# Patient Record
Sex: FEMALE | Race: WHITE | NOT HISPANIC OR LATINO | Employment: OTHER | URBAN - METROPOLITAN AREA
[De-identification: names, ages, dates, MRNs, and addresses within clinical notes are randomized per-mention and may not be internally consistent; named-entity substitution may affect disease eponyms.]

---

## 2017-03-03 ENCOUNTER — TELEPHONE (OUTPATIENT)
Dept: NEUROLOGY | Facility: CLINIC | Age: 42
End: 2017-03-03

## 2017-03-03 NOTE — TELEPHONE ENCOUNTER
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  Pharmacy #[phone]  Pharmacy [phone]

## 2017-03-03 NOTE — TELEPHONE ENCOUNTER
Prior Authorization Retail Medication Request  Medication/Dose: LAMICTAL 100 MG tablet  Diagnosis and ICD code: Poorly controlled generalized convulsive epilepsy with intractable epilepsy (H) [G40.311]   New/Renewal/Insurance Change PA: Renewal  Previously Tried and Failed Therapies: See Chart    Insurance ID (if provided):   Insurance Phone (if provided):     Any additional info from fax request:     If you received a fax notification from an outside Pharmacy:  Pharmacy Name:[facility].  Pharmacy #[phone]  Pharmacy [phone]

## 2017-03-03 NOTE — TELEPHONE ENCOUNTER
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  Pharmacy #[phone]  Pharmacy [phone]

## 2017-03-13 NOTE — TELEPHONE ENCOUNTER
[facility] Prior Authorization Team   Phone: [phone]  Fax: [phone]    PA Initiation    Medication: LAMICTAL 100 MG tablet  Insurance Company: [employer] Part D - Phone [phone] Fax [phone]  Pharmacy [facility]illing the Rx: [facility] [address]  Filling Pharmacy Phone: [phone]  Filling Pharmacy Fax:    Start Date: 3/13/2017

## 2017-03-13 NOTE — TELEPHONE ENCOUNTER
[facility] Prior Authorization Team   Phone: [phone]  Fax: [phone]    PA Initiation    Medication: FELBATOL 400 MG tablet  Insurance Company: [employer] Part D - Phone [phone] Fax [phone]  Pharmacy [facility]illing the Rx: [facility] [address]  Filling Pharmacy Phone: [phone]  Filling Pharmacy Fax:    Start Date: 3/13/2017

## 2017-03-13 NOTE — TELEPHONE ENCOUNTER
[facility] Prior Authorization Team   Phone: [phone]  Fax: [phone]    PA Initiation    Medication: DEPAKOTE SPRINKLES 125 MG capsule  Insurance Company: [employer] Part D - Phone [phone] Fax [phone]  Pharmacy [facility]illing the Rx: [facility] [place], [place] - [address] [place] [address]  Filling Pharmacy Phone: [phone]  Filling Pharmacy Fax:    Start Date: 3/13/2017

## 2017-03-14 NOTE — TELEPHONE ENCOUNTER
Prior Authorization Approval    Authorization Effective Date: 12/13/2016  Authorization Expiration Date: 3/13/2018  Medication: DEPAKOTE SPRINKLES 125 MG capsule - Approved  Approved Dose/Quantity:   Reference #:     Insurance Company: [employer] Part D - Phone [phone] Fax [phone]  Expected CoPay:  $0.00 (patient p/u meds on 2/24/17)       CoPay Card Available:      Foundation Assistance Needed:    Which Pharmacy is filling the prescription (Not needed for infusion/clinic administered): [facility] [place] [facility] [address] [place] [address]  Pharmacy Notified: Yes  Patient Notified: Yes

## 2017-03-14 NOTE — TELEPHONE ENCOUNTER
Prior Authorization Approval    Authorization Effective Date: 12/13/2016  Authorization Expiration Date: 3/13/2018  Medication: LAMICTAL 100 MG tablet- Approved  Approved Dose/Quantity:   Reference #:     Insurance Company: [employer] Part D - Phone [phone] Fax [phone]  Expected CoPay: $0.00 (patient p/u meds on 2/24/17)      CoPay Card Available:      Foundation Assistance Needed:    Which Pharmacy is filling the prescription (Not needed for infusion/clinic administered): [email or web address] [place] [facility] [address] [place] [address]  Pharmacy Notified: Yes  Patient Notified: Yes

## 2017-03-14 NOTE — TELEPHONE ENCOUNTER
Prior Authorization Approval    Authorization Effective Date: 12/13/2016  Authorization Expiration Date: 3/13/2018  Medication: FELBATOL 400 MG tablet- APPROVED  Approved Dose/Quantity:   Reference #:     Insurance Company: [employer] Part D - Phone [phone] Fax [phone]  Expected CoPay: $0.00 (patient p/u meds on 2/24/17)     CoPay Card Available:      Foundation Assistance Needed:    Which Pharmacy is filling the prescription (Not needed for infusion/clinic administered): [facility] [place] [place] - [address] [place] [address]  Pharmacy Notified: Yes  Patient Notified: Yes

## 2017-04-24 ENCOUNTER — TELEPHONE (OUTPATIENT)
Dept: NEUROLOGY | Facility: CLINIC | Age: 42
End: 2017-04-24

## 2017-04-24 DIAGNOSIS — G40.319 GENERALIZED CONVULSIVE EPILEPSY WITH INTRACTABLE EPILEPSY (H): Chronic | ICD-10-CM

## 2017-04-24 RX ORDER — DIVALPROEX SODIUM 125 MG
CAPSULE, DELAYED RELEASE SPRINKLE ORAL
Qty: 392 CAPSULE | Refills: 5 | Status: SHIPPED | OUTPATIENT
Start: 2017-04-24 | End: 2017-10-05

## 2017-05-30 ENCOUNTER — TELEPHONE (OUTPATIENT)
Dept: NEUROLOGY | Facility: CLINIC | Age: 42
End: 2017-05-30

## 2017-05-30 DIAGNOSIS — G40.319 GENERALIZED CONVULSIVE EPILEPSY WITH INTRACTABLE EPILEPSY (H): Chronic | ICD-10-CM

## 2017-05-30 RX ORDER — DIAZEPAM ORAL SOLUTION (CONCENTRATE) 5 MG/ML
SOLUTION ORAL
Qty: 120 ML | Refills: 0 | Status: SHIPPED | OUTPATIENT
Start: 2017-05-30 | End: 2017-10-13

## 2017-10-05 ENCOUNTER — TELEPHONE (OUTPATIENT)
Dept: NEUROLOGY | Facility: CLINIC | Age: 42
End: 2017-10-05

## 2017-10-05 DIAGNOSIS — G40.319: ICD-10-CM

## 2017-10-05 DIAGNOSIS — G40.319 GENERALIZED CONVULSIVE EPILEPSY WITH INTRACTABLE EPILEPSY (H): Chronic | ICD-10-CM

## 2017-10-05 RX ORDER — LAMOTRIGINE 100 MG/1
TABLET ORAL
Qty: 90 TABLET | Refills: 1 | Status: SHIPPED | OUTPATIENT
Start: 2017-10-05 | End: 2017-10-13

## 2017-10-05 RX ORDER — FELBAMATE 400 MG/1
TABLET ORAL
Qty: 224 TABLET | Refills: 1 | Status: SHIPPED | OUTPATIENT
Start: 2017-10-05 | End: 2017-10-13

## 2017-10-05 RX ORDER — DIVALPROEX SODIUM 125 MG
CAPSULE, DELAYED RELEASE SPRINKLE ORAL
Qty: 392 CAPSULE | Refills: 1 | Status: SHIPPED | OUTPATIENT
Start: 2017-10-05 | End: 2017-10-13

## 2017-10-05 NOTE — TELEPHONE ENCOUNTER
----- Message from Ge Interiano CMA sent at 10/5/2017  3:18 PM CDT -----  Regarding: refill  Patient needs a refill on   1. LAMICTAL 100 MG tablet     Taking 1 tab TID  2. FELBATOL 400 MG tablet     Take 3 tab po 8:00 AM and 4:00 PM and 2 tab po hs  3. DEPAKOTE SPRINKLES 125 MG CR capsule  Taking 5 caps AM, 4 caps at 4pm and 5 caps at HS.      Pharmacy: [facility], [address]      RTC date: 10/13

## 2017-10-13 ENCOUNTER — OFFICE VISIT (OUTPATIENT)
Dept: NEUROLOGY | Facility: CLINIC | Age: 42
End: 2017-10-13

## 2017-10-13 VITALS
SYSTOLIC BLOOD PRESSURE: 145 MMHG | DIASTOLIC BLOOD PRESSURE: 77 MMHG | BODY MASS INDEX: 29.79 KG/M2 | WEIGHT: 157.8 LBS | HEIGHT: 61 IN | HEART RATE: 83 BPM

## 2017-10-13 DIAGNOSIS — G40.319: ICD-10-CM

## 2017-10-13 DIAGNOSIS — G40.319 GENERALIZED CONVULSIVE EPILEPSY WITH INTRACTABLE EPILEPSY (H): Chronic | ICD-10-CM

## 2017-10-13 RX ORDER — DIAZEPAM ORAL SOLUTION (CONCENTRATE) 5 MG/ML
SOLUTION ORAL
Qty: 120 ML | Refills: 0 | Status: SHIPPED | OUTPATIENT
Start: 2017-10-13 | End: 2018-02-09

## 2017-10-13 RX ORDER — DIVALPROEX SODIUM 125 MG
CAPSULE, DELAYED RELEASE SPRINKLE ORAL
Qty: 500 CAPSULE | Refills: 3 | Status: SHIPPED | OUTPATIENT
Start: 2017-10-13 | End: 2018-02-09 | Stop reason: SINTOL

## 2017-10-13 RX ORDER — OXCARBAZEPINE 300 MG/1
300 TABLET, FILM COATED ORAL 2 TIMES DAILY
Status: ON HOLD | COMMUNITY
End: 2018-02-26

## 2017-10-13 RX ORDER — FELBAMATE 400 MG/1
TABLET ORAL
Qty: 224 TABLET | Refills: 3 | Status: ON HOLD | OUTPATIENT
Start: 2017-10-13 | End: 2018-02-26

## 2017-10-13 RX ORDER — LAMOTRIGINE 100 MG/1
TABLET ORAL
Qty: 180 TABLET | Refills: 3 | Status: SHIPPED | OUTPATIENT
Start: 2017-10-13 | End: 2018-04-17

## 2017-10-13 RX ORDER — OXCARBAZEPINE 150 MG/1
150 TABLET, FILM COATED ORAL 2 TIMES DAILY
Status: ON HOLD | COMMUNITY
End: 2018-02-26

## 2017-10-13 NOTE — LETTER
10/13/2017       RE: Nella Helms  : 1975   MRN: [id number]      Dear Colleague,    Thank you for referring your patient, Nella Helms, to the [facility] EPILEPSY CARE at [facility]. Please see a copy of my visit note below.    INTERVAL HX:  Stable sz frequency.  Has worsening of sz this summer associated with staph infection. Had drop sz but these resolved. Felbamate 1200 bid plus 800. Needs brand-failed generic FBM.  EPILEPSY HX REVIEWED 10/13/17:   I have been seeing her for at least more than 20 years, since age 19.  She was initially seen at [facility] when she was 15 years old.    First seizure occurred at 6 hours of age.  It was described as color turning dusky, turning head to the right, eyes fixed to the right and no response.  She continued to have intermittent seizure activity and subsequently was transferred to the [facility] where she was hospitalized for 2 weeks.  It is not clear what diagnostic evaluation was done but they decided to discharge her without seizure medications.  Seizures recurred at 9 months of age.  At that time they noticed twitching of her left arm and then twitching of the side of the face.  EEG initially showed hypsarrhythmic pattern.  She was treated with phenobarbital.  Then, she was treated with Tegretol and later Dilantin.  CT scan in  we do not have records.      Seizure types:   In  seizure types were described as sitting, both arms jerk up, her head turns to the right, eyes go to the right and roll up.  These events last 2 minutes and at that time she was having 6 a month.      Second seizure type is described as her arms go up, legs stiffen and she moans.  She can be lowered to the floor and then her arms and legs shake with some erratic breathing and sweating profusely.  These occurred at that time, 3-4 per month.        Seizure type 3 was described as being alert.  Her arms  fly out and her eyes blink.      Seizure type 4 described as stares.      She has had 1 episode of status epilepticus in .      EEG when she was 12 was read as abnormal record by virtue of diffuse theta and delta slowing greater on the right.      Parents report that Nella has always had a left hemiparesis, mostly affecting the left upper extremity with some involvement of her left lower extremity.  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  She has been diagnosed as legally blind as having cortical blindness.      BIRTH HISTORY:  Birth history was that she was the result of a full-term pregnancy that was complicated by maternal high blood pressure and use of Enduron.  Pregnancy ended in a 14 labored and vaginal delivery.  Birth weight 7 pounds 6 ounces.  Apgar scores of 4 at 1 minute and 8 at 5 minutes.      She has had global developmental delay.      Subsequent treatment at [facility] has been quite extensive and will not be reviewed completely here.  Last EEG was 2010 which showed mild to moderate nonspecific diffuse disturbance of cerebral activity and multifocal interictal activity with one complex partial seizure recorded.      In the last year they have described 204 seizures,all given diazepam.  OTHER ISSUES:        She has had behavioral issues throughout.      Bell's palsy in , resolved.      Most of her seizures that the staff is noticing now would be described as brief tonic seizures are brief tonic-clonic seizures.  They are more concerned with the length then the precise observation.  She might be having some minor seizures are not being reported.      CURRENT MEDICATIONS:  Felbatol and Lamictal have really been the best treatment for her.  She has been tried almost all the other seizure medications over time including Depakote, Tegretol, phenobarbital, Dilantin, etc.      SOCIAL HISTORY:  She is living in a group home.      REVIEW OF SYSTEMS:  Through the caregiver is essentially negative.  She has  "not had any upper respiratory infections lately.  Appetite is good.  She has no obvious headaches.   GI and  appear to be functioning normally.      PHYSICAL EXAMINATION:  Blood pressure 145/77, pulse 83, height 5' 1\" (154.9 cm), weight 157 lb 12.8 oz (71.6 kg), unknown if currently breastfeeding.  She is accompanied in clinic today by her mother and her caregiver.  Nella sits quietly throughout the examination, but every once in awhile verbalizes loudly and inappropriately with single words.  Her eyes are rolling.  She is microcephalic, has a small chin. However, when asked about airplane ride, she quickly shows rapifd flight with there hands     Gait is quite abnormal, apractic, but due to poor vision.      Attention span is very short and limited and she does not really follow her discussion.      Coordination is moderately impaired.  However, she is difficult to examine because she does not follow commands.  On observation, she does have difficulty more with the left than the right upper extremity but limb tone is increased bilaterally.        ASSESSMENT:  Her seizure is stable stable. Will not make any med changes at this time. Failed generic ASDs in past. Needs brand. Oxcarb given for behavior by other MD.  PLAN:   1.  RTC 12 months  2.  Levels of ASDs and labs not needed today as she is stable.    Again, thank you for allowing me to participate in the care of your patient.      Sincerely,    Elysia Gomez MD      "

## 2017-10-13 NOTE — PROGRESS NOTES
INTERVAL HX:  Stable sz frequency.  Has worsening of sz this summer associated with staph infection. Had drop sz but these resolved. Felbamate 1200 bid plus 800. Needs brand-failed generic FBM.  EPILEPSY HX REVIEWED 10/13/17:   I have been seeing her for at least more than 20 years, since age 19.  She was initially seen at [facility] when she was 15 years old.    First seizure occurred at 6 hours of age.  It was described as color turning dusky, turning head to the right, eyes fixed to the right and no response.  She continued to have intermittent seizure activity and subsequently was transferred to the [facility] where she was hospitalized for 2 weeks.  It is not clear what diagnostic evaluation was done but they decided to discharge her without seizure medications.  Seizures recurred at 9 months of age.  At that time they noticed twitching of her left arm and then twitching of the side of the face.  EEG initially showed hypsarrhythmic pattern.  She was treated with phenobarbital.  Then, she was treated with Tegretol and later Dilantin.  CT scan in 1983 we do not have records.      Seizure types:   In 1991 seizure types were described as sitting, both arms jerk up, her head turns to the right, eyes go to the right and roll up.  These events last 2 minutes and at that time she was having 6 a month.      Second seizure type is described as her arms go up, legs stiffen and she moans.  She can be lowered to the floor and then her arms and legs shake with some erratic breathing and sweating profusely.  These occurred at that time, 3-4 per month.        Seizure type 3 was described as being alert.  Her arms fly out and her eyes blink.      Seizure type 4 described as stares.      She has had 1 episode of status epilepticus in 1978.      EEG when she was 12 was read as abnormal record by virtue of diffuse theta and delta slowing greater on the right.      Parents report that  "Nella has always had a left hemiparesis, mostly affecting the left upper extremity with some involvement of her left lower extremity.  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  She has been diagnosed as legally blind as having cortical blindness.      BIRTH HISTORY:  Birth history was that she was the result of a full-term pregnancy that was complicated by maternal high blood pressure and use of Enduron.  Pregnancy ended in a 14 labored and vaginal delivery.  Birth weight 7 pounds 6 ounces.  Apgar scores of 4 at 1 minute and 8 at 5 minutes.      She has had global developmental delay.      Subsequent treatment at [facility] has been quite extensive and will not be reviewed completely here.  Last EEG was 2010 which showed mild to moderate nonspecific diffuse disturbance of cerebral activity and multifocal interictal activity with one complex partial seizure recorded.      In the last year they have described 204 seizures,all given diazepam.  OTHER ISSUES:        She has had behavioral issues throughout.      Bell's palsy in , resolved.      Most of her seizures that the staff is noticing now would be described as brief tonic seizures are brief tonic-clonic seizures.  They are more concerned with the length then the precise observation.  She might be having some minor seizures are not being reported.      CURRENT MEDICATIONS:  Felbatol and Lamictal have really been the best treatment for her.  She has been tried almost all the other seizure medications over time including Depakote, Tegretol, phenobarbital, Dilantin, etc.      SOCIAL HISTORY:  She is living in a group home.      REVIEW OF SYSTEMS:  Through the caregiver is essentially negative.  She has not had any upper respiratory infections lately.  Appetite is good.  She has no obvious headaches.   GI and  appear to be functioning normally.      PHYSICAL EXAMINATION:  Blood pressure 145/77, pulse 83, height 5' 1\" (154.9 cm), weight 157 lb 12.8 oz (71.6 kg), unknown " if currently breastfeeding.  She is accompanied in clinic today by her mother and her caregiver.  Nella sits quietly throughout the examination, but every once in awhile verbalizes loudly and inappropriately with single words.  Her eyes are rolling.  She is microcephalic, has a small chin. However, when asked about airplane ride, she quickly shows rapifd flight with there hands     Gait is quite abnormal, apractic, but due to poor vision.      Attention span is very short and limited and she does not really follow her discussion.      Coordination is moderately impaired.  However, she is difficult to examine because she does not follow commands.  On observation, she does have difficulty more with the left than the right upper extremity but limb tone is increased bilaterally.        ASSESSMENT:  Her seizure is stable stable. Will not make any med changes at this time. Failed generic ASDs in past. Needs brand. Oxcarb given for behavior by other MD.  PLAN:   1.  RTC 12 months  2.  Levels of ASDs and labs not needed today as she is stable.

## 2017-10-13 NOTE — MR AVS SNAPSHOT
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      Who to contact     Please call your clinic at [phone] to:    Ask questions about your health    Make or cancel appointments    Discuss your medicines    Learn about your test results    Speak to your doctor   If you have compliments or concerns about an experience at your clinic, or if you wish to file a complaint, please contact [facility] Patient Relations at [phone] or email us at [email or web address]         Additional Information About Your Visit        MyChart Information     Essential Viewing is an electronic gateway that provides easy, online access to your medical records. With Essential Viewing, you can request a clinic appointment, read your test results, renew a prescription or communicate with your care team.     To sign up for Essential Viewing visit the website at [email or web address]   You will be asked to enter the access code listed below, as well as some personal information. Please follow the directions to create your username and password.     Your access code is: [email or web address]  Expires: 2018  3:41 PM     Your access code will  in 90 days. If you need help or a new code, please contact your [facility] Clinic or call [phone] for assistance.        Care EveryWhere ID     This is your Care EveryWhere ID. This could be used by other organizations to access your [facility]  "medical records  [id number]        Your Vitals Were     Pulse Height Breastfeeding? BMI (Body Mass Index)          83 5' 1\" (154.9 cm) Unknown 29.82 kg/m2         Blood Pressure from Last 3 Encounters:   10/13/17 145/77   10/20/16 (!) 141/97   10/19/15 (!) 160/100    Weight from Last 3 Encounters:   10/13/17 157 lb 12.8 oz (71.6 kg)   10/20/16 149 lb (67.6 kg)   10/19/15 150 lb 6.4 oz (68.2 kg)              Today, you had the following     No orders found for display         Where to get your medicines      These medications were sent to [facility] [address] [address]     Phone:  [phone]     DEPAKOTE SPRINKLES 125 MG CR capsule    FELBATOL 400 MG tablet    LAMICTAL 100 MG tablet         Some of these will need a paper prescription and others can be bought over the counter.  Ask your nurse if you have questions.     Bring a paper prescription for each of these medications     diazepam 5 MG/ML (HIGH CONC) solution          Primary Care Provider Office Phone # Fax #    Monet Rodrigues [phone]       [facility] [address] [facility] [address]        Equal Access to Services     KATHRYN BROCK AH: Hadii arslan ku hadasho Soomaali, waaxda luqadaha, qaybta kaalmada adeegyada, sahara durbinin hayhalima sparsk . So [facility] [phone].    ATENCIÓN: Si habla español, tiene a singh disposición servicios gratuitos de asistencia lingüística. Llame al [phone].    We comply with applicable federal civil rights laws and [place] laws. We do not discriminate on the basis of race, color, national origin, age, disability, sex, sexual orientation, or gender identity.            Thank you!     Thank you for choosing [facility] EPILEPSY [facility]  for your care. Our goal is always to provide you with excellent care. Hearing back from our patients is one way we can continue to improve our services. Please take a few minutes " to complete the written survey that you may receive in the mail after your visit with us. Thank you!             Your Updated Medication List - Protect others around you: Learn how to safely use, store and throw away your medicines at www.disposemymeds.org.          This list is accurate as of: 10/13/17  3:41 PM.  Always use your most recent med list.                   Brand Name Dispense Instructions for use Diagnosis    COLACE 100 MG capsule   Generic drug:  docusate sodium      Take by mouth 2 times daily        DEPAKOTE SPRINKLES 125 MG CR capsule   Generic drug:  divalproex     500 capsule    Take 5 capsules po in am and at bedtime. Take 4 capsules po at 4 pm. 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 WHEN ADMINISTERED NOTIFY PC, RN & PD.    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    Poorly controlled generalized convulsive epilepsy with intractable epilepsy (H)       MULTIVITAMINS PO      Take by mouth daily        * OXcarbazepine 300 MG tablet    TRILEPTAL     Take 300 mg by mouth 2 times daily Take 1 tablet along with the 150 mg tablet 2 times daily (450 mg BID)        * OXcarbazepine 150 MG tablet    TRILEPTAL     Take 150 mg by mouth 2 times daily Take 1 tablet along with the 300 mg tablet 2 times daily (450 mg BID)        RISPERDAL 2 MG tablet   Generic drug:  risperiDONE      4 mg bid, 2 mg hs        traZODone 100 MG tablet    DESYREL     1 tablet 8am. 2 tablets noon, 2 tablets  4pm, 2 tablets 8pm        VITAMIN E      2 times daily        * Notice:  This list has 2 medication(s) that are the same as other medications prescribed for you. Read the directions carefully, and ask your doctor or other care provider to review them with you.

## 2017-10-18 ENCOUNTER — TELEPHONE (OUTPATIENT)
Dept: NEUROLOGY | Facility: CLINIC | Age: 42
End: 2017-10-18

## 2017-10-18 NOTE — TELEPHONE ENCOUNTER
Received a request to update patient's seizure protocol on 10/18/17. I put forms in nurse's folder to be completed.     Berta Rosado CMA

## 2017-11-26 ENCOUNTER — HEALTH MAINTENANCE LETTER (OUTPATIENT)
Age: 42
End: 2017-11-26

## 2018-01-08 ENCOUNTER — TELEPHONE (OUTPATIENT)
Dept: NEUROLOGY | Facility: CLINIC | Age: 43
End: 2018-01-08

## 2018-01-08 DIAGNOSIS — G40.319 GENERALIZED CONVULSIVE EPILEPSY WITH INTRACTABLE EPILEPSY (H): Primary | ICD-10-CM

## 2018-01-08 NOTE — TELEPHONE ENCOUNTER
"Patient has been seen in ED recently with falls and was told it was her Trileptal that was causing her difficulty.  It was reduced on 2 different occasions and then patient had 2 \"Grand - Mal\" seizures the last being yesterday.  Patients Trileptal was then increased back to original dose and caller is asking for a blood level to be done.    Nurse indicated he would send order for lab draw to patient's local clinic for a blood draw in one week and asked for a call back with any further seizure or other difficulties.      "

## 2018-01-16 ENCOUNTER — TRANSFERRED RECORDS (OUTPATIENT)
Dept: HEALTH INFORMATION MANAGEMENT | Facility: CLINIC | Age: 43
End: 2018-01-16

## 2018-01-18 LAB — OXCARBAZEPINE: 12.1 UG/ML (ref 10–35)

## 2018-01-19 DIAGNOSIS — G40.319 GENERALIZED CONVULSIVE EPILEPSY WITH INTRACTABLE EPILEPSY (H): ICD-10-CM

## 2018-01-24 ENCOUNTER — TELEPHONE (OUTPATIENT)
Dept: NEUROLOGY | Facility: CLINIC | Age: 43
End: 2018-01-24

## 2018-01-24 DIAGNOSIS — G40.319 GENERALIZED CONVULSIVE EPILEPSY WITH INTRACTABLE EPILEPSY (H): Primary | ICD-10-CM

## 2018-01-24 NOTE — TELEPHONE ENCOUNTER
Received call from  nurse who indicates thart patient has again having side effects of unsteadiness and knees buckling.   nurse also notes that she is on trazodone also and believes these two meds are interacting, and that both of these are prescribed by psychiatry.    When this nurse indicated that this issue needs to be addressed by psychiatry, she told me that the psychiatrist had a stroke and now they can not get her in until March.  This nurse than indicated that patient would have to be taken to urgent care or ED, and she is agreeable with this, but also ask for orders for blood draws again I I agreed to send orders to [facility].

## 2018-01-25 ENCOUNTER — TRANSFERRED RECORDS (OUTPATIENT)
Dept: HEALTH INFORMATION MANAGEMENT | Facility: CLINIC | Age: 43
End: 2018-01-25

## 2018-01-31 LAB
FELBAMATE LEVEL: 60 UG/ML (ref 40–100)
LAMOTRIGINE SERPL-MCNC: 6.3 UG/ML (ref 3–15)
OXCARBAZEPINE: 13 UG/ML (ref 10–35)
TRAZODONE LEVEL: 397 NG/ML (ref 800–1600)
VALPROIC ACID FREE: <6 UG/ML (ref 6–20)
VALPROIC ACID TOTAL: 76.1 UG/ML (ref 50–100)

## 2018-02-05 DIAGNOSIS — G40.319 GENERALIZED CONVULSIVE EPILEPSY WITH INTRACTABLE EPILEPSY (H): ICD-10-CM

## 2018-02-07 ENCOUNTER — TELEPHONE (OUTPATIENT)
Dept: NEUROLOGY | Facility: CLINIC | Age: 43
End: 2018-02-07

## 2018-02-07 NOTE — TELEPHONE ENCOUNTER
Nurse received In-Basket message as follows:    Caller: Alina Shoemaker     Relationship to Patient:      Call Back Number: [phone]     Reason for Call: Would like to discuss changing the patient's medications. Please give her a call back. Thanks.     Nurse returned call to Alina who indicates that patient has had a lot of back and forth with her OXC, there have been decreases with greatly improved physical function including better balance and fewer falls.  Patient has also had 2 Grand - Mal seizure on decreased dose, and OXC is now  back at previous dose once again.  Patient is also once again falling, getting hurt, drooling, and currently wheelchair bound unable to walk.  Patient does not currently have a follow up appointment, but Alina is going to get something set up.    Nurse called RUY  and ask for urgency on appointment, appointment was arranged for 3:30 pm Friday ( 2/9/18 ), returned call to Alina who indicates patient can and will be there.

## 2018-02-09 ENCOUNTER — OFFICE VISIT (OUTPATIENT)
Dept: NEUROLOGY | Facility: CLINIC | Age: 43
End: 2018-02-09
Payer: MEDICARE

## 2018-02-09 VITALS — HEIGHT: 61 IN | TEMPERATURE: 98.2 F

## 2018-02-09 DIAGNOSIS — G40.319 GENERALIZED CONVULSIVE EPILEPSY WITH INTRACTABLE EPILEPSY (H): Chronic | ICD-10-CM

## 2018-02-09 RX ORDER — DIAZEPAM ORAL SOLUTION (CONCENTRATE) 5 MG/ML
SOLUTION ORAL
Qty: 120 ML | Refills: 3 | Status: SHIPPED | OUTPATIENT
Start: 2018-02-09 | End: 2018-12-28

## 2018-02-09 NOTE — LETTER
2018       RE: Nella Helms  : 1975   MRN: [id number]      Dear Colleague,    Thank you for referring your patient, Nella Helms, to the [facility] at [facility]. Please see a copy of my visit note below.    INTERVAL HX:  Since last visit has had marked deterioration. Had a GTSz that did not respond to erecue meds. Taken to ER but sz over by then Has deteriorated in terms of neuro function. In wheelchair since Monday. Increasing tremors; decreased interactions and alertness. Less loud. Mom  a few weeks ago. Dad in hospital for cardiac surgery. Brother, who is guardian came today. MAJOR ISSUE Platelets dropped to 55 on 18 from 158 on 18 !! Other labs close to ok.   .EPILEPSY HX REVIEWED 18:   I have been seeing her f since she was  age 19.  She was initially seen at [facility] when she was 15 years old.    First seizure occurred at 6 hours of age.  It was described as color turning dusky, turning head to the right, eyes fixed to the right and no response.  She continued to have intermittent seizure activity and subsequently was transferred to the [facility] where she was hospitalized for 2 weeks.  It is not clear what diagnostic evaluation was done but they decided to discharge her without seizure medications.  Seizures recurred at 9 months of age.  At that time they noticed twitching of her left arm and then twitching of the side of the face.  EEG initially showed hypsarrhythmic pattern.  She was treated with phenobarbital.  Then, she was treated with Tegretol and later Dilantin.  CT scan in  we do not have records.      Seizure types:   In  seizure types were described as sitting, both arms jerk up, her head turns to the right, eyes go to the right and roll up.  These events last 2 minutes and at that time she was having 6 a month.      Second seizure type is described as her arms go  up, legs stiffen and she moans.  She can be lowered to the floor and then her arms and legs shake with some erratic breathing and sweating profusely.  These occurred at that time, 3-4 per month.        Seizure type 3 was described as being alert.  Her arms fly out and her eyes blink.      Seizure type 4 described as stares.      She has had 1 episode of status epilepticus in .      EEG when she was 12 was read as abnormal record by virtue of diffuse theta and delta slowing greater on the right.      Parents report that Nella has always had a left hemiparesis, mostly affecting the left upper extremity with some involvement of her left lower extremity.  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  She has been diagnosed as legally blind as having cortical blindness.      BIRTH HISTORY:  Birth history was that she was the result of a full-term pregnancy that was complicated by maternal high blood pressure and use of Enduron.  Pregnancy ended in a 14 labored and vaginal delivery.  Birth weight 7 pounds 6 ounces.  Apgar scores of 4 at 1 minute and 8 at 5 minutes.      She has had global developmental delay.      Subsequent treatment at [facility] has been quite extensive and will not be reviewed completely here.  Last EEG was 2010 which showed mild to moderate nonspecific diffuse disturbance of cerebral activity and multifocal interictal activity with one complex partial seizure recorded.      In the last year they have described 204 seizures,all given diazepam.  OTHER ISSUES:        She has had behavioral issues throughout.      Bell's palsy in , resolved.      Most of her seizures that the staff is noticing now would be described as brief tonic seizures are brief tonic-clonic seizures.  They are more concerned with the length then the precise observation.  She might be having some minor seizures are not being reported.      CURRENT MEDICATIONS:  Felbatol and Lamictal have really been the best treatment for her.  She has  "been tried almost all the other seizure medications over time including Depakote, Tegretol, phenobarbital, Dilantin, etc.      SOCIAL HISTORY:  She is living in a group home.      REVIEW OF SYSTEMS:  Through the caregiver is essentially negative.  She has not had any upper respiratory infections lately.  Appetite is good.  She has no obvious headaches.   GI and  appear to be functioning normally.      PHYSICAL EXAMINATION:  Temperature 98.2  F (36.8  C), temperature source Temporal, height 5' 1\" (154.9 cm), unknown if currently breastfeeding. Erythematous right face  She is accompanied in clinic today by her brother and her caregiver.  Nella sits quietly throughout the examination, but every once in awhile verbalizes loudly and inappropriately with single words.  Her eyes are rolling.  She is microcephalic, has a small chin. Less communicative than in past.   Gait : in wheelchair      Attention span is very short and limited and she does not really follow her discussion.      Coordination is moderately impaired.  However, she is difficult to examine because she does not follow commands.  On observation, she does have difficulty more with the left than the right upper extremity but limb tone is increased bilaterally.        ASSESSMENT:  Marked deterioration. Very low platelets. 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 I spoke with Dr Stella Rodrigues and warned her that we are D/Cing VPA and she may have more seizures requiring hospitalization. Also discussed SUDEP with brother. Had ASD levels drawn recently- all within usual range, so I believe neurological deteriorration is source of worsening functioning. More than 50% of 40 min visit in counseling re severity of condiotion and coordinating care with Dr. Rodrigues.  PLAN  1.  D/C valproate  2. Repeat CBC 5 days  3.RTC 2 weeks for re-evaluation of meds.      Sincerely,    Elysia Gomez MD      "

## 2018-02-09 NOTE — MR AVS SNAPSHOT
"              After Visit Summary   2018    Nella Helms    MRN: [id number]           Patient Information     Date Of Birth          1975        Visit Information        Provider Department      2018 3:30 PM Elysia Gomez MD [facility]         Follow-ups after your visit        Your next 10 appointments already scheduled     2018  3:00 PM CST   Return Visit with MD [facility]    [address], [address]  [facility] [address] [phone]              Who to contact     Please call your clinic at [phone] to:    Ask questions about your health    Make or cancel appointments    Discuss your medicines    Learn about your test results    Speak to your doctor            Additional Information About Your Visit        MyChart Information     PlayJam is an electronic gateway that provides easy, online access to your medical records. With PlayJam, you can request a clinic appointment, read your test results, renew a prescription or communicate with your care team.     To sign up for [employer] visit the website at [email or web address]   You will be asked to enter the access code listed below, as well as some personal information. Please follow the directions to create your username and password.     Your access code is: [email or web address]  Expires: 5/10/2018  4:21 PM     Your access code will  in 90 days. If you need help or a new code, please contact your [facility] or call [phone] for assistance.        Care EveryWhere ID     This is your Care EveryWhere ID. This could be used by other organizations to access your [facility] medical records  [id number]        Your Vitals Were     Temperature Height                98.2  F (36.8  C) (Temporal) 5' 1\" (154.9 cm)           Blood Pressure from Last 3 Encounters:   10/13/17 145/77   10/20/16 (!) 141/97   10/19/15 (!) 160/100    Weight from " Last 3 Encounters:   10/13/17 157 lb 12.8 oz (71.6 kg)   10/20/16 149 lb (67.6 kg)   10/19/15 150 lb 6.4 oz (68.2 kg)              Today, you had the following     No orders found for display       Primary Care Provider Office Phone # Fax #    Bryan Fritsch [phone]       [facility] [address]  [facility] [address]        Equal Access to Services     JE BROCK : Hadii aad ku hadasho Soomaali, waaxda luqadaha, qaybta kaalmada adeegyada, waxay idiin hayaan adeeg kharash la'halima . So [facility] [phone].    ATENCIÓN: Si habla español, tiene a singh disposición servicios gratuitos de asistencia lingüística. [facility] [phone].    We comply with applicable federal civil rights laws and [place] laws. We do not discriminate on the basis of race, color, national origin, age, disability, sex, sexual orientation, or gender identity.            Thank you!     Thank you for choosing [facility]  for your care. Our goal is always to provide you with excellent care. Hearing back from our patients is one way we can continue to improve our services. Please take a few minutes to complete the written survey that you may receive in the mail after your visit with us. Thank you!             Your Updated Medication List - Protect others around you: Learn how to safely use, store and throw away your medicines at www.disposemymeds.org.          This list is accurate as of 2/9/18  4:21 PM.  Always use your most recent med list.                   Brand Name Dispense Instructions for use Diagnosis    COLACE 100 MG capsule   Generic drug:  docusate sodium      Take by mouth 2 times daily        DEPAKOTE SPRINKLES 125 MG DR capsule   Generic drug:  divalproex sodium delayed-release     500 capsule    Take 5 capsules po in am and at bedtime. Take 4 capsules po at 4 pm. 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 WHEN ADMINISTERED NOTIFY PC, RN & PD.    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    Poorly controlled generalized convulsive epilepsy with intractable epilepsy (H)       MULTIVITAMINS PO      Take by mouth daily        * OXcarbazepine 300 MG tablet    TRILEPTAL     Take 300 mg by mouth 2 times daily Take 1 tablet along with the 150 mg tablet 2 times daily (450 mg BID)        * OXcarbazepine 150 MG tablet    TRILEPTAL     Take 150 mg by mouth 2 times daily Take 1 tablet along with the 300 mg tablet 2 times daily (450 mg BID)        RISPERDAL 2 MG tablet   Generic drug:  risperiDONE      4 mg bid, 2 mg hs        traZODone 100 MG tablet    DESYREL     1 tablet 8am. 2 tablets noon, 2 tablets 4pm, 2 tablets 8pm        VITAMIN E      2 times daily        * Notice:  This list has 2 medication(s) that are the same as other medications prescribed for you. Read the directions carefully, and ask your doctor or other care provider to review them with you.

## 2018-02-09 NOTE — PROGRESS NOTES
INTERVAL HX:  Since last visit has had marked deterioration. Had a GTSz that did not respond to erecue meds. Taken to ER but sz over by then Has deteriorated in terms of neuro function. In wheelchair since Monday. Increasing tremors; decreased interactions and alertness. Less loud. Mom  a few weeks ago. Dad in hospital for cardiac surgery. Brother, who is guardian came today. MAJOR ISSUE Platelets dropped to 55 on 18 from 158 on 18 !! Other labs close to ok.   .EPILEPSY HX REVIEWED 18:   I have been seeing her f since she was  age 19.  She was initially seen at [facility] when she was 15 years old.    First seizure occurred at 6 hours of age.  It was described as color turning dusky, turning head to the right, eyes fixed to the right and no response.  She continued to have intermittent seizure activity and subsequently was transferred to the [facility] where she was hospitalized for 2 weeks.  It is not clear what diagnostic evaluation was done but they decided to discharge her without seizure medications.  Seizures recurred at 9 months of age.  At that time they noticed twitching of her left arm and then twitching of the side of the face.  EEG initially showed hypsarrhythmic pattern.  She was treated with phenobarbital.  Then, she was treated with Tegretol and later Dilantin.  CT scan in  we do not have records.      Seizure types:   In  seizure types were described as sitting, both arms jerk up, her head turns to the right, eyes go to the right and roll up.  These events last 2 minutes and at that time she was having 6 a month.      Second seizure type is described as her arms go up, legs stiffen and she moans.  She can be lowered to the floor and then her arms and legs shake with some erratic breathing and sweating profusely.  These occurred at that time, 3-4 per month.        Seizure type 3 was described as being alert.  Her arms fly out and her  eyes blink.      Seizure type 4 described as stares.      She has had 1 episode of status epilepticus in .      EEG when she was 12 was read as abnormal record by virtue of diffuse theta and delta slowing greater on the right.      Parents report that Nella has always had a left hemiparesis, mostly affecting the left upper extremity with some involvement of her left lower extremity.  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  She has been diagnosed as legally blind as having cortical blindness.      BIRTH HISTORY:  Birth history was that she was the result of a full-term pregnancy that was complicated by maternal high blood pressure and use of Enduron.  Pregnancy ended in a 14 labored and vaginal delivery.  Birth weight 7 pounds 6 ounces.  Apgar scores of 4 at 1 minute and 8 at 5 minutes.      She has had global developmental delay.      Subsequent treatment at [facility] has been quite extensive and will not be reviewed completely here.  Last EEG was 2010 which showed mild to moderate nonspecific diffuse disturbance of cerebral activity and multifocal interictal activity with one complex partial seizure recorded.      In the last year they have described 204 seizures,all given diazepam.  OTHER ISSUES:        She has had behavioral issues throughout.      Bell's palsy in , resolved.      Most of her seizures that the staff is noticing now would be described as brief tonic seizures are brief tonic-clonic seizures.  They are more concerned with the length then the precise observation.  She might be having some minor seizures are not being reported.      CURRENT MEDICATIONS:  Felbatol and Lamictal have really been the best treatment for her.  She has been tried almost all the other seizure medications over time including Depakote, Tegretol, phenobarbital, Dilantin, etc.      SOCIAL HISTORY:  She is living in a group home.      REVIEW OF SYSTEMS:  Through the caregiver is essentially negative.  She has not had any  "upper respiratory infections lately.  Appetite is good.  She has no obvious headaches.   GI and  appear to be functioning normally.      PHYSICAL EXAMINATION:  Temperature 98.2  F (36.8  C), temperature source Temporal, height 5' 1\" (154.9 cm), unknown if currently breastfeeding. Erythematous right face  She is accompanied in clinic today by her brother and her caregiver.  Nella sits quietly throughout the examination, but every once in awhile verbalizes loudly and inappropriately with single words.  Her eyes are rolling.  She is microcephalic, has a small chin. Less communicative than in past.   Gait : in wheelchair      Attention span is very short and limited and she does not really follow her discussion.      Coordination is moderately impaired.  However, she is difficult to examine because she does not follow commands.  On observation, she does have difficulty more with the left than the right upper extremity but limb tone is increased bilaterally.        ASSESSMENT:  Marked deterioration. Very low platelets. 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 I spoke with Dr Stella Rodrigues and warned her that we are D/Cing VPA and she may have more seizures requiring hospitalization. Also discussed SUDEP with brother. Had ASD levels drawn recently- all within usual range, so I believe neurological deteriorration is source of worsening functioning. More than 50% of 40 min visit in counseling re severity of condiotion and coordinating care with Dr. Rodrigues.  PLAN  1.  D/C valproate  2. Repeat CBC 5 days  3.RTC 2 weeks for re-evaluation of meds.  "

## 2018-02-14 ENCOUNTER — TELEPHONE (OUTPATIENT)
Dept: NEUROLOGY | Facility: CLINIC | Age: 43
End: 2018-02-14

## 2018-02-14 ENCOUNTER — TRANSFERRED RECORDS (OUTPATIENT)
Dept: HEALTH INFORMATION MANAGEMENT | Facility: CLINIC | Age: 43
End: 2018-02-14

## 2018-02-14 DIAGNOSIS — G40.319 GENERALIZED CONVULSIVE EPILEPSY WITH INTRACTABLE EPILEPSY (H): Primary | ICD-10-CM

## 2018-02-14 DIAGNOSIS — G40.319: ICD-10-CM

## 2018-02-14 NOTE — TELEPHONE ENCOUNTER
Staff wondering if AED blood levels are to be checked today.  Patient is getting blood drawn locally at 4pm.  [facility].    Patient had VPA discontinued 5 days ago  Currently taking OXC LUX FBM  Had a cluster of seizures broken with 2 doses of DZP.    Discontinuation of VPA may have affected levels of other AEDs  Order for AED levels placed, to be sent to local clinic for blood draw today

## 2018-02-14 NOTE — TELEPHONE ENCOUNTER
Incoming call from caregiver Alina. Nella was taken off Depakote per direction of MD.Caregiver Alina was told that seizures may occur. Diazepam is on hand.    This morning Nella had multiple seizures that lasted a total of 10 minutes. During this time she had several cluster seizures. Caregiver reports up to 50 cluster seizures during the 10 minute time frame. No motor seizures have occurred.    Seizures described as : Eyes rolled back, she was aware of the seizure occurring but was unable to speak. Two doses of Diazepam were administered so she is currently very groggy. Caregiver would like to know what to do after third dose of Diazepam is administered.     Current/confirmed anticonvulsant  Medications:    OXC (300/150) 450-450  LTG (100) 100-100-100  FBM (400) [phone]

## 2018-02-14 NOTE — TELEPHONE ENCOUNTER
----- Message from Ge Interiano CMA sent at 2/14/2018  2:47 PM CST -----  Regarding: alma  Caller: jason    Relationship to Patient: caregiver    Call Back Number: [phone]    Reason for Call: pt is going in to get her levels check at 4pm today. Would like to know if he needs any AED levels check.

## 2018-02-14 NOTE — TELEPHONE ENCOUNTER
PLAN: Discussed with Dr. Gomez    1) Administer an additional one time dose of Lamotrigine 100 mg now.  2) Repeat platelet count today at 4:00 PM  3) May administer a total of two doses of Diazepam intensol 5 mg/ml in one day. Call [facility] first if a third dose of Diazepam is needed.  4) Call [facility] prior to taking Nella to the ER to determine if a trip to the ER is necessary.    Attention: Alina  FAX to: [phone]

## 2018-02-15 ENCOUNTER — HOSPITAL ENCOUNTER (INPATIENT)
Facility: CLINIC | Age: 43
LOS: 11 days | Discharge: HOME HEALTH | DRG: 813 | End: 2018-02-26
Attending: PSYCHIATRY & NEUROLOGY | Admitting: PSYCHIATRY & NEUROLOGY
Payer: MEDICARE

## 2018-02-15 ENCOUNTER — TELEPHONE (OUTPATIENT)
Dept: NEUROLOGY | Facility: CLINIC | Age: 43
End: 2018-02-15

## 2018-02-15 ENCOUNTER — ALLIED HEALTH/NURSE VISIT (OUTPATIENT)
Dept: NEUROLOGY | Facility: CLINIC | Age: 43
DRG: 813 | End: 2018-02-15
Attending: PSYCHIATRY & NEUROLOGY
Payer: MEDICARE

## 2018-02-15 DIAGNOSIS — R56.9 SEIZURES (H): Primary | ICD-10-CM

## 2018-02-15 DIAGNOSIS — D69.6 THROMBOCYTOPENIA (H): ICD-10-CM

## 2018-02-15 DIAGNOSIS — G40.319 GENERALIZED CONVULSIVE EPILEPSY WITH INTRACTABLE EPILEPSY (H): Primary | Chronic | ICD-10-CM

## 2018-02-15 LAB
ALBUMIN SERPL-MCNC: 3 G/DL (ref 3.4–5)
ALP SERPL-CCNC: 130 U/L (ref 40–150)
ALT SERPL W P-5'-P-CCNC: 65 U/L (ref 0–50)
ANION GAP SERPL CALCULATED.3IONS-SCNC: 4 MMOL/L (ref 3–14)
APTT PPP: 40 SEC (ref 22–37)
AST SERPL W P-5'-P-CCNC: 41 U/L (ref 0–45)
BASOPHILS # BLD AUTO: 0 10E9/L (ref 0–0.2)
BASOPHILS NFR BLD AUTO: 0.2 %
BILIRUB SERPL-MCNC: 0.1 MG/DL (ref 0.2–1.3)
BUN SERPL-MCNC: 7 MG/DL (ref 7–30)
CALCIUM SERPL-MCNC: 9.7 MG/DL (ref 8.5–10.1)
CHLORIDE SERPL-SCNC: 96 MMOL/L (ref 94–109)
CO2 SERPL-SCNC: 32 MMOL/L (ref 20–32)
CREAT SERPL-MCNC: 0.27 MG/DL (ref 0.52–1.04)
DIFFERENTIAL METHOD BLD: ABNORMAL
EOSINOPHIL # BLD AUTO: 0.1 10E9/L (ref 0–0.7)
EOSINOPHIL NFR BLD AUTO: 2.4 %
ERYTHROCYTE [DISTWIDTH] IN BLOOD BY AUTOMATED COUNT: 14.1 % (ref 10–15)
FERRITIN SERPL-MCNC: 280 NG/ML (ref 12–150)
FOLATE SERPL-MCNC: 20.5 NG/ML
GFR SERPL CREATININE-BSD FRML MDRD: >90 ML/MIN/1.7M2
GLUCOSE SERPL-MCNC: 88 MG/DL (ref 70–99)
HCT VFR BLD AUTO: 31.4 % (ref 35–47)
HGB BLD-MCNC: 10.6 G/DL (ref 11.7–15.7)
IMM GRANULOCYTES # BLD: 0 10E9/L (ref 0–0.4)
IMM GRANULOCYTES NFR BLD: 0 %
INR PPP: 0.89 (ref 0.86–1.14)
LDH SERPL L TO P-CCNC: 209 U/L (ref 81–234)
LYMPHOCYTES # BLD AUTO: 1.2 10E9/L (ref 0.8–5.3)
LYMPHOCYTES NFR BLD AUTO: 24.5 %
MCH RBC QN AUTO: 32.6 PG (ref 26.5–33)
MCHC RBC AUTO-ENTMCNC: 33.8 G/DL (ref 31.5–36.5)
MCV RBC AUTO: 97 FL (ref 78–100)
MONOCYTES # BLD AUTO: 0.5 10E9/L (ref 0–1.3)
MONOCYTES NFR BLD AUTO: 11 %
NEUTROPHILS # BLD AUTO: 3.1 10E9/L (ref 1.6–8.3)
NEUTROPHILS NFR BLD AUTO: 61.9 %
NRBC # BLD AUTO: 0 10*3/UL
NRBC BLD AUTO-RTO: 0 /100
PLATELET # BLD AUTO: 21 10E9/L (ref 150–450)
PLATELET # BLD EST: ABNORMAL 10*3/UL
POTASSIUM SERPL-SCNC: 4.4 MMOL/L (ref 3.4–5.3)
PROT SERPL-MCNC: 7.4 G/DL (ref 6.8–8.8)
RBC # BLD AUTO: 3.25 10E12/L (ref 3.8–5.2)
SODIUM SERPL-SCNC: 132 MMOL/L (ref 133–144)
VIT B12 SERPL-MCNC: 1094 PG/ML (ref 193–986)
WBC # BLD AUTO: 4.9 10E9/L (ref 4–11)

## 2018-02-15 PROCEDURE — 82525 ASSAY OF COPPER: CPT | Performed by: PSYCHIATRY & NEUROLOGY

## 2018-02-15 PROCEDURE — A9270 NON-COVERED ITEM OR SERVICE: HCPCS | Mod: GY | Performed by: STUDENT IN AN ORGANIZED HEALTH CARE EDUCATION/TRAINING PROGRAM

## 2018-02-15 PROCEDURE — 83615 LACTATE (LD) (LDH) ENZYME: CPT | Performed by: PSYCHIATRY & NEUROLOGY

## 2018-02-15 PROCEDURE — 95951 ZZHC EEG VIDEO < 12 HR: CPT | Mod: 52,ZF

## 2018-02-15 PROCEDURE — 82607 VITAMIN B-12: CPT | Performed by: PSYCHIATRY & NEUROLOGY

## 2018-02-15 PROCEDURE — 36415 COLL VENOUS BLD VENIPUNCTURE: CPT | Performed by: PSYCHIATRY & NEUROLOGY

## 2018-02-15 PROCEDURE — 12000008 ZZH R&B INTERMEDIATE UMMC

## 2018-02-15 PROCEDURE — 85025 COMPLETE CBC W/AUTO DIFF WBC: CPT | Performed by: PSYCHIATRY & NEUROLOGY

## 2018-02-15 PROCEDURE — 25000132 ZZH RX MED GY IP 250 OP 250 PS 637: Mod: GY | Performed by: STUDENT IN AN ORGANIZED HEALTH CARE EDUCATION/TRAINING PROGRAM

## 2018-02-15 PROCEDURE — 85730 THROMBOPLASTIN TIME PARTIAL: CPT | Performed by: PSYCHIATRY & NEUROLOGY

## 2018-02-15 PROCEDURE — 80053 COMPREHEN METABOLIC PANEL: CPT | Performed by: PSYCHIATRY & NEUROLOGY

## 2018-02-15 PROCEDURE — 83921 ORGANIC ACID SINGLE QUANT: CPT | Performed by: PSYCHIATRY & NEUROLOGY

## 2018-02-15 PROCEDURE — 85610 PROTHROMBIN TIME: CPT | Performed by: PSYCHIATRY & NEUROLOGY

## 2018-02-15 PROCEDURE — 82746 ASSAY OF FOLIC ACID SERUM: CPT | Performed by: PSYCHIATRY & NEUROLOGY

## 2018-02-15 PROCEDURE — 40000141 ZZH STATISTIC PERIPHERAL IV START W/O US GUIDANCE

## 2018-02-15 PROCEDURE — 82728 ASSAY OF FERRITIN: CPT | Performed by: PSYCHIATRY & NEUROLOGY

## 2018-02-15 RX ORDER — LAMOTRIGINE 100 MG/1
100 TABLET ORAL 3 TIMES DAILY
Status: DISCONTINUED | OUTPATIENT
Start: 2018-02-15 | End: 2018-02-26 | Stop reason: HOSPADM

## 2018-02-15 RX ORDER — METHYLPREDNISOLONE SODIUM SUCCINATE 125 MG/2ML
125 INJECTION, POWDER, LYOPHILIZED, FOR SOLUTION INTRAMUSCULAR; INTRAVENOUS ONCE
Status: COMPLETED | OUTPATIENT
Start: 2018-02-15 | End: 2018-02-16

## 2018-02-15 RX ORDER — BISACODYL 10 MG
10 SUPPOSITORY, RECTAL RECTAL DAILY PRN
Status: DISCONTINUED | OUTPATIENT
Start: 2018-02-15 | End: 2018-02-26 | Stop reason: HOSPADM

## 2018-02-15 RX ORDER — FELBAMATE 400 MG/1
800 TABLET ORAL EVERY EVENING
Status: DISCONTINUED | OUTPATIENT
Start: 2018-02-15 | End: 2018-02-16

## 2018-02-15 RX ORDER — MAGNESIUM SULFATE HEPTAHYDRATE 40 MG/ML
4 INJECTION, SOLUTION INTRAVENOUS EVERY 4 HOURS PRN
Status: DISCONTINUED | OUTPATIENT
Start: 2018-02-15 | End: 2018-02-26 | Stop reason: HOSPADM

## 2018-02-15 RX ORDER — NALOXONE HYDROCHLORIDE 0.4 MG/ML
.1-.4 INJECTION, SOLUTION INTRAMUSCULAR; INTRAVENOUS; SUBCUTANEOUS
Status: DISCONTINUED | OUTPATIENT
Start: 2018-02-15 | End: 2018-02-26 | Stop reason: HOSPADM

## 2018-02-15 RX ORDER — ONDANSETRON 4 MG/1
4 TABLET, ORALLY DISINTEGRATING ORAL EVERY 6 HOURS PRN
Status: DISCONTINUED | OUTPATIENT
Start: 2018-02-15 | End: 2018-02-26 | Stop reason: HOSPADM

## 2018-02-15 RX ORDER — ONDANSETRON 2 MG/ML
4 INJECTION INTRAMUSCULAR; INTRAVENOUS EVERY 6 HOURS PRN
Status: DISCONTINUED | OUTPATIENT
Start: 2018-02-15 | End: 2018-02-26 | Stop reason: HOSPADM

## 2018-02-15 RX ORDER — FELBAMATE 400 MG/1
1200 TABLET ORAL 2 TIMES DAILY
Status: DISCONTINUED | OUTPATIENT
Start: 2018-02-16 | End: 2018-02-16

## 2018-02-15 RX ORDER — DOCUSATE SODIUM 100 MG/1
100 CAPSULE, LIQUID FILLED ORAL 2 TIMES DAILY
Status: DISCONTINUED | OUTPATIENT
Start: 2018-02-15 | End: 2018-02-26 | Stop reason: HOSPADM

## 2018-02-15 RX ORDER — POLYETHYLENE GLYCOL 3350 17 G/17G
17 POWDER, FOR SOLUTION ORAL DAILY PRN
Status: DISCONTINUED | OUTPATIENT
Start: 2018-02-15 | End: 2018-02-26 | Stop reason: HOSPADM

## 2018-02-15 RX ORDER — ACETAMINOPHEN 325 MG/1
650 TABLET ORAL EVERY 4 HOURS PRN
Status: DISCONTINUED | OUTPATIENT
Start: 2018-02-15 | End: 2018-02-17

## 2018-02-15 RX ORDER — FELBAMATE 600 MG/1
1200 TABLET ORAL 2 TIMES DAILY
Status: DISCONTINUED | OUTPATIENT
Start: 2018-02-16 | End: 2018-02-15

## 2018-02-15 RX ADMIN — OXCARBAZEPINE 450 MG: 300 TABLET ORAL at 20:56

## 2018-02-15 RX ADMIN — LAMOTRIGINE 100 MG: 100 TABLET ORAL at 20:56

## 2018-02-15 RX ADMIN — FELBAMATE 800 MG: 400 TABLET ORAL at 22:15

## 2018-02-15 RX ADMIN — DOCUSATE SODIUM 100 MG: 100 CAPSULE, LIQUID FILLED ORAL at 20:56

## 2018-02-15 NOTE — IP AVS SNAPSHOT
[address] [address]    Phone:  [phone]                                       After Visit Summary   2/15/2018    Nella Helms    MRN: [id number]           After Visit Summary Signature Page     I have received my discharge instructions, and my questions have been answered. I have discussed any challenges I see with this plan with the nurse or doctor.    ..........................................................................................................................................  Patient/Patient Representative Signature      ..........................................................................................................................................  Patient Representative Print Name and Relationship to Patient    ..................................................               ................................................  Date                                            Time    ..........................................................................................................................................  Reviewed by Signature/Title    ...................................................              ..............................................  Date                                                            Time

## 2018-02-15 NOTE — TELEPHONE ENCOUNTER
Call returned to hospital admission for update regarding admission.  Currently no progress has been made regarding admission    Discussed with . He will contact admissions to try to move things along,  and patient's brother with update

## 2018-02-15 NOTE — TELEPHONE ENCOUNTER
Call placed to Ely to discuss the plan.  Ely will work on arranging transport and will call me back for confirmation

## 2018-02-15 NOTE — IP AVS SNAPSHOT
MRN:[id number]                      After Visit Summary   2/15/2018    Nella Helms    MRN: [id number]           Thank you!     Thank you for choosing [facility] for your care. Our goal is always to provide you with excellent care. Hearing back from our patients is one way we can continue to improve our services. Please take a few minutes to complete the written survey that you may receive in the mail after you visit with us. Thank you!        Patient Information     Date Of Birth          1975        Designated Caregiver       Most Recent Value    Caregiver    Will someone help with your care after discharge? no      About your hospital stay     You were admitted on:  February 15, 2018 You last received care in the:  [address] [facility] [place]    You were discharged on:  February 26, 2018        Reason for your hospital stay       You were hospitalized for low platelets and a need to change your seizure medications.                  Who to Call     For medical emergencies, please call 911.  For non-urgent questions about your medical care, please call your primary care provider or clinic, [phone]          Attending Provider     Provider Specialty    Courtney Matthews MD Neurology    [place], Marina Kowalski MD Neurology    [facility] - Neurology       Primary Care Provider Office Phone # Fax #    Monet Rodrigues [phone]      After Care Instructions     Activity       Your activity upon discharge: activity as tolerated            Diet       Follow this diet upon discharge: Orders Placed This Encounter      Combination Diet Regular Diet Adult                  Follow-up Appointments     Adult [facility] Follow-up and recommended labs and tests       Follow up with primary care provider, Monet Rodrigues MD, within 7 days for hospital follow- up.  The following labs/tests are recommended: weekly CBC.      Follow up with Dr. Gomez , at  (location with clinic name or city) the [facility], within 1-2 months to follow up medication changes.    Appointments on [place] and/or [facility] (with [facility] or [facility] provider or service). Call [phone] if you haven't heard regarding these appointments within 7 days of discharge.                  Additional Services     Home Care Referral       **Order classes of: [place] [facility], [facility] and [facility] will route to the Home Care and Hospice Referral Pool.  Home Care or Hospice will then contact the patient to schedule their appointment.**    If you do not hear from Home Care and Hospice, or you would like to call to schedule, please call the referring place of service that your provider has listed below.  ______________________________________________________________________    Your provider has referred you to: [facility]: [facility] [phone]   [email or web address]    Extended Emergency Contact Information  Primary Emergency Contact: Abdon Helms   [facility]  Home Phone: [phone]  Mobile Phone: [phone]  Relation: Brother  Secondary Emergency Contact: Lesly Garrett   [place]  Mobile Phone: [phone]  Relation: Relative  Father: Cuate Helms    [place]  Mobile Phone: [phone]    Patient Anticipated Discharge Date: 2/26/18   RN, PT, HHA to begin 24 - 48 hours after discharge.  PLEASE EVALUATE AND TREAT (Evaluation timeline is 24 - 48 hrs. Please call if there is need for a variance to this timeline).    REASON FOR REFERRAL: Weekly labs    ADDITIONAL SERVICES NEEDED: none    OTHER PERTINENT INFORMATION: Patient was last seen by provider on 2/26/18 for thrombocytopenia.    Current Outpatient Prescriptions:  lacosamide (VIMPAT) 200 MG TABS tablet, Take 1 tablet (200 mg) by mouth 2 times daily, Disp: 60 tablet, Rfl: 5  levETIRAcetam 1000 MG TABS, Take 1,000 mg by mouth 2 times daily, Disp: 60 tablet, Rfl:  11  OXcarbazepine (TRILEPTAL) 600 MG tablet, Take 1 tablet (600 mg) by mouth 2 times daily, Disp: 60 tablet, Rfl: 11  [START ON 2/27/2018] predniSONE (DELTASONE) 10 MG tablet, Take 1 tablet (10 mg) by mouth daily for 12 days, Disp: 12 tablet, Rfl: 0  [START ON 3/12/2018] predniSONE (DELTASONE) 5 MG tablet, Take 1 tablet (5 mg) by mouth daily for 14 days, Disp: 14 tablet, Rfl: 0      Patient Active Problem List:     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    This encounter with the patient was in whole, or in part, for the following medical condition, which is the primary reason for Home Health Care: Thrombocytopenia.    I certify that, based on my findings, the following services are medically necessary Home Health Services: Nursing    My clinical findings support the need for the above services because: Nurse is needed: Weekly labs.    Further, I certify that my clinical findings support that this patient is homebound (i.e. absences from home require considerable and taxing effort and are for medical reasons or [employer] services or infrequently or of short duration when for other reasons) because: Requires assistance of another person or specialized equipment to access medical services because patient: Is prone to wander/get lost without assistance..    Based on the above findings, I certify that this patient is confined to the home and needs intermittent skilled nursing care, physical therapy and/or speech therapy.  The patient is under my care, and I have initiated the establishment of the plan of care.  This patient will be followed  "by a physician who will periodically review the plan of care.    Physician/Provider to provide follow up care: Monet Rodrigues A    Responsible [place] certified Physician at time of discharge: Mark Washington    Please be aware that coverage of these services is subject to the terms and limitations of your health insurance plan.  Call member services at your health plan with any benefit or coverage questions.                  Future tests that were ordered for you     CBC with platelets  (weekly)      Last Lab Result: Hemoglobin (g/dL)       Date                     Value                 02/26/2018               11.4 (L)         ----------                  Pending Results     No orders found from 2/13/2018 to 2/16/2018.            Statement of Approval     Ordered          02/26/18 1124  I have reviewed and agree with all the recommendations and orders detailed in this document.  EFFECTIVE NOW     Approved and electronically signed by:  Megan Chin MD             Admission Information     Date & Time Provider Department Dept. Phone    2/15/2018 Mark Washington MD [address] [facility] [place] [phone]      Your Vitals Were     Blood Pressure Pulse Temperature Respirations Weight Pulse Oximetry    105/70 72 96.9  F (36.1  C) (Axillary) 18 71.3 kg (157 lb 3.2 oz) 98%    BMI (Body Mass Index)                   29.7 kg/m2           [employer] Information     [employer] lets you send messages to your doctor, view your test results, renew your prescriptions, schedule appointments and more. To sign up, go to [email or web address] . Click on \"Log in\" on the left side of the screen, which will take you to the Welcome page. Then click on \"Sign up Now\" on the right side of the page.     You will be asked to enter the access code listed below, as well as some personal information. Please follow the directions to create your username and password.     Your access code is: [email or web address]  Expires: 5/10/2018  4:21 PM   "   Your access code will  in 90 days. If you need help or a new code, please call your [facility] clinic or [phone].        Care EveryWhere ID     This is your Care EveryWhere ID. This could be used by other organizations to access your [facility] medical records  [id number]        Equal Access to Services     ANN-MARIEJE PERESBERNARD : Hadii arslan ku hadasho Soomaali, waaxda luqadaha, qaybta kaalmada adeegyada, sahara tobardiannastanley conley. So [facility] [phone].    ATENCIÓN: Si habla español, tiene a singh disposición servicios gratuitos de asistencia lingüística. Llame al [phone].    We comply with applicable federal civil rights laws and [place] laws. We do not discriminate on the basis of race, color, national origin, age, disability, sex, sexual orientation, or gender identity.               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)        Dose:  1000 mg   Take 1,000 mg by mouth 2 times daily   Quantity:  60 tablet   Refills:  11       * predniSONE 10 MG tablet   Commonly known as:  DELTASONE        Dose:  10 mg   Start taking on:  2018   Take 1 tablet (10 mg) by mouth daily for 12 days   Quantity:  12 tablet   Refills:  0       * predniSONE 5 MG tablet   Commonly known as:  DELTASONE        Dose:  5 mg   Start taking on:  3/12/2018   Take 1 tablet (5 mg) by mouth daily for 14 days   Quantity:  14 tablet   Refills:  0       * Notice:  This list has 2 medication(s) that are the same as other medications prescribed for you. Read the directions carefully, and ask your doctor or other care provider to review them with you.       CONTINUE these medicines which may have CHANGED, or have new prescriptions. If we are uncertain of the size of tablets/capsules you have at home, strength may be listed as something that might have changed.        Dose / Directions    OXcarbazepine 600 MG tablet   Commonly known as:  TRILEPTAL   This may have changed:    - medication strength  - how much to take  - additional instructions  - Another medication with the same name was removed. Continue taking this medication, and follow the directions you see here.   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 WHEN ADMINISTERED NOTIFY PC, RN & PD.   Quantity:  120 mL   Refills:  3       EYE DROPS AR OP        Dose:  2 drop   Apply 2 drops to eye daily as needed   Refills:  0       LAMICTAL 100 MG tablet   Used for:  Poorly controlled generalized convulsive epilepsy with intractable epilepsy (H)   Generic drug:  lamoTRIgine        TAKE (1) TABLET THREE TIMES DAILY.   Quantity:  180 tablet   Refills:  3       MULTIVITAMINS PO        Take by mouth daily   Refills:  0       RISPERDAL 2 MG tablet   Generic drug:  risperiDONE        4 mg bid, 2 mg hs   Refills:  0       traZODone 100 MG tablet   Commonly known as:  DESYREL        1 tablet 8am. 2 tablets noon, 2 tablets 4pm, 2 tablets 8pm   Refills:  0       VITAMIN E        2 times daily   Refills:  0         STOP taking     FELBATOL 400 MG tablet   Generic drug:  felbamate                Where to get  your medicines      These medications were sent to [facility] - [place], [place] - [address]  [address], [place] [address]     Phone:  [phone]     levETIRAcetam 1000 MG Tabs    OXcarbazepine 600 MG tablet    predniSONE 10 MG tablet    predniSONE 5 MG tablet         Some of these will need a paper prescription and others can be bought over the counter. Ask your nurse if you have questions.     Bring a paper prescription for each of these medications     lacosamide 200 MG Tabs tablet                Protect others around you: Learn how to safely use, store and throw away your medicines at www.disposemymeds.org.             Medication List: This is a list of all your medications and when to take them. Check marks below indicate your daily home schedule. Keep this list as a reference.      Medications           Morning Afternoon Evening Bedtime As Needed    COLACE 100 MG capsule   Take by mouth 2 times daily   Last time this was given:  100 mg on 2/26/2018  7:46 AM   Generic drug:  docusate sodium                                diazepam 5 MG/ML (HIGH CONC) solution   Commonly known as:  DIAZEPAM INTENSOL   (1)ML (5MG) AS NEEDED FOR ANY SEIZURE. WHEN ADMINISTERED NOTIFY PC, RN & PD.                                EYE DROPS AR OP   Apply 2 drops to eye daily as needed                                lacosamide 200 MG Tabs tablet   Commonly known as:  VIMPAT   Take 1 tablet (200 mg) by mouth 2 times daily   Last time this was given:  200 mg on 2/26/2018  7:46 AM                                LAMICTAL 100 MG tablet   TAKE (1) TABLET THREE TIMES DAILY.   Last time this was given:  100 mg on 2/26/2018 10:43 AM   Generic drug:  lamoTRIgine                                levETIRAcetam 1000 MG Tabs   Take 1,000 mg by mouth 2 times daily   Last time this was given:  1,000 mg on 2/26/2018  7:46 AM                                MULTIVITAMINS PO   Take by mouth daily                                 OXcarbazepine 600 MG tablet   Commonly known as:  TRILEPTAL   Take 1 tablet (600 mg) by mouth 2 times daily   Last time this was given:  600 mg on 2/26/2018  7:46 AM                                * predniSONE 10 MG tablet   Commonly known as:  DELTASONE   Take 1 tablet (10 mg) by mouth daily for 12 days   Start taking on:  2/27/2018   Last time this was given:  10 mg on 2/26/2018  7:46 AM                                * predniSONE 5 MG tablet   Commonly known as:  DELTASONE   Take 1 tablet (5 mg) by mouth daily for 14 days   Start taking on:  3/12/2018   Last time this was given:  10 mg on 2/26/2018  7:46 AM                                RISPERDAL 2 MG tablet   4 mg bid, 2 mg hs   Generic drug:  risperiDONE                                traZODone 100 MG tablet   Commonly known as:  DESYREL   1 tablet 8am. 2 tablets noon, 2 tablets 4pm, 2 tablets 8pm                                VITAMIN E   2 times daily                                * Notice:  This list has 2 medication(s) that are the same as other medications prescribed for you. Read the directions carefully, and ask your doctor or other care provider to review them with you.

## 2018-02-15 NOTE — MR AVS SNAPSHOT
After Visit Summary   2/15/2018    Nella Helms    MRN: [id number]           Patient Information     Date Of Birth          1975        Visit Information        Provider Department      2/15/2018 7:30 PM [facility] EEG TECH 4 [facility] EEG        Today's Diagnoses     Seizures (H)    -  1       Follow-ups after your visit        Your next 10 appointments already scheduled     Feb 16, 2018  7:00 AM CST   24 Hour Video Visit with [facility] EEG TECH 4   P EEG ([facility])    [facility]  [address] [address]   [phone]           [place]: Your appointment is scheduled at [facility]. [address] [place] [address]            Feb 20, 2018  3:00 PM CST   [facility]    [address], [address]  [facility] [id number] [phone]              Future tests that were ordered for you today     Open Standing Orders        Priority Remaining Interval Expires Ordered    Blood Morphology Pathologist Review Routine 1/1 AM DRAW  2/15/2018    Reticulocyte Count Routine 1/1 AM DRAW  2/15/2018    Rapid HIV 1 and 2 Antigen Antibody Routine 1/1 AM DRAW  2/15/2018    Hepatits C antibody Routine 1/1 AM DRAW  2/15/2018    Hematology IP Consult: Patient to be seen: Routine - within 24 hours; Call back #: [phone]; unexplained severe thrombocytopenia (but probably 2/2 felbamate); Consultant may enter orders: Yes Routine 1/1 ONE TIME  2/15/2018    Activity: Up with assist Routine 95421/66977 PRN  2/15/2018    Oxygen: Nasal cannula, Oxygen mask Routine 69767/96621 CONTINUOUS  2/15/2018    Basic metabolic panel Routine 1/1 AM DRAW  2/15/2018    CBC with platelets Routine 1/1 AM DRAW  2/15/2018    Magnesium Routine 100/100 CONDITIONAL (SPECIFY)  2/15/2018          Open Future Orders        Priority Expected Expires Ordered    Valproic Acid level Routine  2018    Lamotrigine Level Routine 2018    Felbamate Level Routine 2018    Oxcarbazepine level Routine 2018    Sodium Routine 2018            Who to contact     Please call your clinic at [phone] to:    Ask questions about your health    Make or cancel appointments    Discuss your medicines    Learn about your test results    Speak to your doctor            Additional Information About Your Visit        MyChart Information     [employer] is an electronic gateway that provides easy, online access to your medical records. With [employer], you can request a clinic appointment, read your test results, renew a prescription or communicate with your care team.     To sign up for [employer] visit the website at [email or web address]   You will be asked to enter the access code listed below, as well as some personal information. Please follow the directions to create your username and password.     Your access code is: [email or web address]  Expires: 5/10/2018  4:21 PM     Your access code will  in 90 days. If you need help or a new code, please contact your [facility] or call [phone] for assistance.        Care EveryWhere ID     This is your Care EveryWhere ID. This could be used by other organizations to access your [facility] medical records  [id number]         Blood Pressure from Last 3 Encounters:   02/15/18 145/72   10/13/17 145/77   10/20/16 (!) 141/97    Weight from Last 3 Encounters:   02/15/18 63 kg (138 lb 14.2 oz)   10/13/17 71.6 kg (157 lb 12.8 oz)   10/20/16 67.6 kg (149 lb)              Today, you had the following     No orders found for display         Today's Medication Changes      Notice     This visit is during an admission. Changes to the med list made in this visit will be reflected in the After Visit Summary of the admission.             Primary  Care Provider Office Phone # Fax #    Bryan Fritsch [phone]       [facility] [address]  [facility] [address]        Equal Access to Services     KATHRYN BROCK : Hadii aad ku hadasho Soomaali, waaxda luqadaha, qaybta kaalmada adeegyada, sahara jacksonn osvaldo santos laNathaliehalima conley. So [facility] [phone].    ATENCIÓN: Si habla español, tiene a singh disposición servicios gratuitos de asistencia lingüística. Llame al [phone].    We comply with applicable federal civil rights laws and [place] laws. We do not discriminate on the basis of race, color, national origin, age, disability, sex, sexual orientation, or gender identity.            Thank you!     Thank you for choosing [facility]  for your care. Our goal is always to provide you with excellent care. Hearing back from our patients is one way we can continue to improve our services. Please take a few minutes to complete the written survey that you may receive in the mail after your visit with us. Thank you!             Your Updated Medication List - Protect others around you: Learn how to safely use, store and throw away your medicines at www.disposemymeds.org.      Notice     This visit is during an admission. Changes to the med list made in this visit will be reflected in the After Visit Summary of the admission.

## 2018-02-15 NOTE — TELEPHONE ENCOUNTER
Care coordinator RN from [facility] called in with Platelets result of 35 (draw 2/14/18)  Per chart review, 1/7/18 = 158 ,  2/5/18 = 55,  Depakote was stopped on 2/9/18 because of falling platelets.  Seizure occurred 2/14/18, resolved with PRN DZP .    Unfortunately the call was cut off before I was able to get a call back number.  Caller ID noted [facility]    Call returned to [place].  No seizures have been noted since yesterday morning.    OXC (300/150) 450-450  LTG (100) 100-100-100  FBM (400) [phone]  VPA stopped 2/9/18

## 2018-02-15 NOTE — TELEPHONE ENCOUNTER
Call received from Abdon Kent expects arrival time to be 6.30.  Advised to go to the ER unless otherwise directed.

## 2018-02-15 NOTE — IP AVS SNAPSHOT
` `     [address] [facility] BANK: [phone]            Medication Administration Report for Nella Helms as of 02/22/18 1441   Legend:    Given Hold Not Given Due Canceled Entry Other Actions    Time Time (Time) Time  Time-Action       Inactive    Active    Linked        Medications 02/16/18 02/17/18 02/18/18 02/19/18 02/20/18 02/21/18 02/22/18    acetaminophen (TYLENOL) tablet 650 mg  Dose: 650 mg  Freq: EVERY 4 HOURS PRN Route: PO  PRN Reasons: mild pain,fever  Start: 02/17/18 1702   Admin Instructions: Alternate ibuprofen (if ordered) with acetaminophen.  Maximum acetaminophen dose from all sources = 75 mg/kg/day not to exceed 4 grams/day.      1706 (650 mg)-Given        0039 (650 mg)-Given       1356 (650 mg)-Given       1944 (650 mg)-Given         1342 (650 mg)-Given         0846 (650 mg)-Given           bisacodyl (DULCOLAX) Suppository 10 mg  Dose: 10 mg  Freq: DAILY PRN Route: RE  PRN Reason: constipation  Start: 02/15/18 1915   Admin Instructions: Hold for loose stools.  This is the third step of a three step constipation treatment protocol.               docusate sodium (COLACE) capsule 100 mg  Dose: 100 mg  Freq: 2 TIMES DAILY Route: PO  Start: 02/15/18 2000   Admin Instructions: To prevent constipation.  Hold for loose stools.     0836 (100 mg)-Given       2022 (100 mg)-Given        0910 (100 mg)-Given       (2052)-Not Given        0751 (100 mg)-Given       1945 (100 mg)-Given        0806 (100 mg)-Given       1916 (100 mg)-Given        0851 (100 mg)-Given       (1940)-Not Given        0810 (100 mg)-Given       2033 (100 mg)-Given        0846 (100 mg)-Given       [ ] 2000           enoxaparin (LOVENOX) injection 40 mg  Dose: 40 mg  Freq: EVERY 24 HOURS Route: SC  Start: 02/21/18 0800         0808 (40 mg)-Given        0846 (40 mg)-Given           lacosamide (VIMPAT) tablet 200 mg  Dose: 200 mg  Freq: 2 TIMES DAILY Route: PO  Start: 02/20/18 2000        1944 (200 mg)-Given        0810 (200 mg)-Given        2033 (200 mg)-Given        0846 (200 mg)-Given       [ ] 2000           lamoTRIgine (LaMICtal BRAND ONLY - not from Loladexxis) tablet 100 mg  Dose: 100 mg  Freq: 3 TIMES DAILY Route: PO  Start: 02/15/18 2000   Admin Instructions: BRAND ONLY     0836 (100 mg)-Given       1422 (100 mg)-Given       2022 (100 mg)-Given        1031 (100 mg)-Given       1450 (100 mg)-Given       2104 (100 mg)-Given        0752 (100 mg)-Given       1356 (100 mg)-Given       1945 (100 mg)-Given        0806 (100 mg)-Given       1422 (100 mg)-Given       1917 (100 mg)-Given        0855 (100 mg)-Given       1342 (100 mg)-Given       1944 (100 mg)-Given        0810 (100 mg)-Given       1430 (100 mg)-Given       2033 (100 mg)-Given        0847 (100 mg)-Given       1409 (100 mg)-Given       [ ] 2000           levETIRAcetam (KEPPRA) tablet 1,000 mg  Dose: 1,000 mg  Freq: 2 TIMES DAILY Route: PO  Start: 02/17/18 0915     0909 (1,000 mg)-Given       2052 (1,000 mg)-Given        0751 (1,000 mg)-Given       1945 (1,000 mg)-Given        0806 (1,000 mg)-Given       1915 (1,000 mg)-Given        0852 (1,000 mg)-Given       1944 (1,000 mg)-Given        0810 (1,000 mg)-Given       2033 (1,000 mg)-Given        0846 (1,000 mg)-Given       [ ] 2000           LORazepam (ATIVAN) injection 2 mg  Dose: 2 mg  Freq: EVERY 4 HOURS PRN Route: IV  PRN Comment: generalized tonic-clonic convulsions lasting greater than 2 minutes or with any hemodynamic compromise  Start: 02/17/18 0446   Admin Instructions: For IV PUSH: Dilute with equal volume of NS. For ordered doses up to 4 mg give IV Push. Administer each 2mg over 1-5 minutes.      0537 (2 mg)-Given           0755 (2 mg)-Given [C]            magnesium sulfate 4 g in 100 mL sterile water (premade)  Dose: 4 g  Freq: EVERY 4 HOURS PRN Route: IV  PRN Reason: magnesium supplementation  Start: 02/15/18 1915   Admin Instructions: For serum Mg++ less than 1.6 mg/dL  Give 4 g and recheck magnesium level 2 hours after dose, and  next AM.          1145 (4 g)-New Bag            naloxone (NARCAN) injection 0.1-0.4 mg  Dose: 0.1-0.4 mg  Freq: EVERY 2 MIN PRN Route: IV  PRN Reason: opioid reversal  Start: 02/15/18 1915   Admin Instructions: For respiratory rate LESS than or EQUAL to 8.  Partial reversal dose:  0.1 mg titrated q 2 minutes for Analgesia Side Effects Monitoring Sedation Level of 3 (frequently drowsy, arousable, drifts to sleep during conversation).Full reversal dose:  0.4 mg bolus for Analgesia Side Effects Monitoring Sedation Level of 4 (somnolent, minimal or no response to stimulation).  For ordered doses up to 2mg give IVP. Give each 0.4mg over 15 seconds in emergency situations. For non-emergent situations further dilute in 9mL of NS to facilitate titration of response.               ondansetron (ZOFRAN-ODT) ODT tab 4 mg  Dose: 4 mg  Freq: EVERY 6 HOURS PRN Route: PO  PRN Reasons: nausea,vomiting  Start: 02/15/18 1915   Admin Instructions: This is Step 1 of nausea and vomiting management.  If nausea not resolved in 15 minutes, go to Step 2 prochlorperazine (COMPAZINE). Do not push through foil backing. Peel back foil and gently remove. Place on tongue immediately. Administration with liquid unnecessary  With dry hands, peel back foil backing and gently remove tablet; do not push oral disintegrating tablet through foil backing; administer immediately on tongue and oral disintegrating tablet dissolves in seconds; then swallow with saliva; liquid not required.           1256 (4 mg)-Given          Or  ondansetron (ZOFRAN) injection 4 mg  Dose: 4 mg  Freq: EVERY 6 HOURS PRN Route: IV  PRN Reasons: nausea,vomiting  Start: 02/15/18 1915   Admin Instructions: This is Step 1 of nausea and vomiting management.  If nausea not resolved in 15 minutes, go to Step 2 prochlorperazine (COMPAZINE).  Irritant. For ordered doses up to 4 mg, give IV Push undiluted over 2-5 minutes.                      OXcarbazepine (TRILEPTAL) tablet 600 mg  Dose:  600 mg  Freq: 2 TIMES DAILY Route: PO  Start: 02/17/18 2000 2052 (600 mg)-Given        0751 (600 mg)-Given       1945 (600 mg)-Given        0806 (600 mg)-Given       2005 (600 mg)-Given        0852 (600 mg)-Given       1944 (600 mg)-Given        0810 (600 mg)-Given       2033 (600 mg)-Given        0847 (600 mg)-Given       [ ] 2000           pantoprazole (PROTONIX) EC tablet 40 mg  Dose: 40 mg  Freq: EVERY MORNING Route: PO  Start: 02/17/18 0800   Admin Instructions: DO NOT CRUSH.      0910 (40 mg)-Given        0751 (40 mg)-Given        0806 (40 mg)-Given        0851 (40 mg)-Given        0810 (40 mg)-Given        0847 (40 mg)-Given           polyethylene glycol (MIRALAX/GLYCOLAX) Packet 17 g  Dose: 17 g  Freq: DAILY PRN Route: PO  PRN Reason: constipation  Start: 02/15/18 1915   Admin Instructions: Give in 8oz of  water, juice, or soda. Hold for loose stools.  This is the second step of a three step constipation treatment protocol.  1 Packet = 17 grams. Mixed prescribed dose in 8 ounces of water. Follow with 8 oz. of water.              Future Medications  Medications 02/16/18 02/17/18 02/18/18 02/19/18 02/20/18 02/21/18 02/22/18       predniSONE (DELTASONE) tablet 20 mg  Dose: 20 mg  Freq: DAILY Route: PO  Start: 02/23/18 0800   End: 02/26/18 0759             Followed by  predniSONE (DELTASONE) tablet 10 mg  Dose: 10 mg  Freq: DAILY Route: PO  Start: 02/26/18 0800   End: 03/12/18 0759             Followed by  predniSONE (DELTASONE) tablet 5 mg  Dose: 5 mg  Freq: DAILY Route: PO  Start: 03/12/18 0800   End: 03/26/18 0759             Completed Medications  Medications 02/16/18 02/17/18 02/18/18 02/19/18 02/20/18 02/21/18 02/22/18         Dose: 2 mg  Freq: ONCE Route: IV  Start: 02/19/18 1800   End: 02/19/18 1800   Admin Instructions: For IV PUSH: Dilute with equal volume of NS. For ordered doses up to 4 mg give IV Push. Administer each 2mg over 1-5 minutes.        1800 (2 mg)-Given                Dose: 40  mg  Freq: DAILY Route: PO  Start: 02/21/18 0800   End: 02/22/18 0847         0810 (40 mg)-Given        0847 (40 mg)-Given          Discontinued Medications  Medications 02/16/18 02/17/18 02/18/18 02/19/18 02/20/18 02/21/18 02/22/18         Dose: 150 mg  Freq: 2 TIMES DAILY Route: PO  Start: 02/19/18 1045   End: 02/20/18 1554       1047 (150 mg)-Given       1915 (150 mg)-Given        0851 (150 mg)-Given       1554-Med Discontinued           Dose: 40 mg  Freq: DAILY Route: PO  Start: 02/20/18 0800   End: 02/20/18 1553        0852 (40 mg)-Given       1553-Med Discontinued

## 2018-02-15 NOTE — TELEPHONE ENCOUNTER
Call placed to .  He would like patient admitting to the [facility] for evaluation.    Call placed to Referral / transfer line  The coordinator (Juan) will have the hematologist contact Dr.Leppik Martinez will call me back with additional instructions

## 2018-02-16 ENCOUNTER — APPOINTMENT (OUTPATIENT)
Dept: CT IMAGING | Facility: CLINIC | Age: 43
DRG: 813 | End: 2018-02-16
Attending: PSYCHIATRY & NEUROLOGY
Payer: MEDICARE

## 2018-02-16 ENCOUNTER — ALLIED HEALTH/NURSE VISIT (OUTPATIENT)
Dept: NEUROLOGY | Facility: CLINIC | Age: 43
DRG: 813 | End: 2018-02-16
Attending: PSYCHIATRY & NEUROLOGY
Payer: MEDICARE

## 2018-02-16 DIAGNOSIS — G40.319 GENERALIZED CONVULSIVE EPILEPSY WITH INTRACTABLE EPILEPSY (H): Primary | Chronic | ICD-10-CM

## 2018-02-16 LAB
ANION GAP SERPL CALCULATED.3IONS-SCNC: 6 MMOL/L (ref 3–14)
BASOPHILS # BLD AUTO: 0 10E9/L (ref 0–0.2)
BASOPHILS # BLD AUTO: 0 10E9/L (ref 0–0.2)
BASOPHILS NFR BLD AUTO: 0 %
BASOPHILS NFR BLD AUTO: 0 %
BUN SERPL-MCNC: 8 MG/DL (ref 7–30)
CALCIUM SERPL-MCNC: 10.2 MG/DL (ref 8.5–10.1)
CHLORIDE SERPL-SCNC: 100 MMOL/L (ref 94–109)
CO2 SERPL-SCNC: 29 MMOL/L (ref 20–32)
COPATH REPORT: NORMAL
CREAT SERPL-MCNC: 0.35 MG/DL (ref 0.52–1.04)
DAT POLY-SP REAG RBC QL: NORMAL
DIFFERENTIAL METHOD BLD: ABNORMAL
DIFFERENTIAL METHOD BLD: ABNORMAL
EOSINOPHIL # BLD AUTO: 0 10E9/L (ref 0–0.7)
EOSINOPHIL # BLD AUTO: 0 10E9/L (ref 0–0.7)
EOSINOPHIL NFR BLD AUTO: 0.4 %
EOSINOPHIL NFR BLD AUTO: 0.6 %
ERYTHROCYTE [DISTWIDTH] IN BLOOD BY AUTOMATED COUNT: 14.5 % (ref 10–15)
ERYTHROCYTE [DISTWIDTH] IN BLOOD BY AUTOMATED COUNT: 14.6 % (ref 10–15)
FELBAMATE LEVEL: 59 UG/ML (ref 40–100)
GFR SERPL CREATININE-BSD FRML MDRD: >90 ML/MIN/1.7M2
GLUCOSE SERPL-MCNC: 93 MG/DL (ref 70–99)
HAPTOGLOB SERPL-MCNC: 184 MG/DL (ref 15–200)
HCT VFR BLD AUTO: 30.7 % (ref 35–47)
HCT VFR BLD AUTO: 33.7 % (ref 35–47)
HCV AB SERPL QL IA: NONREACTIVE
HGB BLD-MCNC: 10.3 G/DL (ref 11.7–15.7)
HGB BLD-MCNC: 11.4 G/DL (ref 11.7–15.7)
HIV 1+2 AB+HIV1 P24 AG SERPL QL IA: NONREACTIVE
HIV 1+2 AB+HIV1P24 AG SERPLBLD IA.RAPID: ABNORMAL
IMM GRANULOCYTES # BLD: 0 10E9/L (ref 0–0.4)
IMM GRANULOCYTES # BLD: 0 10E9/L (ref 0–0.4)
IMM GRANULOCYTES NFR BLD: 0.2 %
IMM GRANULOCYTES NFR BLD: 0.4 %
LAMOTRIGINE SERPL-MCNC: 7.1 UG/ML (ref 3–15)
LYMPHOCYTES # BLD AUTO: 0.6 10E9/L (ref 0.8–5.3)
LYMPHOCYTES # BLD AUTO: 0.9 10E9/L (ref 0.8–5.3)
LYMPHOCYTES NFR BLD AUTO: 12.4 %
LYMPHOCYTES NFR BLD AUTO: 17.9 %
MCH RBC QN AUTO: 32.3 PG (ref 26.5–33)
MCH RBC QN AUTO: 32.5 PG (ref 26.5–33)
MCHC RBC AUTO-ENTMCNC: 33.6 G/DL (ref 31.5–36.5)
MCHC RBC AUTO-ENTMCNC: 33.8 G/DL (ref 31.5–36.5)
MCV RBC AUTO: 96 FL (ref 78–100)
MCV RBC AUTO: 97 FL (ref 78–100)
MONOCYTES # BLD AUTO: 0.1 10E9/L (ref 0–1.3)
MONOCYTES # BLD AUTO: 0.2 10E9/L (ref 0–1.3)
MONOCYTES NFR BLD AUTO: 3 %
MONOCYTES NFR BLD AUTO: 4.6 %
NEUTROPHILS # BLD AUTO: 3.9 10E9/L (ref 1.6–8.3)
NEUTROPHILS # BLD AUTO: 3.9 10E9/L (ref 1.6–8.3)
NEUTROPHILS NFR BLD AUTO: 76.9 %
NEUTROPHILS NFR BLD AUTO: 83.6 %
NRBC # BLD AUTO: 0 10*3/UL
NRBC # BLD AUTO: 0 10*3/UL
NRBC BLD AUTO-RTO: 0 /100
NRBC BLD AUTO-RTO: 1 /100
OXCARBAZEPINE: 15.4 UG/ML (ref 10–35)
PLATELET # BLD AUTO: 30 10E9/L (ref 150–450)
PLATELET # BLD AUTO: 43 10E9/L (ref 150–450)
POTASSIUM SERPL-SCNC: 4.5 MMOL/L (ref 3.4–5.3)
RBC # BLD AUTO: 3.17 10E12/L (ref 3.8–5.2)
RBC # BLD AUTO: 3.53 10E12/L (ref 3.8–5.2)
RETICS # AUTO: 19.8 10E9/L (ref 25–95)
RETICS/RBC NFR AUTO: 0.6 % (ref 0.5–2)
SODIUM SERPL-SCNC: 130 MMOL/L (ref 135–145)
SODIUM SERPL-SCNC: 135 MMOL/L (ref 133–144)
VALPROIC ACID TOTAL: <10 UG/ML (ref 50–100)
WBC # BLD AUTO: 4.7 10E9/L (ref 4–11)
WBC # BLD AUTO: 5 10E9/L (ref 4–11)

## 2018-02-16 PROCEDURE — 36415 COLL VENOUS BLD VENIPUNCTURE: CPT | Performed by: STUDENT IN AN ORGANIZED HEALTH CARE EDUCATION/TRAINING PROGRAM

## 2018-02-16 PROCEDURE — A9270 NON-COVERED ITEM OR SERVICE: HCPCS | Mod: GY | Performed by: STUDENT IN AN ORGANIZED HEALTH CARE EDUCATION/TRAINING PROGRAM

## 2018-02-16 PROCEDURE — 25000128 H RX IP 250 OP 636: Performed by: STUDENT IN AN ORGANIZED HEALTH CARE EDUCATION/TRAINING PROGRAM

## 2018-02-16 PROCEDURE — 95951 ZZHC EEG VIDEO EACH 24 HR: CPT | Mod: ZF

## 2018-02-16 PROCEDURE — 86880 COOMBS TEST DIRECT: CPT | Performed by: STUDENT IN AN ORGANIZED HEALTH CARE EDUCATION/TRAINING PROGRAM

## 2018-02-16 PROCEDURE — 83010 ASSAY OF HAPTOGLOBIN QUANT: CPT | Performed by: PSYCHIATRY & NEUROLOGY

## 2018-02-16 PROCEDURE — 12000008 ZZH R&B INTERMEDIATE UMMC

## 2018-02-16 PROCEDURE — 36415 COLL VENOUS BLD VENIPUNCTURE: CPT | Performed by: PSYCHIATRY & NEUROLOGY

## 2018-02-16 PROCEDURE — 25000132 ZZH RX MED GY IP 250 OP 250 PS 637: Mod: GY | Performed by: STUDENT IN AN ORGANIZED HEALTH CARE EDUCATION/TRAINING PROGRAM

## 2018-02-16 PROCEDURE — 86803 HEPATITIS C AB TEST: CPT | Performed by: PSYCHIATRY & NEUROLOGY

## 2018-02-16 PROCEDURE — 87806 HIV AG W/HIV1&2 ANTB W/OPTIC: CPT | Performed by: PSYCHIATRY & NEUROLOGY

## 2018-02-16 PROCEDURE — 99223 1ST HOSP IP/OBS HIGH 75: CPT | Mod: GC | Performed by: INTERNAL MEDICINE

## 2018-02-16 PROCEDURE — 80048 BASIC METABOLIC PNL TOTAL CA: CPT | Performed by: PSYCHIATRY & NEUROLOGY

## 2018-02-16 PROCEDURE — 40000611 ZZHCL STATISTIC MORPHOLOGY W/INTERP HEMEPATH TC 85060: Performed by: PSYCHIATRY & NEUROLOGY

## 2018-02-16 PROCEDURE — 70450 CT HEAD/BRAIN W/O DYE: CPT

## 2018-02-16 PROCEDURE — 85025 COMPLETE CBC W/AUTO DIFF WBC: CPT | Performed by: PSYCHIATRY & NEUROLOGY

## 2018-02-16 PROCEDURE — 85045 AUTOMATED RETICULOCYTE COUNT: CPT | Performed by: PSYCHIATRY & NEUROLOGY

## 2018-02-16 PROCEDURE — 85025 COMPLETE CBC W/AUTO DIFF WBC: CPT | Performed by: STUDENT IN AN ORGANIZED HEALTH CARE EDUCATION/TRAINING PROGRAM

## 2018-02-16 PROCEDURE — 87389 HIV-1 AG W/HIV-1&-2 AB AG IA: CPT | Performed by: PSYCHIATRY & NEUROLOGY

## 2018-02-16 RX ORDER — LEVETIRACETAM 750 MG/1
750 TABLET ORAL 2 TIMES DAILY
Status: DISCONTINUED | OUTPATIENT
Start: 2018-02-17 | End: 2018-02-17

## 2018-02-16 RX ORDER — FELBAMATE 400 MG/1
1200 TABLET ORAL DAILY
Status: DISCONTINUED | OUTPATIENT
Start: 2018-02-17 | End: 2018-02-16

## 2018-02-16 RX ORDER — LAMOTRIGINE 100 MG/1
TABLET ORAL
Qty: 84 TABLET | Refills: 0 | OUTPATIENT
Start: 2018-02-16

## 2018-02-16 RX ADMIN — DOCUSATE SODIUM 100 MG: 100 CAPSULE, LIQUID FILLED ORAL at 08:36

## 2018-02-16 RX ADMIN — LAMOTRIGINE 100 MG: 100 TABLET ORAL at 14:22

## 2018-02-16 RX ADMIN — LAMOTRIGINE 100 MG: 100 TABLET ORAL at 08:36

## 2018-02-16 RX ADMIN — FELBAMATE 1200 MG: 600 TABLET ORAL at 08:36

## 2018-02-16 RX ADMIN — LAMOTRIGINE 100 MG: 100 TABLET ORAL at 20:22

## 2018-02-16 RX ADMIN — LEVETIRACETAM 2000 MG: 100 INJECTION, SOLUTION INTRAVENOUS at 18:04

## 2018-02-16 RX ADMIN — METHYLPREDNISOLONE 125 MG: 125 INJECTION, POWDER, LYOPHILIZED, FOR SOLUTION INTRAMUSCULAR; INTRAVENOUS at 00:46

## 2018-02-16 RX ADMIN — DOCUSATE SODIUM 100 MG: 100 CAPSULE, LIQUID FILLED ORAL at 20:22

## 2018-02-16 RX ADMIN — OXCARBAZEPINE 450 MG: 300 TABLET ORAL at 08:36

## 2018-02-16 RX ADMIN — OXCARBAZEPINE 450 MG: 300 TABLET ORAL at 20:22

## 2018-02-16 NOTE — CONSULTS
[facility] Hematology-Oncology New Consult          Nella Helms MRN# [id number]   Age: 42 year old YOB: 1975   Date of Admission: 2/15/2018     Reason for consult:Thromnbocytopenia  Requested by: Sunil Richards MD       Assessment and Recommendations:     Assessment:    41 yo F  h/o severe epilepsy (on several AED's) and developmental delay who presents for severe unexplained thrombocytopenia. The thrombocytopenia was initally mild in 11/2017 (105K), the count retured to 158K in 1/7/18 but has been declining since then. Plt claudio at 21K, no obvious bleeding except petechiae. Valproate   She has no evidence of TTP thus far, peripheral smear reviewed by Dr. Santos and me does not show any schistocytes, and the platelets are truly decreased in number with no clumping, so no pseudo- thrombocytopenia. Interestingly, the smear has occasional bite cells, target cells and spherocytes, although the normal haptoglobin and LD goes against significant hemolysis, and not sure how that would tie in with the thrmboctyopenia. Nothing to suggest DIC or HIT as she has not had Heparin exposure recently. She does have a family hx of thrombocytoepnai of unknown etiology, not sure of its significance. Since the thrombocytopenia is new, not congenital.   DD include: ITP, drug induced immune thrombocytopenia (valproate,felbamate-more associate with aplastic anemia), infection, lupus anticoagulant (mild increase in PTT)  It is possible that because she was on valproate for decades, she has a chronic BM suppression and is having delayed recovery.Improvement with steroid would suggest either ITP or drug induced immune TCP.        Recommendations:  -Prednisone 60 mg daily PO  -WILFREDO, TSH   -Lupus anticoagulant screen (ordered for you)  -Immature platelet fraction (ordered for you)  -HIV, HCV, Hep B screening  -US of abdomen for spleen size  -Avoid pharmacologic DVT pro;hylaxis until Plt >50K  -Plt Trx threshold: Plt  <10K in absenceof bleeding, if fever >20K, bleeding 30K      Pt seen and discussed with Dr. Santos   who agrees with the plan.    Please don't hesitate to call us with any questions    Shakeel aMtias MD  Hematology Oncology fellow  [phone]    Attending Note:  I have reviewed the patient chart, and interviewed and examined the patient.  I agree with the assessment and plan. I saw the patient on 2/16/18.   Stephanie Santos MD  Hematology        HPI:     42 year old female h/o severe epilepsy (on several AED) and developmental delay who presents for severe unexplained thrombocytopenia. Hx was taken from brother and chart review. Pt lives in group home and has been noted to be declining by her brother in terms of gait, coordination, and cognitive function over the past few years but much more noticeably in the last couple months. She has otherwise been in her usual state of health.   Per chart review plt count trend:   10/2016- 206K  11/2017-105K  1/7/18-158K  2/5/18-55K  2/9/18- Vaproic acid held  2/14/18-34K  2/15/18-21K    The patient had recurrent falls and balance problems for the last 2-3 weeks and was incidentally noted to have dropping platelets at a routine visit 2/5/18 to her epileptologist (Dr. Gomez) when platelets dropped from 158->55. Her primary epileptologist then stopped her Vakproic acid 2/9. On 2/14, the patient had a seizure that resolved with prn diazepam. Plts then noted to be only 35. The patient has been admitted for further work-up and video-EEG monitoring while AEDs are adjusted.      Per the brother, she had some bruises after the fall 2 weeks ago and has been on wheel chair since then. She is legally blind but is usually ambulatory but has progressive weakness on the lt side and consequent gait and balnce issues. At baseline she is usually independent of some ADLs. He denied any recent illness like fever, chills, night swests, wt loss, sick contacts, rashes, chest pain, SOB, cough wheezing,  abdominal pain, nausea, vomiting, diarrhea, nose bleeds, gum bleed, GI or  bleed. No recent change in meds except stopping valproate, which she has been taking for decades with no problem. She has not been taking any NSAIDs.    Hospital course: Ct of the head was neg for bleeding. Hemolysis work up was negative with normal LDH and haptoglobin. LIANE neg. She has mild normocytic anemia, and normal white count. Coagulation parameters are normal. Normal kidney function and ALT is only mildly elevated, AST normal.She was given a dose of 125 mg solumedrol with improvement of plt count from 21 to 30 K this AM.      Current AED's and other meds PTA include:  Oxcarbazepine 450 BID  Felbatol 1200 mg AM and 800 mg PM  Lamotrigine 100 TID  Trazodone 700 mg in 4 divided doses  Risperidone 4 mg BID  Valproate 625 BID and 600 mg at 4 pm-DC on     Review of system: Complete ROS otherwise negative         Past Medical History:     Past Medical History:   Diagnosis Date     Cortical blindness      Mental retardation      Strabismus    Developmental delay-[facility] group home  Epilepsy-[facility]          Past Surgical History:   No past surgical history on file.          Social History:     Social History     Social History     Marital status: Single     Spouse name: N/A     Number of children: N/A     Years of education: N/A     Occupational History     Not on file.     Social History Main Topics     Smoking status: Never Smoker     Smokeless tobacco: Never Used     Alcohol use No     Drug use: No     Sexual activity: Not on file     Other Topics Concern     Not on file     Social History Narrative   [facility] group home, brother is POA          Family History:   No family history on file.   Mom- 1 month ago had aortic valve replaced; had chronic thrombocytopenia of unknown etiology (in 3-5 th decade)  Sister- of PDA at age 11- had thrombocytoenia of unknown etiology  No cancer Hx in family          Allergies:   .   Allergies  "  Allergen Reactions     Cefzil [Cefprozil]      Codeine Phosphate      Dilantin [Phenytoin]      Penicillins              Medications:     Current Facility-Administered Medications   Medication     [START ON 2/17/2018] felbamate (FELBATOL **BRAND ONLY**) tablet 1,200 mg DO NOT PULL FROM PYXIS     lamoTRIgine (BRAND NAME ONLY LaMICtal) tablet 100 mg DO NOT PULL FROM PYXIS     OXcarbazepine (TRILEPTAL) tablet 450 mg     acetaminophen (TYLENOL) tablet 650 mg     naloxone (NARCAN) injection 0.1-0.4 mg     polyethylene glycol (MIRALAX/GLYCOLAX) Packet 17 g     bisacodyl (DULCOLAX) Suppository 10 mg     ondansetron (ZOFRAN-ODT) ODT tab 4 mg    Or     ondansetron (ZOFRAN) injection 4 mg     magnesium sulfate 4 g in 100 mL sterile water (premade)     docusate sodium (COLACE) capsule 100 mg     felbamate (FELBATOL **BRAND ONLY** ) tablet 800 mg  DO NOT PULL FROM PYXIS           Vital signs:  Temp: 96.8  F (36  C) Temp src: Axillary BP: 134/72 Pulse: 79   Resp: 16 SpO2: 92 % O2 Device: None (Room air)     Weight: 63 kg (138 lb 14.2 oz)  Estimated body mass index is 26.24 kg/(m^2) as calculated from the following:    Height as of 2/9/18: 1.549 m (5' 1\").    Weight as of this encounter: 63 kg (138 lb 14.2 oz).    Physical exam:  Exam limited  Gen: Limited interaction. not in any acute distress  HEENT: Mucous Membrane Moist, no oral lesions, no pallor, icterus, no gum bleeding, or wet purpura  Neck: supple,   Lymph Nodes: no cervical, sc, axillary LAD   CVS: Regular Rate Rhythm, no murmurs  Resp: CTA, no added sounds  GI: Soft, non-tender, no Hepatospleenomegaly  Extremities: no edema , she had petechiae in the b/l LE  Neuro: AAOx4. Cranial nerves grossly intact. Strength 5/5 throughout.  Normal muscle tone. Sensations grossly intact. 3+ symmetric reflexes in Knee jt          Data:   The labs and imaging were reviewed.      .  Recent Results (from the past 24 hour(s))   CBC with platelets differential    Collection Time: " 02/15/18  8:26 PM   Result Value Ref Range    WBC 4.9 4.0 - 11.0 10e9/L    RBC Count 3.25 (L) 3.8 - 5.2 10e12/L    Hemoglobin 10.6 (L) 11.7 - 15.7 g/dL    Hematocrit 31.4 (L) 35.0 - 47.0 %    MCV 97 78 - 100 fl    MCH 32.6 26.5 - 33.0 pg    MCHC 33.8 31.5 - 36.5 g/dL    RDW 14.1 10.0 - 15.0 %    Platelet Count 21 (LL) 150 - 450 10e9/L    Diff Method Automated Method     % Neutrophils 61.9 %    % Lymphocytes 24.5 %    % Monocytes 11.0 %    % Eosinophils 2.4 %    % Basophils 0.2 %    % Immature Granulocytes 0.0 %    Nucleated RBCs 0 0 /100    Absolute Neutrophil 3.1 1.6 - 8.3 10e9/L    Absolute Lymphocytes 1.2 0.8 - 5.3 10e9/L    Absolute Monocytes 0.5 0.0 - 1.3 10e9/L    Absolute Eosinophils 0.1 0.0 - 0.7 10e9/L    Absolute Basophils 0.0 0.0 - 0.2 10e9/L    Abs Immature Granulocytes 0.0 0 - 0.4 10e9/L    Absolute Nucleated RBC 0.0     Platelet Estimate Confirming automated cell count    Comprehensive metabolic panel    Collection Time: 02/15/18  8:26 PM   Result Value Ref Range    Sodium 132 (L) 133 - 144 mmol/L    Potassium 4.4 3.4 - 5.3 mmol/L    Chloride 96 94 - 109 mmol/L    Carbon Dioxide 32 20 - 32 mmol/L    Anion Gap 4 3 - 14 mmol/L    Glucose 88 70 - 99 mg/dL    Urea Nitrogen 7 7 - 30 mg/dL    Creatinine 0.27 (L) 0.52 - 1.04 mg/dL    GFR Estimate >90 >60 mL/min/1.7m2    GFR Estimate If Black >90 >60 mL/min/1.7m2    Calcium 9.7 8.5 - 10.1 mg/dL    Bilirubin Total 0.1 (L) 0.2 - 1.3 mg/dL    Albumin 3.0 (L) 3.4 - 5.0 g/dL    Protein Total 7.4 6.8 - 8.8 g/dL    Alkaline Phosphatase 130 40 - 150 U/L    ALT 65 (H) 0 - 50 U/L    AST 41 0 - 45 U/L   INR    Collection Time: 02/15/18  8:26 PM   Result Value Ref Range    INR 0.89 0.86 - 1.14   Partial thromboplastin time    Collection Time: 02/15/18  8:26 PM   Result Value Ref Range    PTT 40 (H) 22 - 37 sec   Vitamin B12    Collection Time: 02/15/18  8:26 PM   Result Value Ref Range    Vitamin B12 1094 (H) 193 - 986 pg/mL   Folate    Collection Time: 02/15/18  8:26 PM    Result Value Ref Range    Folate 20.5 >5.4 ng/mL   Ferritin    Collection Time: 02/15/18  8:26 PM   Result Value Ref Range    Ferritin 280 (H) 12 - 150 ng/mL   Lactate Dehydrogenase    Collection Time: 02/15/18  8:26 PM   Result Value Ref Range    Lactate Dehydrogenase 209 81 - 234 U/L   Basic metabolic panel    Collection Time: 02/16/18  7:22 AM   Result Value Ref Range    Sodium 135 133 - 144 mmol/L    Potassium 4.5 3.4 - 5.3 mmol/L    Chloride 100 94 - 109 mmol/L    Carbon Dioxide 29 20 - 32 mmol/L    Anion Gap 6 3 - 14 mmol/L    Glucose 93 70 - 99 mg/dL    Urea Nitrogen 8 7 - 30 mg/dL    Creatinine 0.35 (L) 0.52 - 1.04 mg/dL    GFR Estimate >90 >60 mL/min/1.7m2    GFR Estimate If Black >90 >60 mL/min/1.7m2    Calcium 10.2 (H) 8.5 - 10.1 mg/dL   CBC with platelets differential    Collection Time: 02/16/18  7:22 AM   Result Value Ref Range    WBC 4.7 4.0 - 11.0 10e9/L    RBC Count 3.53 (L) 3.8 - 5.2 10e12/L    Hemoglobin 11.4 (L) 11.7 - 15.7 g/dL    Hematocrit 33.7 (L) 35.0 - 47.0 %    MCV 96 78 - 100 fl    MCH 32.3 26.5 - 33.0 pg    MCHC 33.8 31.5 - 36.5 g/dL    RDW 14.5 10.0 - 15.0 %    Platelet Count 30 (LL) 150 - 450 10e9/L    Diff Method Automated Method     % Neutrophils 83.6 %    % Lymphocytes 12.4 %    % Monocytes 3.0 %    % Eosinophils 0.6 %    % Basophils 0.0 %    % Immature Granulocytes 0.4 %    Nucleated RBCs 0 0 /100    Absolute Neutrophil 3.9 1.6 - 8.3 10e9/L    Absolute Lymphocytes 0.6 (L) 0.8 - 5.3 10e9/L    Absolute Monocytes 0.1 0.0 - 1.3 10e9/L    Absolute Eosinophils 0.0 0.0 - 0.7 10e9/L    Absolute Basophils 0.0 0.0 - 0.2 10e9/L    Abs Immature Granulocytes 0.0 0 - 0.4 10e9/L    Absolute Nucleated RBC 0.0    Rapid HIV 1 and 2 Antigen Antibody    Collection Time: 02/16/18  7:22 AM   Result Value Ref Range    Rapid HIV 1/2 Antibody Incorrectly ordered by PCU/Clinic (A) NR^Nonreactive    Rapid HIV 1 p24 Antigen Incorrectly ordered by PCU/Clinic (A) NR^Nonreactive    Rapid HIV Interpretation  Incorrectly ordered by PCU/Clinic     Rapid HIV Internal Control Incorrectly ordered by PCU/Clinic    Reticulocyte Count    Collection Time: 02/16/18  7:22 AM   Result Value Ref Range    % Retic 0.6 0.5 - 2.0 %    Absolute Retic 19.8 (L) 25 - 95 10e9/L   Direct antiglobulin test    Collection Time: 02/16/18  8:45 AM   Result Value Ref Range    LIANE  Broad Spectrum Neg        Lab Results   Component Value Date    WBC 4.7 02/16/2018     Lab Results   Component Value Date    RBC 3.53 02/16/2018     Lab Results   Component Value Date    HGB 11.4 02/16/2018     Lab Results   Component Value Date    HCT 33.7 02/16/2018     No components found for: MCT  Lab Results   Component Value Date    MCV 96 02/16/2018     Lab Results   Component Value Date    MCH 32.3 02/16/2018     Lab Results   Component Value Date    MCHC 33.8 02/16/2018     Lab Results   Component Value Date    RDW 14.5 02/16/2018     Lab Results   Component Value Date    PLT 30 02/16/2018         Last Basic Metabolic Panel:  Lab Results   Component Value Date     02/16/2018      Lab Results   Component Value Date    POTASSIUM 4.5 02/16/2018     Lab Results   Component Value Date    CHLORIDE 100 02/16/2018     Lab Results   Component Value Date    SHARMILA 10.2 02/16/2018     Lab Results   Component Value Date    CO2 29 02/16/2018     Lab Results   Component Value Date    BUN 8 02/16/2018     Lab Results   Component Value Date    CR 0.35 02/16/2018     Lab Results   Component Value Date    GLC 93 02/16/2018         Results for orders placed or performed during the hospital encounter of 02/15/18   CT Head w/o Contrast    Narrative    CT HEAD W/O CONTRAST 2/16/2018 1:47 AM    Provided History: psychomotor slowing in the setting of severe  thrombocytopenia;     Comparison: None.    Technique: Using multidetector thin collimation helical acquisition  technique, axial, coronal and sagittal CT images from the skull base  to the vertex were obtained without intravenous  contrast.     Findings:    Motion artifact limiting exam. Enlargement of the posterior horns of  the lateral ventricles with associated severe thinning of the  bilateral parietal cortex, changes appear chronic. No intracranial  hemorrhage, mass effect, or midline shift. The ventricles are  proportionate to the cerebral sulci. The gray to white matter  differentiation of the cerebral hemispheres is preserved. The basal  cisterns are patent.    The visualized paranasal sinuses are clear. The mastoid air cells are  clear.       Impression    Impression:   1. No acute intracranial pathology.  2. 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    I have personally reviewed the examination and initial interpretation  and I agree with the findings.    MD Shakeel MEIER MD

## 2018-02-16 NOTE — PLAN OF CARE
Problem: Patient Care Overview  Goal: Plan of Care/Patient Progress Review  2100: Lab updated writer of critical platelet count of 21; MD and charge RN notified.

## 2018-02-16 NOTE — PROCEDURES
Procedure Date: 02/15/2018      EEG #:  -1.  This is day 1 of 24-hour video EEG.      DATE OF RECORDIN/15/2018.      SOURCE FILE DURATION:  3 hours, 35 minutes and 1 second.      CLINICAL SUMMARY:  The patient is a 42-year-old, right-handed female with history of intractable epilepsy.  The patient presented after being noted to be declining in terms of gait, coordination, and cognitive function.  EEG was performed to evaluate for seizures.     TECHNICAL SUMMARY: This continuous video- EEG monitoring procedure was performed with 23 scalp electrodes in 10-20 electrode system placement, and additional scalp, precordial and other surface electrodes used for electrical referencing and artifact detection.  Video monitoring was utilized and periodically reviewed by EEG technologists and the physician for electroclinical correlations.     INTERICTAL ACTIVITY:  There was occasional nonsustained 8 to 9 Hz alpha activity over the posterior head regions which was symmetric.  Diffuse theta and less frequently delta slowing was present during waking.  Stage II sleep was recorded and manifested by vertex waves and symmetric sleep spindles.  During sleep, there was also activation of rare spike and sharp waves over the right frontotemporal head region at F8/T4 as well as rare frontally predominant generalized slow spike and wave and slow sharp-wave discharges at 1.5 to 2.5 Hz, seen as isolated discharges.      CLINICAL/ICTAL EVENTS:  No electrographic or clinical seizures were recorded.      IMPRESSION:  This is an abnormal video EEG due to the presence of rare epileptiform discharges over the right frontotemporal head region as well as frontally predominant generalized epileptiform discharges during sleep.  There was also diffuse theta delta slowing during waking consistent with a mild to moderate diffuse nonspecific encephalopathy.  No electrographic or clinical seizures were recorded during this video EEG monitoring.          KENNY SARABIA MD             D: 2018   T: 2018   MT: SHARRON      Name:     JOHANNA SIMONS   MRN:      [id number]        Account:        [id number]   :      1975           Procedure Date: 02/15/2018      Document: [id number]

## 2018-02-16 NOTE — PROGRESS NOTES
"SPIRITUAL HEALTH SERVICES  SPIRITUAL ASSESSMENT Progress Note  [facility] ([place]) 6A   ON-CALL VISIT    REFERRAL SOURCE: [facility] request for hospital .     Attempted x2 to visit with pt's brother Abdon, who seems to have requested hospital .  Walked with Abdon to [facility] and he began talking about his sister's situation.  His phone rang and he needed to take the call.  I waited for awhile, went and did another visit and came back and he was still on the phone.  Checked back again later and his phone was just ringing and he was taking another call again.  He smiled and said, \"keep trying.\"      PLAN: Will attempt visit again today if schedule allows, otherwise refer to Saturday on call .    Mana Putnam  Chaplain Resident  Pager [phone]  "

## 2018-02-16 NOTE — PLAN OF CARE
Problem: Patient Care Overview  Goal: Plan of Care/Patient Progress Review  Outcome: Improving  VEEG monitoring, no events witnessed or reported. VSS. pt is confused, difficult to assess neuros. No sign of pain or distress. Voided about 560mL overnight. PIV SL'd. Up w/ heavy 2, GB, & pivot to commode. Reg diet. Meds with apple sauce. CT scan was completed last night. Hematology consult today due to unexplained thrombocytopenia, last platelet count was 21 @2030 02/15/18. Cont to monitor and with POC.

## 2018-02-16 NOTE — PLAN OF CARE
Problem: Seizure Disorder/Epilepsy (Adult)  Goal: Signs and Symptoms of Listed Potential Problems Will be Absent, Minimized or Managed (Seizure Disorder/Epilepsy)  Signs and symptoms of listed potential problems will be absent, minimized or managed by discharge/transition of care (reference Seizure Disorder/Epilepsy (Adult) CPG).   Outcome: No Change  VSS. No s/s of pain. Pt is developmentally delayed, orientated to self. Hard to assess neuros, doesn't follow all commmands. Pt was pleaseant during day, would call out but was easily redirected. EEG leads in place, no known events today. Good po intake, feeder. Voiding good amounts, had large BM. Up with assist of 2 to Pivot. Brother at bedside during day. Bed alarm on at all times.

## 2018-02-16 NOTE — MR AVS SNAPSHOT
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 [address] [place] [id number]            Feb 20, 2018  3:00 PM CST   Return Visit with MD [facility] ([facility])    [address], [address]  [facility] [id number] [phone]              Future tests that were ordered for you today     Open Standing Orders        Priority Remaining Interval Expires Ordered    CBC with platelets differential Routine 15/16 AM DRAW  2/16/2018    Magnesium Routine 100/100 CONDITIONAL (SPECIFY)  2/16/2018    Activity: Up with assist Routine 24461/85969 PRN  2/15/2018    Oxygen: Nasal cannula, Oxygen mask Routine 66295/19029 CONTINUOUS  2/15/2018            Who to contact     Please call your clinic at [phone] to:    Ask questions about your health    Make or cancel appointments    Discuss your medicines    Learn about your test results    Speak to your doctor            Additional Information About Your Visit        BioProte[employer] Information     PurePlay is an electronic gateway that provides easy, online access to your medical records. With PurePlay, you can request a clinic appointment, read your test results, renew a prescription or communicate with your care team.     To sign up for MyChart visit  the website at [email or web address]   You will be asked to enter the access code listed below, as well as some personal information. Please follow the directions to create your username and password.     Your access code is: [email or web address]  Expires: 5/10/2018  4:21 PM     Your access code will  in 90 days. If you need help or a new code, please contact your [facility] or call [phone] for assistance.        Care EveryWhere ID     This is your Care EveryWhere ID. This could be used by other organizations to access your [facility] medical records  [id number]         Blood Pressure from Last 3 Encounters:   18 134/72   10/13/17 145/77   10/20/16 (!) 141/97    Weight from Last 3 Encounters:   02/15/18 63 kg (138 lb 14.2 oz)   10/13/17 71.6 kg (157 lb 12.8 oz)   10/20/16 67.6 kg (149 lb)              Today, you had the following     No orders found for display         Today's Medication Changes      Notice     This visit is during an admission. Changes to the med list made in this visit will be reflected in the After Visit Summary of the admission.             Primary Care Provider Office Phone # Fax #    Bryan Fritsch [phone]       [address] [address]        Equal Access to Services     [facility]: Hadii aad ku hadasho Soomaali, waaxda luqadaha, qaybta kaalmada adeegyada, sahara sparks . So [facility] [phone].    ATENCIÓN: Si habla español, tiene a singh disposición servicios gratuitos de asistencia lingüística. Llame al [phone].    We comply with applicable federal civil rights laws and [place] laws. We do not discriminate on the basis of race, color, national origin, age, disability, sex, sexual orientation, or gender identity.            Thank you!     Thank you for choosing [facility]  for your care. Our goal is always to provide you with excellent care. Hearing back from our  patients is one way we can continue to improve our services. Please take a few minutes to complete the written survey that you may receive in the mail after your visit with us. Thank you!             Your Updated Medication List - Protect others around you: Learn how to safely use, store and throw away your medicines at www.disposemymeds.org.      Notice     This visit is during an admission. Changes to the med list made in this visit will be reflected in the After Visit Summary of the admission.

## 2018-02-16 NOTE — PROGRESS NOTES
"CLINICAL NUTRITION SERVICES - ASSESSMENT NOTE     Nutrition Prescription    RECOMMENDATIONS FOR MDs/PROVIDERS TO ORDER:  - none currently     Malnutrition Status:    - unable to assess     Recommendations already ordered by Registered Dietitian (RD):  - ordered oral nutrition supplement BID with meals     Future/Additional Recommendations:  - oral PO/supp tolerance/adequacy      REASON FOR ASSESSMENT  Nella Helms is a/an 42 year old female assessed by the dietitian for Admission Nutrition Risk Screen for unintentional loss of 10# or more in the past two months    Medical History: Pt with h/o severe epilepsy and developmental delay, cortical blindness, and wheelchair bound who presents for severe unexplained thrombocytopenia.    NUTRITION HISTORY  Assess as able; pt busy with MD/other provider upon visit    CURRENT NUTRITION ORDERS  Diet: Regular  Intake/Tolerance: 100%     LABS  Labs reviewed  ALT: 65 (H)    MEDICATIONS  Medications reviewed  Colace  Prednisone     ANTHROPOMETRICS  Height: 0 cm (Data Unavailable)   Ht Readings from Last 2 Encounters:   02/09/18 1.549 m (5' 1\")   10/13/17 1.549 m (5' 1\")   61\"   Most Recent Weight: 63 kg (138 lb 14.2 oz)    IBW: 48 kg  BMI: Overweight BMI 25-29.9  Weight History:   Wt Readings from Last 9 Encounters:   02/15/18 63 kg (138 lb 14.2 oz)   10/13/17 71.6 kg (157 lb 12.8 oz)   10/20/16 67.6 kg (149 lb)   10/19/15 68.2 kg (150 lb 6.4 oz)   12/04/14 69.8 kg (153 lb 12.8 oz)   11/06/14 70.4 kg (155 lb 3.2 oz)   10/23/13 66.7 kg (147 lb)   12% weight loss over ~ 4 months     Dosing Weight: 52 kg (adjusted weight using current weight of 63 kg and IBW of 48 kg)    ASSESSED NUTRITION NEEDS  Estimated Energy Needs: 6975-5647 kcals/day (25 - 30 kcals/kg)  Justification: Maintenance  Estimated Protein Needs: 62-78 grams protein/day (1.2 - 1.5 grams of pro/kg)  Justification: Increased needs  Estimated Fluid Needs: 6168-0143 mL/day (1 mL/kcal)   Justification: Maintenance and Per " provider pending fluid status    PHYSICAL FINDINGS  See malnutrition section below.    MALNUTRITION  % Intake: Unable to assess  % Weight Loss: > 10% in 6 months (severe)  Subcutaneous Fat Loss: Unable to assess  Muscle Loss: Unable to assess  Fluid Accumulation/Edema: Unable to assess  Malnutrition Diagnosis: Unable to determine due to pt with provider     NUTRITION DIAGNOSIS  Inadequate protein-energy intake related to imbalance of intake verses output as evidenced by pt with 12% weight loss over 4 months.       INTERVENTIONS  Implementation  Nutrition Education: Not appropriate at this time due to patient condition   Medical food supplement therapy     Goals  Patient to consume % of nutritionally adequate meal trays TID, or the equivalent with supplements/snacks.     Monitoring/Evaluation  Progress toward goals will be monitored and evaluated per protocol.    José Antonio Adler RD, [place], [facility]  Neuro ICU  Pager: [phone]

## 2018-02-16 NOTE — PROGRESS NOTES
Care Coordinator Progress Note     Admission Date/Time:  2/15/2018  Attending MD:  Dr Courtney Matthews     Data  Chart reviewed, discussed with interdisciplinary team. Pt was admitted from group home last evening. The group home had been in contact with the Epilepsy [facility]; pt found to have low platelets, hospital admission recommended.   Per 6A Discharge Rounds report pt was admitted last evening; had a sitter on nights, but sitter was discontinued. Plan for continuous video EEG monitoring & Heme consult for low platelets. Oriented x1, developmental delay; up with assist of 2 & a walker. Required straight cath last evening.     Patient was admitted for:  Thrombocytopenia  Past history includes:  Epilepsy; cortical blindness; mental retardation.     Concerns with insurance coverage for discharge needs: None. Pt's insurance is [place] & [place].     Current Living Situation: Patient lives in a group home.  [facility] in [place]. Main phone: [phone]. Ely Renteria, Nurse or Rosalina Jaime, Director.      Support System: Supportive  Services Involved:  TBD  Transportation: TBD  Barriers to Discharge: TBD    Coordination of Care and Referrals  Reviewed chart; could not find any documentation in paper chart from [facility].   Introduced myself to pt. She was resting in bed; awake; had crayons & pictures in front of her. She said her name.   Met with pt's brother, Abdon and his aunt & uncle in the 6th Floor Lounge. Abdon said he is pt's legal guardian. He had paperwork; I made copies and placed in chart. Abdon said his parents were guardians. His mother passed away 1 month ago and his father just had open heart surgery last Friday, 2-09-18.   Abdon said Nella has been at [facility] in [place] for about 20 years. It is a split level home. Pt's mobility has declined in the last few weeks. Abdon asked to speak with a SW. He doesn't think she can go back there if she can't do stairs. She has been  using a w/c the last 2 weeks. PT consulted here. Informed him I will contact the group home and discuss it with them. The group home may have to look for a handicapped accessible or 1 level home. Until a new home is found pt may need TCU placement. Abdon said about 10 years ago she broke her ankle and went to a TCU.   Abdon gave me the contact for [facility] . Phone: [phone]. I will call her.   Abdon said he spoke with doctors here and is aware of the plan. Gave him the phone number for 6A. Encouraged him to call if any questions.       Abdon also had info from the group home on pt's behaviors. I made a copy & put in the chart.     Called Roldan at [facility]; she runs the house Nella lives in. Informed Roldan I spoke with brother, Abdon and he mentioned pt's declining mobility. Roldan said since before November, 2-17 pt has been losing her balance more. Prior to that she was able to walker and do stairs with minimal assist. She was able to feed herself; toilet herself and make her needs known. Staff assisted with washing hair and supervised bathing.   [facility]old[facility] said to call the main office and discuss disposition. [facility] in [place]. Main phone: [phone]. Ely Renteria, Nurse or Rosalina Jaime, Director.       Assessment  Diagnostic work-up in progress. Due to declining mobility pt may need temporary TCU placement until a new group home is found.     Plan  Anticipated Discharge Date:  TBD    Anticipated Discharge Plan:   Return to group home if mobilty improves or TCU placement until new group home is found.    --PT consult.       Joanne Contreras, RN Care Coordinator  Unit [address], [facility] in [place]. Main phone: [phone]. Ely Renteria Nurse or Rosalina Jaiem, Director.       [facility]   Phone: [phone].

## 2018-02-16 NOTE — PLAN OF CARE
Problem: Seizure Disorder/Epilepsy (Adult)  Goal: Signs and Symptoms of Listed Potential Problems Will be Absent, Minimized or Managed (Seizure Disorder/Epilepsy)  Signs and symptoms of listed potential problems will be absent, minimized or managed by discharge/transition of care (reference Seizure Disorder/Epilepsy (Adult) CPG).   Pt admitted with severe epilepsy and unexplained thrombocytopenia. Platelets 21 this shift; MD and charge notified. Hematology  consulted per MD.  Developmentally delayed. VSS except slightly HTN. Oriented to self. Unable to assess neuros as pt doesn t follow most commands. Dysconjugate gaze. R upper > L upper per family; unable to assess. Seizure precautions maintained. VEEG leads in place; 1 event per family at 2000; staring and eyes back and forth; button pushed per family. Bladder scan of 600 cc; float nurse to straight cath now. Pt takes meds whole in apple sauce. Reg diet; good intake. Wheelchair bound at baseline; up with heavy assist of 2 and GB; pivot to commode. Continue to monitor and follow current POC.

## 2018-02-16 NOTE — H&P
[facility] [place]  Neurology History and Physical    Patient Name:  Nella Helms  MRN:  [id number]    :  1975  Date of Admission:  2/15/2018  Date of Service:  February 15, 2018  Primary care provider:  Fritsch, Bryan      Chief Complaint:  thrombocytopenia    History of Present Illness:   42 year old female h/o severe epilepsy and developmental delay who presents for severe unexplained thrombocytopenia. The patient has been noted to be declining by her brother in terms of gait, coordination, and cognitive function over the past few years but much more noticeably in the last couple months. She has otherwise been in her usual state of health. The patient was incidentally noted to have dropping platelets at a routine visit 18 to her epileptologist (Dr. Gomez) when platelets dropped from 158->55. Her primary epileptologist then stopped her VPA . On , the patient had a sz that resolved with prn diazepam. Plts then noted to be only 35. The patient has been admitted for further work-up and video-EEG monitoring while AEDs are adjusted.     Epilepsy History (Per Dr. Gomez):     Semiology:  -1st sz 6h after birth, EEG with hypsarrythmia    Type 1: sitting, both arms jerk up, her head turns to the right, eyes go to the right and roll up.  These events last 2 minutes and at that time she was having 6 a month.       Type 2:  is described as her arms go up, legs stiffen and she moans.  She can be lowered to the floor and then her arms and legs shake with some erratic breathing and sweating profusely.  These occurred at that time, 3-4 per month.         Seizure type 3 was described as being alert.  Her arms fly out and her eyes blink.       Seizure type 4 described as stares.       She has had 1 episode of status epilepticus in .       EEG when she was 12 was read as abnormal record by virtue of diffuse theta and delta slowing greater on the right.     Previous  AEDs:  -CBZ, PHE, PHB, VPA    ROS: A 10-point ROS was performed as per HPI.     PMH:  Past Medical History:   Diagnosis Date     Cortical blindness      Mental retardation      Strabismus      No past surgical history on file.    Allergies:  Allergies   Allergen Reactions     Cefzil [Cefprozil]      Codeine Phosphate      Dilantin [Phenytoin]      Penicillins        Medications:      Current Facility-Administered Medications:      lamoTRIgine (BRAND NAME ONLY LaMICtal) tablet 100 mg, 100 mg, Oral, TID, Abraham Cummings MD     OXcarbazepine (TRILEPTAL) tablet 450 mg, 450 mg, Oral, BID, Abraham Cummings MD     acetaminophen (TYLENOL) tablet 650 mg, 650 mg, Oral, Q4H PRN, Abraham Cummings MD     naloxone (NARCAN) injection 0.1-0.4 mg, 0.1-0.4 mg, Intravenous, Q2 Min PRN, Abraham Cummings MD     polyethylene glycol (MIRALAX/GLYCOLAX) Packet 17 g, 17 g, Oral, Daily PRN, Abraham Cummings MD     bisacodyl (DULCOLAX) Suppository 10 mg, 10 mg, Rectal, Daily PRN, Abraham Cummings MD     ondansetron (ZOFRAN-ODT) ODT tab 4 mg, 4 mg, Oral, Q6H PRN **OR** ondansetron (ZOFRAN) injection 4 mg, 4 mg, Intravenous, Q6H PRN, Abraham Cummings MD     magnesium sulfate 4 g in 100 mL sterile water (premade), 4 g, Intravenous, Q4H PRN, Abraham Cummings MD     docusate sodium (COLACE) capsule 100 mg, 100 mg, Oral, BID, Abraham Cummings MD     felbamate (FELBATOL) tablet 800 mg, 800 mg, Oral, QPM, Abraham Cummings MD     [START ON 2/16/2018] felbamate (FELBATOL) tablet 1,200 mg, 1,200 mg, Oral, BID, Abraham Cummings MD    Social History:  Social History   Substance Use Topics     Smoking status: Never Smoker     Smokeless tobacco: Never Used     Alcohol use No       Family History:    Reviewed    Physical Examination:   Vitals:  B/P: 145/72, T: Data Unavailable, P: 68, R: 16  General: pt laying comfortably in bed, breathing easily on ra, in NAD   HEENT: petechiae noted in mouth  Chest: cta   Heart: rrr  Abdomen: soft, nt, nd, +BS  Ext: no edema   Skin:  multiple bruises and petechiae throughout  Neuro:   -MS: alert, rarely able to follow 1 step commands  -CN: blinks to threat b/l, b/l exotropia, R upper and lower face weak, tongue midline, dysarthric  -Motor: moves R>L ext but all antigravity  -Reflexes: normoactive and symmetric at achilles, patella, brachioradialis, and biceps. Toes mute b/l  -Sensory: responds to noxious stimuli equally in all ext  -Gait: deferred    Investigations:    Plt: 158-> 55->35->21    Assessment and Plan:  43 yo f with developmental delay and epilepsy presents for severe unexplained thrombocytopenia.    #Severe Thrombocytopenia  Platelets dropped from 158 (1/7/18) to 55 (2/5/18). VPA then stopped but platelets continued to drop to 35 (2/14/18) and 21 (2/15/18). Most likely felbamate is the offending agent but will investigate other etiologies as below. Will discuss down-titration of felbamate in AM and continue vEEG.  -Head CT   -125mg IV solumedrol for possibility of ITP  -HIV, HCV  -peripheral smear, ferritin, haptoglobin, LDH, reticulocytes  -B12/MMA, folate, copper  -LFTs  -Hematology consult in AM    #Epilepsy: last sz 2/14/18, resolved with prn diazepam  -down titration of felbamate to be discussed in AM  -continue -450  -continue -100-100  -continue FBM [id number]  -VPA stopped (2/9/18)    #Developmental Delay: ongoing decline likely 2/2 cumulative effect of AEDs  -PT/OT for safety eval  -SW consult    FEN: lyte replacement protocol, senna  PPx: chemical ppx contraindicated given thrombocytopenia  Full Code    Patient discussed with attending neurologist, Dr. Cassie Richards MD  Neurology G3  [phone]

## 2018-02-16 NOTE — PROGRESS NOTES
SPIRITUAL HEALTH SERVICES  SPIRITUAL ASSESSMENT Progress Note  [facility] ([place]) 6A   ON-CALL VISIT    REFERRAL SOURCE: [facility]      Visited with pt Nella and brother Abdon.  Abdon shared about stresses in their family in the last month:  Their mom  a month ago, yesterday Abdon transported his father to a hospital after a procedure, and Nella is now hospitalized.  Provided compassionate conversation and listening for Abdon, who is serving as a caregiver for both his sister (pt) and his father.  Abdon requested a visit with a .  I affirmed  would visit Monday though was not available today.  Abdon said that was fine.  Provided prayer upon request.    PLAN: Refer to Maricruz garcia for follow-up on Monday.    Mana Putnam  Chaplain Resident  Pager [phone]

## 2018-02-17 ENCOUNTER — APPOINTMENT (OUTPATIENT)
Dept: PHYSICAL THERAPY | Facility: CLINIC | Age: 43
DRG: 813 | End: 2018-02-17
Attending: PSYCHIATRY & NEUROLOGY
Payer: MEDICARE

## 2018-02-17 ENCOUNTER — APPOINTMENT (OUTPATIENT)
Dept: ULTRASOUND IMAGING | Facility: CLINIC | Age: 43
DRG: 813 | End: 2018-02-17
Attending: STUDENT IN AN ORGANIZED HEALTH CARE EDUCATION/TRAINING PROGRAM
Payer: MEDICARE

## 2018-02-17 ENCOUNTER — APPOINTMENT (OUTPATIENT)
Dept: GENERAL RADIOLOGY | Facility: CLINIC | Age: 43
DRG: 813 | End: 2018-02-17
Attending: PSYCHIATRY & NEUROLOGY
Payer: MEDICARE

## 2018-02-17 ENCOUNTER — ALLIED HEALTH/NURSE VISIT (OUTPATIENT)
Dept: NEUROLOGY | Facility: CLINIC | Age: 43
DRG: 813 | End: 2018-02-17
Attending: PSYCHIATRY & NEUROLOGY
Payer: MEDICARE

## 2018-02-17 DIAGNOSIS — G40.319 GENERALIZED CONVULSIVE EPILEPSY WITH INTRACTABLE EPILEPSY (H): Primary | Chronic | ICD-10-CM

## 2018-02-17 LAB
BASOPHILS # BLD AUTO: 0 10E9/L (ref 0–0.2)
BASOPHILS NFR BLD AUTO: 0 %
COPPER SERPL-MCNC: 119 UG/DL (ref 80–155)
DIFFERENTIAL METHOD BLD: ABNORMAL
EOSINOPHIL # BLD AUTO: 0.1 10E9/L (ref 0–0.7)
EOSINOPHIL NFR BLD AUTO: 1.1 %
ERYTHROCYTE [DISTWIDTH] IN BLOOD BY AUTOMATED COUNT: 15 % (ref 10–15)
ERYTHROCYTE [DISTWIDTH] IN BLOOD BY AUTOMATED COUNT: 15 % (ref 10–15)
HCT VFR BLD AUTO: 29.8 % (ref 35–47)
HCT VFR BLD AUTO: 32.1 % (ref 35–47)
HGB BLD-MCNC: 10.5 G/DL (ref 11.7–15.7)
HGB BLD-MCNC: 9.8 G/DL (ref 11.7–15.7)
IMM GRANULOCYTES # BLD: 0 10E9/L (ref 0–0.4)
IMM GRANULOCYTES NFR BLD: 0.3 %
LYMPHOCYTES # BLD AUTO: 1.5 10E9/L (ref 0.8–5.3)
LYMPHOCYTES NFR BLD AUTO: 23.4 %
MCH RBC QN AUTO: 32.2 PG (ref 26.5–33)
MCH RBC QN AUTO: 32.5 PG (ref 26.5–33)
MCHC RBC AUTO-ENTMCNC: 32.7 G/DL (ref 31.5–36.5)
MCHC RBC AUTO-ENTMCNC: 32.9 G/DL (ref 31.5–36.5)
MCV RBC AUTO: 98 FL (ref 78–100)
MCV RBC AUTO: 99 FL (ref 78–100)
MONOCYTES # BLD AUTO: 0.7 10E9/L (ref 0–1.3)
MONOCYTES NFR BLD AUTO: 11.5 %
NEUTROPHILS # BLD AUTO: 4.1 10E9/L (ref 1.6–8.3)
NEUTROPHILS NFR BLD AUTO: 63.7 %
NRBC # BLD AUTO: 0 10*3/UL
NRBC BLD AUTO-RTO: 0 /100
PLATELET # BLD AUTO: 60 10E9/L (ref 150–450)
PLATELET # BLD AUTO: 75 10E9/L (ref 150–450)
PLATELETS.RETICULATED NFR BLD AUTO: 9.1 % (ref 1–7)
RBC # BLD AUTO: 3.04 10E12/L (ref 3.8–5.2)
RBC # BLD AUTO: 3.23 10E12/L (ref 3.8–5.2)
T4 FREE SERPL-MCNC: 0.75 NG/DL (ref 0.76–1.46)
TSH SERPL DL<=0.005 MIU/L-ACNC: 6.13 MU/L (ref 0.4–4)
WBC # BLD AUTO: 6.4 10E9/L (ref 4–11)
WBC # BLD AUTO: 9.2 10E9/L (ref 4–11)

## 2018-02-17 PROCEDURE — 85597 PHOSPHOLIPID PLTLT NEUTRALIZ: CPT | Performed by: STUDENT IN AN ORGANIZED HEALTH CARE EDUCATION/TRAINING PROGRAM

## 2018-02-17 PROCEDURE — 71045 X-RAY EXAM CHEST 1 VIEW: CPT

## 2018-02-17 PROCEDURE — 95951 ZZHC EEG VIDEO EACH 24 HR: CPT | Mod: ZF

## 2018-02-17 PROCEDURE — 76705 ECHO EXAM OF ABDOMEN: CPT

## 2018-02-17 PROCEDURE — 87040 BLOOD CULTURE FOR BACTERIA: CPT | Performed by: STUDENT IN AN ORGANIZED HEALTH CARE EDUCATION/TRAINING PROGRAM

## 2018-02-17 PROCEDURE — C9254 INJECTION, LACOSAMIDE: HCPCS | Performed by: STUDENT IN AN ORGANIZED HEALTH CARE EDUCATION/TRAINING PROGRAM

## 2018-02-17 PROCEDURE — A9270 NON-COVERED ITEM OR SERVICE: HCPCS | Mod: GY | Performed by: STUDENT IN AN ORGANIZED HEALTH CARE EDUCATION/TRAINING PROGRAM

## 2018-02-17 PROCEDURE — 25000132 ZZH RX MED GY IP 250 OP 250 PS 637: Mod: GY | Performed by: STUDENT IN AN ORGANIZED HEALTH CARE EDUCATION/TRAINING PROGRAM

## 2018-02-17 PROCEDURE — 97530 THERAPEUTIC ACTIVITIES: CPT | Mod: GP

## 2018-02-17 PROCEDURE — 97161 PT EVAL LOW COMPLEX 20 MIN: CPT | Mod: GP

## 2018-02-17 PROCEDURE — 25000128 H RX IP 250 OP 636: Performed by: STUDENT IN AN ORGANIZED HEALTH CARE EDUCATION/TRAINING PROGRAM

## 2018-02-17 PROCEDURE — G0499 HEPB SCREEN HIGH RISK INDIV: HCPCS | Performed by: STUDENT IN AN ORGANIZED HEALTH CARE EDUCATION/TRAINING PROGRAM

## 2018-02-17 PROCEDURE — 36415 COLL VENOUS BLD VENIPUNCTURE: CPT | Performed by: STUDENT IN AN ORGANIZED HEALTH CARE EDUCATION/TRAINING PROGRAM

## 2018-02-17 PROCEDURE — 86038 ANTINUCLEAR ANTIBODIES: CPT | Performed by: STUDENT IN AN ORGANIZED HEALTH CARE EDUCATION/TRAINING PROGRAM

## 2018-02-17 PROCEDURE — 12000003 ZZH R&B CRITICAL UMMC

## 2018-02-17 PROCEDURE — 85730 THROMBOPLASTIN TIME PARTIAL: CPT | Performed by: STUDENT IN AN ORGANIZED HEALTH CARE EDUCATION/TRAINING PROGRAM

## 2018-02-17 PROCEDURE — 85025 COMPLETE CBC W/AUTO DIFF WBC: CPT | Performed by: STUDENT IN AN ORGANIZED HEALTH CARE EDUCATION/TRAINING PROGRAM

## 2018-02-17 PROCEDURE — 85055 RETICULATED PLATELET ASSAY: CPT | Performed by: PSYCHIATRY & NEUROLOGY

## 2018-02-17 PROCEDURE — 40000193 ZZH STATISTIC PT WARD VISIT

## 2018-02-17 PROCEDURE — 25000125 ZZHC RX 250: Performed by: STUDENT IN AN ORGANIZED HEALTH CARE EDUCATION/TRAINING PROGRAM

## 2018-02-17 PROCEDURE — 85027 COMPLETE CBC AUTOMATED: CPT | Performed by: STUDENT IN AN ORGANIZED HEALTH CARE EDUCATION/TRAINING PROGRAM

## 2018-02-17 PROCEDURE — 84439 ASSAY OF FREE THYROXINE: CPT | Performed by: STUDENT IN AN ORGANIZED HEALTH CARE EDUCATION/TRAINING PROGRAM

## 2018-02-17 PROCEDURE — 00000167 ZZHCL STATISTIC INR NC: Performed by: STUDENT IN AN ORGANIZED HEALTH CARE EDUCATION/TRAINING PROGRAM

## 2018-02-17 PROCEDURE — 84443 ASSAY THYROID STIM HORMONE: CPT | Performed by: STUDENT IN AN ORGANIZED HEALTH CARE EDUCATION/TRAINING PROGRAM

## 2018-02-17 PROCEDURE — 85613 RUSSELL VIPER VENOM DILUTED: CPT | Performed by: STUDENT IN AN ORGANIZED HEALTH CARE EDUCATION/TRAINING PROGRAM

## 2018-02-17 PROCEDURE — 00000401 ZZHCL STATISTIC THROMBIN TIME NC: Performed by: STUDENT IN AN ORGANIZED HEALTH CARE EDUCATION/TRAINING PROGRAM

## 2018-02-17 PROCEDURE — 86706 HEP B SURFACE ANTIBODY: CPT | Performed by: STUDENT IN AN ORGANIZED HEALTH CARE EDUCATION/TRAINING PROGRAM

## 2018-02-17 RX ORDER — PANTOPRAZOLE SODIUM 40 MG/1
40 TABLET, DELAYED RELEASE ORAL EVERY MORNING
Status: DISCONTINUED | OUTPATIENT
Start: 2018-02-17 | End: 2018-02-26 | Stop reason: HOSPADM

## 2018-02-17 RX ORDER — LORAZEPAM 2 MG/ML
2 INJECTION INTRAMUSCULAR EVERY 4 HOURS PRN
Status: DISCONTINUED | OUTPATIENT
Start: 2018-02-17 | End: 2018-02-26 | Stop reason: HOSPADM

## 2018-02-17 RX ORDER — OXCARBAZEPINE 300 MG/1
600 TABLET, FILM COATED ORAL 2 TIMES DAILY
Status: DISCONTINUED | OUTPATIENT
Start: 2018-02-17 | End: 2018-02-26 | Stop reason: HOSPADM

## 2018-02-17 RX ORDER — LACOSAMIDE 100 MG/1
100 TABLET ORAL 2 TIMES DAILY
Status: DISCONTINUED | OUTPATIENT
Start: 2018-02-17 | End: 2018-02-17

## 2018-02-17 RX ORDER — SODIUM CHLORIDE 9 MG/ML
INJECTION, SOLUTION INTRAVENOUS
Status: DISCONTINUED
Start: 2018-02-17 | End: 2018-02-17 | Stop reason: HOSPADM

## 2018-02-17 RX ORDER — ACETAMINOPHEN 325 MG/1
650 TABLET ORAL EVERY 4 HOURS PRN
Status: DISCONTINUED | OUTPATIENT
Start: 2018-02-17 | End: 2018-02-26 | Stop reason: HOSPADM

## 2018-02-17 RX ORDER — PREDNISONE 20 MG/1
60 TABLET ORAL DAILY
Status: DISCONTINUED | OUTPATIENT
Start: 2018-02-17 | End: 2018-02-19

## 2018-02-17 RX ORDER — LEVETIRACETAM 500 MG/1
1000 TABLET ORAL 2 TIMES DAILY
Status: DISCONTINUED | OUTPATIENT
Start: 2018-02-17 | End: 2018-02-26 | Stop reason: HOSPADM

## 2018-02-17 RX ADMIN — ACETAMINOPHEN 650 MG: 325 TABLET, FILM COATED ORAL at 17:06

## 2018-02-17 RX ADMIN — LAMOTRIGINE 100 MG: 100 TABLET ORAL at 21:04

## 2018-02-17 RX ADMIN — LAMOTRIGINE 100 MG: 100 TABLET ORAL at 14:50

## 2018-02-17 RX ADMIN — LEVETIRACETAM 1000 MG: 500 TABLET, FILM COATED ORAL at 20:52

## 2018-02-17 RX ADMIN — PREDNISONE 60 MG: 20 TABLET ORAL at 09:09

## 2018-02-17 RX ADMIN — LACOSAMIDE 150 MG: 100 TABLET, FILM COATED ORAL at 20:52

## 2018-02-17 RX ADMIN — OXCARBAZEPINE 600 MG: 300 TABLET, FILM COATED ORAL at 20:52

## 2018-02-17 RX ADMIN — DOCUSATE SODIUM 100 MG: 100 CAPSULE, LIQUID FILLED ORAL at 09:10

## 2018-02-17 RX ADMIN — PANTOPRAZOLE SODIUM 40 MG: 40 TABLET, DELAYED RELEASE ORAL at 09:10

## 2018-02-17 RX ADMIN — SODIUM CHLORIDE 200 MG: 9 INJECTION, SOLUTION INTRAVENOUS at 07:59

## 2018-02-17 RX ADMIN — LORAZEPAM 2 MG: 2 INJECTION INTRAMUSCULAR; INTRAVENOUS at 05:37

## 2018-02-17 RX ADMIN — OXCARBAZEPINE 450 MG: 300 TABLET ORAL at 09:09

## 2018-02-17 RX ADMIN — LEVETIRACETAM 1000 MG: 500 TABLET, FILM COATED ORAL at 09:09

## 2018-02-17 RX ADMIN — LAMOTRIGINE 100 MG: 100 TABLET ORAL at 10:31

## 2018-02-17 ASSESSMENT — ACTIVITIES OF DAILY LIVING (ADL)
WHICH_OF_THE_ABOVE_FUNCTIONAL_RISKS_HAD_A_RECENT_ONSET_OR_CHANGE?: AMBULATION;FALL HISTORY
TRANSFERRING: 0-->INDEPENDENT
AMBULATION: 0-->INDEPENDENT
FALL_HISTORY_WITHIN_LAST_SIX_MONTHS: YES
DRESS: 0-->INDEPENDENT
TOILETING: 2-->ASSISTIVE PERSON
RETIRED_COMMUNICATION: 0-->UNDERSTANDS/COMMUNICATES WITHOUT DIFFICULTY
BATHING: 2-->ASSISTIVE PERSON
NUMBER_OF_TIMES_PATIENT_HAS_FALLEN_WITHIN_LAST_SIX_MONTHS: 4
RETIRED_EATING: 0-->INDEPENDENT
SWALLOWING: 0-->SWALLOWS FOODS/LIQUIDS WITHOUT DIFFICULTY
COGNITION: 0 - NO COGNITION ISSUES REPORTED

## 2018-02-17 ASSESSMENT — VISUAL ACUITY
OU: BASELINE
OU: BASELINE

## 2018-02-17 NOTE — PLAN OF CARE
Problem: Seizure Disorder/Epilepsy (Adult)  Goal: Signs and Symptoms of Listed Potential Problems Will be Absent, Minimized or Managed (Seizure Disorder/Epilepsy)  Signs and symptoms of listed potential problems will be absent, minimized or managed by discharge/transition of care (reference Seizure Disorder/Epilepsy (Adult) CPG).   Outcome: No Change  Pt. Had a few witnessed seizures this am shortly before shift change in which she was given 2mg Ativan IV.  As a result, pt. Was lethargic at the start of the shift but would arouse to voice. 200mg Vimpat IV was given x 1 this am.  Pt. Became more alert mid morning and was interacting with her brother, Abdon, at the bedside.  Pt. Is oriented x 1 and doesn't always follow commands.  BALDERRAMA with 4/5 strength.  L. Eye is dysconjugate and pt. Is unable to track on command.  Pt. Is legally blind at baseline and brother states that she can only see about 6 feet in front of her.  Speech is garbled.  Pt. Is a max assist of 2-3 when pivoting to the commode.  Pt. Has been continent thus far this shift and had a large, formed BM this am.  Chest Xray and Abdominal US performed today.  Pt. Had one witnessed episode this afternoon where she tensed up her BUE and looked to the left for a few seconds and would not respond to voice; event button pushed.  Within a few seconds pt. Returned to baseline. The plan is for pt. To remain on VEEG while AEDs are being adjusted.  Continue to monitor and notify MD with any further seizure activity.

## 2018-02-17 NOTE — PROGRESS NOTES
[facility], [place]   Neurology Daily Note  2/17/2018    Subjective:  Two seizures noted around 9pm.  Given ativan 2mg for each event. Lacosamide loaded after second event.     Objective:    Vitals: /52 (BP Location: Right arm)  Pulse 100  Temp 98.2  F (36.8  C) (Axillary)  Resp 16  Wt 63 kg (138 lb 14.2 oz)  SpO2 93%  BMI 26.24 kg/m2  General: Sitting upright in bed in no acute distress  HEENT: NC/AT  Chest: No respiratory distress  Heart: RRR  Abdomen: Nondistended  Ext: no edema   Skin: Minimal petechiae noted in left upper extremity  Neuro:   -MS: alert, awake, eating breakfast.  Able to follow some commands (closing eyes, sticking out tongue).  -CN: blinks to threat bilaterally.  Bilateral exotropia noted, tongue midline, mild dysarthria noted  -Motor: Moving all extremities spontaneously and antigravity  -Sensory: Intact to light touch throughout  -Gait: deferred    Pertinent Investigations:    Plt: 158-> 55->35->21-> 30-> 43 -> 60     Assessment/Plan  Ms. Helms is 42 year old woman with a history of developmental delay and epilepsy who was admitted on 2/15 for thrombocytopenia.     #Thrombocytopenia: improving.  Presenting with acute drop in platelets.  Valproate discontinued as an outpatient however pain was continued to drop.  Felbamate also discontinued as it has been associated with aplastic anemia.  All other cell lines are stable.  ITP also in the differential.  -Hematology onboard, appreciate recs  - Prednisone 60mg daily  - US of abdomen     #Epilepsy: Follows up with Dr. Gomez as an outpatient  -Discontinued felbamate 2/16  -VPA stopped 2/9/18  -continue -450  -continue -100-100  -Started Keppra.  Increased to 1000 twice daily today.  -Loaded with Vimpat 200 mg this morning.  Continue 100 mg twice daily maintenance dose.  -Continue video EEG monitoring while adjusting antiepileptics    #Developmental Delay  -PT/OT for safety eval  -SW  consult     FEN: lyte replacement protocol, senna  PPx: chemical ppx contraindicated given thrombocytopenia  Full Code    Patient seen and discussed with Dr. Matthews, attending.    Cuate Dubon MD  Neurology PGY2  [phone]

## 2018-02-17 NOTE — PROGRESS NOTES
Pt had a witnessed event at 0644 lasting approximately 45 seconds; no interventions provided.  Unable to follow commands nor recite sentences.  MD at bedside following event; waiting for vimpat from pharmacy.

## 2018-02-17 NOTE — INTERIM SUMMARY
Patient had a shaking event earlier for which cross-cover was paged. Shaking had stopped after 2-3 minutes but there has been a consistent desaturation in her O2 and she's riding in mid-80's without signs of distress, has been doing this for ~5 minutes. No use of accessory muscles. She's refusing nasal cannula or mask. Will administer Ativan 2mg to prevent another seizure and calm her down enough to hopefully allow us to put the mask on.    Mansoor Thomason MD  Neurology PGY3

## 2018-02-17 NOTE — PLAN OF CARE
Problem: Patient Care Overview  Goal: Plan of Care/Patient Progress Review  VEEG monitoring, pt had multiple events. Post seizure event around 0530, pt O2 sats was in 80s, pt would not allow placement of NC or Oxy plus mask. MD was notified, Ativan was administered. Pt continued to have brief events. Waiting for Vimpat order to be verified. Difficult to assess neuros, follows only some commands. PIV SL'd. Up w/ heavy 2, GB, & pivot to commode. Has been appropriately using call light to void. No BM. Reg diet. Meds with apple sauce. Cont to monitor & with POC.

## 2018-02-17 NOTE — PLAN OF CARE
Problem: Patient Care Overview  Goal: Plan of Care/Patient Progress Review  Discharge Planner PT   Patient plan for discharge: unknown   Current status: STS x2 with FWW with max Ax2- mod Ax2 with knees blocked.  Behavioral and cognitive changes causes difficulty in command following.  Supine to sit with max Ax2.  Rolls R and L with mod Ax1.  Sitting balance with frequent max Ax2 due to avoiding command following.  Occasionally will correct balance IND.    Barriers to return to prior living situation: cognitive impairment, medical status, level of A   Recommendations for discharge: TCU  Rationale for recommendations: Is below functional mobility baseline.  Family reports up until 3 wks ago pt was amb with HHAx1.  Most recently in w/c performing transfers with Ax1.  Unable to return back to group home due to stairs and Ax 2-3 needed for all mobility.  Would benefit most for skilled PT at TCU but would be able to d/c to a different group home without stairs and would require ability for staff to provide Ax2 for mobility.        Entered by: Jarad Deleon 02/17/2018 2:53 PM

## 2018-02-17 NOTE — PLAN OF CARE
Problem: Patient Care Overview  Goal: Plan of Care/Patient Progress Review  OT 6A Cx Pt with medical needs this am, PT to see in pm. Will reschedule eval for Sunday.

## 2018-02-17 NOTE — MR AVS SNAPSHOT
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)    -  1       Follow-ups after your visit        Your next 10 appointments already scheduled     2018  3:00 PM CST   Return Visit with Elysia Gomez MD   [facility]    [address] [place], [address]  [facility] [address] [phone]              Who to contact     Please call your clinic at [phone] to:    Ask questions about your health    Make or cancel appointments    Discuss your medicines    Learn about your test results    Speak to your doctor            Additional Information About Your Visit        MyChart Information     [employer] is an electronic gateway that provides easy, online access to your medical records. With [employer], you can request a clinic appointment, read your test results, renew a prescription or communicate with your care team.     To sign up for [employer] visit the website at [email or web address]   You will be asked to enter the access code listed below, as well as some personal information. Please follow the directions to create your username and password.     Your access code is: [email or web address]  Expires: 5/10/2018  4:21 PM     Your access code will  in 90 days. If you need help or a new code, please contact your [facility] or call [phone] for assistance.        Care EveryWhere ID     This is your Care EveryWhere ID. This could be used by other organizations to access your [place] medical records  [id number]         Blood Pressure from Last 3 Encounters:   18 138/63   10/13/17 145/77   10/20/16 (!) 141/97    Weight from Last 3 Encounters:   02/15/18 63  kg (138 lb 14.2 oz)   10/13/17 71.6 kg (157 lb 12.8 oz)   10/20/16 67.6 kg (149 lb)              Today, you had the following     No orders found for display         Today's Medication Changes      Notice     This visit is during an admission. Changes to the med list made in this visit will be reflected in the After Visit Summary of the admission.             Primary Care Provider Office Phone # Fax #    Bryan Fritsch [phone]       [facility] [address]  [facility] [address]        Equal Access to Services     [facility]: Hadii aad ku hadasho Soomaali, waaxda luqadaha, qaybta kaalmada adeegyada, sahara sparks . So [facility] [phone].    ATENCIÓN: Si habla español, tiene a singh disposición servicios gratuitos de asistencia lingüística. Llame al [phone].    We comply with applicable federal civil rights laws and [place] laws. We do not discriminate on the basis of race, color, national origin, age, disability, sex, sexual orientation, or gender identity.            Thank you!     Thank you for choosing [facility]  for your care. Our goal is always to provide you with excellent care. Hearing back from our patients is one way we can continue to improve our services. Please take a few minutes to complete the written survey that you may receive in the mail after your visit with us. Thank you!             Your Updated Medication List - Protect others around you: Learn how to safely use, store and throw away your medicines at www.disposemymeds.org.      Notice     This visit is during an admission. Changes to the med list made in this visit will be reflected in the After Visit Summary of the admission.

## 2018-02-17 NOTE — PROCEDURES
Procedure Date: 2018      EEG #:  -2      DAY 2 of 24-HOUR VIDEO EEG      DATE OF RECORDIN2018.     CLINICAL SUMMARY:  The patient is a 42-year-old, right-handed female with developmental delay and intractable epilepsy.  The patient presented for increased seizures after discontinuation of Depakote due to thrombocytopenia.  EEG was performed to evaluate for seizures.      MEDICATIONS:  Lacosamide, lamotrigine, Trileptal, levetiracetam and p.r.n. Ativan.       TECHNICAL SUMMARY: This continuous video- EEG monitoring procedure was performed with 23 scalp electrodes in 10-20 electrode system placement, and additional scalp, precordial and other surface electrodes used for electrical referencing and artifact detection.  Video monitoring was utilized and periodically reviewed by EEG technologists and the physician for electroclinical correlations.     INTERICTAL ACTIVITY:  There was 8-9 Hz alpha activity over the posterior head regions during waking, which was symmetric and attenuated by eye opening.  Diffuse theta greater and delta slowing was seen during waking.  During drowsiness, there was attenuation of PDR and roving eye movements.  Stage II sleep was manifested as vertex waves and symmetric sleep spindles.  There was sleep activation of occasional right frontotemporal spike and sharp waves at F8, T4.  Generalized frontally predominant slow spike wave and sharp and slow wave were present during waking, which significantly increased during sleep.  These were seen at rate of 1.5-2.5 Hz.  During waking, there were seen rarely and only as isolated discharge, however, during sleep, they were frequent and were seen or in runs of generalized discharges lasting up to 5 seconds.      CLINICAL/ICTAL EVENTS:  The patient had 2 seizures on this date of monitoring.  First seizure was out of sleep.  First clinical onset was at 21:11:53.  The patient was sleeping on her back, she woke up and had elevation  and posturing of her left arm.  She leaned forward and upper body got stiff.  Then, she put her hands together and rocked back and forth and then clapped a few times.  Her face was looking down and was not clearly visible.  Then, the patient sat still for a few seconds towards the end of the seizure.  After the seizure, she laid back again.  The seizure ended at approximately 21:13:05.  Electrographically, initially, the patient was in stage II sleep.  At 21:11:49, a right frontotemporal sharp wave was seen which repeated in 2 seconds.  Then there was generalized approximately 10-11 Hz of activity at 21:11:51 which lasted 1 second.  Upon awakening of the patient, there was generalized attenuation of background lasting approximately 5 seconds, then there was generalized 3 Hz delta activity which evolved to rhythmic 5-6 Hz theta activity which was seen diffusely.  Then it changed to 2-4 Hz delta activity, and it ended at approximately 21:13:05.      The patient had another seizure out of sleep.  First clinical manifestation was at 21:50:53.  The patient woke up with grunting and upper body tensing up with posturing and elevation of the left arm and then holding hands together.  Seizure ended at approximately 21:51:13.  Electrographically, initially, the patient was in stage II sleep; then there was an arousal with generalized 9-10 Hz alpha activity followed by generalized attenuation of the background lasting approximately 4 seconds followed by rhythmic generalized 3 Hz delta activity seen maximally over anterior electrodes.  Seizure ended at approximately 21:51:13.      IMPRESSION:  This is an abnormal video EEG due to the presence of mild to moderate diffuse nonspecific encephalopathy.  There were also occasional epileptiform discharges over the right frontotemporal head region during sleep.  Generalized epileptiform discharges were seen during waking and they significantly increased during sleep.  Two nonlocalizing  tonic seizures were captured out of sleep with tonic upper body and left arm posturing and elevation.  EEG was not clearly localizing or lateralizing.  Clinical correlation advised.         KENNY SARABIA MD             D: 2018   T: 2018   MT: LAWSON      Name:     JOHANNA SIMONS   MRN:      [id number]        Account:        [id number]   :      1975           Procedure Date: 2018      Document: [id number]

## 2018-02-17 NOTE — PROGRESS NOTES
02/17/18 1100   Quick Adds   Type of Visit Initial PT Evaluation   Living Environment   Lives With other (see comments)  (group home)   Living Arrangements group home   Home Accessibility stairs (1 railing present);stairs within home   Number of Stairs to Enter Home 14  (7 up and 7 down.  )   Number of Stairs Within Home 0   Stair Railings at Home inside, present on left side   Transportation Available family or friend will provide   Living Environment Comment Pt lives in a group home (split level).  Brother states pt was amb with HHA x1 up until 3 wks ago.  Since then, mobility has declined to w/c bound with Ax1 for transfers from w/c to bed.      Self-Care   Usual Activity Tolerance moderate   Current Activity Tolerance poor   Regular Exercise no   Equipment Currently Used at Home none   Functional Level Prior   Ambulation 2-->assistive person   Transferring 2-->assistive person   Toileting 2-->assistive person   Bathing 2-->assistive person   Dressing 2-->assistive person   Eating 0-->independent   Communication 2-->difficulty speaking (not related to language barrier)   Swallowing 0-->swallows foods/liquids without difficulty   Cognition 2 - difficulty with organizing thoughts   Fall history within last six months yes   Number of times patient has fallen within last six months 4   Which of the above functional risks had a recent onset or change? ambulation;fall history   Prior Functional Level Comment Lives at group home.  Due to developmental delay requires HHAx1 for mobility and most recently w/c bound.    General Information   Onset of Illness/Injury or Date of Surgery - Date 02/15/18   Referring Physician Sunil Richards MD   Patient/Family Goals Statement rehab to increase IND functional mobility   Pertinent History of Current Problem (include personal factors and/or comorbidities that impact the POC) 42 year old female h/o severe epilepsy and developmental delay who presents for severe unexplained  thrombocytopenia. The patient has been noted to be declining by her brother in terms of gait, coordination, and cognitive function over the past few years but much more noticeably in the last couple months. She has otherwise been in her usual state of health. The patient was incidentally noted to have dropping platelets at a routine visit 2/5/18 to her epileptologist (Dr. Gomez) when platelets dropped from 158->55. Her primary epileptologist then stopped her VPA 2/9. On 2/14, the patient had a sz that resolved with prn diazepam. Plts then noted to be only 35. The patient has been admitted for further work-up and video-EEG monitoring while AEDs are adjusted.    Precautions/Limitations fall precautions  (seizure precautions )   Heart Disease Risk Factors Lack of physical activity;Overweight;Medical history   General Observations Pt demonstrates behovioral issues limiting participation and command following.    Cognitive Status Examination   Orientation person   Level of Consciousness alert   Follows Commands and Answers Questions 50% of the time   Personal Safety and Judgment impaired   Memory impaired   Posture    Posture Forward head position;Protracted shoulders   Range of Motion (ROM)   ROM Comment WFL   Strength   Strength Comments WFL   Bed Mobility   Bed Mobility Comments max Ax2 from sit to supine   Transfer Skills   Transfer Comments STS with FWW and maxAx2-mod Ax2   Gait   Gait Comments unable at this time   General Therapy Interventions   Planned Therapy Interventions bed mobility training;balance training;gait training;neuromuscular re-education;strengthening;stretching;transfer training;risk factor education;home program guidelines;wheelchair management/propulsion training;progressive activity/exercise   Clinical Impression   Criteria for Skilled Therapeutic Intervention yes, treatment indicated   PT Diagnosis impaired functional mobility   Influenced by the following impairments impaired balance,  "weakness, cognitive impairment, poor command following   Functional limitations due to impairments IND with mobility   Clinical Presentation Evolving/Changing   Clinical Presentation Rationale seizure activity this am   Clinical Decision Making (Complexity) Moderate complexity   Therapy Frequency` 5 times/week   Predicted Duration of Therapy Intervention (days/wks) 2/24/18   Anticipated Discharge Disposition Transitional Care Facility   Risk & Benefits of therapy have been explained Yes   Patient, Family & other staff in agreement with plan of care Yes   [facility] TM \"6 Clicks\"   2016, Trustees of [facility], under license to [employer].  All rights reserved.   6 Clicks Short Forms Basic Mobility Inpatient Short Form   [facility] AM-PAC  \"6 Clicks\" V.2 Basic Mobility Inpatient Short Form   1. Turning from your back to your side while in a flat bed without using bedrails? 2 - A Lot   2. Moving from lying on your back to sitting on the side of a flat bed without using bedrails? 2 - A Lot   3. Moving to and from a bed to a chair (including a wheelchair)? 2 - A Lot   4. Standing up from a chair using your arms (e.g., wheelchair, or bedside chair)? 2 - A Lot   5. To walk in hospital room? 1 - Total   6. Climbing 3-5 steps with a railing? 1 - Total   Basic Mobility Raw Score (Score out of 24.Lower scores equate to lower levels of function) 10   Total Evaluation Time   Total Evaluation Time (Minutes) 15     "

## 2018-02-17 NOTE — PLAN OF CARE
Problem: Seizure Disorder/Epilepsy (Adult)  Goal: Signs and Symptoms of Listed Potential Problems Will be Absent, Minimized or Managed (Seizure Disorder/Epilepsy)  Signs and symptoms of listed potential problems will be absent, minimized or managed by discharge/transition of care (reference Seizure Disorder/Epilepsy (Adult) CPG).   Pt admitted with hx of epilepsy and severe thrombocytopenia. Last platelet count of 43 tonight. Developmentally delayed. VSS. Oriented to self. Unable to assess neuros as pt doesn t follow most commands. Strabismus both eyes. Seizure precautions maintained. VEEG leads in place; no events witnessed. Pt takes meds whole in apple sauce. Reg diet; good intake. Up with 2 and GB; pivot to commode. Voiding spontaneously. 2 BM this shift. Continue to monitor and follow current POC.  Update 2300: EEG techs updated writer that pt had two seizures last at around 2130; pt sitting up and MD malvin notified by technician. Will continue to monitor.

## 2018-02-17 NOTE — INTERIM SUMMARY
Patient had another event ~615am after Ativan 2mg received at 530. Loading with Lacosamide 200mg for improved coverage. O2 sats improved to 90% with 4L mask.    Mansoor Thomason MD  Neurology PGY3

## 2018-02-17 NOTE — PROGRESS NOTES
Hem onc brief progress note      Chart reviewed. Plt count improved to 60K. Received PO prednsione today at 8 AM. Improvement could be from the iv steroids on Day 1. TSH slightly high and low T4 suggesting subclinical hypothyroidism. No obvious symptoms, but hard to assess due to neurological status. Would send Anti-TPO Abs and repeat TSH in 6 weeks. Spleen size normal.Plan to continue Prednisone 60 mg daily.    Shakeel Matias MD  Hematology Oncology fellow  [phone]    Attending Note:  I have reviewed the patient chart.  I agree with the assessment and plan.  Stephanie Santos MD  Hematology

## 2018-02-18 ENCOUNTER — ALLIED HEALTH/NURSE VISIT (OUTPATIENT)
Dept: NEUROLOGY | Facility: CLINIC | Age: 43
DRG: 813 | End: 2018-02-18
Attending: PSYCHIATRY & NEUROLOGY
Payer: MEDICARE

## 2018-02-18 DIAGNOSIS — G40.319 GENERALIZED CONVULSIVE EPILEPSY WITH INTRACTABLE EPILEPSY (H): Primary | Chronic | ICD-10-CM

## 2018-02-18 LAB
ALBUMIN UR-MCNC: 10 MG/DL
ANION GAP SERPL CALCULATED.3IONS-SCNC: 7 MMOL/L (ref 3–14)
APPEARANCE UR: CLEAR
BASOPHILS # BLD AUTO: 0 10E9/L (ref 0–0.2)
BASOPHILS NFR BLD AUTO: 0.1 %
BILIRUB UR QL STRIP: NEGATIVE
BUN SERPL-MCNC: 12 MG/DL (ref 7–30)
CALCIUM SERPL-MCNC: 9 MG/DL (ref 8.5–10.1)
CHLORIDE SERPL-SCNC: 108 MMOL/L (ref 94–109)
CO2 SERPL-SCNC: 28 MMOL/L (ref 20–32)
COLOR UR AUTO: YELLOW
CREAT SERPL-MCNC: 0.47 MG/DL (ref 0.52–1.04)
DIFFERENTIAL METHOD BLD: ABNORMAL
EOSINOPHIL # BLD AUTO: 0.1 10E9/L (ref 0–0.7)
EOSINOPHIL NFR BLD AUTO: 0.9 %
ERYTHROCYTE [DISTWIDTH] IN BLOOD BY AUTOMATED COUNT: 14.9 % (ref 10–15)
GFR SERPL CREATININE-BSD FRML MDRD: >90 ML/MIN/1.7M2
GLUCOSE SERPL-MCNC: 84 MG/DL (ref 70–99)
GLUCOSE UR STRIP-MCNC: NEGATIVE MG/DL
HCT VFR BLD AUTO: 28.1 % (ref 35–47)
HGB BLD-MCNC: 9.3 G/DL (ref 11.7–15.7)
HGB UR QL STRIP: NEGATIVE
IMM GRANULOCYTES # BLD: 0 10E9/L (ref 0–0.4)
IMM GRANULOCYTES NFR BLD: 0.4 %
KETONES UR STRIP-MCNC: NEGATIVE MG/DL
LEUKOCYTE ESTERASE UR QL STRIP: NEGATIVE
LYMPHOCYTES # BLD AUTO: 2.4 10E9/L (ref 0.8–5.3)
LYMPHOCYTES NFR BLD AUTO: 29.6 %
MCH RBC QN AUTO: 32.7 PG (ref 26.5–33)
MCHC RBC AUTO-ENTMCNC: 33.1 G/DL (ref 31.5–36.5)
MCV RBC AUTO: 99 FL (ref 78–100)
METHYLMALONATE SERPL-SCNC: <0.1 UMOL/L (ref 0–0.4)
MONOCYTES # BLD AUTO: 0.9 10E9/L (ref 0–1.3)
MONOCYTES NFR BLD AUTO: 11.4 %
MUCOUS THREADS #/AREA URNS LPF: PRESENT /LPF
NEUTROPHILS # BLD AUTO: 4.6 10E9/L (ref 1.6–8.3)
NEUTROPHILS NFR BLD AUTO: 57.6 %
NITRATE UR QL: NEGATIVE
NRBC # BLD AUTO: 0 10*3/UL
NRBC BLD AUTO-RTO: 0 /100
PH UR STRIP: 7.5 PH (ref 5–7)
PLATELET # BLD AUTO: 82 10E9/L (ref 150–450)
POTASSIUM SERPL-SCNC: 3.6 MMOL/L (ref 3.4–5.3)
RBC # BLD AUTO: 2.84 10E12/L (ref 3.8–5.2)
RBC #/AREA URNS AUTO: 0 /HPF (ref 0–2)
SODIUM SERPL-SCNC: 143 MMOL/L (ref 133–144)
SOURCE: ABNORMAL
SP GR UR STRIP: 1.02 (ref 1–1.03)
SQUAMOUS #/AREA URNS AUTO: 2 /HPF (ref 0–1)
UROBILINOGEN UR STRIP-MCNC: NORMAL MG/DL (ref 0–2)
WBC # BLD AUTO: 8 10E9/L (ref 4–11)
WBC #/AREA URNS AUTO: 1 /HPF (ref 0–2)

## 2018-02-18 PROCEDURE — 36415 COLL VENOUS BLD VENIPUNCTURE: CPT | Performed by: STUDENT IN AN ORGANIZED HEALTH CARE EDUCATION/TRAINING PROGRAM

## 2018-02-18 PROCEDURE — 81001 URINALYSIS AUTO W/SCOPE: CPT | Performed by: STUDENT IN AN ORGANIZED HEALTH CARE EDUCATION/TRAINING PROGRAM

## 2018-02-18 PROCEDURE — 80183 DRUG SCRN QUANT OXCARBAZEPIN: CPT | Performed by: STUDENT IN AN ORGANIZED HEALTH CARE EDUCATION/TRAINING PROGRAM

## 2018-02-18 PROCEDURE — 25000125 ZZHC RX 250: Performed by: STUDENT IN AN ORGANIZED HEALTH CARE EDUCATION/TRAINING PROGRAM

## 2018-02-18 PROCEDURE — 80177 DRUG SCRN QUAN LEVETIRACETAM: CPT | Performed by: STUDENT IN AN ORGANIZED HEALTH CARE EDUCATION/TRAINING PROGRAM

## 2018-02-18 PROCEDURE — A9270 NON-COVERED ITEM OR SERVICE: HCPCS | Mod: GY | Performed by: STUDENT IN AN ORGANIZED HEALTH CARE EDUCATION/TRAINING PROGRAM

## 2018-02-18 PROCEDURE — 80175 DRUG SCREEN QUAN LAMOTRIGINE: CPT | Performed by: STUDENT IN AN ORGANIZED HEALTH CARE EDUCATION/TRAINING PROGRAM

## 2018-02-18 PROCEDURE — 95951 ZZHC EEG VIDEO EACH 24 HR: CPT | Mod: ZF

## 2018-02-18 PROCEDURE — 80299 QUANTITATIVE ASSAY DRUG: CPT | Performed by: STUDENT IN AN ORGANIZED HEALTH CARE EDUCATION/TRAINING PROGRAM

## 2018-02-18 PROCEDURE — 25000132 ZZH RX MED GY IP 250 OP 250 PS 637: Mod: GY | Performed by: STUDENT IN AN ORGANIZED HEALTH CARE EDUCATION/TRAINING PROGRAM

## 2018-02-18 PROCEDURE — 25000128 H RX IP 250 OP 636: Performed by: STUDENT IN AN ORGANIZED HEALTH CARE EDUCATION/TRAINING PROGRAM

## 2018-02-18 PROCEDURE — 12000003 ZZH R&B CRITICAL UMMC

## 2018-02-18 PROCEDURE — 40000141 ZZH STATISTIC PERIPHERAL IV START W/O US GUIDANCE

## 2018-02-18 PROCEDURE — 80048 BASIC METABOLIC PNL TOTAL CA: CPT | Performed by: STUDENT IN AN ORGANIZED HEALTH CARE EDUCATION/TRAINING PROGRAM

## 2018-02-18 PROCEDURE — 85025 COMPLETE CBC W/AUTO DIFF WBC: CPT | Performed by: STUDENT IN AN ORGANIZED HEALTH CARE EDUCATION/TRAINING PROGRAM

## 2018-02-18 RX ORDER — OLANZAPINE 5 MG/1
5 TABLET, ORALLY DISINTEGRATING ORAL ONCE
Status: COMPLETED | OUTPATIENT
Start: 2018-02-18 | End: 2018-02-18

## 2018-02-18 RX ORDER — LORAZEPAM 2 MG/ML
1 INJECTION INTRAMUSCULAR ONCE
Status: COMPLETED | OUTPATIENT
Start: 2018-02-18 | End: 2018-02-18

## 2018-02-18 RX ADMIN — ACETAMINOPHEN 650 MG: 325 TABLET, FILM COATED ORAL at 19:44

## 2018-02-18 RX ADMIN — LORAZEPAM 1 MG: 2 INJECTION INTRAMUSCULAR; INTRAVENOUS at 05:14

## 2018-02-18 RX ADMIN — OXCARBAZEPINE 600 MG: 300 TABLET, FILM COATED ORAL at 19:45

## 2018-02-18 RX ADMIN — DOCUSATE SODIUM 100 MG: 100 CAPSULE, LIQUID FILLED ORAL at 19:45

## 2018-02-18 RX ADMIN — LAMOTRIGINE 100 MG: 100 TABLET ORAL at 07:52

## 2018-02-18 RX ADMIN — DOCUSATE SODIUM 100 MG: 100 CAPSULE, LIQUID FILLED ORAL at 07:51

## 2018-02-18 RX ADMIN — OLANZAPINE 5 MG: 5 TABLET, ORALLY DISINTEGRATING ORAL at 03:46

## 2018-02-18 RX ADMIN — LAMOTRIGINE 100 MG: 100 TABLET ORAL at 19:45

## 2018-02-18 RX ADMIN — LEVETIRACETAM 1000 MG: 500 TABLET, FILM COATED ORAL at 19:45

## 2018-02-18 RX ADMIN — LACOSAMIDE 150 MG: 100 TABLET, FILM COATED ORAL at 19:45

## 2018-02-18 RX ADMIN — PANTOPRAZOLE SODIUM 40 MG: 40 TABLET, DELAYED RELEASE ORAL at 07:51

## 2018-02-18 RX ADMIN — LEVETIRACETAM 1000 MG: 500 TABLET, FILM COATED ORAL at 07:51

## 2018-02-18 RX ADMIN — OXCARBAZEPINE 600 MG: 300 TABLET, FILM COATED ORAL at 07:51

## 2018-02-18 RX ADMIN — PREDNISONE 60 MG: 20 TABLET ORAL at 07:51

## 2018-02-18 RX ADMIN — ACETAMINOPHEN 650 MG: 325 TABLET, FILM COATED ORAL at 13:56

## 2018-02-18 RX ADMIN — ACETAMINOPHEN 650 MG: 325 TABLET, FILM COATED ORAL at 00:39

## 2018-02-18 RX ADMIN — LAMOTRIGINE 100 MG: 100 TABLET ORAL at 13:56

## 2018-02-18 RX ADMIN — LACOSAMIDE 150 MG: 100 TABLET, FILM COATED ORAL at 07:51

## 2018-02-18 ASSESSMENT — VISUAL ACUITY
OU: NORMAL ACUITY
OU: BASELINE
OU: BASELINE

## 2018-02-18 NOTE — PLAN OF CARE
"Problem: Patient Care Overview  Goal: Plan of Care/Patient Progress Review  PT 6A: Cancel.  Cousin in room at time of attempt.  Cousin states pt had a \"rough night\" with little sleep. Was agitated and confused last night, should rest now.  PT unable to try back.  Will reschedule tomorrow.       "

## 2018-02-18 NOTE — PROCEDURES
Procedure Date: 2018      24-HOUR VIDEO EEG      EEG #-3 (This is day 3 of a 24-hour video EEG.)      DATE OF RECORDIN2018.      SOURCE FILE DURATION:  23 hours 31 minutes 55 seconds.      CLINICAL SUMMARY:  The patient is a 42-year-old, right-handed female with history of intractable epilepsy.  The patient presented after being noted to be declining in terms of gait, coordination, and cognitive function.  EEG was performed to evaluate for seizures.     MEDICATIONS:  Lacosamide, lamotrigine, Trileptal, levetiracetam and p.r.n. Ativan.      TECHNICAL SUMMARY: This continuous video- EEG monitoring procedure was performed with 23 scalp electrodes in 10-20 electrode system placement, and additional scalp, precordial and other surface electrodes used for electrical referencing and artifact detection.  Video monitoring was utilized and periodically reviewed by EEG technologists and the physician for electroclinical correlations.      INTERICTAL ACTIVITY:  There was occasional nonsustained 8 to 9 Hz alpha activity over the posterior head regions which was symmetric.  Diffuse theta and less frequently delta slowing was present during waking.  Stage II sleep was recorded and manifested by vertex waves and symmetric sleep spindles.  During sleep, there was also activation of rare spike and sharp waves over the right frontotemporal head region at F8/T4.  There were also frequent frontally predominant generalized slow spike and wave and sharp and slow wave discharges at 1.5 to 2.5 Hz, which increased significantly during sleep.      CLINICAL/ICTAL AND ICTAL EVENTS:  The patient had 12 complex partial seizures which one got secondary generalized.  These occurred at 2:37:37, 4:32:15, 6:13:42,  6:43:40, 12:05:28, 12:46:16, 13:59:41, 14:18:39, 14:35, 14:54:05, 15:03:58, and 15:17:34.  The seizures typically lasted 15-30 seconds and rarely up to 1 minute.  During these, the patient would typically have upper  "body stiffening and leaning forward with elevation and posturing of the left arm and then irregular bilateral arm movements with some grabbing with the right hand and staring.  The patient was tested by the nurse during the seizure  at 12:46 and she was not able to follow commands and not naming objects.   Electrographically, the started with a generalized attenuation of the background followed by diffuse fast alpha activity which evolved into diffuse rhythmic delta activity.  This ictal activity was diffuse but more pronounced in the parasagittal regions.  The seizure at 4:32:15 got secondary generalized electrographically.  It occurred out of sleep.  There was generalized attenuation of the background for 1 second followed by diffuse rhythmic alpha activity followed by rhythmic delta activity in diffuse distribution which evolved into diffuse rhythmic theta activity which evolved to generalized spike and wave discharges.  The seizure lasted for 2 minutes.  Clinically, the seizures started similar to complex partial seizures with upper body stiffening and leaning forward and posturing and elevation of the left arm followed by rightward cephalic version, whole body stiffening and clonic movements. The last 3 seizures were slightly different.  The patient had right arm elevation and irregular wringing hand movements and then slight bilateral leg twitches.  They started with generalized 1 second attenuation of the background followed by diffuse rhythmic theta activity.   There was a spike component over the right frontotemporal during 2 of these seizures.  They were only 10-15 seconds.     The patient also had 2 events.  At 18:47 the patient was off camera, she cries out, said \"I need help\".  Per staff, the patient was getting out of bed.  There were no changes in the EEG background and no ictal activity during this event.  The next event was at 19:20.  The patient tried to get out of the bed. The patient fell into " bed and yelled.   There was no ictal activity during this event.      IMPRESSION:  This is an abnormal video EEG due to the presence of mild to moderate diffuse nonspecific encephalopathy.  There were epileptiform discharges over the right frontotemporal head region during sleep as well as frequent generalized epileptogenic discharges.  Twelve complex partial seizures of nonlocalizing, nonlateralizing EEG onset were captured, though clinically there was some suggestion of right hemispheric onset.  One seizure got  secondary generalized.  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         KENNY SARABIA MD             D: 2018   T: 2018   MT: MD      Name:     JOHANNA SIMONS   MRN:      [id number]        Account:        [id number]   :      1975           Procedure Date: 2018      Document: [id number]

## 2018-02-18 NOTE — PROGRESS NOTES
[facility], [place]   Neurology Daily Note    Nella Helms  [id number]  02/18/2018    Subjective Data:  Ms Helms is somewhat restless in bed this morning.  She indicates that she is uncomfortable, but does not further localize this complaint.  Preliminary EEG read with 13 seizures yesterday.  Formal read is pending to determine efficacy of increased antiepileptic drugs.    Objective Data:   /71 (BP Location: Right arm)  Pulse 73  Temp 98.9  F (37.2  C) (Axillary)  Resp 20  Wt 63 kg (138 lb 14.2 oz)  SpO2 95%  BMI 26.24 kg/m2  SpO2 data collected on O2 mask    Neurologic: Limited by baseline mental status.  Ms Helms she is lying in bed, she is awake and attends to the examiner.  She occasionally follow simple commands.  Her speech is abrupt.  Mild dysarthria.  No apparent facial asymmetry.  The eyes appeared to move in all directions without nystagmus.  I was unable to examine her pupils due to active eye closure.  All 4 extremities move spontaneously and apparently purposefully.  She tends to hold the distal portions of her limbs and flexion.  She has her shoes on in the bed.  Reflexes are symmetric throughout toes are down.  Coordination is not able to be assessed due to cooperation    Constitutional: appears anxious  Cardiovascular: regular rhythm  Respiratory: clear to auscultation without wheezes or rales  Chest: symmetric chest rise, no accessory muscle use  Abdominal: BS normal active x 4  Extremities: distal limbs held in flexion  Skin: No lesions or rashes    Labs:  Recent Labs   Lab Test  02/17/18   1414  02/17/18   0833  02/16/18   1744  02/16/18   0722  02/15/18   2026   10/19/15   1430   HGB  10.5*  9.8*  10.3*  11.4*  10.6*   < >  12.9   WBC  9.2  6.4  5.0  4.7  4.9   < >  6.4   PLT  75*  60*  43*  30*  21*   < >  207   NA   --    --    --   135  132*   --   135   POTASSIUM   --    --    --   4.5  4.4   --    --    CR   --    --    --   0.35*  0.27*   --    --   "  BUN   --    --    --   8  7   --    --    GLC   --    --    --   93  88   --    --     < > = values in this interval not displayed.     Abdominal US: impression copied directly to ensure accuracy  \"Impression:   1. Normal-appearing spleen and measures 10 cm.  2. Trace left-sided pleural effusion\"    EEG:  \"CLINICAL/ICTAL AND ICTAL EVENTS:  The patient had 12 complex partial seizures which one got secondary generalized.  These occurred at 2:30, 7:37, 4:30, 2:15, 6:13:42,  6:43:40, 12:05:28, 12:46:16, 13:59:41, 14:18:39, 14:35, 14:54:05, 15:03:58, and 15:17:34.  The number of seizures is 12 seizures.  The seizures typically lasted 15-30 seconds and rarely up to 1 minute.  During these, the patient would typically have upper body stiffening and leaning forward with elevation and posturing of the left arm and then irregular bilateral arm movements with some grabbing with the right hand and staring.  The patient was tested by the nurse during the seizure  at 12:46 and she was not able to follow commands and not naming objects.  The last 3 seizures were slightly different.  The patient had right arm elevation and irregular movements and then slight bilateral leg twitches.  Electrographically, the seizures started with a generalized attenuation of the background followed by diffuse fast alpha activity which evolved into diffuse rhythmic delta activity.  This ictal activity was diffuse but more pronounced in the parasagittal regions.  The seizure at 4:32:15 got secondary generalized electrographically.  It occurred out of sleep.  There was generalized attenuation of the background for 1 second followed by diffuse rhythmic alpha activity followed by rhythmic delta activity in diffuse distribution which evolved into diffuse rhythmic theta activity which evolved to generalized spike and wave discharges.  Clinically, the seizure lasted for 2 minutes.  Clinically, the seizures started similar to complex partial seizures with " "elevation of upper body and posturing and elevation of the left arm followed by rightward cephalic version, whole body stiffening and clonic movements.  The last two seizures were electrographically different.  They started with generalized 1 second attenuation of the background followed by diffuse rhythmic theta activity.  This had a spike component over the right frontotemporal during 12 of the seizures and they were only 10-15 seconds.  The patient also had 2 events.  At 18:47 the patient was off camera, she cries out, said \"I need help\".  Per staff, the patient was getting out of bed.  There were no changes in the EEG background and no ictal activity during this event.  The next event was at 19:20.  The patient tried to get out of the bed. The patient fell into bed and yelled.   There was no ictal activity during this event.       IMPRESSION:  This is an abnormal video EEG due to the presence of mild to moderate diffuse nonspecific encephalopathy.  There were epileptiform discharges over the right frontotemporal head region during sleep as well as frequent generalized epileptogenic discharges.  Twelve complex partial seizures of nonlocalizing, nonlateralizing EEG onset were captured, though clinically there was some suggestion of right hemispheric onset with 8 seizures.  One seizure got  secondary generalized.  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 \"    Assessment and Plan:  Ms Helms is a 42-year-old woman with history of developmental delay and intractable epilepsy admitted for workup of thrombocytopenia which is improving.    #Thrombocytopenia: The cause of her thrombocytopenia is not yet determined.  ITP versus adverse effects of valproic acid and/or felbamate are considered.  Valproic acid was discontinued approximately 1 week prior to her admission and her platelets were continuing to trend downward.  Shortly after stopping felbamate " the platelets began to rebound, however this is confounded by her simultaneously receiving steroids as treatment for ITP.  We are not certain that her platelets would rebound so quickly after stopping felbamate.  Hematology is consulted and following.  Abdominal ultrasound did not reveal evidence of splenomegaly  - prednisone 60mg daily for empiric treatment of ITP  - hematology following  - transfuse if platelets <10,000 or <25,000 with evidence of hemorrhage    #epilepsy: Felbamate discontinued shortly after admission. Levetiracetam and lacosamide since started and titrating up. Oxcarbazepine titrated up. Seizures continue to be frequent.  - lacosamide 150mg BID  - levetiracetam 1000mg BID  - oxcarbazepine 600mg BID  - vEEG  - seizure precautions    FEN: Electrolytes replaced PRN, regular diet  Prophylaxis: SCD, no chemoprophylaxis due to thrombocytopenia  Code Status: FULL CODE    Dispo: Anticipate discharge back home after seizures are under control and thrombocytopenia is improved    This patient was seen and discussed with attending neurologist, Dr. Cassie Cummings, DO  PGY3 Neurology   Neurology Service  02/18/2018

## 2018-02-18 NOTE — PROGRESS NOTES
PRELIMINARY EEG REPORT:     There was mild to moderate diffuse encephalopathy, frequent generalized slow spike-wave and sharp and slow wave complexes.  At least 13 brief seizures were captured in the past 24 hours with diffuse nonlocalizing onset.  The full report is to follow.    Sandra Sparks MD

## 2018-02-18 NOTE — PLAN OF CARE
Problem: Seizure Disorder/Epilepsy (Adult)  Goal: Signs and Symptoms of Listed Potential Problems Will be Absent, Minimized or Managed (Seizure Disorder/Epilepsy)  Signs and symptoms of listed potential problems will be absent, minimized or managed by discharge/transition of care (reference Seizure Disorder/Epilepsy (Adult) CPG).   Pt admitted with epilepsy and severe thrombocytopenia. Developmentally delayed. VSS except O2 sats 88-95% this shift on 3L O2; MD aware. Sats 92-95 when oxyPlus mask on; pt takes it off frequently; redirected as needed. Oriented to self. Unable to assess neuros as pt doesn t follow most commands. Garbled speech. Legally blind. Strabismus to R eye. Impulsive and restless most of shift. Pt had multiple seizures per MD during the day; AED's increased this evening. Seizure precautions maintained. VEEG leads in place. Pt takes meds whole in apple sauce. Reg diet; good intake. Up with 2-3 and GB; pivot to commode. Voiding spontaneously. 2 BM this shift. Pt's brother updated this evening; he went home this evening and will be back on Monday. Please call him with any updates/changes. Continue to monitor and follow current POC.

## 2018-02-18 NOTE — PLAN OF CARE
Problem: Patient Care Overview  Goal: Plan of Care/Patient Progress Review  OT 6A Pt not medically appropriate for OT eval on this date.

## 2018-02-18 NOTE — PLAN OF CARE
Problem: Seizure Disorder/Epilepsy (Adult)  Goal: Signs and Symptoms of Listed Potential Problems Will be Absent, Minimized or Managed (Seizure Disorder/Epilepsy)  Signs and symptoms of listed potential problems will be absent, minimized or managed by discharge/transition of care (reference Seizure Disorder/Epilepsy (Adult) CPG).   Outcome: No Change  Pt. Was agitated on the previous shift and was given 1mg Ativan IV x 1 @ 0514.  Pt. Slept the entire morning, aroused to verbal intermittently, but would quickly drift back to sleep.  O2 sats were in the low to mid 90s on 2L via Oxy Plus mask.  Pt. Was awoken around 1300 to eat lunch.  Pt. Was agitated when she woke up and was crying for Abdon and stating that she fell.  Pt. Was reassured that she hadn't fallen, but she continued to be upset and holler out for Abdon.  Abdon was called and pt. Quickly calmed down after she talked to him.  Pt. Ate 75% of her lunch with minimal assistance, but needed to be reminded not to eat too fast.  Neuros remain unchanged: oriented x self and doesn't follow most commands.  BALDERRAMA with 4/5 strength.  L. Eye is dysconjugate and pt. Is unable to track on command.  Pt. Is legally blind at baseline and brother states that she can only see about 6 feet in front of her.  Speech is garbled.  Pt. Got up to the commode with minimal assist of two and a GB this afternoon. No obvious seizures witnessed thus far this shift.  Multiple EEG leads were replaced this morning and are currently intact.  1:1 present at the bedside to ensure pt. Safety; no mitts in place.  Pt. Has bruising and petechia present throughout BLE.  Continue to monitor and notify MD with any further seizure activity.

## 2018-02-18 NOTE — PLAN OF CARE
Problem: Patient Care Overview  Goal: Plan of Care/Patient Progress Review  Outcome: No Change  VEEG monitoring, no events witnessed or reported. VSS with 3L O2 via NC, O2 sats in mid 90s.Oriented to self only. Difficult to assess neuros as pt doesn't follow most commands; garbled speech; R eye strabismus; legally blind. Pt was restless and impulsive throughout the night. c/o of pain in her R feet where there was a small reddened sore next to her toe. PRN Tylenol does not seem to help. pt wanted to have her shoes on in bed. Pulling on her VEEG leads, mitts did not help. At times pt was loud & aggressive. MD notified. Zyprexa administered at 0345. New PIV had to be placed to administer Ativan since the L PIV was leaking out. Became somewhat calm during AM shift change. Urine sample sent to lab for Urinalysis. Up w/ heavy 2, GB, & pivot to commode. Formed BM x 1. Reg diet. Meds with apple sauce. Bedside attendant overnight. Cont to monitor & with POC.

## 2018-02-18 NOTE — MR AVS SNAPSHOT
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)    -  1       Follow-ups after your visit        Your next 10 appointments already scheduled     2018  3:00 PM CST   Return Visit with Elysia Gomez MD   [facility] ([facility])    [address], [address]  [facility] [address] [phone]              Who to contact     Please call your clinic at [phone] to:    Ask questions about your health    Make or cancel appointments    Discuss your medicines    Learn about your test results    Speak to your doctor            Additional Information About Your Visit        MyChart Information     [employer] is an electronic gateway that provides easy, online access to your medical records. With [email or web address], you can request a clinic appointment, read your test results, renew a prescription or communicate with your care team.     To sign up for [employer] visit the website at [email or web address]   You will be asked to enter the access code listed below, as well as some personal information. Please follow the directions to create your username and password.     Your access code is: [email or web address]  Expires: 5/10/2018  4:21 PM     Your access code will  in 90 days. If you need help or a new code, please contact your [facility] or call [phone] for assistance.        Care EveryWhere ID     This is your Care EveryWhere ID. This could be used by other organizations to access your [facility] medical records  [id number]         Blood Pressure from Last 3 Encounters:   18 127/55   10/13/17 145/77   10/20/16 (!) 141/97    Weight from Last 3 Encounters:   02/15/18 63  kg (138 lb 14.2 oz)   10/13/17 71.6 kg (157 lb 12.8 oz)   10/20/16 67.6 kg (149 lb)              Today, you had the following     No orders found for display         Today's Medication Changes      Notice     This visit is during an admission. Changes to the med list made in this visit will be reflected in the After Visit Summary of the admission.             Primary Care Provider Office Phone # Fax #    Bryan Fritsch [phone]       [facility] [address]  [facility] [address]        Equal Access to Services     [facility]: Hadii aad ku hadasho Soomaali, waaxda luqadaha, qaybta kaalmada adeegyada, sahara sparks . So [facility] [phone].    ATENCIÓN: Si habla español, tiene a singh disposición servicios gratuitos de asistencia lingüística. Llame al [phone].    We comply with applicable federal civil rights laws and [place] laws. We do not discriminate on the basis of race, color, national origin, age, disability, sex, sexual orientation, or gender identity.            Thank you!     Thank you for choosing [facility]  for your care. Our goal is always to provide you with excellent care. Hearing back from our patients is one way we can continue to improve our services. Please take a few minutes to complete the written survey that you may receive in the mail after your visit with us. Thank you!             Your Updated Medication List - Protect others around you: Learn how to safely use, store and throw away your medicines at www.disposemymeds.org.      Notice     This visit is during an admission. Changes to the med list made in this visit will be reflected in the After Visit Summary of the admission.

## 2018-02-19 ENCOUNTER — APPOINTMENT (OUTPATIENT)
Dept: PHYSICAL THERAPY | Facility: CLINIC | Age: 43
DRG: 813 | End: 2018-02-19
Attending: PSYCHIATRY & NEUROLOGY
Payer: MEDICARE

## 2018-02-19 ENCOUNTER — ALLIED HEALTH/NURSE VISIT (OUTPATIENT)
Dept: NEUROLOGY | Facility: CLINIC | Age: 43
DRG: 813 | End: 2018-02-19
Attending: PSYCHIATRY & NEUROLOGY
Payer: MEDICARE

## 2018-02-19 DIAGNOSIS — G40.319 GENERALIZED CONVULSIVE EPILEPSY WITH INTRACTABLE EPILEPSY (H): Primary | Chronic | ICD-10-CM

## 2018-02-19 LAB
ANA SER QL IF: NEGATIVE
BASOPHILS # BLD AUTO: 0 10E9/L (ref 0–0.2)
BASOPHILS NFR BLD AUTO: 0.3 %
DIFFERENTIAL METHOD BLD: ABNORMAL
EOSINOPHIL # BLD AUTO: 0.2 10E9/L (ref 0–0.7)
EOSINOPHIL NFR BLD AUTO: 2.4 %
ERYTHROCYTE [DISTWIDTH] IN BLOOD BY AUTOMATED COUNT: 14.9 % (ref 10–15)
HBV SURFACE AB SERPL IA-ACNC: 10.76 M[IU]/ML
HBV SURFACE AG SERPL QL IA: NONREACTIVE
HCT VFR BLD AUTO: 28.7 % (ref 35–47)
HGB BLD-MCNC: 9.5 G/DL (ref 11.7–15.7)
IMM GRANULOCYTES # BLD: 0 10E9/L (ref 0–0.4)
IMM GRANULOCYTES NFR BLD: 0.6 %
LACOSAMIDE SERPL-MCNC: 2.8 UG/ML (ref 5–10)
LAMOTRIGINE SERPL-MCNC: 2.9 UG/ML (ref 2.5–15)
LEVETIRACETAM SERPL-MCNC: 19 UG/ML (ref 12–46)
LYMPHOCYTES # BLD AUTO: 2.5 10E9/L (ref 0.8–5.3)
LYMPHOCYTES NFR BLD AUTO: 38.6 %
MCH RBC QN AUTO: 32.9 PG (ref 26.5–33)
MCHC RBC AUTO-ENTMCNC: 33.1 G/DL (ref 31.5–36.5)
MCV RBC AUTO: 99 FL (ref 78–100)
MONOCYTES # BLD AUTO: 0.7 10E9/L (ref 0–1.3)
MONOCYTES NFR BLD AUTO: 10.7 %
NEUTROPHILS # BLD AUTO: 3.1 10E9/L (ref 1.6–8.3)
NEUTROPHILS NFR BLD AUTO: 47.4 %
NRBC # BLD AUTO: 0 10*3/UL
NRBC BLD AUTO-RTO: 1 /100
PLATELET # BLD AUTO: 121 10E9/L (ref 150–450)
PLATELET # BLD EST: ABNORMAL 10*3/UL
RBC # BLD AUTO: 2.89 10E12/L (ref 3.8–5.2)
WBC # BLD AUTO: 6.6 10E9/L (ref 4–11)

## 2018-02-19 PROCEDURE — 97116 GAIT TRAINING THERAPY: CPT | Mod: GP

## 2018-02-19 PROCEDURE — 25000132 ZZH RX MED GY IP 250 OP 250 PS 637: Mod: GY | Performed by: STUDENT IN AN ORGANIZED HEALTH CARE EDUCATION/TRAINING PROGRAM

## 2018-02-19 PROCEDURE — A9270 NON-COVERED ITEM OR SERVICE: HCPCS | Mod: GY | Performed by: STUDENT IN AN ORGANIZED HEALTH CARE EDUCATION/TRAINING PROGRAM

## 2018-02-19 PROCEDURE — 85025 COMPLETE CBC W/AUTO DIFF WBC: CPT | Performed by: STUDENT IN AN ORGANIZED HEALTH CARE EDUCATION/TRAINING PROGRAM

## 2018-02-19 PROCEDURE — 97530 THERAPEUTIC ACTIVITIES: CPT | Mod: GP

## 2018-02-19 PROCEDURE — 25000128 H RX IP 250 OP 636: Performed by: STUDENT IN AN ORGANIZED HEALTH CARE EDUCATION/TRAINING PROGRAM

## 2018-02-19 PROCEDURE — 12000003 ZZH R&B CRITICAL UMMC

## 2018-02-19 PROCEDURE — 36415 COLL VENOUS BLD VENIPUNCTURE: CPT | Performed by: STUDENT IN AN ORGANIZED HEALTH CARE EDUCATION/TRAINING PROGRAM

## 2018-02-19 PROCEDURE — 40000193 ZZH STATISTIC PT WARD VISIT

## 2018-02-19 PROCEDURE — 25000125 ZZHC RX 250: Performed by: STUDENT IN AN ORGANIZED HEALTH CARE EDUCATION/TRAINING PROGRAM

## 2018-02-19 PROCEDURE — 95951 ZZHC EEG VIDEO EACH 24 HR: CPT | Mod: ZF

## 2018-02-19 RX ORDER — PREDNISONE 20 MG/1
40 TABLET ORAL DAILY
Status: DISCONTINUED | OUTPATIENT
Start: 2018-02-20 | End: 2018-02-20

## 2018-02-19 RX ORDER — LORAZEPAM 2 MG/ML
2 INJECTION INTRAMUSCULAR ONCE
Status: COMPLETED | OUTPATIENT
Start: 2018-02-19 | End: 2018-02-19

## 2018-02-19 RX ADMIN — LAMOTRIGINE 100 MG: 100 TABLET ORAL at 08:06

## 2018-02-19 RX ADMIN — PREDNISONE 60 MG: 20 TABLET ORAL at 08:06

## 2018-02-19 RX ADMIN — LACOSAMIDE 150 MG: 100 TABLET, FILM COATED ORAL at 10:47

## 2018-02-19 RX ADMIN — LEVETIRACETAM 1000 MG: 500 TABLET, FILM COATED ORAL at 19:15

## 2018-02-19 RX ADMIN — LORAZEPAM 2 MG: 2 INJECTION INTRAMUSCULAR; INTRAVENOUS at 18:00

## 2018-02-19 RX ADMIN — OXCARBAZEPINE 600 MG: 300 TABLET, FILM COATED ORAL at 08:06

## 2018-02-19 RX ADMIN — PANTOPRAZOLE SODIUM 40 MG: 40 TABLET, DELAYED RELEASE ORAL at 08:06

## 2018-02-19 RX ADMIN — LACOSAMIDE 150 MG: 100 TABLET, FILM COATED ORAL at 19:15

## 2018-02-19 RX ADMIN — LAMOTRIGINE 100 MG: 100 TABLET ORAL at 14:22

## 2018-02-19 RX ADMIN — LEVETIRACETAM 1000 MG: 500 TABLET, FILM COATED ORAL at 08:06

## 2018-02-19 RX ADMIN — DOCUSATE SODIUM 100 MG: 100 CAPSULE, LIQUID FILLED ORAL at 08:06

## 2018-02-19 RX ADMIN — OXCARBAZEPINE 600 MG: 300 TABLET, FILM COATED ORAL at 20:05

## 2018-02-19 RX ADMIN — LAMOTRIGINE 100 MG: 100 TABLET ORAL at 19:17

## 2018-02-19 RX ADMIN — DOCUSATE SODIUM 100 MG: 100 CAPSULE, LIQUID FILLED ORAL at 19:16

## 2018-02-19 NOTE — PROGRESS NOTES
EEG CLINICAL NEUROPHYSIOLOGY PRELIMINARY REPORT    Video-EEG through 0600 today reviewed. Mild to moderate diffuse encephalopathy. Occasional generalized slow spike waves. Occasional right temporal sharp waves. Three brief (less than 4 sec) runs of diffuse bifrontal alpha without clinical correlate. Given her previous EEG findings these are probably brief subclinical seizures. Significant improvement compared to Saturday's recording. Full report to follow.    Ramses Paul MD  Pager [phone]

## 2018-02-19 NOTE — PLAN OF CARE
Problem: Patient Care Overview  Goal: Plan of Care/Patient Progress Review  Patient has been awake the whole shift, screams and hits people sometimes, able to swallow her meds with applesauce/pudding, assist of two to commode, had BM and voided, good appetite, has right eye strabismus, legally blind, brother monserrat and sitter has been helping to calm her down, likes to be out of the room, continue to monitor.

## 2018-02-19 NOTE — PLAN OF CARE
Problem: Patient Care Overview  Goal: Plan of Care/Patient Progress Review  Outcome: Improving  VEEG monitoring, no events witnessed or reported. VSS. Oriented to self only. Difficult to assess neuros as pt doesn't follow most commands; garbled speech; R eye strabismus; legally blind. Pt rested well for most part of the night. If pt is restless or agitated, try calling her brother to have him speak to her to calm her down. Up w/ heavy 2, GB, & pivot to commode. Reg diet. Meds with apple sauce & have her sit upright. Bedside attendant overnight. Cont to monitor & with POC.

## 2018-02-19 NOTE — PLAN OF CARE
Problem: Patient Care Overview  Goal: Plan of Care/Patient Progress Review  Discharge Planner PT   Patient plan for discharge: rehab   Current status: Ambulates [id number] with HHA and CGA-min Ax1 to correct balance.  W/c following.  STS and supine to sit with CGAx1.  Occasional outbursts present with pt is unhappy but agreeable to getting OOR with PT.  Educated brother of d/c recs and benefits of PT.    Barriers to return to prior living situation: A level, medical status, weakness, balance impairment.   Recommendations for discharge: TCU  Rationale for recommendations: Pt would benefit from skilled PT at TCU in order ot progress functional balance, strength, command following and stairs.  Unable to attempt stairs at this time secondary to poor command following and weakness.        Entered by: Jarad Deleon 02/19/2018 3:53 PM

## 2018-02-19 NOTE — PROGRESS NOTES
Care Coordinator Progress Note     Admission Date/Time:  2/15/2018  Attending MD:  Dr Gray Felt     Coordination of Care & Referrals  Chart reviewed, discussed with interdisciplinary team. On bedside rounds Dr Cortes reported pt will remain on continuous video EEG monitoring for 1 more day. Plan for PT to evaluate if pt able to ambulate & do stairs. Pt's group home is a split level. Pt very loud today, screaming at times. Sitter at bedside. Occasionally tried to strike out at sitter.   Brother at bedside, he tried to calm pt down but she isn't always receptive to it. Brother thinks her behavior is related to change in medication.   Dr Chin updated pt's brother. May be ready for discharge tomorrow. Informed brother, Abdon, I will contact [facility] about need to change pt's living situation. Current group home is a split level; pt not able to do stairs.     Noted in front of chart the info from group [place] about pt's behaviors & interventions they suggest. Informed pt's nurseNuha of this.     This afternoon called [facility] main office at [phone] and spoke with Maksim. She said both the nurse & director are out today. The nurse is back tomorrow. Maksim said pt needs to be able to do stairs and only need assist of 1 to return to group home. Informed her pt needs assist of 2 and is not able to do stairs. She will leave a message for group home nurse to call me tomorrow morning.     Spoke with brother Abdon, this afternoon. Informed him the group home nurse & director were not in the office today & I will follow-up tomorrow. Abdon said Nella saw Dr Gomez in clinic about a week ago; she is more interested in her environment than she was then.     Patient was admitted for:  Thrombocytopenia  Past history includes:  Epilepsy; cortical blindness; mental retardation.    Barriers to Discharge: cognitively impaired, dependent with mobility/activities of daily living, physical impairment and Unable to do  stairs; needs assist of 2 for mobility.     Assessment  Pt with behaviors due to development delay; requiring a sitter for safety. Pt was having impaired mobility prior to admission and was in a group home that had stairs. Will need to speak with group home staff about what their plan was. Do not anticipate pt's mobility will improve sufficiently for her to do stairs & only need 1 assist.    Due to aggressive behaviors and need for 1:1, TCU placement would be difficult.   Change in environment and familiar staff may cause exacerbation of behaviors.      Plan  Anticipated Discharge Date:  When safe disposition determined.   Anticipated Discharge Plan:  RNCC will speak with [facility] nurse or director tomorrow.   --Nursing to review behavior interventions from [facility] (info in front of chart) and implement as appropriate.          Joanne Contreras RN Care Coordinator  [address], [facility] in [place]  Main phone: [phone]  Ely Renteria Nurse   Rosalina Jaime, Director.      [place]   Phone: [phone]

## 2018-02-19 NOTE — PLAN OF CARE
Problem: Seizure Disorder/Epilepsy (Adult)  Goal: Signs and Symptoms of Listed Potential Problems Will be Absent, Minimized or Managed (Seizure Disorder/Epilepsy)  Signs and symptoms of listed potential problems will be absent, minimized or managed by discharge/transition of care (reference Seizure Disorder/Epilepsy (Adult) CPG).   Pt admitted with epilepsy and severe thrombocytopenia. Developmentally delayed. VSS on RA. Oriented to self. Unable to assess neuros as pt doesn t follow most commands. Garbled speech. Legally blind. Strabismus to R eye. Impulsive and restless part of shift. Called pt s brother Abdon who was able to calm her down and help her fall asleep. Please call Abdon overnight if pt is restless per family request. VEEG leads in place; no events witnessed this shift. Seizure precautions maintained. Voiding spontaneously. No BM. Up with assist of 2; GB and pivot to commode. Pt s brother (Abdon) will be back tomorrow. Continue to monitor and follow current POC.

## 2018-02-19 NOTE — PROCEDURES
DATE OF STUDY:  2018      TYPE OF STUDY:  Inpatient video-EEG monitoring.      EEG #-4      DURATION OF RECORDIN hours 19 minutes 56 seconds.      HISTORY:  Day #4 of video-EEG monitoring, Nella Helms, a 42-year-old who is undergoing monitoring for the treatment of her ongoing seizure disorder.  She has intractable epilepsy and was treated with a variety of medications.  She developed significant thrombocytopenia and medications are being adjusted.  Frequent seizures were recorded earlier in this series of recording during adjustment and the seizures were sometimes subtle.  Video monitoring is being performed to quantify seizure frequency during this period of transition.  She is being treated with lacosamide 300 mg per day and lamotrigine 300 mg per day, oxcarbazepine 1800 mg per day and levetiracetam 2000 mg per day.     TECHNICAL SUMMARY: This continuous video- EEG monitoring procedure was performed with 23 scalp electrodes in 10-20 electrode system placements, and additional scalp, precordial and other surface electrodes used for electrical referencing and artifact detection.  Video monitoring was utilized and periodically reviewed by EEG technologists and the physician for electroclinical correlations.     FINDINGS:  Desynchronized background while awake.  No well-defined posterior dominant rhythm.  Scattered irregular theta.  Rare sleep spindles are seen during sleep.  There are periods of diffuse alpha lasting up to 2 seconds, sometimes with a spiky configuration (e.g. 17, 31, 22).  Two or three of these are seen during the study.  Video during these is reviewed and there is no clear clinical changes. EEG during patient's previous seizures recorded earlier was reviewed and there were some similarities in the form and distribution of the discharge suggesting that these were fragments of seizures or subclinical seizures.     INTERICTAL ABNORMALITIES:  Occasional diffuse spikes.  Rarely these  recur at the rate of 1.6-2.0 Hz.  They do not occur in the long runs typically seen in Lennox-Gastaut syndrome.  Occasional right temporal sharp waves.      ICTAL:  No electrographic seizures.      IMPRESSION:  Abnormal.  There is evidence of mild diffuse encephalopathy.  Occasional generalized spikes are seen which rarely recur at a slow rate.  These findings suggest a symptomatic generalized epilepsy.  Right temporal sharp waves are also noted suggesting focal epilepsy predisposition.  Thus, overall, epileptiform abnormalities are consistent with symptomatic generalized/symptomatic focal epilepsy.  Overt seizures were not seen in this study, which is much improved compared to the first 2 days of recording. However, there were runs of diffuse paroxysmal appearing alpha that may have been a variation of generalized paroxysmal fast activity often seen in patients with symptomatic generalized epilepsy.     KERON LOPEZ MD             D: 2018   T: 2018   MT: KESHAV      Name:     JOHANNA SIMONS   MRN:      [id number]        Account:        [id number]   :      1975           Procedure Date: 2018      Document: [id number]

## 2018-02-19 NOTE — MR AVS SNAPSHOT
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)    -  1       Follow-ups after your visit        Your next 10 appointments already scheduled     2018  3:00 PM CST   Return Visit with Elysia Gomez MD   [facility] ([facility])    [address], [address]  [facility] [address] [phone]              Who to contact     Please call your clinic at [phone] to:    Ask questions about your health    Make or cancel appointments    Discuss your medicines    Learn about your test results    Speak to your doctor            Additional Information About Your Visit        MyChart Information     [employer] is an electronic gateway that provides easy, online access to your medical records. With [facility], you can request a clinic appointment, read your test results, renew a prescription or communicate with your care team.     To sign up for [employer] visit the website at [email or web address]   You will be asked to enter the access code listed below, as well as some personal information. Please follow the directions to create your username and password.     Your access code is: [email or web address]  Expires: 5/10/2018  4:21 PM     Your access code will  in 90 days. If you need help or a new code, please contact your [facility] or call [phone] for assistance.        Care EveryWhere ID     This is your Care EveryWhere ID. This could be used by other organizations to access your [place] medical records  [id number]         Blood Pressure from Last 3 Encounters:   18 100/42   10/13/17 145/77   10/20/16 (!) 141/97    Weight from Last 3 Encounters:   02/15/18 63  kg (138 lb 14.2 oz)   10/13/17 71.6 kg (157 lb 12.8 oz)   10/20/16 67.6 kg (149 lb)              Today, you had the following     No orders found for display         Today's Medication Changes      Notice     This visit is during an admission. Changes to the med list made in this visit will be reflected in the After Visit Summary of the admission.             Primary Care Provider Office Phone # Fax #    Bryan Fritsch [phone]       [facility] [address]  [facility] [address]        Equal Access to Services     [facility]: Hadii aad ku hadasho Soomaali, waaxda luqadaha, qaybta kaalmada adeegyada, sahara sparks . So [facility] [phone].    ATENCIÓN: Si habla español, tiene a singh disposición servicios gratuitos de asistencia lingüística. Llame al [phone].    We comply with applicable federal civil rights laws and [place] laws. We do not discriminate on the basis of race, color, national origin, age, disability, sex, sexual orientation, or gender identity.            Thank you!     Thank you for choosing [facility]  for your care. Our goal is always to provide you with excellent care. Hearing back from our patients is one way we can continue to improve our services. Please take a few minutes to complete the written survey that you may receive in the mail after your visit with us. Thank you!             Your Updated Medication List - Protect others around you: Learn how to safely use, store and throw away your medicines at www.disposemymeds.org.      Notice     This visit is during an admission. Changes to the med list made in this visit will be reflected in the After Visit Summary of the admission.

## 2018-02-19 NOTE — PROGRESS NOTES
Care Coordinator Progress Note     Admission Date/Time:  2/15/2018  Attending MD:  Dr Gray Felt      Coordination of Care and Referrals  Pt's brother, Abdon, said the group home staff has information on TCU facilities in the [facility]. Abdon said he lives in [place]; his father is in [place]. He would prefer placement in the [facility] area. Informed him [facility] [place], will follow-up with him for TCU placement. Informed him it probably won't be an easy placement and he expressed understanding.    Abdon said he won't be here tomorrow.     Assessment  Brother (guardian) involved and supportive.       Plan  RNCC will follow-up with group home nurse or director tomorrow regarding plans for new handicapped accessible group home.  [facility] for TCU placement.         Joanne Contreras RN Care Coordinator  Unit [address], [facility]

## 2018-02-19 NOTE — PROGRESS NOTES
"SPIRITUAL HEALTH SERVICES  SPIRITUAL ASSESSMENT Progress Note  [facility]     REFERRAL SOURCE: Follow-up visit     Follow-up visit with pt and her brother Abdon.  Provided emotional support to both and Abdon spoke about having a good weekend connecting with family.  Affirmed that we are still aware of their desire to speak with a  and that due to 's illness last week we are a little backed up.  Pt was understanding and stated \"That's ok, we're doing good.\"      PLAN: Unit  continue to follow,  please check in when able.    Mana Putnam  Chaplain Resident  Pager [phone]  "

## 2018-02-20 ENCOUNTER — APPOINTMENT (OUTPATIENT)
Dept: PHYSICAL THERAPY | Facility: CLINIC | Age: 43
DRG: 813 | End: 2018-02-20
Attending: PSYCHIATRY & NEUROLOGY
Payer: MEDICARE

## 2018-02-20 ENCOUNTER — ALLIED HEALTH/NURSE VISIT (OUTPATIENT)
Dept: NEUROLOGY | Facility: CLINIC | Age: 43
DRG: 813 | End: 2018-02-20
Attending: PSYCHIATRY & NEUROLOGY
Payer: MEDICARE

## 2018-02-20 DIAGNOSIS — G40.319 GENERALIZED CONVULSIVE EPILEPSY WITH INTRACTABLE EPILEPSY (H): Primary | Chronic | ICD-10-CM

## 2018-02-20 LAB
10OH-CARBAZEPINE SERPL-MCNC: 12.5 UG/ML
BASOPHILS # BLD AUTO: 0 10E9/L (ref 0–0.2)
BASOPHILS NFR BLD AUTO: 0.3 %
DIFFERENTIAL METHOD BLD: ABNORMAL
EOSINOPHIL # BLD AUTO: 0.3 10E9/L (ref 0–0.7)
EOSINOPHIL NFR BLD AUTO: 4 %
ERYTHROCYTE [DISTWIDTH] IN BLOOD BY AUTOMATED COUNT: 14.7 % (ref 10–15)
HCT VFR BLD AUTO: 30.8 % (ref 35–47)
HGB BLD-MCNC: 10 G/DL (ref 11.7–15.7)
IMM GRANULOCYTES # BLD: 0 10E9/L (ref 0–0.4)
IMM GRANULOCYTES NFR BLD: 0.6 %
LA PPP-IMP: POSITIVE
LYMPHOCYTES # BLD AUTO: 3.1 10E9/L (ref 0.8–5.3)
LYMPHOCYTES NFR BLD AUTO: 44.7 %
MCH RBC QN AUTO: 32.5 PG (ref 26.5–33)
MCHC RBC AUTO-ENTMCNC: 32.5 G/DL (ref 31.5–36.5)
MCV RBC AUTO: 100 FL (ref 78–100)
MONOCYTES # BLD AUTO: 0.9 10E9/L (ref 0–1.3)
MONOCYTES NFR BLD AUTO: 13.1 %
NEUTROPHILS # BLD AUTO: 2.6 10E9/L (ref 1.6–8.3)
NEUTROPHILS NFR BLD AUTO: 37.3 %
NRBC # BLD AUTO: 0 10*3/UL
NRBC BLD AUTO-RTO: 1 /100
PLATELET # BLD AUTO: 144 10E9/L (ref 150–450)
RBC # BLD AUTO: 3.08 10E12/L (ref 3.8–5.2)
WBC # BLD AUTO: 6.9 10E9/L (ref 4–11)

## 2018-02-20 PROCEDURE — 25000132 ZZH RX MED GY IP 250 OP 250 PS 637: Mod: GY | Performed by: STUDENT IN AN ORGANIZED HEALTH CARE EDUCATION/TRAINING PROGRAM

## 2018-02-20 PROCEDURE — 36415 COLL VENOUS BLD VENIPUNCTURE: CPT | Performed by: STUDENT IN AN ORGANIZED HEALTH CARE EDUCATION/TRAINING PROGRAM

## 2018-02-20 PROCEDURE — 40000193 ZZH STATISTIC PT WARD VISIT

## 2018-02-20 PROCEDURE — 95951 ZZHC EEG VIDEO < 12 HR: CPT | Mod: 52,ZF

## 2018-02-20 PROCEDURE — A9270 NON-COVERED ITEM OR SERVICE: HCPCS | Mod: GY | Performed by: STUDENT IN AN ORGANIZED HEALTH CARE EDUCATION/TRAINING PROGRAM

## 2018-02-20 PROCEDURE — 25000125 ZZHC RX 250: Performed by: STUDENT IN AN ORGANIZED HEALTH CARE EDUCATION/TRAINING PROGRAM

## 2018-02-20 PROCEDURE — 97116 GAIT TRAINING THERAPY: CPT | Mod: GP

## 2018-02-20 PROCEDURE — 12000003 ZZH R&B CRITICAL UMMC

## 2018-02-20 PROCEDURE — 85025 COMPLETE CBC W/AUTO DIFF WBC: CPT | Performed by: STUDENT IN AN ORGANIZED HEALTH CARE EDUCATION/TRAINING PROGRAM

## 2018-02-20 RX ORDER — PREDNISONE 2.5 MG/1
5 TABLET ORAL DAILY
Status: DISCONTINUED | OUTPATIENT
Start: 2018-03-12 | End: 2018-02-26 | Stop reason: HOSPADM

## 2018-02-20 RX ORDER — PREDNISONE 10 MG/1
10 TABLET ORAL DAILY
Status: DISCONTINUED | OUTPATIENT
Start: 2018-02-26 | End: 2018-02-26 | Stop reason: HOSPADM

## 2018-02-20 RX ORDER — LACOSAMIDE 200 MG/1
200 TABLET ORAL 2 TIMES DAILY
Status: DISCONTINUED | OUTPATIENT
Start: 2018-02-20 | End: 2018-02-26 | Stop reason: HOSPADM

## 2018-02-20 RX ORDER — PREDNISONE 20 MG/1
40 TABLET ORAL DAILY
Status: COMPLETED | OUTPATIENT
Start: 2018-02-21 | End: 2018-02-22

## 2018-02-20 RX ORDER — PREDNISONE 20 MG/1
20 TABLET ORAL DAILY
Status: COMPLETED | OUTPATIENT
Start: 2018-02-23 | End: 2018-02-25

## 2018-02-20 RX ADMIN — LACOSAMIDE 200 MG: 200 TABLET, FILM COATED ORAL at 19:44

## 2018-02-20 RX ADMIN — PANTOPRAZOLE SODIUM 40 MG: 40 TABLET, DELAYED RELEASE ORAL at 08:51

## 2018-02-20 RX ADMIN — ACETAMINOPHEN 650 MG: 325 TABLET, FILM COATED ORAL at 13:42

## 2018-02-20 RX ADMIN — DOCUSATE SODIUM 100 MG: 100 CAPSULE, LIQUID FILLED ORAL at 08:51

## 2018-02-20 RX ADMIN — LEVETIRACETAM 1000 MG: 500 TABLET, FILM COATED ORAL at 08:52

## 2018-02-20 RX ADMIN — OXCARBAZEPINE 600 MG: 300 TABLET, FILM COATED ORAL at 19:44

## 2018-02-20 RX ADMIN — LEVETIRACETAM 1000 MG: 500 TABLET, FILM COATED ORAL at 19:44

## 2018-02-20 RX ADMIN — LACOSAMIDE 150 MG: 100 TABLET, FILM COATED ORAL at 08:51

## 2018-02-20 RX ADMIN — OXCARBAZEPINE 600 MG: 300 TABLET, FILM COATED ORAL at 08:52

## 2018-02-20 RX ADMIN — LAMOTRIGINE 100 MG: 100 TABLET ORAL at 19:44

## 2018-02-20 RX ADMIN — LAMOTRIGINE 100 MG: 100 TABLET ORAL at 13:42

## 2018-02-20 RX ADMIN — LAMOTRIGINE 100 MG: 100 TABLET ORAL at 08:55

## 2018-02-20 RX ADMIN — PREDNISONE 40 MG: 20 TABLET ORAL at 08:52

## 2018-02-20 ASSESSMENT — VISUAL ACUITY
OU: BASELINE
OU: BASELINE

## 2018-02-20 NOTE — PLAN OF CARE
Problem: Patient Care Overview  Goal: Plan of Care/Patient Progress Review  Discharge Planner PT   Patient plan for discharge: unknown  Current status: Ambulates 160 and 260 ft HHA and modAx2 w/ wc follow. STS CGA-minAx2. 2x1 aerobic step up w/ HHA and modAx2 w/ tactile cues for appropriate foot placement. Fewer behavioral outbursts today. Highly impulsive, requiring verbal and manual cues for safety.   Barriers to return to prior living situation: Decreased safety awareness, level of assist, balance deficits, impaired cognition   Recommendations for discharge: TCU  Rationale for recommendations: Pt would benefit from continued therapy to address deficits w/ transfers, ambulation, and stairs and to increase command following. Pt currently unsafe to return to previous group home.        Entered by: Alisha Schwartz 02/20/2018 1:44 PM

## 2018-02-20 NOTE — PLAN OF CARE
Problem: Patient Care Overview  Goal: Plan of Care/Patient Progress Review  Outcome: No Change  Pt here for video EEG monitoring. Pt pulled all electrodes off at once this am. No sz activity noted. Pt pleasant and sleeping off and on. Has now woke, is hitting and yelling. Unable to calm and brother called. He talked with pt and now is calmer. Has a bed side attendant overnight.

## 2018-02-20 NOTE — PROGRESS NOTES
Chart reviewed. Plts improved to 140.    Prednisone dose taper: 40 mg daily for 3 days, 20 mg daily for 3 days and 10 mg daily for 2 weeks, 5 mg daily for 2 weeks and then stop. She needs weekly CBC, only if plts drop <50 K, contact Dr. Santos for appt at [phone])    Attending Note:  I have reviewed the patient chart..  I agree with the assessment and plan. It is not completely clear if the low platelet was due to ITP or some other factor, such as suppression from a self-limited viral infection. I would like to wean her to a lower dose of prednisone fairly quickly, since high doses can have toxicity such as insomnia, and perhaps in her might contribute to some dificult behaviors, along with possible hyperglycemia, osteoporosis and gastritis. As long as platelet count remains >50k, no additional interventions are needed Would like to try to minimize the need for her to come back and forth to clinic. Prednisone taper as above, and after 2 weeks at 10 mg, stop. This is assuming platelets are staying >50k. Needs weekly CBC- can be done by home care. If platelets drop to <50k while tapering or when off prednisone, should come back to see me in hematology clinic in [facility]. If platelets holding >50k, no need for routine hematology f/u.  Stephanie Santos MD  Hematology

## 2018-02-20 NOTE — PROGRESS NOTES
[facility] [place]   Neurology Daily Note    Nella Helms  [id number]  02/20/2018    Subjective Data:  Per PT near functional baseline, stairs still not attempted. EEG discontinued, await final read. Feeling well today, denies complaints.    Objective Data:   /46 (BP Location: Left arm)  Pulse 70  Temp 99.4  F (37.4  C) (Axillary)  Resp 18  Wt 63 kg (138 lb 14.2 oz)  SpO2 96%  BMI 26.24 kg/m2  SpO2 data collected on O2 mask    Mental status: awake, alert, attentive, oriented to self and hospital. Follows 1 step commands, spontaneous speech nonfluent.  CN: Visual fields impaired >5 feet, right eye appears to track in all directions, left eye fixed in midline, PERRL, face symmetric, facial sensation intact and symmetric, tongue and uvula midline, shoulder shrug intact.  Motor: moves 4/4 extremities purposefully antigravity, follows some commands for strength testing, diminished bulk LUE, no increased tone, no abnormal movements  Sensory: intact to light touch in 4/4 extremities  Reflexes: 2+ and symmetric in bilateral biceps, brachioradialis, patellae and achilles. No clonus, toes downgoing.  Coordination and gait: Not tested     Constitutional: appears anxious  Cardiovascular: regular rhythm  Respiratory: clear to auscultation without wheezes or rales  Chest: symmetric chest rise, no accessory muscle use  Abdominal: BS normal active x 4  Extremities: warm, dry, no edema  Skin: No lesions or rashes    Labs:  Recent Labs   Lab Test  02/18/18   0737  02/16/18   0722   NA  143  135   POTASSIUM  3.6  4.5   CHLORIDE  108  100   CO2  28  29   ANIONGAP  7  6   GLC  84  93   BUN  12  8   CR  0.47*  0.35*   SHARMILA  9.0  10.2*     Recent Labs   Lab Test  02/20/18   0702   WBC  6.9   RBC  3.08*   HGB  10.0*   HCT  30.8*   MCV  100   MCH  32.5   MCHC  32.5   RDW  14.7   PLT  144*     EEG:  HISTORY:  Day #5 of video-EEG monitoring of Nella Helms, a 42-year-old with medically refractory,  generalized/symptomatic focal epilepsy.  She presented with thrombocytopenia on Depakote and Felbatol.  These medications were discontinued.  Vimpat and Keppra were substituted.  She has had some worsening seizures.  Video monitoring is being performed to quantify seizures during medication adjustment.       TECHNICAL SUMMARY:  EEG was recorded from scalp electrodes applied according to the 10-20 international system.  Additional electrodes were utilized for referencing, artifact detection, and recording from other cerebral regions.  Video was continuously recorded.  Video was reviewed for clinical correlation and ptosis with EEG interpretation.       FINDINGS:  During sleep, irregular theta and delta.  No sleep spindles.  During wakefulness, attenuated background.  Occasional runs of 10 Hz posterior dominant rhythm.  Occasional irregular slowing.       INTERICTAL ABNORMALITIES:   Occasional generalized spikes.  Occasional sharp slow wave and spike-wave complexes at the rate of 2 Hz or less.  Occasional right temporal sharp waves.       ICTAL:  The patient experienced a cluster of tonic seizures starting around 17:44. Clinically, seizures were reasonably stereotyped.  Head leaned to the left, there was left arm extension, and then some leg movement.  Seizures were typically relatively brief.  Following this brief period of dystonic posturing, she would rock back and forth.  Seizures were recorded at 17:44:44, 17:45:13, 17:45:36, 17:46:18, and 17:46:53.  Further brief seizures were recorded at 17:50:30.  Further brief seizure was recorded at 17:15:30.  Ictal EEG during all seizures was similar.  Generalized polyspike was noted for about half a second.  This was followed by arrhythmic 6 Hz delta which gradually slowed.  Sometimes the delta would persist for another 20 seconds.  This was associated with some of the rocking and other automatic movements.       IMPRESSION:  Abnormal.  There is evidence of a mild diffuse  "encephalopathy.  Slow spike waves seen consistent with symptomatic generalized epilepsy.  Right temporal sharp waves were also seen indicating a focal seizure tendency.       A cluster of brief tonic seizures was recorded starting around 17:40. EEG indicated a generalized onset.  During one of the seizures, prolonged rocking following the seizure lasted for about 30 seconds, suggesting that that particular tonic seizure had evolved into an atypical absence.  Convulsive seizures were not seen.       Overall, study continues consistent with the syndromic diagnosis of symptomatic generalized/symptomatic focal epilepsy.  The recorded seizures all appear to be generalized.    Assessment and Plan:  Ms Helms is a 42-year-old woman with history of developmental delay and intractable epilepsy admitted for workup of thrombocytopenia which is improving.    #Thrombocytopenia: Heme favors ITP as etiology due to evidence of peripheral destruction of platelets. Also considering effects of valproic acid and/or felbamate.  Valproic acid was discontinued 1 week prior to admission and platelets were continuing to trend downward.  Shortly after stopping felbamate the platelets began to rebound, however this is confounded by her simultaneously receiving steroids as empiric treatment for ITP. Abdominal ultrasound did not reveal evidence of splenomegaly.  - prednisone- 40 mg daily and then a prolonged taper to be prescribed by Heme/Onc:  \"Prednisone dose taper: 40 mg daily for 3 days, 20 mg daily for 3 days and 10 mg daily for 2 weeks, 5 mg daily for 2 weeks and then stop. She needs weekly CBC, only if plts drop <50 K, contact Dr. Santos for appt at [phone])\"  - Hematology following  - transfuse if platelets <10,000 or <25,000 with evidence of hemorrhage    #Intractible epilepsy: Felbamate discontinued shortly after admission. Levetiracetam and lacosamide since started and titrated up. Oxcarbazepine titrated up. Run of several " tonic seizures in the evening. Based on her Epilepsy clinic notes from the last several years, it appears that she does not have seizure control at her baseline, but currently is in a state that she could return to her home and have consideration of change from lacosamide/levetiracetam to rufinamide or clobazam per her primary Epileptologist, on a gradual, outpatient basis.  - lacosamide increase to 200mg BID  - levetiracetam 1000mg BID  - oxcarbazepine 600mg BID  - discontinue vEEG  - Epilepsy clinic follow up  - seizure precautions    FEN: no mIVF, Lytes replaced PRN, regular diet  Prophylaxis: SCD, subQ lovenox  Code Status: FULL CODE    Dispo: Anticipate discharge back to group home, may require TCU stay if not at her ambulatory baseline. Medically ready for discharge.    This patient was seen and discussed with attending Neurologist, Dr. Cortes.    Megan Chin MD  Neurology PGY-2  [phone]

## 2018-02-20 NOTE — PLAN OF CARE
Problem: Patient Care Overview  Goal: Plan of Care/Patient Progress Review  Patient admitted for seizure monitoring. Hx developmental delay & epilepsy. VSS. Oriented to self only. Difficult to complete neuro exam. Often yelling out, can be combative at times. Cooperative most of shift. 1:1 sitter at bedside. VEEG, ripped leads off early this morning. Regular diet, requires assistance. PIV SL. Up with assist of 2 and GB. Walked around unit several times with PT. Voiding spontaneously, can be incontinent at times. Large BM this morning. Denies pain. Will continue to monitor.

## 2018-02-20 NOTE — MR AVS SNAPSHOT
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 With Gigalocal, you can request a clinic appointment, read your test results, renew a prescription or communicate with your care team.     To sign up for [employer] visit the website at [email or web address]   You will be asked to enter the access code listed below, as well as some personal information. Please follow the directions to create your username and password.     Your access code is: [email or web address]  Expires: 5/10/2018  4:21 PM     Your access code will  in 90 days. If you need help or a new code, please contact your [facility] or call [phone] for assistance.        Care EveryWhere ID     This is your Care EveryWhere ID. This could be used by other organizations to access your [facility] medical records  [id number]         Blood Pressure from Last 3 Encounters:   18 113/46   10/13/17 145/77   10/20/16 (!) 141/97    Weight from Last 3 Encounters:   02/15/18 63 kg (138 lb 14.2 oz)   10/13/17 71.6 kg (157 lb 12.8 oz)   10/20/16 67.6 kg (149 lb)              Today, you had the following     No orders found for display         Today's Medication Changes      Notice     This visit is during an admission.  Changes to the med list made in this visit will be reflected in the After Visit Summary of the admission.             Primary Care Provider Office Phone # Fax #    Bryan Fritsch [phone]       [place] [address]  [facility] [address]        Equal Access to Services     KATHRYN BROCK : Hadii aad ku hadasho Soomaali, waaxda luqadaha, qaybta kaalmada adeegyada, waxay giftyin hayaan adeeg khdiannastanley lalian . So [facility] [phone].    ATENCIÓN: Si habla español, tiene a singh disposición servicios gratuitos de asistencia lingüística. [facility] [phone].    We comply with applicable federal civil rights laws and [place] laws. We do not discriminate on the basis of race, color, national origin, age, disability, sex, sexual orientation, or gender identity.            Thank you!     Thank you for choosing [facility]  for your care. Our goal is always to provide you with excellent care. Hearing back from our patients is one way we can continue to improve our services. Please take a few minutes to complete the written survey that you may receive in the mail after your visit with us. Thank you!             Your Updated Medication List - Protect others around you: Learn how to safely use, store and throw away your medicines at www.disposemymeds.org.      Notice     This visit is during an admission. Changes to the med list made in this visit will be reflected in the After Visit Summary of the admission.

## 2018-02-20 NOTE — PLAN OF CARE
Problem: Patient Care Overview  Goal: Plan of Care/Patient Progress Review  Pt VSS. Neuros unchanged. Pt agreeable and cooperative much of time, however will have episodes of yelling, hitting. Pt redirectable during this time. Plan was for pt to go in halls in wheelchair, however, pt had multiple seizure episodes approx 1800, ativan given. Pt still awake shortly after and was able to eat dinner but has then been very sleepy since. Bedside attendant with pt and helpful. Takes meds in applesauce/pudding. Up with 2, pivot. Reg diet, tolerate well. Brother at bedside for part of shift and very helpful. Brother made decision to go home but wants staff to know that they can call anytime of night for patient to talk to him, sometimes he can help her calm down. VEEG intact, pt ripped off a couple times. PIV SL. Cont with POC.

## 2018-02-20 NOTE — PROCEDURES
Procedure Date: 2018      TYPE OF STUDY:  Inpatient video EEG monitoring.      EEG #-5.      DURATION OF RECORDIN hours 26 minutes 7 seconds.      HISTORY:  Day #5 of video-EEG monitoring of Nella Helms, a 42-year-old with medically refractory, generalized/symptomatic focal epilepsy.  She presented with thrombocytopenia on Depakote and Felbatol.  These medications were discontinued.  Vimpat and Keppra were substituted.  She has had some worsening seizures.  Video monitoring is being performed to quantify seizures during medication adjustment.      TECHNICAL SUMMARY:  EEG was recorded from scalp electrodes applied according to the 10-20 international system.  Additional electrodes were utilized for referencing, artifact detection, and recording from other cerebral regions.  Video was continuously recorded.  Video was reviewed for clinical correlation and ptosis with EEG interpretation.      FINDINGS:  During sleep, irregular theta and delta.  No sleep spindles.  During wakefulness, attenuated background.  Occasional runs of 10 Hz posterior dominant rhythm.  Occasional irregular slowing.      INTERICTAL ABNORMALITIES:   Occasional generalized spikes.  Occasional sharp slow wave and spike-wave complexes at the rate of 2 Hz or less.  Occasional right temporal sharp waves.      ICTAL:  The patient experienced a cluster of tonic seizures starting around 17:44. Clinically, seizures were reasonably stereotyped.  Head leaned to the left, there was left arm extension, and then some leg movement.  Seizures were typically relatively brief.  Following this brief period of dystonic posturing, she would rock back and forth.  Seizures were recorded at 17:44:44, 17:45:13, 17:45:36, 17:46:18, and 17:46:53.  Further brief seizures were recorded at 17:50:30.  Further brief seizure was recorded at 17:15:30.  Ictal EEG during all seizures was similar.  Generalized polyspike was noted for about half a second.  This was  followed by arrhythmic 6 Hz delta which gradually slowed.  Sometimes the delta would persist for another 20 seconds.  This was associated with some of the rocking and other automatic movements.      IMPRESSION:  Abnormal.  There is evidence of a mild diffuse encephalopathy.  Slow spike waves seen consistent with symptomatic generalized epilepsy.  Right temporal sharp waves were also seen indicating a focal seizure tendency.      A cluster of brief tonic seizures was recorded starting around 17:40. EEG indicated a generalized onset.  During one of the seizures, prolonged rocking following the seizure lasted for about 30 seconds, suggesting that that particular tonic seizure had evolved into an atypical absence.  Convulsive seizures were not seen.      Overall, study continues consistent with the syndromic diagnosis of symptomatic generalized/symptomatic focal epilepsy.  The recorded seizures all appear to be generalized.         KERON LOPEZ MD             D: 2018   T: 2018   MT: NADIA      Name:     JOHANNA SIMONS   MRN:      [id number]        Account:        [id number]   :      1975           Procedure Date: 2018      Document: [id number]

## 2018-02-20 NOTE — PLAN OF CARE
Problem: Patient Care Overview  Goal: Plan of Care/Patient Progress Review  OT:  Pt with another care provider at time of attempt.

## 2018-02-20 NOTE — PLAN OF CARE
Problem: Patient Care Overview  Goal: Plan of Care/Patient Progress Review  OT 6A: Cancel and hold. Per PT, patient near or at functional baseline for ADLs as she lives in group home and has assist. PT to follow for mobility, OT to initiate evaluation as appropriate.

## 2018-02-20 NOTE — PROGRESS NOTES
Care Coordinator Progress Note     Admission Date/Time:  2/15/2018  Attending MD:  Dr Gray Felt      Coordination of Care and Referrals     Received a voice message this AM from SANDY Graves [facility] Care Coordinator at [facility]. She requested a call back about Nella. Phone: [phone].   Observed pt this AM; she was sitting up in bed; calmer than yesterday. Sitter at bedside. Pt pulled off EEG leads last night; off EEG now.   Spoke with Dr Cummings and Dr Chin about pt's status. Plan is to get steroid plan from Heme/Onc. Pt was able to walk with 1 assist yesterday; will see if she can do stairs. Pt will be on the current AEDs. May be ready for discharge as soon as tomorrow.      Called SANDY Graves at [facility]. Informed her pt is doing better today; requiring a sitter for safety; yesterday she was loud and trying to hit people. Updated her on medications and platelets, 144 today.  Reviewed PT notes with Ely: pt walked 160 feet with assist yesterday. Ely said pt cannot return to [facility] if she needs assist of 2. They only have 1 staff member for 4 residents. Informed Ely I will update her later today on pt's mobility status.   Ely said they have had residents from the [facility] to go local [facility] for rehab. Local facilities they have used in the past are: [facility] in [place]; [facility] in [place]; [facility]. These facilities have taken their pts with developmental disabilities.   Today noted pt was ambulating in shepherd with PT; required assist of 2; was leaning on staff; following direction with encouragement. Staff also wheeled pt around the unit in w/c.          Assessment  Pt's mood improved from yesterday. Mobility improving but still requiring assist of 2. Has not done stairs yet. Group home involved with discharge planning.      Plan  Anticipated Discharge Date:  When medical evaluation/treatment completed.    Anticipated Discharge Plan:  If pt only needing  assist of 1 & can do stairs she can return to current group home; otherwise will need TCU placement.   --RNCC will update group home nurse later this afternoon on pt's mobility status.   --Will need to be off sitter before a TCU would accept her.         Joanne Contreras RN Care Coordinator  [address], [facility]

## 2018-02-21 LAB
BASOPHILS # BLD AUTO: 0 10E9/L (ref 0–0.2)
BASOPHILS NFR BLD AUTO: 0.3 %
DIFFERENTIAL METHOD BLD: ABNORMAL
EOSINOPHIL # BLD AUTO: 0.3 10E9/L (ref 0–0.7)
EOSINOPHIL NFR BLD AUTO: 3.6 %
ERYTHROCYTE [DISTWIDTH] IN BLOOD BY AUTOMATED COUNT: 14.5 % (ref 10–15)
HCT VFR BLD AUTO: 32.4 % (ref 35–47)
HGB BLD-MCNC: 10.6 G/DL (ref 11.7–15.7)
IMM GRANULOCYTES # BLD: 0.1 10E9/L (ref 0–0.4)
IMM GRANULOCYTES NFR BLD: 0.7 %
LYMPHOCYTES # BLD AUTO: 3.2 10E9/L (ref 0.8–5.3)
LYMPHOCYTES NFR BLD AUTO: 43.4 %
MAGNESIUM SERPL-MCNC: 1.5 MG/DL (ref 1.6–2.3)
MAGNESIUM SERPL-MCNC: 2.3 MG/DL (ref 1.6–2.3)
MCH RBC QN AUTO: 33 PG (ref 26.5–33)
MCHC RBC AUTO-ENTMCNC: 32.7 G/DL (ref 31.5–36.5)
MCV RBC AUTO: 101 FL (ref 78–100)
MONOCYTES # BLD AUTO: 0.9 10E9/L (ref 0–1.3)
MONOCYTES NFR BLD AUTO: 12.6 %
NEUTROPHILS # BLD AUTO: 2.9 10E9/L (ref 1.6–8.3)
NEUTROPHILS NFR BLD AUTO: 39.4 %
NRBC # BLD AUTO: 0 10*3/UL
NRBC BLD AUTO-RTO: 0 /100
PLATELET # BLD AUTO: 198 10E9/L (ref 150–450)
RBC # BLD AUTO: 3.21 10E12/L (ref 3.8–5.2)
WBC # BLD AUTO: 7.3 10E9/L (ref 4–11)

## 2018-02-21 PROCEDURE — 25000128 H RX IP 250 OP 636: Performed by: STUDENT IN AN ORGANIZED HEALTH CARE EDUCATION/TRAINING PROGRAM

## 2018-02-21 PROCEDURE — 25000132 ZZH RX MED GY IP 250 OP 250 PS 637: Mod: GY | Performed by: STUDENT IN AN ORGANIZED HEALTH CARE EDUCATION/TRAINING PROGRAM

## 2018-02-21 PROCEDURE — 83735 ASSAY OF MAGNESIUM: CPT | Performed by: STUDENT IN AN ORGANIZED HEALTH CARE EDUCATION/TRAINING PROGRAM

## 2018-02-21 PROCEDURE — 25000125 ZZHC RX 250: Performed by: STUDENT IN AN ORGANIZED HEALTH CARE EDUCATION/TRAINING PROGRAM

## 2018-02-21 PROCEDURE — 36415 COLL VENOUS BLD VENIPUNCTURE: CPT | Performed by: PSYCHIATRY & NEUROLOGY

## 2018-02-21 PROCEDURE — A9270 NON-COVERED ITEM OR SERVICE: HCPCS | Mod: GY | Performed by: STUDENT IN AN ORGANIZED HEALTH CARE EDUCATION/TRAINING PROGRAM

## 2018-02-21 PROCEDURE — 83735 ASSAY OF MAGNESIUM: CPT | Performed by: PSYCHIATRY & NEUROLOGY

## 2018-02-21 PROCEDURE — 40000141 ZZH STATISTIC PERIPHERAL IV START W/O US GUIDANCE

## 2018-02-21 PROCEDURE — 36415 COLL VENOUS BLD VENIPUNCTURE: CPT | Performed by: STUDENT IN AN ORGANIZED HEALTH CARE EDUCATION/TRAINING PROGRAM

## 2018-02-21 PROCEDURE — 12000003 ZZH R&B CRITICAL UMMC

## 2018-02-21 PROCEDURE — 40000802 ZZH SITE CHECK

## 2018-02-21 PROCEDURE — 85025 COMPLETE CBC W/AUTO DIFF WBC: CPT | Performed by: STUDENT IN AN ORGANIZED HEALTH CARE EDUCATION/TRAINING PROGRAM

## 2018-02-21 RX ADMIN — LEVETIRACETAM 1000 MG: 500 TABLET, FILM COATED ORAL at 08:10

## 2018-02-21 RX ADMIN — LEVETIRACETAM 1000 MG: 500 TABLET, FILM COATED ORAL at 20:33

## 2018-02-21 RX ADMIN — DOCUSATE SODIUM 100 MG: 100 CAPSULE, LIQUID FILLED ORAL at 20:33

## 2018-02-21 RX ADMIN — OXCARBAZEPINE 600 MG: 300 TABLET, FILM COATED ORAL at 20:33

## 2018-02-21 RX ADMIN — MAGNESIUM SULFATE IN WATER 4 G: 40 INJECTION, SOLUTION INTRAVENOUS at 11:45

## 2018-02-21 RX ADMIN — LACOSAMIDE 200 MG: 200 TABLET, FILM COATED ORAL at 20:33

## 2018-02-21 RX ADMIN — PANTOPRAZOLE SODIUM 40 MG: 40 TABLET, DELAYED RELEASE ORAL at 08:10

## 2018-02-21 RX ADMIN — LAMOTRIGINE 100 MG: 100 TABLET ORAL at 20:33

## 2018-02-21 RX ADMIN — LORAZEPAM 2 MG: 2 INJECTION INTRAMUSCULAR; INTRAVENOUS at 07:55

## 2018-02-21 RX ADMIN — OXCARBAZEPINE 600 MG: 300 TABLET, FILM COATED ORAL at 08:10

## 2018-02-21 RX ADMIN — LACOSAMIDE 200 MG: 200 TABLET, FILM COATED ORAL at 08:10

## 2018-02-21 RX ADMIN — ENOXAPARIN SODIUM 40 MG: 40 INJECTION SUBCUTANEOUS at 08:08

## 2018-02-21 RX ADMIN — LAMOTRIGINE 100 MG: 100 TABLET ORAL at 14:30

## 2018-02-21 RX ADMIN — DOCUSATE SODIUM 100 MG: 100 CAPSULE, LIQUID FILLED ORAL at 08:10

## 2018-02-21 RX ADMIN — LAMOTRIGINE 100 MG: 100 TABLET ORAL at 08:10

## 2018-02-21 RX ADMIN — PREDNISONE 40 MG: 20 TABLET ORAL at 08:10

## 2018-02-21 ASSESSMENT — VISUAL ACUITY
OU: BASELINE

## 2018-02-21 NOTE — PLAN OF CARE
Problem: Patient Care Overview  Goal: Plan of Care/Patient Progress Review  Patient admitted for seizure monitoring. Hx developmental delay & epilepsy. VSS. Oriented to self only. Difficult to complete neuro exam. Often yelling out, can be combative at times. Cooperative most of shift. Sitter discontinued today at 0730. Had several events this morning around 0800, jerking of arms and head turning to the right. 2mg Ativan given per . Regular diet, requires assistance. PIV SL. Up with assist of 2 and GB. Voiding spontaneously, can be incontinent at times. No BM this shift. Denies pain.Magnesium replaced, recheck at 1545. D/c to TCU vs back to group home depending on mobility needs. Will continue to monitor.

## 2018-02-21 NOTE — PROGRESS NOTES
[facility], [place]   Neurology Daily Note    Nella Helms  [id number]  02/21/2018    Subjective Data:  Has been calm and pleasant overnight, sitting in bed without sitter. During interview in am, had repetitive arm stiffening and head turning that interrupted her midsentence, consistent with her semiology of electrographic tonic seizures from earlier in her hospital stay. Ativan 2 mg IV given x1 with full resolution of seizure activity.    Objective Data:   /57 (BP Location: Right arm)  Pulse 71  Temp 98.9  F (37.2  C) (Oral)  Resp 16  Wt 63 kg (138 lb 14.2 oz)  SpO2 95%  BMI 26.24 kg/m2  SpO2 data collected on RA    Neurologic:  - Mental Status: alert and oriented to person, place; verbal, language appropriate, follows commands some commands  - Cranial Nerves: pupils symmetric, R eye movement intact; no facial asymmetry; tongue protrusion midline  - Sensation intact to light touch in upper and lower extremities bilaterally  - Motor Exam: tone and bulk symmetric; symmetric movement anti-gravity, strong  strength  - Reflexes: DTR 2+ and symmetric throughout  - Coordination: not assessed  - Gait: not assessed  Constitutional: NAD, cooperative with examination and interview  Cardiovascular: Regular rate and rhythm without murmurs  Respiratory: clear to auscultation without wheezes or rales; no accessory muscle use  Abdominal: soft and nontender without organomegaly; BS normal  Extremities: no clubbing, no edema  Skin: No lesions or rashes    Labs:  CBC RESULTS:   Recent Labs   Lab Test  02/21/18   0806   WBC  7.3   RBC  3.21*   HGB  10.6*   HCT  32.4*   MCV  101*   MCH  33.0   MCHC  32.7   RDW  14.5   PLT  198     Imaging: Impression copied directly for accuracy:  EEG 2/20/18:  HISTORY:  Day 6 of video-EEG monitoring on patient Nella Helms, a 42-year-old with a history of medically intractable seizures.  She had been treated with a complicated medical regimen that  included Felbatol and valproic acid.  She developed symptoms severe thrombocytopenia and both of these medications required discontinuation.  Study is being performed to quantify seizures during medication transition process given the severe and unstable nature of her epilepsy.  She is currently being treated with lacosamide 300 mg per day, lamotrigine 300 mg per day, oxcarbazepine 1200 mg per day and levetiracetam 2000 mg per day.     IMPRESSION:  Abnormal, consistent with mild diffuse encephalopathy.  Occasional generalized epileptiform discharges are seen consistent with generalized epilepsy and consistent with the previous diagnosis of symptomatic generalized epilepsy.  No seizures recorded in this session.     SUMMARY OF 6 DAYS OF VIDEO ELECTROENCEPHALOGRAM MONITORING:  Mild diffuse encephalopathy noted throughout, at times mild to moderate.  Generalized spikes and spike wave discharges were noted.  Infrequently, these would recur at a rate of 2 Hz for less.  Occasional right temporal sharp waves.  Occasional runs of bifrontal alpha.  These were thought to possibly represent a variant of generalized paroxysmal fast activity.  However, video during these did not show clear seizure discharge.     The patient experienced 12 seizures on the second day of evaluation, 2 seizures on third day of evaluation and a cluster of 5 seizures occurring over about 5 minutes on day 5 of the evaluation.  Seizures were clinically and electrographically stereotyped.  Seizures consisted of elevation of the right arm with head turning to the left and bilateral stiffening.  After this, some rocking behavior was noted.  The seizures on day 2 were longer, lasting for up to 60 seconds.  This occurred in the context of significant medication and changes.  The seizures on day 5 were briefer with the clinical behavior typically lasting less than 5 seconds and 1 case lasting 15 seconds.  Ictal EEG showed diffuse polyspike at onset followed  "by rhythmic delta.  Sometimes an alpha discharge was also seen.  Overall features suggested that these were tonic seizures of generalized onset or perhaps frontal lobe seizures of indeterminate site of onset.     Multiple seizures were recorded during the acute medication changes necessitated by the development of significant thrombocytopenia.  These included discontinuation of Felbatol and the substitution of levetiracetam and lacosamide.  Seizures were longer and more frequent during the period of acute medication change and were briefer later on in the session when medications were more stable.  Both of the interictal and the ictal features were consistent with a symptomatic generalized/symptomatic multifocal epilepsy.     Assessment and Plan:  Ms Helms is a 42-year-old woman with history of developmental delay and intractable epilepsy admitted for workup of thrombocytopenia which is improving.     #Thrombocytopenia: resolved  Heme is considering ITP as etiology due to evidence of peripheral destruction of platelets. Also considering effects of valproic acid and/or felbamate, or suppression from a self-limited viral infection. Valproic acid was discontinued 1 week prior to admission and platelets were continuing to trend downward. Shortly after stopping felbamate the platelets began to rebound, however this is confounded by her simultaneously receiving steroids as empiric treatment for ITP. Abdominal ultrasound did not reveal evidence of splenomegaly. Heme suggests weaning to a lower dose quickly in order to minimize insomnia and effects of prednisone on behavior.  - prednisone- 40 mg daily and then a prolonged taper to be prescribed by Heme/Onc:  \"Prednisone dose taper: 40 mg daily for 3 days, 20 mg daily for 3 days and 10 mg daily for 2 weeks, then stop. She needs weekly CBC, only if plts drop <50 K, contact Dr. Santos for appt at [phone])\"  - Will need weekly CBC outpatient, can be done by home health " RN  - Hematology following  - transfuse if platelets <10,000 or <25,000 with evidence of hemorrhage     #Intractible epilepsy:   Felbamate discontinued shortly after admission. Levetiracetam and lacosamide since started and titrated up. Oxcarbazepine titrated up. Run of several tonic seizures in the evening. Based on her Epilepsy clinic notes from the last several years, it appears that she does not have seizure control at her baseline, but currently is in a state that she could return to her home and have consideration of change from lacosamide/levetiracetam to rufinamide or clobazam per her primary Epileptologist, on a gradual, outpatient basis. Since admission and medication changes, seizures have become shorter and less frequent, and there were no seizures recorded on the last day of vEEG.  - lacosamide 200mg BID  - levetiracetam 1000mg BID  - oxcarbazepine 600mg BID  - Epilepsy clinic follow up  - seizure precautions    FEN: regular diet  Prophylaxis: SCD, subQ lovenox  Code Status: FULL    Dispo: Ready for discharge, likely to TCU vs group home depending on need for assist with walking/stairs.    Patient was seen and discussed with attending Neurologist, Dr. Cortes    This note was transcribed with the assistance of Mansoor Sigala, MS3    Megan Chin MD  Neurology PGY-2  [phone]

## 2018-02-21 NOTE — PLAN OF CARE
Problem: Patient Care Overview  Goal: Plan of Care/Patient Progress Review  Outcome: No Change  Pt on 6A for thrombocytopenia and seizure monitoring. VSS. Pt oriented to self. Intermittently follows commands. Has known to be combative at times, but cooperative this shift. 1:1 sitter at bedside. VEEG discontinued today. No events witnessed or reported this shift. Up w/ A1-2 and GB. Voiding spont via commode, incontinent at times. Pt had a couple BMs this shift. PIV SL--difficult to flush but still works. Discharge to TCU vs back to group home. Continue with POC.

## 2018-02-21 NOTE — PROCEDURES
Procedure Date: 2018      TYPE OF STUDY:  Inpatient video EEG monitoring.      ELECTROENCEPHALOGRAM NUMBER:  -6.      DURATION OF RECORDIN hours 18 minutes 39 seconds.      HISTORY:  Day 6 of video-EEG monitoring on patient Nella Helms, a 42-year-old with a history of medically intractable seizures.  She had been treated with a complicated medical regimen that included Felbatol and valproic acid.  She developed symptoms severe thrombocytopenia and both of these medications required discontinuation.  Study is being performed to quantify seizures during medication transition process given the severe and unstable nature of her epilepsy.  She is currently being treated with lacosamide 300 mg per day, lamotrigine 300 mg per day, oxcarbazepine 1200 mg per day and levetiracetam 2000 mg per day.      TECHNICAL SUMMARY:  EEG was recorded from scalp electrodes placed according to the 10-20 international system.  Additional electrodes were utilized for referencing, artifact detection and recording from other cerebral regions.  Video was continuously recorded.  Video was reviewed for clinical correlation and to assist with EEG interpretation.      FINDINGS:  Desynchronized background while awake without a well-organized posterior dominant rhythm.  Some sleep spindles and irregular slowing during sleep.  T5 and T6 electrodes came off early in the study and so reliable recording from these regions was not possible.  The patient had removed most of her electrodes after approximately 0600, so further analysis was not possible after that moment.      INTERICTAL ABNORMALITIES:  Occasional diffuse generalized spikes.      ICTAL ABNORMALITIES:  No electrographic seizures.  No events marked by staff.      IMPRESSION:  Abnormal, consistent with mild diffuse encephalopathy.  Occasional generalized epileptiform discharges are seen consistent with generalized epilepsy and consistent with the previous diagnosis of  symptomatic generalized epilepsy.  No seizures recorded in this session.      SUMMARY OF 6 DAYS OF VIDEO ELECTROENCEPHALOGRAM MONITORING:  Mild diffuse encephalopathy noted throughout, at times mild to moderate.  Generalized spikes and spike wave discharges were noted.  Infrequently, these would recur at a rate of 2 Hz for less.  Occasional right temporal sharp waves.  Occasional runs of bifrontal alpha.  These were thought to possibly represent a variant of generalized paroxysmal fast activity.  However, video during these did not show clear seizure discharge.      The patient experienced 12 seizures on the second day of evaluation, 2 seizures on third day of evaluation and a cluster of 5 seizures occurring over about 5 minutes on day 5 of the evaluation.  Seizures were clinically and electrographically stereotyped.  Seizures consisted of elevation of the right arm with head turning to the left and bilateral stiffening.  After this, some rocking behavior was noted.  The seizures on day 2 were longer, lasting for up to 60 seconds.  This occurred in the context of significant antiseizure medication changes.  The seizures on day 5 were briefer with the clinical behavior typically lasting less than 5 seconds and  In one case lasting 15 seconds.  Ictal EEG showed diffuse polyspike at onset followed by rhythmic delta.  Sometimes an alpha discharge was also seen.  Overall features suggested that these were tonic seizures of generalized onset or perhaps frontal lobe seizures of indeterminate site of onset.      Multiple seizures were recorded during the acute medication changes necessitated by the development of significant thrombocytopenia.  These included discontinuation of Felbatol and the substitution of levetiracetam and lacosamide.  Seizures were longer and more frequent during the period of acute medication change and were briefer later on in the session when medications were more stable.  Both of the interictal and  the ictal features were consistent with a symptomatic generalized/symptomatic multifocal epilepsy.         KERON LOPEZ MD             D: 2018   T: 2018   MT: JARVIS      Name:     JOHANNA SIMONS   MRN:      [id number]        Account:        [id number]   :      1975           Procedure Date: 2018      Document: [id number]

## 2018-02-21 NOTE — PLAN OF CARE
Problem: Patient Care Overview  Goal: Plan of Care/Patient Progress Review  Oriented x1 to self. Pt would not follow commands for writer. No evidence seizures overnight. VSS on RA. Denies pain/ nausea. Up ax1-2 to BSC. Voiding. Large BM x1 overnight. RPIV SL. Pt appeared in pleasant mood overnight & was not impulsive. Sitter currently at bedside for safety, to be d/c'ed today. Plan to d/c back to group home, but possibly TCU if not at ambulatory baseline. Will cont to monitor.

## 2018-02-21 NOTE — PROGRESS NOTES
Care Coordinator Progress Note     Admission Date/Time:  2/15/2018  Attending MD:  Dr Marina Cortes     Data  Chart reviewed, discussed with interdisciplinary team. In 6A Discharge Rounds it was reported the Sitter was discontinued. Pt continues to require assist of 1-2; it not able to get out of bed or chair without assist. I observed yesterday she required assist of 2 to walk in shepherd.   Pt had seizure activity this AM and required IV Ativan.   Started on Lovenox subq today, every 24 hours. Received Magnesium IV x1 this AM.      Barriers to Discharge: cognitively impaired and physical impairment.  Requires assist of 2 for mobility.     Assessment  Behavior improving, sitter discontinued. Still requiring assist of 2 for mobility; group home does not have that much assistance available. If pt can remain off sitter for 24 hour then can consider TCU placement for short-term rehab.      Plan  Anticipated Discharge Date:  When no longer requiring IV meds.   Anticipated Discharge Plan:  TCU placement for short-term rehab.         Joanne Contreras RN Care Coordinator  Unit 6A, [facility]

## 2018-02-22 ENCOUNTER — APPOINTMENT (OUTPATIENT)
Dept: PHYSICAL THERAPY | Facility: CLINIC | Age: 43
DRG: 813 | End: 2018-02-22
Attending: PSYCHIATRY & NEUROLOGY
Payer: MEDICARE

## 2018-02-22 LAB
BASOPHILS # BLD AUTO: 0 10E9/L (ref 0–0.2)
BASOPHILS NFR BLD AUTO: 0.5 %
DIFFERENTIAL METHOD BLD: ABNORMAL
EOSINOPHIL # BLD AUTO: 0.3 10E9/L (ref 0–0.7)
EOSINOPHIL NFR BLD AUTO: 4.1 %
ERYTHROCYTE [DISTWIDTH] IN BLOOD BY AUTOMATED COUNT: 15 % (ref 10–15)
HCT VFR BLD AUTO: 31.6 % (ref 35–47)
HGB BLD-MCNC: 10.2 G/DL (ref 11.7–15.7)
IMM GRANULOCYTES # BLD: 0 10E9/L (ref 0–0.4)
IMM GRANULOCYTES NFR BLD: 0.5 %
LYMPHOCYTES # BLD AUTO: 3 10E9/L (ref 0.8–5.3)
LYMPHOCYTES NFR BLD AUTO: 44.9 %
MCH RBC QN AUTO: 32.9 PG (ref 26.5–33)
MCHC RBC AUTO-ENTMCNC: 32.3 G/DL (ref 31.5–36.5)
MCV RBC AUTO: 102 FL (ref 78–100)
MONOCYTES # BLD AUTO: 0.6 10E9/L (ref 0–1.3)
MONOCYTES NFR BLD AUTO: 9.7 %
NEUTROPHILS # BLD AUTO: 2.7 10E9/L (ref 1.6–8.3)
NEUTROPHILS NFR BLD AUTO: 40.3 %
NRBC # BLD AUTO: 0 10*3/UL
NRBC BLD AUTO-RTO: 0 /100
PLATELET # BLD AUTO: 225 10E9/L (ref 150–450)
RBC # BLD AUTO: 3.1 10E12/L (ref 3.8–5.2)
WBC # BLD AUTO: 6.6 10E9/L (ref 4–11)

## 2018-02-22 PROCEDURE — A9270 NON-COVERED ITEM OR SERVICE: HCPCS | Mod: GY | Performed by: STUDENT IN AN ORGANIZED HEALTH CARE EDUCATION/TRAINING PROGRAM

## 2018-02-22 PROCEDURE — 40000193 ZZH STATISTIC PT WARD VISIT

## 2018-02-22 PROCEDURE — 36415 COLL VENOUS BLD VENIPUNCTURE: CPT | Performed by: STUDENT IN AN ORGANIZED HEALTH CARE EDUCATION/TRAINING PROGRAM

## 2018-02-22 PROCEDURE — 12000003 ZZH R&B CRITICAL UMMC

## 2018-02-22 PROCEDURE — 40000141 ZZH STATISTIC PERIPHERAL IV START W/O US GUIDANCE

## 2018-02-22 PROCEDURE — 97530 THERAPEUTIC ACTIVITIES: CPT | Mod: GP

## 2018-02-22 PROCEDURE — 85025 COMPLETE CBC W/AUTO DIFF WBC: CPT | Performed by: STUDENT IN AN ORGANIZED HEALTH CARE EDUCATION/TRAINING PROGRAM

## 2018-02-22 PROCEDURE — 25000132 ZZH RX MED GY IP 250 OP 250 PS 637: Mod: GY | Performed by: STUDENT IN AN ORGANIZED HEALTH CARE EDUCATION/TRAINING PROGRAM

## 2018-02-22 PROCEDURE — 97112 NEUROMUSCULAR REEDUCATION: CPT | Mod: GP

## 2018-02-22 PROCEDURE — 25000128 H RX IP 250 OP 636: Performed by: STUDENT IN AN ORGANIZED HEALTH CARE EDUCATION/TRAINING PROGRAM

## 2018-02-22 PROCEDURE — 25000125 ZZHC RX 250: Performed by: STUDENT IN AN ORGANIZED HEALTH CARE EDUCATION/TRAINING PROGRAM

## 2018-02-22 PROCEDURE — 97116 GAIT TRAINING THERAPY: CPT | Mod: GP

## 2018-02-22 RX ADMIN — PANTOPRAZOLE SODIUM 40 MG: 40 TABLET, DELAYED RELEASE ORAL at 08:47

## 2018-02-22 RX ADMIN — LEVETIRACETAM 1000 MG: 500 TABLET, FILM COATED ORAL at 08:46

## 2018-02-22 RX ADMIN — PREDNISONE 40 MG: 20 TABLET ORAL at 08:47

## 2018-02-22 RX ADMIN — ACETAMINOPHEN 650 MG: 325 TABLET, FILM COATED ORAL at 22:07

## 2018-02-22 RX ADMIN — OXCARBAZEPINE 600 MG: 300 TABLET, FILM COATED ORAL at 08:47

## 2018-02-22 RX ADMIN — OXCARBAZEPINE 600 MG: 300 TABLET, FILM COATED ORAL at 20:23

## 2018-02-22 RX ADMIN — ACETAMINOPHEN 650 MG: 325 TABLET, FILM COATED ORAL at 08:46

## 2018-02-22 RX ADMIN — ACETAMINOPHEN 650 MG: 325 TABLET, FILM COATED ORAL at 15:34

## 2018-02-22 RX ADMIN — ENOXAPARIN SODIUM 40 MG: 40 INJECTION SUBCUTANEOUS at 08:46

## 2018-02-22 RX ADMIN — DOCUSATE SODIUM 100 MG: 100 CAPSULE, LIQUID FILLED ORAL at 20:23

## 2018-02-22 RX ADMIN — LEVETIRACETAM 1000 MG: 500 TABLET, FILM COATED ORAL at 20:23

## 2018-02-22 RX ADMIN — LAMOTRIGINE 100 MG: 100 TABLET ORAL at 08:47

## 2018-02-22 RX ADMIN — LAMOTRIGINE 100 MG: 100 TABLET ORAL at 14:09

## 2018-02-22 RX ADMIN — DOCUSATE SODIUM 100 MG: 100 CAPSULE, LIQUID FILLED ORAL at 08:46

## 2018-02-22 RX ADMIN — ONDANSETRON 4 MG: 4 TABLET, ORALLY DISINTEGRATING ORAL at 12:56

## 2018-02-22 RX ADMIN — LACOSAMIDE 200 MG: 200 TABLET, FILM COATED ORAL at 20:23

## 2018-02-22 RX ADMIN — LAMOTRIGINE 100 MG: 100 TABLET ORAL at 20:24

## 2018-02-22 RX ADMIN — LACOSAMIDE 200 MG: 200 TABLET, FILM COATED ORAL at 08:46

## 2018-02-22 ASSESSMENT — VISUAL ACUITY
OU: BASELINE

## 2018-02-22 NOTE — PLAN OF CARE
Problem: Patient Care Overview  Goal: Plan of Care/Patient Progress Review  Discharge Planner PT   Patient plan for discharge: not stated   Current status: Pt performed STS tx with CGA and use of UE for support/control. She ambulated 2 x 250' with HHA and CGA-min A at . Pt completed 3 steps with L ascending handrail and CGA-min A. She performed seated ball tosses for improved coordination and sitting balance.   Barriers to return to prior living situation: medical needs   Recommendations for discharge: return to group home   Rationale for recommendations: Pt appears to be approaching baseline functional mobility.        Entered by: Marla Paulino 02/22/2018 3:24 PM

## 2018-02-22 NOTE — PLAN OF CARE
Problem: Patient Care Overview  Goal: Plan of Care/Patient Progress Review  Outcome: No Change  Pt on 6A for thrombocytopenia and seizure monitoring. VSS. Neuros at baseline; hx of developmental delay. Pt is oriented to self. Intermittently follows commands. Pt pleasant overnight, but per report can be combative at times. No seizures witnessed this shift. Up with assist of 1-2 and GB. Voiding spont via commode, incontinent at times. PIV SL. Discharge to TCU vs back to group home depending on ambulatory needs. Continue to monitor and follow POC.

## 2018-02-22 NOTE — PROGRESS NOTES
[facility], [place]   Neurology Daily Note    Nella Helms  [id number]  02/22/2018    Subjective Data:  Per nurses, Nella has been pleasant overnight with some combative outbursts. Has been sitting in her bed or chair without sitter.    Objective Data:   /51 (BP Location: Left arm)  Pulse 66  Temp 97.8  F (36.6  C) (Axillary)  Resp 16  Wt 63 kg (138 lb 14.2 oz)  SpO2 94%  BMI 26.24 kg/m2  SpO2 data collected on RA    Neurologic:  - Mental Status: alert and oriented to person, place; language fluent but simple, follows some commands  - Cranial Nerves: pupils symmetric, R eye movement intact; no facial asymmetry; tongue protrusion midline  - Sensation intact to light touch in upper and lower extremities bilaterally  - Motor Exam: tone and bulk symmetric; symmetric movement anti-gravity, strong  strength  - Reflexes: DTR 2+ and symmetric throughout  - Coordination: not assessed  - Gait: not assessed  Constitutional: NAD, cooperative with examination and interview  Cardiovascular: Regular rate and rhythm without murmurs  Respiratory: clear to auscultation without wheezes or rales; no accessory muscle use  Abdominal: soft and nontender without organomegaly; BS normal  Extremities: no clubbing, no edema  Skin: No lesions or rashes    Labs:  Recent Labs   Lab Test  02/22/18   0658   WBC  6.6   RBC  3.10*   HGB  10.2*   HCT  31.6*   MCV  102*   MCH  32.9   MCHC  32.3   RDW  15.0   PLT  225     Assessment and Plan:  Ms Helms is a 42-year-old woman with history of developmental delay and intractable epilepsy admitted for transition off of valproate/felbamate due to thrombocytopenia which has since resolved and she has been seizure free for greater than 24 hours.      #Thrombocytopenia: resolved  Heme is considering ITP as etiology due to evidence of peripheral destruction of platelets. Also considering effects of valproic acid and/or felbamate, or suppression from a self-limited  "viral infection. Valproic acid was discontinued 1 week prior to admission and platelets were continuing to trend downward. Shortly after stopping felbamate the platelets began to rebound, however this is confounded by her simultaneously receiving steroids as empiric treatment for ITP. Abdominal ultrasound did not reveal evidence of splenomegaly. Heme suggests weaning to a lower dose quickly in order to minimize insomnia and effects of prednisone on behavior.  - prednisone- 40 mg daily and then a prolonged taper to be prescribed by Heme/Onc:  \"Prednisone dose taper: 40 mg daily for 3 days, 20 mg daily for 3 days and 10 mg daily for 2 weeks, then stop. She needs weekly CBC, only if plts drop <50 K, contact Dr. Santos for appt at [phone])\"  - Will need weekly CBC outpatient, can be done by home health RN  - Hematology signed off  - transfuse if platelets <10,000 or <25,000 with evidence of hemorrhage      #Intractible epilepsy:   Felbamate discontinued shortly after admission. Levetiracetam and lacosamide since started and titrated up. Oxcarbazepine titrated up. Run of several tonic seizures in the evening. Based on her Epilepsy clinic notes from the last several years, it appears that she does not have seizure control at her baseline, but currently is in a state that she could return to her home and have consideration of change from lacosamide/levetiracetam to rufinamide or clobazam per her primary Epileptologist, on a gradual, outpatient basis. Since admission and medication changes, seizures have become shorter and less frequent, and there were no seizures recorded on the last day of vEEG.  - lacosamide 200mg BID  - levetiracetam 1000mg BID  - oxcarbazepine 600mg BID  - Epilepsy clinic follow up  - seizure precautions     FEN: regular diet  Prophylaxis: SCD, subQ lovenox  Code Status: FULL     Dispo: Ready for discharge, will send TCU referrals today due to still walking with assist of 1 and only walking 3 " stairs, per discussion with group home.    Patient was seen and discussed with attending neurologist, Dr. Cortes    This note was transcribed with the assistance of Mansoor Sigala, MS3    Megan Chin MD  Neurology PGY-2  [phone]

## 2018-02-22 NOTE — PLAN OF CARE
Problem: Patient Care Overview  Goal: Plan of Care/Patient Progress Review  Pt VSS. Neuros unchanged, baseline for pt. Pt restless at times but was in w/c x2 in hallway at nurses station. No events noted. PIV SL. Pt slept from approx 0331-7783, then up in hallway again with staff. Reg diet, tolerated well, ate with assistance. Pt went back to bed approx 2100. Pt generally happy and cooperative this shift. Up with 1, GB, was walking quite well for short distances this shift. Voiding spontaneously, good UOP. Had BM this shift. BA on. Cont with POC.

## 2018-02-22 NOTE — PLAN OF CARE
Problem: Patient Care Overview  Goal: Plan of Care/Patient Progress Review  Patient admitted for seizure monitoring. Hx developmental delay & epilepsy. VSS. Oriented to self only. Difficult to complete neuro exam. Often yelling out, cooperative most of shift. No events noted this shift. Regular diet, requires assistance to not eat too fast. Had one large emesis after breakfast this morning. Zofran given before lunch. PIV SL. Up with assist of 2 and GB. Voiding spontaneously, can be incontinent at times. No BM this shift. Denies pain. D/c to TCU vs back to group home depending on mobility needs. Will continue to monitor.

## 2018-02-22 NOTE — PROGRESS NOTES
Care Coordinator Progress Note     Admission Date/Time:  2/15/2018  Attending MD:  Dr Gray Felt     Coordination of Care and Referrals   Pt has been without a sitter for over 24 hours. Spoke with PT this AM and they will see pt this afternoon.  This AM spoke with pt's nurse, Halle about status of mobility. Halle reported pt had an emesis after breakfast, was lethargic; required assist of 2 for mobility.     Left a message for SANDY Graves Coordinator with [facility]; informed her pt still requiring assist of 2 for mobility.      Plan  TCU placement until mobility improved and then return to group home.     Joanne Contreras RN Care Coordinator  Unit 6A, [facility]  ______________________________________________    Addendum:  This afternoon informed by ADALID Moreira, that pt did well. She was able to do 3 stairs; up with assist of 1. I called SANDY Graves at [facility] and had her speak with Marla. Ely still expressed concern about pt returning to group home. She would like to see if pt can do 12 steps (has this number of stairs at group home) & if pt is able to ambulate independently at all or if she always needs 1 assist for mobility. I will keep her updated.   Updated ADALID Moreira, she will see pt tomorrow morning.   Pt is improving with mobility but difficult to tell how soon she can do 12 stairs and be ambulating with minimal assist. Therefore, will make TCU referrals.     Based on my previous discussions with pt's brother/guardian, Abdon and suggestions for TCUs from SANDY Graves [facility], I made the following calls.    1)  [facility] in [place]. Phone: [phone]. Left a message for Admissions to call me back about a referral.    At 4:05pm spoke with Bailey in Admissions, phone: [phone]. Faxed referral, fax# [phone]. Bailey said their TCU was full; they do have room on their LTC unit. I said the LTC unit was OK.        2)  [facility]. Phone: [phone]. Spoke with  Admissions and faxed referral. They have 1 female bed. Informed them pt is sometimes loud and calls out. Fax: [phone].   At 3:47pm received call back from Admissions; they declined pt for admission, pt is not appropriate for their home.     3)  [facility] ([facility] in [place].  Phone: [phone]. Spoke with Admissions and faxed referral. Fax: [phone].       Plan:  Wait to hear back from facilities. Will update SW.

## 2018-02-23 ENCOUNTER — APPOINTMENT (OUTPATIENT)
Dept: PHYSICAL THERAPY | Facility: CLINIC | Age: 43
DRG: 813 | End: 2018-02-23
Attending: PSYCHIATRY & NEUROLOGY
Payer: MEDICARE

## 2018-02-23 LAB
BACTERIA SPEC CULT: NO GROWTH
BACTERIA SPEC CULT: NO GROWTH
SPECIMEN SOURCE: NORMAL
SPECIMEN SOURCE: NORMAL

## 2018-02-23 PROCEDURE — A9270 NON-COVERED ITEM OR SERVICE: HCPCS | Mod: GY | Performed by: STUDENT IN AN ORGANIZED HEALTH CARE EDUCATION/TRAINING PROGRAM

## 2018-02-23 PROCEDURE — 25000132 ZZH RX MED GY IP 250 OP 250 PS 637: Mod: GY | Performed by: STUDENT IN AN ORGANIZED HEALTH CARE EDUCATION/TRAINING PROGRAM

## 2018-02-23 PROCEDURE — 97112 NEUROMUSCULAR REEDUCATION: CPT | Mod: GP

## 2018-02-23 PROCEDURE — 12000003 ZZH R&B CRITICAL UMMC

## 2018-02-23 PROCEDURE — 25000128 H RX IP 250 OP 636: Performed by: STUDENT IN AN ORGANIZED HEALTH CARE EDUCATION/TRAINING PROGRAM

## 2018-02-23 PROCEDURE — 97530 THERAPEUTIC ACTIVITIES: CPT | Mod: GP

## 2018-02-23 PROCEDURE — 97116 GAIT TRAINING THERAPY: CPT | Mod: GP

## 2018-02-23 PROCEDURE — 40000193 ZZH STATISTIC PT WARD VISIT

## 2018-02-23 PROCEDURE — 25000125 ZZHC RX 250: Performed by: STUDENT IN AN ORGANIZED HEALTH CARE EDUCATION/TRAINING PROGRAM

## 2018-02-23 RX ADMIN — ENOXAPARIN SODIUM 40 MG: 40 INJECTION SUBCUTANEOUS at 09:14

## 2018-02-23 RX ADMIN — LACOSAMIDE 200 MG: 200 TABLET, FILM COATED ORAL at 20:28

## 2018-02-23 RX ADMIN — ACETAMINOPHEN 650 MG: 325 TABLET, FILM COATED ORAL at 09:13

## 2018-02-23 RX ADMIN — PREDNISONE 20 MG: 20 TABLET ORAL at 09:13

## 2018-02-23 RX ADMIN — DOCUSATE SODIUM 100 MG: 100 CAPSULE, LIQUID FILLED ORAL at 20:24

## 2018-02-23 RX ADMIN — LACOSAMIDE 200 MG: 200 TABLET, FILM COATED ORAL at 09:13

## 2018-02-23 RX ADMIN — LEVETIRACETAM 1000 MG: 500 TABLET, FILM COATED ORAL at 20:28

## 2018-02-23 RX ADMIN — LAMOTRIGINE 100 MG: 100 TABLET ORAL at 09:13

## 2018-02-23 RX ADMIN — PANTOPRAZOLE SODIUM 40 MG: 40 TABLET, DELAYED RELEASE ORAL at 09:13

## 2018-02-23 RX ADMIN — OXCARBAZEPINE 600 MG: 300 TABLET, FILM COATED ORAL at 09:13

## 2018-02-23 RX ADMIN — OXCARBAZEPINE 600 MG: 300 TABLET, FILM COATED ORAL at 20:28

## 2018-02-23 RX ADMIN — LAMOTRIGINE 100 MG: 100 TABLET ORAL at 13:17

## 2018-02-23 RX ADMIN — LEVETIRACETAM 1000 MG: 500 TABLET, FILM COATED ORAL at 09:13

## 2018-02-23 RX ADMIN — ACETAMINOPHEN 650 MG: 325 TABLET, FILM COATED ORAL at 22:07

## 2018-02-23 RX ADMIN — LAMOTRIGINE 100 MG: 100 TABLET ORAL at 20:31

## 2018-02-23 ASSESSMENT — VISUAL ACUITY
OU: BASELINE

## 2018-02-23 NOTE — PROGRESS NOTES
Social Work Services Progress Note    Hospital Day: 9    Collaborated with:  Sally DELGADO (Ely), 6A RN Care Coordinator and Charge Nurse, Dr. Chin    Data:  Pt is awaiting discharge.    The 6A RN Care Coordinator initiated SNF referrals on 2/22/18 (on this SW's behalf).  Pt was assessed and declined for admit to [facility] in [place] and [facility] has indicated that they have no openings.  However, per the LAKESHIA RN Care Coordinator, a TCU stay is no longer indicated.    Intervention:  LIDIA spoke with Ely Zavala RN.  Per Ely, Sally has submitted a  new rate request to [facility] requesting that they increase pt's rate so that [facility] could increase pt's services.  Per Eyl, Sally staff will complete an in-person assessment on Monday.  Per Ely, they will not consider pt for return to the group home today or through the weekend due to: 1.  Needing to complete an in-person assessment (their  is off today  2.  Anticipate approval for rate increase from [facility] by Monday 2/26/18, and 3.  Inadequate staffing today and through the weekend.  Ely indicates that she anticipates that pt will be able to return to the group home on 2/26/18. They do not have adequate staffing today or through the weekend.  LIDIA updated the LAKESHIA RN Care Coordinator, 6A RN Care Coordinator, 6A Charge Nurse and Dr. Chin.     Assessment:  A rehab stay is no longer indicated.    Plan:    Anticipated Disposition:  Return to group home on 2/26/18, pending [facility] staff in-person assessment    Barriers to d/c plan:  Per SANDY Graves at [facility] they will not consider pt for return to the group home today or through the weekend due to: 1.  Needing to complete an in-person assessment (their  is off today  2.  Anticipate approval for rate increase from [facility] by Monday 2/26/18, and 3.  Inadequate staffing today and through the weekend.    Follow Up:  The LAKESHIA RN Care  Coordinator will coordinate  return to the group home.    MICHAEL Smith  Social Work, 6A  Phone:  [phone]  Pager:  [phone]  2/23/2018

## 2018-02-23 NOTE — PROGRESS NOTES
Care Coordinator Progress Note     Admission Date/Time:  2/15/2018  Attending MD:  Dr Gray Felt     Coordination of Care & Referrals  This AM Marla, PT, reported pt did 12 steps; she was up with supervision only. Did well with therapy. I called SANDY Graves with [facility] and informed her of pt's progress. I had Marla speak directly with Ely about pt's PT session. Ely said she would like to speak with the doctor about pt's medical issues. I contacted Dr Chin and she spoke with Ely.   Later informed by LIDIA Smith that she took a call from SANDY Graves [facility]. Ely said they are not able to accept pt back yet; they have applied to the [facility] for more funding. Group home staff plan to visit pt on Monday, 2-26. Dr Chin aware of this; she also communicated to Ely that pt is stable and ready for discharge.    Coordination of Care and Referrals   Discussed case with group home RN; LIDIA and Dr Chin.     Assessment  Pt medically ready for discharge. Group home will not accept pt back at this time (see LIDIA note from 2-23).     Plan  Anticipated Discharge Date:  TBD    Anticipated Discharge Plan:  Return to group home. Group home staff will visit pt on Monday, 2-26.         Joanne Contreras, RN Care Coordinator  Unit 6A, [facility]

## 2018-02-23 NOTE — PLAN OF CARE
Problem: Patient Care Overview  Goal: Plan of Care/Patient Progress Review  Discharge Planner PT   Patient plan for discharge: not stated   Current status: Pt performed 5 x sit<>stand with SBA-CGA; poor eccentric control. She ambulated 2 x 250' with CGA. Pt performed 4 x 3 steps with unilateral UE support and CGA. To promote coordination and balance, pt completed seated and standing ball tosses - no overt LOB.   Barriers to return to prior living situation: medical needs   Recommendations for discharge: return to group home. Pt appears close to baseline of functional mobility.   Rationale for recommendations: see above. Will continue to work with pt during hospitalization to promote maintenance of activity tolerance and strength.        Entered by: Marla Paulino 02/23/2018 8:53 AM

## 2018-02-23 NOTE — PROGRESS NOTES
CLINICAL NUTRITION SERVICES - REASSESSMENT NOTE     Nutrition Prescription    RECOMMENDATIONS FOR MDs/PROVIDERS TO ORDER:  - none additional     Malnutrition Status:    - Unable to determine due to pt with other cares during visits.    Recommendations already ordered by Registered Dietitian (RD):  - ordered weight to assess for weight changes.     Future/Additional Recommendations:  - ongoing PO adequacy      EVALUATION OF THE PROGRESS TOWARD GOALS   Diet: Regular diet   Intake: 100% PO        NEW FINDINGS   Nutrition: pt tolerating regular diet with 100% PO intake. Pt requires assistance with meals and requires supervision due to habit of eating too quickly.    Meds/labs: bowel meds  Skin: Farhad score 19 and nutrition sub-score of 3 (adequate)  Weights:  No new weight since 2/15    MALNUTRITION  % Intake: No decreased intake noted  % Weight Loss: > 10% in 6 months (severe)  Subcutaneous Fat Loss: Unable to assess  Muscle Loss: Unable to assess  Fluid Accumulation/Edema: None noted in flowsheets   Malnutrition Diagnosis: Unable to determine due to pt with other cares during visits.  Pt may not be able to consent for NFPA due to mental status/orientated to self only.     Previous Goals   Patient to consume % of nutritionally adequate meal trays TID, or the equivalent with supplements/snacks.  Evaluation: Met    Previous Nutrition Diagnosis  Inadequate protein-energy intake related to imbalance of intake verses output as evidenced by pt with 12% weight loss over 4 months.     Evaluation: Improving    CURRENT NUTRITION DIAGNOSIS  No nutrition diagnosis at this time    INTERVENTIONS  Implementation  Ordered weight     Goals  Patient to consume % of nutritionally adequate meal trays TID, or the equivalent with supplements/snacks.    Monitoring/Evaluation  Progress toward goals will be monitored and evaluated per protocol.     José Antonio Adler RD, LD, [facility]  Neuro ICU  Pager: [phone]

## 2018-02-23 NOTE — PLAN OF CARE
Problem: Patient Care Overview  Goal: Plan of Care/Patient Progress Review  OT 5C: cancel and defer, per chart review and discussion with physical therapist, pt is at baseline for ADLs, not appropriate for IP therapy. Will complete orders.

## 2018-02-23 NOTE — PROGRESS NOTES
[facility], [place]   Neurology Daily Note    Nella Helms  [id number]  02/23/2018    Subjective Data:  No overnight issues. No seizures in >48 hours. Walked with PT.    Objective Data:   /54 (BP Location: Right arm)  Pulse 75  Temp 95.9  F (35.5  C) (Axillary)  Resp 16  Wt 63 kg (138 lb 14.2 oz)  SpO2 94%  BMI 26.24 kg/m2  SpO2 data collected on RA    Neurologic:  - Mental Status: alert and oriented to person, place; language fluent but simple, follows some commands  - Cranial Nerves: pupils symmetric, R eye movement intact; no facial asymmetry; tongue protrusion midline  - Sensation intact to light touch in upper and lower extremities bilaterally  - Motor Exam: tone and bulk symmetric; symmetric movement anti-gravity, strong  strength  - Reflexes: DTR 2+ and symmetric throughout  - Coordination: not assessed  - Gait: not assessed  Constitutional: NAD, cooperative with examination and interview  Cardiovascular: Regular rate and rhythm without murmurs  Respiratory: clear to auscultation without wheezes or rales; no accessory muscle use  Abdominal: soft and nontender without organomegaly; BS normal  Extremities: no clubbing, no edema  Skin: No lesions or rashes    Labs:  No recent lab testing. Platelets wnl for >2 days. Will move to weekly cbcs only.    Assessment and Plan:  Ms Helms is a 42-year-old woman with history of developmental delay and intractable epilepsy admitted for transition off of valproate/felbamate due to thrombocytopenia which has since resolved and she has been seizure free for greater than 48 hours.      #Thrombocytopenia: resolved  Heme is considering ITP as etiology due to evidence of peripheral destruction of platelets. Also considering effects of valproic acid and/or felbamate, or suppression from a self-limited viral infection. Valproic acid was discontinued 1 week prior to admission and platelets were continuing to trend downward. Shortly after  "stopping felbamate the platelets began to rebound, however this is confounded by her simultaneously receiving steroids as empiric treatment for ITP. Abdominal ultrasound did not reveal evidence of splenomegaly. Heme suggests weaning to a lower dose quickly in order to minimize insomnia and effects of prednisone on behavior.  - prednisone- 40 mg daily and then a prolonged taper to be prescribed by Heme/Onc:  \"Prednisone dose taper: 40 mg daily for 3 days, 20 mg daily for 3 days and 10 mg daily for 2 weeks, then stop. She needs weekly CBC, only if plts drop <50 K, contact Dr. Santos for appt at [phone])\"  - Will need weekly CBC outpatient, can be done by home health RN  - Hematology signed off  - transfuse if platelets <10,000 or <25,000 with evidence of hemorrhage      #Intractible epilepsy:   Felbamate discontinued shortly after admission. Levetiracetam and lacosamide since started and titrated up. Oxcarbazepine titrated up. Run of several tonic seizures in the evening. Based on her Epilepsy clinic notes from the last several years, it appears that she does not have seizure control at her baseline, but currently is in a state that she could return to her home and have consideration of change from lacosamide/levetiracetam to rufinamide or clobazam per her primary Epileptologist, on a gradual, outpatient basis. Since admission and medication changes, seizures have become shorter and less frequent, and there were no seizures recorded on the last day of vEEG.  - lacosamide 200mg BID  - levetiracetam 1000mg BID  - oxcarbazepine 600mg BID  - Epilepsy clinic follow up  - seizure precautions     FEN: regular diet, no mIVF, lytes wnl  Prophylaxis: SCD, subQ lovenox  Code Status: FULL     Dispo: Medically ready for discharge, at ambulatory baseline, Group Home [facility] DELGADO states that face to face evaluation of patient must take place to confirm patient is appropriate for return to home, and the person who would do " that is out on Friday and does not work on the weekend. They anticipate d/c home on Monday. She also mentions that an application for more funding from the [facility] has been made by the facility and after direct questioning if this application will be a further barrier to discharge come Monday, she says no.    Patient was seen and discussed with attending neurologist, Dr. Sebastian Chin MD  Neurology PGY-2  [phone]

## 2018-02-23 NOTE — PLAN OF CARE
Problem: Patient Care Overview  Goal: Plan of Care/Patient Progress Review  Outcome: Improving  Alert to self only, pt seemed slightly agitated today, c/o pain on legs, given tylenol x 1. Up to bathroom/commode with assist x 1 and GB. Generalized weakness 4/5. Follows some commands. Tolerating regular diet but need supervision due to habit of eating to fast. Possible return to group home on Monday. Bed alarm on for safety. Continue to monitor and treat per plan of care.
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
## 2018-02-24 PROCEDURE — 25000132 ZZH RX MED GY IP 250 OP 250 PS 637: Mod: GY | Performed by: STUDENT IN AN ORGANIZED HEALTH CARE EDUCATION/TRAINING PROGRAM

## 2018-02-24 PROCEDURE — A9270 NON-COVERED ITEM OR SERVICE: HCPCS | Mod: GY | Performed by: STUDENT IN AN ORGANIZED HEALTH CARE EDUCATION/TRAINING PROGRAM

## 2018-02-24 PROCEDURE — 25000125 ZZHC RX 250: Performed by: STUDENT IN AN ORGANIZED HEALTH CARE EDUCATION/TRAINING PROGRAM

## 2018-02-24 PROCEDURE — 25000128 H RX IP 250 OP 636: Performed by: STUDENT IN AN ORGANIZED HEALTH CARE EDUCATION/TRAINING PROGRAM

## 2018-02-24 PROCEDURE — 12000003 ZZH R&B CRITICAL UMMC

## 2018-02-24 RX ADMIN — ACETAMINOPHEN 650 MG: 325 TABLET, FILM COATED ORAL at 10:45

## 2018-02-24 RX ADMIN — OXCARBAZEPINE 600 MG: 300 TABLET, FILM COATED ORAL at 20:17

## 2018-02-24 RX ADMIN — OXCARBAZEPINE 600 MG: 300 TABLET, FILM COATED ORAL at 08:21

## 2018-02-24 RX ADMIN — DOCUSATE SODIUM 100 MG: 100 CAPSULE, LIQUID FILLED ORAL at 08:21

## 2018-02-24 RX ADMIN — LAMOTRIGINE 100 MG: 100 TABLET ORAL at 20:18

## 2018-02-24 RX ADMIN — PANTOPRAZOLE SODIUM 40 MG: 40 TABLET, DELAYED RELEASE ORAL at 08:21

## 2018-02-24 RX ADMIN — LAMOTRIGINE 100 MG: 100 TABLET ORAL at 08:21

## 2018-02-24 RX ADMIN — LACOSAMIDE 200 MG: 200 TABLET, FILM COATED ORAL at 08:21

## 2018-02-24 RX ADMIN — LACOSAMIDE 200 MG: 200 TABLET, FILM COATED ORAL at 20:17

## 2018-02-24 RX ADMIN — LAMOTRIGINE 100 MG: 100 TABLET ORAL at 14:15

## 2018-02-24 RX ADMIN — LEVETIRACETAM 1000 MG: 500 TABLET, FILM COATED ORAL at 20:18

## 2018-02-24 RX ADMIN — DOCUSATE SODIUM 100 MG: 100 CAPSULE, LIQUID FILLED ORAL at 20:17

## 2018-02-24 RX ADMIN — LEVETIRACETAM 1000 MG: 500 TABLET, FILM COATED ORAL at 08:21

## 2018-02-24 RX ADMIN — ENOXAPARIN SODIUM 40 MG: 40 INJECTION SUBCUTANEOUS at 08:21

## 2018-02-24 RX ADMIN — PREDNISONE 20 MG: 20 TABLET ORAL at 08:21

## 2018-02-24 ASSESSMENT — VISUAL ACUITY
OU: BASELINE

## 2018-02-24 NOTE — PROGRESS NOTES
[facility], [place]   Neurology Daily Note    Nella Helms  [id number]  02/24/2018    Subjective Data:  Stood up quickly from commode and fell onto knees. No seizures in >72 hours.    Objective Data:   /61 (BP Location: Right arm)  Pulse 84  Temp 97.9  F (36.6  C) (Oral)  Resp 16  Wt 71.3 kg (157 lb 3.2 oz)  SpO2 92%  BMI 29.7 kg/m2  SpO2 data collected on RA    Neurologic:  - Mental Status: alert and oriented to person, place; language fluent but simple, follows some commands  - Cranial Nerves: pupils symmetric, R eye movement intact; no facial asymmetry; tongue protrusion midline  - Sensation intact to light touch in upper and lower extremities bilaterally  - Motor Exam: tone and bulk symmetric; symmetric movement anti-gravity, strong  strength  - Reflexes: DTR 2+ and symmetric throughout  - Coordination: not assessed  - Gait: not assessed  Constitutional: NAD, cooperative with examination and interview  Cardiovascular: Regular rate and rhythm without murmurs  Respiratory: clear to auscultation without wheezes or rales; no accessory muscle use  Abdominal: soft and nontender without organomegaly; BS normal  Extremities: no clubbing, no edema  Skin: No lesions or rashes    Labs:  No recent lab testing. CBC recheck Monday 2/26.    Assessment and Plan:  Ms Helms is a 42-year-old woman with history of developmental delay and intractable epilepsy admitted for transition off of valproate/felbamate due to thrombocytopenia which has since resolved and she has been seizure free for greater than 72 hours.      #Thrombocytopenia: resolved  Heme is considering ITP as etiology due to evidence of peripheral destruction of platelets. Also considering effects of valproic acid and/or felbamate, or suppression from a self-limited viral infection. Valproic acid was discontinued 1 week prior to admission and platelets were continuing to trend downward. Shortly after stopping felbamate the  "platelets began to rebound, however this is confounded by her simultaneously receiving steroids as empiric treatment for ITP. Abdominal ultrasound did not reveal evidence of splenomegaly. Heme suggests weaning to a lower dose quickly in order to minimize insomnia and effects of prednisone on behavior.  - prednisone- 40 mg daily and then a prolonged taper to be prescribed by Heme/Onc:  \"Prednisone dose taper: 40 mg daily for 3 days (currently day 2), 20 mg daily for 3 days (start Monday) and 10 mg daily for 2 weeks, then stop. She needs weekly CBC, only if plts drop <50 K, contact Dr. Santos for appt at [phone])\"  - Will need weekly CBC outpatient, can be done by home health RN  - Hematology signed off  - transfuse if platelets <10,000 or <25,000 with evidence of hemorrhage      #Intractible epilepsy:   Felbamate discontinued shortly after admission. Levetiracetam and lacosamide since started and titrated up. Oxcarbazepine titrated up. Run of several tonic seizures in the evening. Based on her Epilepsy clinic notes from the last several years, it appears that she does not have seizure control at her baseline, but currently is in a state that she could return to her home and have consideration of change from lacosamide/levetiracetam to rufinamide or clobazam per her primary Epileptologist, on a gradual, outpatient basis. Since admission and medication changes, seizures have become shorter and less frequent, and there were no seizures recorded on the last day of vEEG.  - lacosamide 200mg BID  - levetiracetam 1000mg BID  - oxcarbazepine 600mg BID  - Epilepsy clinic follow up  - seizure precautions     FEN: regular diet, no mIVF, lytes wnl  Prophylaxis: SCD, subQ lovenox  Code Status: FULL     Dispo: Medically ready for discharge, anticipate Monday after reevaluated by [facility] employee.    Patient was seen and discussed with attending neurologist, Dr. Pedersen.    Megan Chin MD  Neurology " PGY-2  [phone]      Patient seen and examined by me today February 24, 2018. I agree with DR. Chin's note and details above from today February 24, 2018.   Juanito Vazquez MD

## 2018-02-24 NOTE — PLAN OF CARE
Problem: Patient Care Overview  Goal: Plan of Care/Patient Progress Review  VS stabled. A&Ox alert and able to follow some commands. Neuros unable to assess fully. Patient complained of Pain all night. Had at least 5 hours of sleep.pt tends to jump out of bed often as well as throws self on a commode when using the commode. Patient has no Incision. Voids using commode. Assist of one, careful attention needed so that she does not hurt self. Patient has no blood sugar checks. Has no IVs intact. Regular diet. Takes pills with putting.Cont to monitor and with POC. Pt had a fall to her knees from commode.

## 2018-02-24 NOTE — PLAN OF CARE
Problem: Patient Care Overview  Goal: Plan of Care/Patient Progress Review  Alert to self only,delayed, repetitive statements, uses a loud voice.  c/o pain on legs, given tylenol x 1. Up to bathroom/commode with assist x 1 and GB.Ambulated in halls with staff and GB Generalized weakness 4/5. Follows some commands. Tolerating regular diet but need supervision due to habit of eating to fast. Bother visited today. Possible return to group home on Monday. Bed alarm on for safety. Continue to monitor and treat per plan of care.

## 2018-02-25 PROCEDURE — 25000132 ZZH RX MED GY IP 250 OP 250 PS 637: Mod: GY | Performed by: STUDENT IN AN ORGANIZED HEALTH CARE EDUCATION/TRAINING PROGRAM

## 2018-02-25 PROCEDURE — 12000008 ZZH R&B INTERMEDIATE UMMC

## 2018-02-25 PROCEDURE — A9270 NON-COVERED ITEM OR SERVICE: HCPCS | Mod: GY | Performed by: STUDENT IN AN ORGANIZED HEALTH CARE EDUCATION/TRAINING PROGRAM

## 2018-02-25 PROCEDURE — 25000125 ZZHC RX 250: Performed by: STUDENT IN AN ORGANIZED HEALTH CARE EDUCATION/TRAINING PROGRAM

## 2018-02-25 PROCEDURE — 25000128 H RX IP 250 OP 636: Performed by: STUDENT IN AN ORGANIZED HEALTH CARE EDUCATION/TRAINING PROGRAM

## 2018-02-25 RX ADMIN — LAMOTRIGINE 100 MG: 100 TABLET ORAL at 20:25

## 2018-02-25 RX ADMIN — OXCARBAZEPINE 600 MG: 300 TABLET, FILM COATED ORAL at 08:03

## 2018-02-25 RX ADMIN — PANTOPRAZOLE SODIUM 40 MG: 40 TABLET, DELAYED RELEASE ORAL at 08:04

## 2018-02-25 RX ADMIN — ENOXAPARIN SODIUM 40 MG: 40 INJECTION SUBCUTANEOUS at 08:04

## 2018-02-25 RX ADMIN — ACETAMINOPHEN 650 MG: 325 TABLET, FILM COATED ORAL at 23:23

## 2018-02-25 RX ADMIN — LEVETIRACETAM 1000 MG: 500 TABLET, FILM COATED ORAL at 08:04

## 2018-02-25 RX ADMIN — ACETAMINOPHEN 650 MG: 325 TABLET, FILM COATED ORAL at 16:14

## 2018-02-25 RX ADMIN — LEVETIRACETAM 1000 MG: 500 TABLET, FILM COATED ORAL at 20:25

## 2018-02-25 RX ADMIN — DOCUSATE SODIUM 100 MG: 100 CAPSULE, LIQUID FILLED ORAL at 20:24

## 2018-02-25 RX ADMIN — LAMOTRIGINE 100 MG: 100 TABLET ORAL at 08:04

## 2018-02-25 RX ADMIN — LAMOTRIGINE 100 MG: 100 TABLET ORAL at 14:33

## 2018-02-25 RX ADMIN — LACOSAMIDE 200 MG: 200 TABLET, FILM COATED ORAL at 08:04

## 2018-02-25 RX ADMIN — DOCUSATE SODIUM 100 MG: 100 CAPSULE, LIQUID FILLED ORAL at 08:03

## 2018-02-25 RX ADMIN — PREDNISONE 20 MG: 20 TABLET ORAL at 08:04

## 2018-02-25 RX ADMIN — OXCARBAZEPINE 600 MG: 300 TABLET, FILM COATED ORAL at 20:25

## 2018-02-25 RX ADMIN — LACOSAMIDE 200 MG: 200 TABLET, FILM COATED ORAL at 20:27

## 2018-02-25 ASSESSMENT — VISUAL ACUITY
OU: BASELINE

## 2018-02-25 NOTE — PLAN OF CARE
Problem: Patient Care Overview  Goal: Plan of Care/Patient Progress Review  Outcome: No Change  Alert to self only, delayed, repetitive statements, uses a loud voice. Does not follow most commands. Slept well overnight. Denies pain. Up to bathroom/commode with assist of 1 and GB. Generalized weakness 4/5. Tolerating regular diet but need supervision due to habit of eating too fast. Bed alarm at all times.   Plan: Possible return to group home on Monday.

## 2018-02-25 NOTE — PROGRESS NOTES
[facility], [place]   Neurology Daily Note  2/25/2018    Nella Helms  [id number]  1975    Subjective:  No acute events overnight. No seizures noted.  Pain, which is unchanged from the past several days.  She states that she fell, however this fall occurred several days ago and did not result in any significant injuries.    Objective:    Vitals: /55  Pulse 76  Temp 98.3  F (36.8  C) (Oral)  Resp 16  Wt 71.3 kg (157 lb 3.2 oz)  SpO2 98%  BMI 29.7 kg/m2    Exam: Ms Helms is lying comfortably in bed.  She is awake and alert.  She cooperates with examination for the most part, however is chronically able to follow simple commands only.  Her pupils are symmetric and reactive to light, eye movements are full.  There is no facial asymmetry.  Her voice is mildly dysarthric at baseline.  All 4 limbs move spontaneously, purposefully, and antigravity.  Sensation, coordination, and gait not assessed today.  Her heart rhythm is regular, pulses are full, no murmurs are heard.  Breath sounds are clear.  Bowel sounds are present in all quadrants.  There are multiple bandages on her limbs from minor injuries.  No focal swelling, no rashes, no bruising.    Assessment and Plan:   Nella Helms is a 42 year old female with a history of developmental delay and intractable epilepsy admitted 2/15/2018 for discontinuation of valproate and felbamate due to thrombocytopenia with suspected ITP. Thrombocytopenia now resolved; currently on steroid taper. Now on levetiracetam, lacosamide, and oxcarbazepine; seizure free for >96 hours.    #Intractable epilepsy:  Valproic acid stopped prior to admission, felbamate stopped shortly after admission and replaced with levetiracetam and lacosamide.  Seizure-free for the past several days.  - lacosamide 200mg BID  - levetiracetam 1000mg BID  - oxcarbazepine 600mg BID  - lamotrigine 100mg TID  - Follow up in epilepsy clinic  - Seizure  precautions    #Thrombocytopenia: resolved.  Hematology consulted with working diagnosis of ITP.  - Prednisone 40 mg daily then prolonged taper as prescribed by heme/onc:   40 mg daily for 3 days, 20 mg daily for 3 days (starting 2/26), and 10 mg daily for 2 weeks, then stop.  - Needs weekly CBC outpatient, can be done by home health RN  - Next CBC Monday 2/26  - Hematology signed off  - Transfuse if platelets <10,000 or <25,000 with evidence of hemorrhage    FEN: regular diet, no mIVF, lytes WNL  Prophylaxis: SCD, enoxaparin  Code Status: FULL CODE    Dispo: Medically ready for discharge. PT has evaluated her and she is at ambulatory baseline. Anticipate d/c Monday after reevaluation by group home employees.    This patient was seen and discussed with Dr. Pedersen, attending.    Note written with the help of Nuha Rocha, MS3.    Abraham Cummings,   PGY3 Neurology    Patient seen and examined by me today February 25, 2018  I agree with Dr. Cummings's note from today February 25, 2018  Juanito willingham md  February 25, 2018

## 2018-02-25 NOTE — PLAN OF CARE
Pt with a  history of developmental delay and intractable epilepsy admitted 2/15/2018 for discontinuation of valproate and felbamate due to thrombocytopenia. VSS. Neuros at baseline. Dysconjugate gaze, follows simple commands, repetitive statements, and uses a loud tone. Pt c/o of pain in feet and neck. Tylenol given x2. Pt needs assistance with feeding. Up to bathroom with assistance of one and a GB.BM x1 in AM. Voiding spont. Pt is able to make most of her needs known .  Anticipate d/c Monday after reevaluation by group home employees.Cont POC.

## 2018-02-25 NOTE — PLAN OF CARE
Problem: Patient Care Overview  Goal: Plan of Care/Patient Progress Review  PT 6A: Cx, per conversation with RN pt not appropriate for PT, just recently returned to bed and asleep.

## 2018-02-26 VITALS
HEART RATE: 72 BPM | SYSTOLIC BLOOD PRESSURE: 105 MMHG | OXYGEN SATURATION: 98 % | DIASTOLIC BLOOD PRESSURE: 70 MMHG | TEMPERATURE: 96.9 F | WEIGHT: 157.2 LBS | RESPIRATION RATE: 18 BRPM | BODY MASS INDEX: 29.7 KG/M2

## 2018-02-26 LAB
ERYTHROCYTE [DISTWIDTH] IN BLOOD BY AUTOMATED COUNT: 16 % (ref 10–15)
HCT VFR BLD AUTO: 35.7 % (ref 35–47)
HGB BLD-MCNC: 11.4 G/DL (ref 11.7–15.7)
MCH RBC QN AUTO: 33.6 PG (ref 26.5–33)
MCHC RBC AUTO-ENTMCNC: 31.9 G/DL (ref 31.5–36.5)
MCV RBC AUTO: 105 FL (ref 78–100)
PLATELET # BLD AUTO: 394 10E9/L (ref 150–450)
RBC # BLD AUTO: 3.39 10E12/L (ref 3.8–5.2)
WBC # BLD AUTO: 4.9 10E9/L (ref 4–11)

## 2018-02-26 PROCEDURE — 85027 COMPLETE CBC AUTOMATED: CPT | Performed by: STUDENT IN AN ORGANIZED HEALTH CARE EDUCATION/TRAINING PROGRAM

## 2018-02-26 PROCEDURE — 25000132 ZZH RX MED GY IP 250 OP 250 PS 637: Mod: GY | Performed by: STUDENT IN AN ORGANIZED HEALTH CARE EDUCATION/TRAINING PROGRAM

## 2018-02-26 PROCEDURE — 25000128 H RX IP 250 OP 636: Performed by: STUDENT IN AN ORGANIZED HEALTH CARE EDUCATION/TRAINING PROGRAM

## 2018-02-26 PROCEDURE — 36415 COLL VENOUS BLD VENIPUNCTURE: CPT | Performed by: STUDENT IN AN ORGANIZED HEALTH CARE EDUCATION/TRAINING PROGRAM

## 2018-02-26 PROCEDURE — A9270 NON-COVERED ITEM OR SERVICE: HCPCS | Mod: GY | Performed by: STUDENT IN AN ORGANIZED HEALTH CARE EDUCATION/TRAINING PROGRAM

## 2018-02-26 PROCEDURE — 25000125 ZZHC RX 250: Performed by: STUDENT IN AN ORGANIZED HEALTH CARE EDUCATION/TRAINING PROGRAM

## 2018-02-26 RX ORDER — LACOSAMIDE 200 MG/1
200 TABLET ORAL 2 TIMES DAILY
Qty: 60 TABLET | Refills: 5 | Status: SHIPPED | OUTPATIENT
Start: 2018-02-26 | End: 2018-04-17

## 2018-02-26 RX ORDER — PREDNISONE 10 MG/1
10 TABLET ORAL DAILY
Qty: 12 TABLET | Refills: 0 | Status: SHIPPED | OUTPATIENT
Start: 2018-02-27 | End: 2018-03-11

## 2018-02-26 RX ORDER — OXCARBAZEPINE 600 MG/1
600 TABLET, FILM COATED ORAL 2 TIMES DAILY
Qty: 60 TABLET | Refills: 11 | Status: SHIPPED | OUTPATIENT
Start: 2018-02-26 | End: 2019-03-29

## 2018-02-26 RX ORDER — PREDNISONE 5 MG/1
5 TABLET ORAL DAILY
Qty: 14 TABLET | Refills: 0 | Status: SHIPPED | OUTPATIENT
Start: 2018-03-12 | End: 2018-03-26

## 2018-02-26 RX ORDER — LEVETIRACETAM 1000 MG/1
1000 TABLET ORAL 2 TIMES DAILY
Qty: 60 TABLET | Refills: 11 | Status: ON HOLD | OUTPATIENT
Start: 2018-02-26 | End: 2018-09-26

## 2018-02-26 RX ADMIN — LAMOTRIGINE 100 MG: 100 TABLET ORAL at 10:43

## 2018-02-26 RX ADMIN — DOCUSATE SODIUM 100 MG: 100 CAPSULE, LIQUID FILLED ORAL at 07:46

## 2018-02-26 RX ADMIN — LACOSAMIDE 200 MG: 200 TABLET, FILM COATED ORAL at 07:46

## 2018-02-26 RX ADMIN — ACETAMINOPHEN 650 MG: 325 TABLET, FILM COATED ORAL at 07:46

## 2018-02-26 RX ADMIN — LEVETIRACETAM 1000 MG: 500 TABLET, FILM COATED ORAL at 07:46

## 2018-02-26 RX ADMIN — OXCARBAZEPINE 600 MG: 300 TABLET, FILM COATED ORAL at 07:46

## 2018-02-26 RX ADMIN — PANTOPRAZOLE SODIUM 40 MG: 40 TABLET, DELAYED RELEASE ORAL at 07:46

## 2018-02-26 RX ADMIN — ENOXAPARIN SODIUM 40 MG: 40 INJECTION SUBCUTANEOUS at 07:46

## 2018-02-26 RX ADMIN — PREDNISONE 10 MG: 10 TABLET ORAL at 07:46

## 2018-02-26 NOTE — PLAN OF CARE
Problem: Seizure Disorder/Epilepsy (Adult)  Intervention: Support/Optimize Psychosocial Response to Condition  Post Fall Assessment Note    S: Patient had a(n) :unwitnessed fall     B: Patient's orientation/mental status at time of fall: oriented to self only. Developmental delay at baseline   Medications administered within 8 hours of fall:    PCA/Opiates:  No    Hypnotics:  No    Psychotropics: No    Antihypertensives:  No    Sedatives:  No   Location of fall: In room    A: Type of injury from fall:  None   Patient status:    Patient was lifted from fall event: Yes   Interventions: Chair alarm on   MD notified: Yes   Family member/next of kin notified: Yes, brother Abdon contacted    R: Falls assessment completed in Epic:  Yes Care Plan updated:  Yes

## 2018-02-26 NOTE — PLAN OF CARE
"Problem: Seizure Disorder/Epilepsy (Adult)  Goal: Signs and Symptoms of Listed Potential Problems Will be Absent, Minimized or Managed (Seizure Disorder/Epilepsy)  Signs and symptoms of listed potential problems will be absent, minimized or managed by discharge/transition of care (reference Seizure Disorder/Epilepsy (Adult) CPG).   Outcome: Adequate for Discharge Date Met: 02/26/18  Pt admitted for seizures. No events witnessed or reported. VSS. Pt c/o neck pain, relieved w/ Tylenol. Neuros include: severe developmental delay at baseline, follows some commands, repetitive statements. Up w/ A1 and gait belt. Multiple skin abrasions present. Voiding spontaneously, Sm BM. Patient to discharge to group home today.   Pt fell at 1245, pt fell from wheelchair to knees, chair alarm was on and going off, pt stated  \"I'm trying to go home.\" see fall note. (MD Chin notified), Brother Abdon notified of fall. No injuries sustained from fall.  Discharge instructions given to group home staff at 1315. Patient transported to front door via wheel chair.      "

## 2018-02-26 NOTE — PLAN OF CARE
Problem: Patient Care Overview  Goal: Plan of Care/Patient Progress Review  Physical Therapy Discharge Summary    Reason for therapy discharge:    Discharged to group home    Progress towards therapy goal(s). See goals on Care Plan in [facility] electronic health record for goal details.  Goals met    Therapy recommendation(s):    No further therapy is recommended.

## 2018-02-26 NOTE — PLAN OF CARE
Problem: Patient Care Overview  Goal: Plan of Care/Patient Progress Review  Outcome: No Change  VS stable. A&Ox; unable to assess due to not following commands.. Pt was able to follow some commands such as squeeze my fingers however was unable to participate in neuro assessments. Complained of pain in the head, back, and legs, administered tylenol and feel sleep after. No  Incision present. Has a few bandages on her legs and face. Patient has bedside side commode to Void. No BS checks. No PIV present. Up with assist of 2. Patient jerks forward when placed on a commode. Need someone to be present when urinating. Regular diet. Takes pills with pudding. If behavioral is an issue, calling her brother and talking to her calms her down.Cont to monitor and with POC.

## 2018-02-27 ENCOUNTER — CARE COORDINATION (OUTPATIENT)
Dept: CARE COORDINATION | Facility: CLINIC | Age: 43
End: 2018-02-27

## 2018-02-27 NOTE — DISCHARGE SUMMARY
[facility]  NEUROLOGY DISCHARGE SUMMARY    Patient Name:  Nella Helms  MRN:  [id number]      :  1975      Date of Admission:  2/15/2018  Date of Discharge:  2018  Admitting Physician:  Courtney Matthews MD  Discharge Physician:  Mark Washington MD  Primary Care Provider:   Monet Rodrigues  Discharge Disposition:   Discharged to group home    Admission Diagnoses:  low platelets  Thrombocytopenia (H)    Discharge Diagnoses:    Thrombocytopenia  Seizure disorder    Brief History of Illness:   42 year old female h/o severe epilepsy and developmental delay who presents for severe unexplained thrombocytopenia. The patient has been noted to be declining by her brother in terms of gait, coordination, and cognitive function over the past few years but much more noticeably in the last couple months. She has otherwise been in her usual state of health. The patient was incidentally noted to have dropping platelets at a routine visit 18 to her epileptologist (Dr. Gomez) when platelets dropped from 158->55. Her primary epileptologist then stopped her VPA . On , the patient had a sz that resolved with prn diazepam. Plts then noted to be only 35. The patient has been admitted for further work-up and video-EEG monitoring while AEDs are adjusted.    For further information about admission please see H&P dated 2018    Hospital Course:  The patient was admitted for EEG monitoring well antiepileptic drugs were changed due to thrombocytopenia.  Valproic acid had been discontinued prior to admission.  Felbamate was discontinued at admission.  Keppra and lacosamide were initiated in their place.  Lamotrigine was continued at prior to admission dose and oxcarbazepine was increased to 600 mg BID from 450 BID.  Hematology/Oncology was consulted for evaluation of acute thrombocytopenia.  At their recommendation prednisone was started at 60 mg daily and  platelet counts were seen to immediately increase.  For this reason it was thought that neither valproic acid nor felbamate were the offenders causing the thrombocytopenia, rather that ITP was the etiology.  After platelets normalized prednisone was tapered to 40 mg daily for 3 days followed by 20 mg daily for 3 days followed by 10 mg daily for 2 weeks followed by 5 mg daily for 2 weeks then discontinuation.  The patient is instructed to have weekly CBCs drawn and she only needs to be seen in hematology clinic as an outpatient if her platelets drop below 50,000.    With regard to seizures, patient has consistent documentation in past years of around 200 seizures yearly.  While recording with EEG monitoring, several subclinical seizures were observed early in the admission followed by a significant decrease, followed by 0 for greater than 24 hours.  The patient did have one clinical event while EEG leads were not connected for which she received as needed Ativan on 2/21.  After this event no clinical seizures were observed.  The patient had multiple falls prior to admission and she was evaluated by physical therapy and progressed to her ambulatory baseline prior to discharge back to her group home.    Pertinent Investigations:  EEG  Day 1  IMPRESSION:  This is an abnormal video EEG due to the presence of rare epileptiform discharges over the right frontotemporal head region as well as frontally predominant generalized epileptiform discharges during sleep.  There was also diffuse theta delta slowing during waking consistent with a mild to moderate diffuse nonspecific encephalopathy.  No electrographic or clinical seizures were recorded during this video EEG monitoring.   Day 2  IMPRESSION:  This is an abnormal video EEG due to the presence of mild to moderate diffuse nonspecific encephalopathy.  There were also occasional epileptiform discharges over the right frontotemporal head region during sleep.  Generalized  epileptiform discharges were seen during waking and they significantly increased during sleep.  Two nonlocalizing tonic seizures were captured out of sleep with tonic upper body and left arm posturing and elevation.  EEG was not clearly localizing or lateralizing.  Clinical correlation advised.   Day 3  IMPRESSION:  This is an abnormal video EEG due to the presence of mild to moderate diffuse nonspecific encephalopathy.  There were epileptiform discharges over the right frontotemporal head region during sleep as well as frequent generalized epileptogenic discharges.  Twelve complex partial seizures of nonlocalizing, nonlateralizing EEG onset were captured, though clinically there was some suggestion of right hemispheric onset.  One seizure got  secondary generalized.  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   Day 4  IMPRESSION:  Abnormal.  There is evidence of mild diffuse encephalopathy.  Occasional generalized spikes are seen which rarely recur at a slow rate.  These findings suggest a symptomatic generalized epilepsy.  Right temporal sharp waves are also noted suggesting focal epilepsy predisposition.  Thus, overall, epileptiform abnormalities are consistent with symptomatic generalized/symptomatic focal epilepsy.  Overt seizures were not seen in this study, which is much improved compared to the first 2 days of recording. However, there were runs of diffuse paroxysmal appearing alpha that may have been a variation of generalized paroxysmal fast activity often seen in patients with symptomatic generalized epilepsy.  Day 5  IMPRESSION:  Abnormal.  There is evidence of a mild diffuse encephalopathy.  Slow spike waves seen consistent with symptomatic generalized epilepsy.  Right temporal sharp waves were also seen indicating a focal seizure tendency.       A cluster of brief tonic seizures was recorded starting around 17:40. EEG indicated a  generalized onset.  During one of the seizures, prolonged rocking following the seizure lasted for about 30 seconds, suggesting that that particular tonic seizure had evolved into an atypical absence.  Convulsive seizures were not seen.       Overall, study continues consistent with the syndromic diagnosis of symptomatic generalized/symptomatic focal epilepsy.  The recorded seizures all appear to be generalized.   Day 6  IMPRESSION:  Abnormal, consistent with mild diffuse encephalopathy.  Occasional generalized epileptiform discharges are seen consistent with generalized epilepsy and consistent with the previous diagnosis of symptomatic generalized epilepsy.  No seizures recorded in this session.       SUMMARY OF 6 DAYS OF VIDEO ELECTROENCEPHALOGRAM MONITORING:  Mild diffuse encephalopathy noted throughout, at times mild to moderate.  Generalized spikes and spike wave discharges were noted.  Infrequently, these would recur at a rate of 2 Hz for less.  Occasional right temporal sharp waves.  Occasional runs of bifrontal alpha.  These were thought to possibly represent a variant of generalized paroxysmal fast activity.  However, video during these did not show clear seizure discharge.       The patient experienced 12 seizures on the second day of evaluation, 2 seizures on third day of evaluation and a cluster of 5 seizures occurring over about 5 minutes on day 5 of the evaluation.  Seizures were clinically and electrographically stereotyped.  Seizures consisted of elevation of the right arm with head turning to the left and bilateral stiffening.  After this, some rocking behavior was noted.  The seizures on day 2 were longer, lasting for up to 60 seconds.  This occurred in the context of significant antiseizure medication changes.  The seizures on day 5 were briefer with the clinical behavior typically lasting less than 5 seconds and  In one case lasting 15 seconds.  Ictal EEG showed diffuse polyspike at onset  followed by rhythmic delta.  Sometimes an alpha discharge was also seen.  Overall features suggested that these were tonic seizures of generalized onset or perhaps frontal lobe seizures of indeterminate site of onset.       Multiple seizures were recorded during the acute medication changes necessitated by the development of significant thrombocytopenia.  These included discontinuation of Felbatol and the substitution of levetiracetam and lacosamide.  Seizures were longer and more frequent during the period of acute medication change and were briefer later on in the session when medications were more stable.  Both of the interictal and the ictal features were consistent with a symptomatic generalized/symptomatic multifocal epilepsy.    CBC on day of discharge  Recent Labs   Lab Test  02/26/18   0830   WBC  4.9   RBC  3.39*   HGB  11.4*   HCT  35.7   MCV  105*   MCH  33.6*   MCHC  31.9   RDW  16.0*   PLT  394     Consultations:    Hematology/Oncology    Recommendations and Follow-up:  Weekly CBC- f/u with PCP  Home Care referral placed for above  Hematology: only if platelets drop below 50k  Neurology- Dr. Gomez (Epilepsy): 1-2 months    Discharge physical examination:   /70  Pulse 72  Temp 96.9  F (36.1  C) (Axillary)  Resp 18  Wt 71.3 kg (157 lb 3.2 oz)  SpO2 98%  BMI 29.7 kg/m2  - Mental Status: alert and oriented to person, place; language fluent but simple, follows some commands, perseverative  - Cranial Nerves: pupils symmetric, R eye movement intact, L eye ptosis; no facial asymmetry; tongue protrusion midline  - Sensation intact to light touch in upper and lower extremities bilaterally  - Motor Exam: tone and bulk symmetric; symmetric movement anti-gravity, strong  strength  - Reflexes: DTR 2+ and symmetric throughout  - Coordination: not assessed  - Gait: not assessed  Constitutional: NAD, cooperative with examination and interview  Cardiovascular: Regular rate and rhythm without  murmurs  Respiratory: clear to auscultation without wheezes or rales; no accessory muscle use  Abdominal: soft and nontender without organomegaly; BS normal  Extremities: no clubbing, no edema  Skin: No lesions or rashes    Discharge Medications:  Discharge Medication List as of 2/26/2018  1:00 PM      START taking these medications    Details   lacosamide (VIMPAT) 200 MG TABS tablet Take 1 tablet (200 mg) by mouth 2 times daily, Disp-60 tablet, R-5, Local Print      levETIRAcetam 1000 MG TABS Take 1,000 mg by mouth 2 times daily, Disp-60 tablet, R-11, E-Prescribe      !! predniSONE (DELTASONE) 10 MG tablet Take 1 tablet (10 mg) by mouth daily for 12 days, Disp-12 tablet, R-0, E-Prescribe      !! predniSONE (DELTASONE) 5 MG tablet Take 1 tablet (5 mg) by mouth daily for 14 days, Disp-14 tablet, R-0, E-Prescribe       !! - Potential duplicate medications found. Please discuss with provider.      CONTINUE these medications which have CHANGED    Details   OXcarbazepine (TRILEPTAL) 600 MG tablet Take 1 tablet (600 mg) by mouth 2 times daily, Disp-60 tablet, R-11, E-Prescribe         CONTINUE these medications which have NOT CHANGED    Details   LAMICTAL 100 MG tablet TAKE (1) TABLET THREE TIMES DAILY., Disp-180 tablet, R-3, MIKAYLA, E-Prescribe      docusate sodium (COLACE) 100 MG capsule Take by mouth 2 times daily, Historical      Multiple Vitamin (MULTIVITAMINS PO) Take by mouth daily, Historical      risperiDONE (RISPERDAL) 2 MG tablet 4 mg bid, 2 mg hs, Historical      diazepam (DIAZEPAM INTENSOL) 5 MG/ML (HIGH CONC) solution (1)ML (5MG) AS NEEDED FOR ANY SEIZURE. WHEN ADMINISTERED NOTIFY PC, RN & PD., Disp-120 mL, R-3, Local Print      Tetrahydrozoline-Zn Sulfate (EYE DROPS AR OP) Apply 2 drops to eye daily as needed, Historical      traZODone (DESYREL) 100 MG tablet 1 tablet 8am. 2 tablets noon, 2 tablets 4pm, 2 tablets 8pm, Historical      VITAMIN E 2 times daily, Historical         STOP taking these medications        FELBATOL 400 MG tablet Comments:   Reason for Stopping:             Discharge follow up and instructions:    CBC with platelets   Last Lab Result: Hemoglobin (g/dL)      Date                     Value                02/26/2018               11.4 (L)         ----------     Home Care Referral     Reason for your hospital stay   You were hospitalized for low platelets and a need to change your seizure medications.     Adult [facility] Follow-up and recommended labs and tests   Follow up with primary care provider, Monet Rodrigues MD, within 7 days for hospital follow- up.  The following labs/tests are recommended: weekly CBC.      Follow up with Dr. Gomez , at (location with clinic name or city) the [facility], within 1-2 months to follow up medication changes.    Appointments on [place] and/or [facility] (with [facility] or [facility] provider or service). Call [phone] if you haven't heard regarding these appointments within 7 days of discharge.     Activity   Your activity upon discharge: activity as tolerated     Full Code     Diet   Follow this diet upon discharge: Orders Placed This Encounter     Combination Diet Regular Diet Adult     Patient seen and discussed with the attending, Dr. Washington.    Megan Chin MD  Neurology PGY-2  [phone]    Attending physician: I saw and evaluated the patient on the day of discharge and I agree with the findings and plan of care as per Dr. Chin above.    Briefly, the patient is a 42 year old woman with intractable epilepsy and global developmental delay admitted for taper of felbamate and addition of levetiracetam and lacosamide, as above, due to thrombocytopenia. We suspect at this time that thrombocytopenia was due to ITP as this problem resolved with use of steroids. Plan for follow up is as above.    Mark Washington MD   of Neurology

## 2018-02-28 NOTE — PROGRESS NOTES
Patient was called three times and no answer so post 24 hr DC follow up calls will be closed out

## 2018-03-02 NOTE — PROCEDURES
Procedure Date: 2018      REVISED REPORT      24-HOUR VIDEO EEG         EEG #-3 (This is day 3 of a 24-hour video EEG.)         DATE OF RECORDIN2018.         SOURCE FILE DURATION:  23 hours 31 minutes 55 seconds.        CLINICAL SUMMARY:  The patient is a 42-year-old, right-handed female with history of intractable epilepsy.  The patient presented after being noted to be declining in terms of gait, coordination, and cognitive function.  EEG was performed to evaluate for seizures.        MEDICATIONS:  Lacosamide, lamotrigine, Trileptal, levetiracetam and p.r.n. Ativan.         TECHNICAL SUMMARY: This continuous video- EEG monitoring procedure was performed with 23 scalp electrodes in 10-20 electrode system placement, and additional scalp, precordial and other surface electrodes used for electrical referencing and artifact detection.  Video monitoring was utilized and periodically reviewed by EEG technologists and the physician for electroclinical correlations.        INTERICTAL ACTIVITY:  There was occasional nonsustained 8 to 9 Hz alpha activity over the posterior head regions which was symmetric.  Diffuse theta and less frequently delta slowing was present during waking.  Stage II sleep was recorded and manifested by vertex waves and symmetric sleep spindles.  During sleep, there was activation of rare spike and sharp waves over the right frontotemporal head region at F8/T4.  There were also frontally predominant generalized slow spike and wave and sharp and slow wave discharges at 1.5 to 2.5 Hz, which increased significantly during sleep.         CLINICAL/ICTAL AND ICTAL EVENTS:  The patient had 12 complex partial seizures which one got secondary generalized.  These occurred at 2:37:37, 4:32:15, 6:13:42,  6:43:40, 12:05:28, 12:46:16, 13:59:41, 14:18:39, 14:35, 14:54:05, 15:03:58, and 15:17:34.  The seizures typically lasted 15-30 seconds and rarely up to 1 minute.  During these, the patient  "would typically have upper body stiffening and leaning forward with elevation and posturing of the left arm and then irregular bilateral arm movements with some grabbing with the right hand and staring.  The patient was tested by the nurse during the seizure at 12:46 and she was not able to follow commands and not naming objects.   Electrographically, these started with a generalized attenuation of the background followed by diffuse fast alpha activity which evolved into diffuse rhythmic delta activity.  This ictal activity was diffuse but more pronounced in the parasagittal regions.  The seizure at 4:32:15 got secondary generalized.  It occurred out of sleep.  Electrographically, there was generalized attenuation of the background for 1 second followed by diffuse rhythmic alpha activity followed by rhythmic delta activity in diffuse distribution which evolved into diffuse rhythmic theta activity which evolved to generalized spike and wave discharges.  The seizure lasted for 2 minutes.  Clinically, the seizures started similar to complex partial seizures with upper body stiffening and leaning forward and posturing and elevation of the left arm followed by rightward cephalic version, whole body stiffening and clonic movements.  The last 3 seizures were slightly different.  The patient had right arm elevation and irregular wringing hand movements and then slight bilateral leg twitches.  They started with generalized 1 second attenuation of the background followed by diffuse rhythmic theta activity.  They lasted only 10-15 seconds.        The patient also had 2 events.  At 18:47 the patient was off camera, she cries out, said \"I need help\".  Per staff, the patient was getting out of bed.  There were no changes in the EEG background and no ictal activity during this event.  The next event was at 19:20.  The patient tried to get out of the bed. The patient fell into bed and yelled.   There was no ictal activity during " this event.         IMPRESSION:  This is an abnormal video EEG due to the presence of mild to moderate diffuse nonspecific encephalopathy.  There were epileptiform discharges over the right frontotemporal head region during sleep as well as frequent generalized epileptogenic discharges.  Twelve complex partial seizures of nonlocalizing, nonlateralizing EEG onset were captured, though clinically there was some suggestion of right hemispheric onset.  One seizure got  secondary generalized.  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         Revised Account:  2018, buddy SARABIA MD             D: 2018   T: 2018   MT: MD      Name:     JOHANNA SIMONS   MRN:      [id number]        Account:        [id number]   :      1975           Procedure Date: 2018      Document: [id number]

## 2018-03-02 NOTE — PROCEDURES
Procedure Date: 2018      REVISED REPORT      EEG #:  -2         DAY 2 of 24-HOUR VIDEO EEG      SOURCE FILE DURATION: 23:53:18       DATE OF RECORDIN2018.        CLINICAL SUMMARY:  The patient is a 42-year-old, right-handed female with developmental delay and intractable epilepsy.  The patient presented for increased seizures after discontinuation of Depakote due to thrombocytopenia.  EEG was performed to evaluate for seizures.         MEDICATIONS:  Lacosamide, lamotrigine, Trileptal, levetiracetam and p.r.n. Ativan.         TECHNICAL SUMMARY: This continuous video- EEG monitoring procedure was performed with 23 scalp electrodes in 10-20 electrode system placement, and additional scalp, precordial and other surface electrodes used for electrical referencing and artifact detection.  Video monitoring was utilized and periodically reviewed by EEG technologists and the physician for electroclinical correlations.        INTERICTAL ACTIVITY:  There was 8-9 Hz alpha activity over the posterior head regions during waking, which was symmetric and attenuated by eye opening.  Diffuse theta greater and delta slowing was seen during waking.  During drowsiness, there was attenuation of posterior dominant rhythm and roving eye movements.  Stage II sleep was manifested as vertex waves and symmetric sleep spindles.  There was sleep activation of occasional right frontotemporal spike and sharp waves at F8, T4.  Frontally predominant generalized slow spike wave and sharp and slow wave discharges were present during waking, which significantly increased during sleep.  These were seen at rate of 1.5-2.5 Hz.  During waking, these were seen rarely and only as an isolated discharge, however, during sleep, they were frequent and were seen in runs of generalized discharges lasting up to 5 seconds.         CLINICAL/ICTAL EVENTS:  The patient had 2 seizures on this date of monitoring.  First seizure was out of sleep.   First clinical symptom was at 21:11:53.  The patient was sleeping on her back, she woke up and had elevation and posturing of her left arm.  She leaned forward and upper body got stiff.  Then, she put her hands together and rocked back and forth and then clapped a few times.  Her face was looking down and was not clearly visible.  Then, the patient sat still for a few seconds towards the end of the seizure.  After the seizure, she laid back again.  The seizure ended at approximately 21:13:05.  Electrographically, initially, the patient was in stage II sleep.  At 21:11:49, a right frontotemporal sharp wave was seen which repeated in 2 seconds.  Then there was generalized approximately 10-11 Hz alpha activity at 21:11:51 which lasted 1 second.  Upon awakening of the patient, there was generalized attenuation of background lasting approximately 5 seconds, then there was generalized 3 Hz delta activity which evolved to rhythmic 5-6 Hz theta activity which was seen diffusely.  Then it changed to 2-4 Hz delta activity, and it ended at approximately 21:13:05.         The patient had another seizure out of sleep.  First clinical manifestation was at 21:50:53.  The patient woke up with grunting and upper body tensing up with posturing and elevation of the left arm and then holding hands together.  Seizure ended at approximately 21:51:13.  Electrographically, initially, the patient was in stage II sleep; then there was an arousal with generalized 9-10 Hz alpha activity followed by generalized attenuation of the background lasting approximately 4 seconds followed by rhythmic generalized 3 Hz delta activity seen maximally over anterior electrodes.  Seizure ended at approximately 21:51:13.         IMPRESSION:  This is an abnormal video EEG due to the presence of mild to moderate diffuse nonspecific encephalopathy.  There were also occasional epileptiform discharges over the right frontotemporal head region during sleep.   Generalized epileptiform discharges were seen during waking which increased significantly during sleep.  Two nonlocalizing tonic seizures were captured out of sleep with tonic upper body and left arm posturing and elevation.  EEG was not clearly localizing or lateralizing.  Clinical correlation advised.        Revised Account:  2018, buddy SARABIA MD             D: 2018   T: 2018   MT: DT      Name:     JOHANNA SIMONS   MRN:      [id number]        Account:        [id number]   :      1975           Procedure Date: 2018      Document: [id number]

## 2018-03-08 ENCOUNTER — CARE COORDINATION (OUTPATIENT)
Dept: NEUROLOGY | Facility: CLINIC | Age: 43
End: 2018-03-08

## 2018-03-08 NOTE — PROGRESS NOTES
Octavio Florian Angela, RN; Bebe Sánchez RN Hi guys. Dr Chin ordered CBC w platelets and pt is going to [facility] to have that drawn. Can you please fax those orders to [phone].     Thanks!   Octavio         3/8/18: orders faxed to above number.

## 2018-03-21 ENCOUNTER — TRANSFERRED RECORDS (OUTPATIENT)
Dept: HEALTH INFORMATION MANAGEMENT | Facility: CLINIC | Age: 43
End: 2018-03-21

## 2018-03-23 ENCOUNTER — TELEPHONE (OUTPATIENT)
Dept: NEUROLOGY | Facility: CLINIC | Age: 43
End: 2018-03-23

## 2018-03-23 NOTE — TELEPHONE ENCOUNTER
Prior Authorization Retail Medication Request    Medication/Dose: Lamictal 100mg  ICD code (if different than what is on RX):    Previously Tried and Failed:  See chart  Rationale:      Insurance Name:  [employer]  Insurance ID:        Pharmacy Information (if different than what is on RX)  [facility]  [facility] - [place], [place] - [address] [place] [address]    Phone:

## 2018-03-26 NOTE — TELEPHONE ENCOUNTER
Prior Authorization Approval    Authorization Effective Date: 1/1/2018  Authorization Expiration Date: 3/26/2019  Medication: LAMICTAL 100 MG tablet-APPROVED  Approved Dose/Quantity:  Reference #: [id number]   Insurance Company: [employer] Part D - Phone [phone] Fax [phone]  Expected CoPay: refill too soon     CoPay Card Available:      Foundation Assistance Needed:    Which Pharmacy is filling the prescription (Not needed for infusion/clinic administered): [facility] [address]  Pharmacy Notified: Yes  Patient Notified: No    Per the pharmacist, patient just filled a couple days ago. Refill too soon.

## 2018-03-26 NOTE — TELEPHONE ENCOUNTER
Central Prior Authorization Team   Phone: [phone]      PA Initiation    Medication: LAMICTAL 100 MG tablet-PA Initiated  Insurance Company: [employer] Part D - Phone [phone] Fax [phone]  Pharmacy Filling the Rx: [facility] [place], [place] - [address] [place] [address]  Filling Pharmacy Phone: [phone]  Filling Pharmacy Fax: [phone]  Start Date: 3/26/2018

## 2018-03-27 ENCOUNTER — TRANSFERRED RECORDS (OUTPATIENT)
Dept: HEALTH INFORMATION MANAGEMENT | Facility: CLINIC | Age: 43
End: 2018-03-27

## 2018-04-17 ENCOUNTER — TRANSFERRED RECORDS (OUTPATIENT)
Dept: HEALTH INFORMATION MANAGEMENT | Facility: CLINIC | Age: 43
End: 2018-04-17

## 2018-04-17 ENCOUNTER — OFFICE VISIT (OUTPATIENT)
Dept: NEUROLOGY | Facility: CLINIC | Age: 43
End: 2018-04-17
Payer: MEDICARE

## 2018-04-17 VITALS
HEART RATE: 92 BPM | RESPIRATION RATE: 16 BRPM | DIASTOLIC BLOOD PRESSURE: 91 MMHG | SYSTOLIC BLOOD PRESSURE: 137 MMHG | WEIGHT: 157 LBS | BODY MASS INDEX: 29.66 KG/M2 | TEMPERATURE: 98 F

## 2018-04-17 DIAGNOSIS — G40.319 GENERALIZED CONVULSIVE EPILEPSY WITH INTRACTABLE EPILEPSY (H): Chronic | ICD-10-CM

## 2018-04-17 DIAGNOSIS — G40.319: ICD-10-CM

## 2018-04-17 RX ORDER — LACOSAMIDE 200 MG/1
200 TABLET ORAL 2 TIMES DAILY
Qty: 60 TABLET | Refills: 5 | Status: SHIPPED | OUTPATIENT
Start: 2018-04-17 | End: 2018-11-02

## 2018-04-17 RX ORDER — LACOSAMIDE 200 MG/1
200 TABLET ORAL
COMMUNITY
Start: 2018-02-26 | End: 2018-04-17

## 2018-04-17 ASSESSMENT — PAIN SCALES - GENERAL: PAINLEVEL: NO PAIN (0)

## 2018-04-17 NOTE — MR AVS SNAPSHOT
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      Who to contact     Please call your clinic at [phone] to:    Ask questions about your health    Make or cancel appointments    Discuss your medicines    Learn about your test results    Speak to your doctor            Additional Information About Your Visit        MyChart Information     Colizer is an electronic gateway that provides easy, online access to your medical records. With Colizer, you can request a clinic appointment, read your test results, renew a prescription or communicate with your care team.     To sign up for [employer] visit the website at [email or web address]   You will be asked to enter the access code listed below, as well as some personal information. Please follow the directions to create your username and password.     Your access code is: [email or web address]  Expires: 5/10/2018  5:21 PM     Your access code will  in 90 days. If you need help or a new code, please contact your [facility] or call [phone] for assistance.        Care EveryWhere ID     This is your Care EveryWhere ID. This could be used by other organizations to access your [place] medical records  [id number]        Your Vitals Were     Pulse Temperature Respirations BMI (Body Mass Index)          92 98  F (36.7  C) 16 29.66 kg/m2         Blood Pressure from Last 3 Encounters:   18 (!) 137/91   18 105/70    10/13/17 145/77    Weight from Last 3 Encounters:   04/17/18 157 lb (71.2 kg)   02/23/18 157 lb 3.2 oz (71.3 kg)   10/13/17 157 lb 12.8 oz (71.6 kg)              Today, you had the following     No orders found for display         Where to get your medicines      These medications were sent to [facility] - [address]  [address] [facility] [address]     Phone:  [phone]     LAMICTAL 100 MG tablet         Some of these will need a paper prescription and others can be bought over the counter.  Ask your nurse if you have questions.     Bring a paper prescription for each of these medications     lacosamide 200 MG Tabs tablet          Primary Care Provider Office Phone # Fax #    Monet Rodrigues [phone]       [facility] [address] [facility] [address]        Equal Access to Services     KATHRYN BROCK AH: Hadii arslan ribera hadasho Soomaali, waaxda luqadaha, qaybta kaalmada adeegyada, sahara sparks . So [facility] [phone].    ATENCIÓN: Si habla español, tiene a singh disposición servicios gratuitos de asistencia lingüística. Llame al [phone].    We comply with applicable federal civil rights laws and [place] laws. We do not discriminate on the basis of race, color, national origin, age, disability, sex, sexual orientation, or gender identity.            Thank you!     Thank you for choosing [facility]  for your care. Our goal is always to provide you with excellent care. Hearing back from our patients is one way we can continue to improve our services. Please take a few minutes to complete the written survey that you may receive in the mail after your visit with us. Thank you!             Your Updated Medication List - Protect others around you: Learn how to safely use, store and throw away your medicines at www.disposemymeds.org.          This list is accurate as of 4/17/18 11:59 PM.  Always  use your most recent med list.                   Brand Name Dispense Instructions for use Diagnosis    COLACE 100 MG capsule   Generic drug:  docusate sodium      Take by mouth 2 times daily        diazepam 5 MG/ML (HIGH CONC) solution    DIAZEPAM INTENSOL    120 mL    (1)ML (5MG) AS NEEDED FOR ANY SEIZURE. WHEN ADMINISTERED NOTIFY PC, RN & PD.    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 2 tablets noon, 2 tablets 4pm, 2 tablets 8pm        VITAMIN E      2 times daily

## 2018-04-17 NOTE — LETTER
2018       RE: Nella Helms  : 1975   MRN: [id number]      Dear Colleague,    Thank you for referring your patient, Nella Helms, to the [facility]. Please see a copy of my visit note below.    INTERVAL HX:  Since last visit had marked deterioration. Had a GTSz that did not respond to erecue meds. Taken to ER but sz over by then Has deteriorated in terms of neuro function. In wheelchair since Monday. Increasing tremors; decreased interactions and alertness. Less loud. Mom  a few weeks ago. Dad in hospital for cardiac surgery. Brother, who is guardian came today. MAJOR ISSUE Platelets dropped to 55 on 18 from 158 on 18 !! Other labs close to ok. Was admitted to [facility]. Felbamate and VPA D/Sarabjit. Hematology concerned that ASDs NOT cause. Nevertheless lacosamide substituted. No sz since discharge. But platelets dropping again; down to `135 from 225. Will need oncology evaluation.   .EPILEPSY HX REVIEWED 18:   I have been seeing her f since she was  age 19.  She was initially seen at [facility] when she was 15 years old.    First seizure occurred at 6 hours of age.  It was described as color turning dusky, turning head to the right, eyes fixed to the right and no response.  She continued to have intermittent seizure activity and subsequently was transferred to the [facility] where she was hospitalized for 2 weeks.  It is not clear what diagnostic evaluation was done but they decided to discharge her without seizure medications.  Seizures recurred at 9 months of age.  At that time they noticed twitching of her left arm and then twitching of the side of the face.  EEG initially showed hypsarrhythmic pattern.  She was treated with phenobarbital.  Then, she was treated with Tegretol and later Dilantin.  CT scan in  we do not have records.      Seizure types:   In  seizure types  were described as sitting, both arms jerk up, her head turns to the right, eyes go to the right and roll up.  These events last 2 minutes and at that time she was having 6 a month.      Second seizure type is described as her arms go up, legs stiffen and she moans.  She can be lowered to the floor and then her arms and legs shake with some erratic breathing and sweating profusely.  These occurred at that time, 3-4 per month.        Seizure type 3 was described as being alert.  Her arms fly out and her eyes blink.      Seizure type 4 described as stares.      She has had 1 episode of status epilepticus in .      EEG when she was 12 was read as abnormal record by virtue of diffuse theta and delta slowing greater on the right.      Parents report that Nella has always had a left hemiparesis, mostly affecting the left upper extremity with some involvement of her left lower extremity.  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  She has been diagnosed as legally blind as having cortical blindness.      BIRTH HISTORY:  Birth history was that she was the result of a full-term pregnancy that was complicated by maternal high blood pressure and use of Enduron.  Pregnancy ended in a 14 labored and vaginal delivery.  Birth weight 7 pounds 6 ounces.  Apgar scores of 4 at 1 minute and 8 at 5 minutes.      She has had global developmental delay.      Subsequent treatment at [facility] has been quite extensive and will not be reviewed completely here.  Last EEG was 2010 which showed mild to moderate nonspecific diffuse disturbance of cerebral activity and multifocal interictal activity with one complex partial seizure recorded.      In the last year they have described 204 seizures,all given diazepam.  OTHER ISSUES:        She has had behavioral issues throughout.      Bell's palsy in , resolved.      Most of her seizures that the staff is noticing now would be described as brief tonic seizures are brief tonic-clonic seizures.   They are more concerned with the length then the precise observation.  She might be having some minor seizures are not being reported.      CURRENT MEDICATIONS:  Felbatol and Lamictal have really been the best treatment for her.  She has been tried almost all the other seizure medications over time including Depakote, Tegretol, phenobarbital, Dilantin, etc.      SOCIAL HISTORY:  She is living in a group home.      REVIEW OF SYSTEMS:  Through the caregiver is essentially negative.  She has not had any upper respiratory infections lately.  Appetite is good.  She has no obvious headaches.   GI and  appear to be functioning normally.      PHYSICAL EXAMINATION:  Blood pressure (!) 137/91, pulse 92, temperature 98  F (36.7  C), resp. rate 16, weight 157 lb (71.2 kg), unknown if currently breastfeeding. Erythematous right face  She is accompanied in clinic today by her caregiver.  Nella alert, no longer in wheelchair  Her eyes are rolling.  She is microcephalic, has a small chin. Less communicative than in past.   Gait: walks unaided but moderate spastic gait.  Attention span is very short and limited and she does not really follow her discussion.      Coordination is moderately impaired.  However, she is difficult to examine because she does not follow commands.  On observation, she does have difficulty more with the left than the right upper extremity but limb tone is increased bilaterally.        ASSESSMENT: Marked improvemt. Complicated case; hospital notes, EEG and D/C summary reviewed.  At this time epilepsy is stable.  PLAN: Renew ASDs   lamictal 100 tid  Oxcarb 600 bid  Lev 1000 bid  RTC 9 mo    Again, thank you for allowing me to participate in the care of your patient.      Sincerely,    Elysia Gomez MD

## 2018-04-17 NOTE — PROGRESS NOTES
INTERVAL HX:  Since last visit had marked deterioration. Had a GTSz that did not respond to erecue meds. Taken to ER but sz over by then Has deteriorated in terms of neuro function. In wheelchair since Monday. Increasing tremors; decreased interactions and alertness. Less loud. Mom  a few weeks ago. Dad in hospital for cardiac surgery. Brother, who is guardian came today. MAJOR ISSUE Platelets dropped to 55 on 18 from 158 on 18 !! Other labs close to ok. Was admitted to [facility]. Felbamate and VPA D/Sarabjit. Hematology concerned that ASDs NOT cause. Nevertheless lacosamide substituted. No sz since discharge. But platelets dropping again; down to `135 from 225. Will need oncology evaluation.   .EPILEPSY HX REVIEWED 18:   I have been seeing her f since she was  age 19.  She was initially seen at [facility] when she was 15 years old.    First seizure occurred at 6 hours of age.  It was described as color turning dusky, turning head to the right, eyes fixed to the right and no response.  She continued to have intermittent seizure activity and subsequently was transferred to the [facility] where she was hospitalized for 2 weeks.  It is not clear what diagnostic evaluation was done but they decided to discharge her without seizure medications.  Seizures recurred at 9 months of age.  At that time they noticed twitching of her left arm and then twitching of the side of the face.  EEG initially showed hypsarrhythmic pattern.  She was treated with phenobarbital.  Then, she was treated with Tegretol and later Dilantin.  CT scan in  we do not have records.      Seizure types:   In  seizure types were described as sitting, both arms jerk up, her head turns to the right, eyes go to the right and roll up.  These events last 2 minutes and at that time she was having 6 a month.      Second seizure type is described as her arms go up, legs stiffen and she moans.  She  can be lowered to the floor and then her arms and legs shake with some erratic breathing and sweating profusely.  These occurred at that time, 3-4 per month.        Seizure type 3 was described as being alert.  Her arms fly out and her eyes blink.      Seizure type 4 described as stares.      She has had 1 episode of status epilepticus in .      EEG when she was 12 was read as abnormal record by virtue of diffuse theta and delta slowing greater on the right.      Parents report that Nella has always had a left hemiparesis, mostly affecting the left upper extremity with some involvement of her left lower extremity.  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  She has been diagnosed as legally blind as having cortical blindness.      BIRTH HISTORY:  Birth history was that she was the result of a full-term pregnancy that was complicated by maternal high blood pressure and use of Enduron.  Pregnancy ended in a 14 labored and vaginal delivery.  Birth weight 7 pounds 6 ounces.  Apgar scores of 4 at 1 minute and 8 at 5 minutes.      She has had global developmental delay.      Subsequent treatment at [facility] has been quite extensive and will not be reviewed completely here.  Last EEG was 2010 which showed mild to moderate nonspecific diffuse disturbance of cerebral activity and multifocal interictal activity with one complex partial seizure recorded.      In the last year they have described 204 seizures,all given diazepam.  OTHER ISSUES:        She has had behavioral issues throughout.      Bell's palsy in , resolved.      Most of her seizures that the staff is noticing now would be described as brief tonic seizures are brief tonic-clonic seizures.  They are more concerned with the length then the precise observation.  She might be having some minor seizures are not being reported.      CURRENT MEDICATIONS:  Felbatol and Lamictal have really been the best treatment for her.  She has been tried almost all the other  seizure medications over time including Depakote, Tegretol, phenobarbital, Dilantin, etc.      SOCIAL HISTORY:  She is living in a group home.      REVIEW OF SYSTEMS:  Through the caregiver is essentially negative.  She has not had any upper respiratory infections lately.  Appetite is good.  She has no obvious headaches.   GI and  appear to be functioning normally.      PHYSICAL EXAMINATION:  Blood pressure (!) 137/91, pulse 92, temperature 98  F (36.7  C), resp. rate 16, weight 157 lb (71.2 kg), unknown if currently breastfeeding. Erythematous right face  She is accompanied in clinic today by her caregiver.  Nella alert, no longer in wheelchair  Her eyes are rolling.  She is microcephalic, has a small chin. Less communicative than in past.   Gait: walks unaided but moderate spastic gait.  Attention span is very short and limited and she does not really follow her discussion.      Coordination is moderately impaired.  However, she is difficult to examine because she does not follow commands.  On observation, she does have difficulty more with the left than the right upper extremity but limb tone is increased bilaterally.        ASSESSMENT: Marked improvemt. Complicated case; hospital notes, EEG and D/C summary reviewed.  At this time epilepsy is stable.  PLAN: Renew ASDs   lamictal 100 tid  Oxcarb 600 bid  Lev 1000 bid  RTC 9 mo    .

## 2018-04-19 RX ORDER — LAMOTRIGINE 100 MG/1
TABLET ORAL
Qty: 180 TABLET | Refills: 3 | Status: ON HOLD | OUTPATIENT
Start: 2018-04-19 | End: 2018-06-20

## 2018-05-08 ENCOUNTER — TRANSFERRED RECORDS (OUTPATIENT)
Dept: HEALTH INFORMATION MANAGEMENT | Facility: CLINIC | Age: 43
End: 2018-05-08

## 2018-06-11 ENCOUNTER — HOSPITAL ENCOUNTER (INPATIENT)
Facility: CLINIC | Age: 43
LOS: 9 days | Discharge: NURSING, REHAB OR LONG-TERM CARE | DRG: 644 | End: 2018-06-20
Attending: HOSPITALIST | Admitting: HOSPITALIST
Payer: MEDICARE

## 2018-06-11 DIAGNOSIS — R45.1 AGITATION: ICD-10-CM

## 2018-06-11 DIAGNOSIS — M62.81 GENERALIZED MUSCLE WEAKNESS: Primary | ICD-10-CM

## 2018-06-11 DIAGNOSIS — G40.319 GENERALIZED CONVULSIVE EPILEPSY WITH INTRACTABLE EPILEPSY (H): Chronic | ICD-10-CM

## 2018-06-11 PROBLEM — T68.XXXA HYPOTHERMIA: Status: ACTIVE | Noted: 2018-06-11

## 2018-06-11 LAB
ALBUMIN SERPL-MCNC: 2.8 G/DL (ref 3.4–5)
ALP SERPL-CCNC: 130 U/L (ref 40–150)
ALT SERPL W P-5'-P-CCNC: 36 U/L (ref 0–50)
ANION GAP SERPL CALCULATED.3IONS-SCNC: 1 MMOL/L (ref 3–14)
APTT PPP: 33 SEC (ref 22–37)
AST SERPL W P-5'-P-CCNC: 14 U/L (ref 0–45)
BASOPHILS # BLD AUTO: 0 10E9/L (ref 0–0.2)
BASOPHILS NFR BLD AUTO: 0 %
BILIRUB SERPL-MCNC: 0.2 MG/DL (ref 0.2–1.3)
BUN SERPL-MCNC: 8 MG/DL (ref 7–30)
CALCIUM SERPL-MCNC: 8.6 MG/DL (ref 8.5–10.1)
CHLORIDE SERPL-SCNC: 97 MMOL/L (ref 94–109)
CO2 SERPL-SCNC: 30 MMOL/L (ref 20–32)
CORTIS SERPL-MCNC: 2.9 UG/DL (ref 4–22)
CREAT SERPL-MCNC: 0.35 MG/DL (ref 0.52–1.04)
DIFFERENTIAL METHOD BLD: ABNORMAL
EOSINOPHIL # BLD AUTO: 0 10E9/L (ref 0–0.7)
EOSINOPHIL NFR BLD AUTO: 1.4 %
ERYTHROCYTE [DISTWIDTH] IN BLOOD BY AUTOMATED COUNT: 13.1 % (ref 10–15)
GFR SERPL CREATININE-BSD FRML MDRD: >90 ML/MIN/1.7M2
GLUCOSE SERPL-MCNC: 78 MG/DL (ref 70–99)
HCT VFR BLD AUTO: 28.8 % (ref 35–47)
HGB BLD-MCNC: 9.8 G/DL (ref 11.7–15.7)
IMM GRANULOCYTES # BLD: 0 10E9/L (ref 0–0.4)
IMM GRANULOCYTES NFR BLD: 0 %
INR PPP: 0.94 (ref 0.86–1.14)
LYMPHOCYTES # BLD AUTO: 1.2 10E9/L (ref 0.8–5.3)
LYMPHOCYTES NFR BLD AUTO: 43.1 %
MCH RBC QN AUTO: 32.2 PG (ref 26.5–33)
MCHC RBC AUTO-ENTMCNC: 34 G/DL (ref 31.5–36.5)
MCV RBC AUTO: 95 FL (ref 78–100)
MONOCYTES # BLD AUTO: 0.2 10E9/L (ref 0–1.3)
MONOCYTES NFR BLD AUTO: 6.8 %
NEUTROPHILS # BLD AUTO: 1.4 10E9/L (ref 1.6–8.3)
NEUTROPHILS NFR BLD AUTO: 48.7 %
NRBC # BLD AUTO: 0 10*3/UL
NRBC BLD AUTO-RTO: 0 /100
PLATELET # BLD AUTO: 51 10E9/L (ref 150–450)
POTASSIUM SERPL-SCNC: 4.6 MMOL/L (ref 3.4–5.3)
PROT SERPL-MCNC: 5.7 G/DL (ref 6.8–8.8)
RBC # BLD AUTO: 3.04 10E12/L (ref 3.8–5.2)
SODIUM SERPL-SCNC: 128 MMOL/L (ref 133–144)
WBC # BLD AUTO: 2.8 10E9/L (ref 4–11)

## 2018-06-11 PROCEDURE — 36415 COLL VENOUS BLD VENIPUNCTURE: CPT | Performed by: INTERNAL MEDICINE

## 2018-06-11 PROCEDURE — 12000006 ZZH R&B IMCU INTERMEDIATE UMMC

## 2018-06-11 PROCEDURE — 85610 PROTHROMBIN TIME: CPT | Performed by: INTERNAL MEDICINE

## 2018-06-11 PROCEDURE — 85025 COMPLETE CBC W/AUTO DIFF WBC: CPT | Performed by: INTERNAL MEDICINE

## 2018-06-11 PROCEDURE — 99223 1ST HOSP IP/OBS HIGH 75: CPT | Mod: AI | Performed by: PEDIATRICS

## 2018-06-11 PROCEDURE — 80053 COMPREHEN METABOLIC PANEL: CPT | Performed by: INTERNAL MEDICINE

## 2018-06-11 PROCEDURE — 84145 PROCALCITONIN (PCT): CPT | Performed by: INTERNAL MEDICINE

## 2018-06-11 PROCEDURE — 85730 THROMBOPLASTIN TIME PARTIAL: CPT | Performed by: INTERNAL MEDICINE

## 2018-06-11 PROCEDURE — 82533 TOTAL CORTISOL: CPT | Performed by: INTERNAL MEDICINE

## 2018-06-11 RX ORDER — DOCUSATE SODIUM 100 MG/1
100 CAPSULE, LIQUID FILLED ORAL 2 TIMES DAILY
Status: DISCONTINUED | OUTPATIENT
Start: 2018-06-12 | End: 2018-06-13 | Stop reason: DRUGHIGH

## 2018-06-11 RX ORDER — TRAZODONE HYDROCHLORIDE 50 MG/1
50 TABLET, FILM COATED ORAL EVERY MORNING
Status: DISCONTINUED | OUTPATIENT
Start: 2018-06-12 | End: 2018-06-11

## 2018-06-11 RX ORDER — TRAZODONE HYDROCHLORIDE 50 MG/1
50 TABLET, FILM COATED ORAL AT BEDTIME
Status: DISCONTINUED | OUTPATIENT
Start: 2018-06-12 | End: 2018-06-11

## 2018-06-11 RX ORDER — LIDOCAINE 40 MG/G
CREAM TOPICAL
Status: DISCONTINUED | OUTPATIENT
Start: 2018-06-11 | End: 2018-06-20 | Stop reason: HOSPADM

## 2018-06-11 RX ORDER — LACOSAMIDE 200 MG/1
200 TABLET ORAL 2 TIMES DAILY
Status: DISCONTINUED | OUTPATIENT
Start: 2018-06-12 | End: 2018-06-20 | Stop reason: HOSPADM

## 2018-06-11 RX ORDER — LAMOTRIGINE 100 MG/1
100 TABLET ORAL 3 TIMES DAILY
Status: DISCONTINUED | OUTPATIENT
Start: 2018-06-12 | End: 2018-06-16

## 2018-06-11 RX ORDER — OXCARBAZEPINE 300 MG/1
600 TABLET, FILM COATED ORAL 2 TIMES DAILY
Status: DISCONTINUED | OUTPATIENT
Start: 2018-06-12 | End: 2018-06-20 | Stop reason: HOSPADM

## 2018-06-11 RX ORDER — NALOXONE HYDROCHLORIDE 0.4 MG/ML
.1-.4 INJECTION, SOLUTION INTRAMUSCULAR; INTRAVENOUS; SUBCUTANEOUS
Status: DISCONTINUED | OUTPATIENT
Start: 2018-06-11 | End: 2018-06-20 | Stop reason: HOSPADM

## 2018-06-11 RX ORDER — RISPERIDONE 3 MG/1
3 TABLET ORAL EVERY MORNING
Status: DISCONTINUED | OUTPATIENT
Start: 2018-06-12 | End: 2018-06-20 | Stop reason: HOSPADM

## 2018-06-11 RX ORDER — RISPERIDONE 4 MG/1
4 TABLET ORAL AT BEDTIME
Status: DISCONTINUED | OUTPATIENT
Start: 2018-06-12 | End: 2018-06-12

## 2018-06-11 RX ORDER — LEVETIRACETAM 500 MG/1
1000 TABLET ORAL 2 TIMES DAILY
Status: DISCONTINUED | OUTPATIENT
Start: 2018-06-12 | End: 2018-06-20 | Stop reason: HOSPADM

## 2018-06-11 NOTE — LETTER
Transition Communication Hand-off for Care Transitions to Next Level of Care Provider    Name: Nella Helms  : 1975  MRN #: [id number]  Primary Care Provider: Bryan Fritsch     [address]  [facility] [address]     Reason for Hospitalization:  Weakness  hypothermia  falls  Hypothermia  Admit Date/Time: 2018  8:07 PM  Discharge Date: 2018   Payor Source: Payor: MEDICARE / Plan: MEDICARE / Product Type: Medicare /          Reason for Communication Hand-off Referral: Fragility  Difficulty understanding plan of care  Multiple providers/specialties    Discharge Plan:  Discharge Needs Assessment:  Needs       Most Recent Value    Equipment Currently Used at Home shower chair, walker, rolling, grab bar    Transportation Available agency transportation        Follow-up plan:  Future Appointments  Date Time Provider Department Center   2018 2:30 PM Elysia Gomez MD [facility] Owned       Any outstanding tests or procedures:        Referrals     Future Labs/Procedures    Physical Therapy Referral     Comments:    *This therapy referral will be filtered to a centralized scheduling office at [facility] and the patient will receive a call to schedule an appointment at a [place] location most convenient for them. *     [facility] provides Physical Therapy evaluation and treatment and many specialty services across the [place] system.  If requesting a specialty program, please choose from the list below.    If you have not heard from the scheduling office within 2 business days, please call [phone] for all locations, with the exception of [place], please call [phone] and [facility], please call [phone]  Treatment: Evaluation & Treatment  Special Instructions/Modalities: strength  Special Programs: NONE    Please be aware that coverage of these services is subject to the terms and limitations of your  "health insurance plan.  Call member services at your health plan with any benefit or coverage questions.      **Note to Provider:  If you are referring outside of [place] for the therapy appointment, please list the name of the location in the \"special instructions\" above, print the referral and give to the patient to schedule the appointment.          Dilia Acharya    AVS/Discharge Summary is the source of truth; this is a helpful guide for improved communication of patient story          "

## 2018-06-11 NOTE — IP AVS SNAPSHOT
"    [address] [facility]: [phone]                                              INTERAGENCY TRANSFER FORM - PHYSICIAN ORDERS   2018                    Hospital Admission Date: 2018  JOHANNA SIMONS   : 1975  Sex: Female        Attending Provider: Candice Liu MD     Allergies:  Cefzil [Cefprozil], Codeine Phosphate, Dilantin [Phenytoin], Penicillins    Infection:  None   Service:  INTERNAL MED    Ht:  1.549 m (5' 1\")   Wt:  71.6 kg (157 lb 14.4 oz)   Admission Wt:  79.2 kg (174 lb 9.7 oz)    BMI:  29.83 kg/m 2   BSA:  1.76 m 2            Patient PCP Information     Provider PCP Type    [facility]      ED Clinical Impression     Diagnosis Description Comment Added By Time Added    Generalized muscle weakness [M62.81] Generalized muscle weakness [M62.81]  Dilia Acharya RN 2018 11:24 AM    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]  Candice Liu MD 2018 11:41 AM    Agitation [R45.1] Agitation [R45.1]  Candice Liu MD 2018 11:43 AM      Hospital Problems as of 2018              Priority Class Noted POA    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) High  10/23/2013 Yes    Thrombocytopenia (H) Medium  2/15/2018 Yes    Hypothermia Medium  2018 Yes      Non-Hospital Problems as of 2018              Priority Class Noted    ACP (advance care planning) Medium  2018      Code Status History     Date Active Date Inactive Code Status Order ID Comments User Context    2018 11:49 AM  Full Code [id number]  Candice Liu MD Outpatient    2018  8:57 PM 2018 11:49 AM Full Code [id number]  Arpan Field MD Inpatient    2018 11:23 AM 2018  8:57 PM Full Code [id number]  Megan Chin MD Outpatient    2/15/2018  7:15 PM 2018 11:23 AM Full Code [id number]  [facility], " MD [facility]         Medication Review      CONTINUE these medications which may have CHANGED, or have new prescriptions. If we are uncertain of the size of tablets/capsules you have at home, strength may be listed as something that might have changed.        Dose / Directions Comments    lamoTRIgine 200 MG tablet   Commonly known as:  LAMICTAL   This may have changed:    - medication strength  - how much to take  - how to take this  - when to take this  - additional instructions   Used for:  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)        Dose:  200 mg   Take 1 tablet (200 mg) by mouth 2 times daily   Quantity:  60 tablet   Refills:  0        risperiDONE 3 MG tablet   Commonly known as:  RISPERDAL   This may have changed:    - medication strength  - how much to take  - when to take this  - additional instructions   Used for:  Agitation        Dose:  3 mg   Take 1 tablet (3 mg) by mouth 2 times daily   Quantity:  60 tablet   Refills:  0        traZODone 50 MG tablet   Commonly known as:  DESYREL   This may have changed:    - medication strength  - how to take this  - additional instructions   Used for:  Agitation        1 tablet (50 mg) q 8am. 1 tablet (50 mg) q 4 pm,  3 tablets (150 mg) 8pm   Quantity:  90 tablet   Refills:  0          CONTINUE these medications which have NOT CHANGED        Dose / Directions Comments    COLACE 100 MG capsule   Generic drug:  docusate sodium        Take by mouth 2 times daily   Refills:  0        diazepam 5 MG/ML (HIGH CONC) solution   Commonly known as:  DIAZEPAM INTENSOL   Used for:  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 WHEN ADMINISTERED NOTIFY PC, RN & PD.   Quantity:  120 mL   Refills:  3        EYE DROPS AR OP        Dose:  2 drop   Apply 2 drops to eye daily as needed   Refills:  0        lacosamide 200 MG Tabs tablet   Commonly known as:  VIMPAT   Used for:  Generalized  convulsive epilepsy with intractable epilepsy (H)        Dose:  200 mg   Take 1 tablet (200 mg) by mouth 2 times daily   Quantity:  60 tablet   Refills:  5        levETIRAcetam 1000 MG Tabs   Used for:  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)        Dose:  1000 mg   Take 1,000 mg by mouth 2 times daily   Quantity:  60 tablet   Refills:  11        MULTIVITAMINS PO        Take by mouth daily   Refills:  0        OXcarbazepine 600 MG tablet   Commonly known as:  TRILEPTAL   Used for:  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)        Dose:  600 mg   Take 1 tablet (600 mg) by mouth 2 times daily   Quantity:  60 tablet   Refills:  11        VITAMIN E        2 times daily   Refills:  0                Summary of Visit     Reason for your hospital stay       Hypotension, Hypothermia and high K due to adrenal insufficiency. Got better with IV steroid dose. SP treatment of impetigo on face with doxycycline.             After Care     Activity       Your activity upon discharge: activity as tolerated       Diet       Follow this diet upon discharge: Orders Placed This Encounter      Room Service      Regular Diet Adult             Referrals     Physical Therapy Referral       *This therapy referral will be filtered to a centralized scheduling office at [facility] and the patient will receive a call to schedule an appointment at a [place] location most convenient for them. *     [facility] provides Physical Therapy evaluation and treatment and many specialty services across the [place] system.  If requesting a specialty program, please choose from the list below.    If you have not heard from the scheduling office within 2 business days, please call [phone] for all locations, with the exception of [facility], please call [phone] and [facility], please call [phone]  Treatment: Evaluation & Treatment  Special  "Instructions/Modalities: strength  Special Programs: NONE    Please be aware that coverage of these services is subject to the terms and limitations of your health insurance plan.  Call member services at your health plan with any benefit or coverage questions.      **Note to Provider:  If you are referring outside of [place] for the therapy appointment, please list the name of the location in the \"special instructions\" above, print the referral and give to the patient to schedule the appointment.             Your next 10 appointments already scheduled     Dec 18, 2018  2:30 PM CST   Return Visit with Elysia Gomez MD   [facility]    [address], [address]  [facility] [id number] [phone]              Follow-Up Appointment Instructions     Future Labs/Procedures    Follow Up and recommended labs and tests     Comments:    Follow up with primary care provider, Bryan Fritsch, within 7 days for hospital follow- up.  No follow up labs or test are needed.      Follow-Up Appointment Instructions     Follow Up and recommended labs and tests       Follow up with primary care provider, Bryan Fritsch, within 7 days for hospital follow- up.  No follow up labs or test are needed.             Statement of Approval     Ordered          06/20/18 1149  I have reviewed and agree with all the recommendations and orders detailed in this document.  EFFECTIVE NOW     Approved and electronically signed by:  Candice Liu MD               "

## 2018-06-11 NOTE — IP AVS SNAPSHOT
[address] [address]    Phone:  [phone]                                       After Visit Summary   6/11/2018    Nella Helms    MRN: [id number]           After Visit Summary Signature Page     I have received my discharge instructions, and my questions have been answered. I have discussed any challenges I see with this plan with the nurse or doctor.    ..........................................................................................................................................  Patient/Patient Representative Signature      ..........................................................................................................................................  Patient Representative Print Name and Relationship to Patient    ..................................................               ................................................  Date                                            Time    ..........................................................................................................................................  Reviewed by Signature/Title    ...................................................              ..............................................  Date                                                            Time

## 2018-06-11 NOTE — IP AVS SNAPSHOT
` `     [address] [facility]: [phone]                 INTERAGENCY TRANSFER FORM - NOTES (H&P, Discharge Summary, Consults, Procedures, Therapies)   2018                    Hospital Admission Date: 2018  JOHANNA SIMONS   : 1975  Sex: Female        Patient PCP Information     Provider PCP Type    Bryan Fritsch General         History & Physicals      H&P by Arpan Field MD at 2018  9:40 PM     Author:  Arpan Field MD Service:  General Medicine Author Type:  Resident    Filed:  2018  1:13 AM Date of Service:  2018  9:40 PM Creation Time:  2018  9:40 PM    Status:  Attested :  Arpan Field MD (Resident)    Cosigner:  Ciara Maguire MD at 2018  5:20 AM        Attestation signed by Ciara Maguire MD at 2018  5:20 AM        Attestation:  Physician Attestation   I, Ciara Mgauire, saw this patient with the resident and agree with the resident and/or medical student's findings and plan of care as documented in the note.      I personally reviewed vital signs, medications, labs and imaging.    Key findings: 42 y.o. Female with h/o cognitive delay, Left hemiplegia, severe epilepsy, and ITP transferred here for hypothermia an hypotension after several weeks of weakness and a face rash, s/p hydrocortisone and IV fluids, and improvement in hypothermia and hypotension.  I am unclear about the rash at this point in time. ?cellulitis, with a little bit of impetigo around the lips? No vesicles, making herpes zoster unlikely, ? Improved on clinda.  Not painful or warm now.  CPR/procal ok. Will monitor overnight.    - at this time will treat with doxycycline, if any signs of sepsis will change to broader spectrum ABX  BPs and temp improved, AM cortisol to be done.     Ciara Maguire MD  Date of Service (when I saw the patient): 18                               [facility], [place]  Internal Medicine  History and Physical - [facility] Service       Date of Admission:  6/11/2018    Assessment & Plan:   Nella Helms is a 42-year-old female with history of cognitive delay, left sided hemiplegia, severe epilepsy, ITP, who was transferred here from an outside emergency department for hypothermia and hypotension in the setting of multiple weeks of weakness.  The patient has received 50 mg of hydrocortisone as well as intravenous fluids with improvement in both hypothermia and hypotension and is currently vitally stable.    # Hypothermia, hypotension  # Hyponatremia, mild hyperkalemia  At the outside ED, bladder temperature was noted to be in the low 30 C range.  She was given IV fluids, bear hugger, as well as 50 mg of IV hydrocortisone with improvement.  She was noted to be hyponatremic to 128 and slightly hyperkalemic to 5.  She had recently stopped steroids June 9 for worsening thrombocytopenia in the context of ITP (taking 40 mg of prednisone from June 6 - June 8 and then 20 mg on June 9).  The differential at this point is quite broad but her constellation of symptoms[LL1.1] (hyponatremia, relative hyperkalemia, hypothermia, hypotension)[LL1.2] are possibly suggestive of adrenal insufficiency[LL1.1] given recent steroid use, though admittedly she was not on steroid long enough for this to reasonably occur.[LL1.2]  As also possible but unlikely is severe hypothyroidism, hypovolemia perhaps from her progressive weakness/deconditioning[LL1.1] noted at the NH[LL1.2].  She does not appear to be septic[LL1.1] though will get procal.[LL1.3]  -Repeat CMP here  -8 AM serum cortisol level, TSH  -Serum cortisol level is pending at the outside emergency department  -1L NS tonight, urine sodium[LL1.1]  - procalcitonin; if elevated will treat empirically for sepsis[LL1.3]    # Worsening thrombocytopenia in the context of ITP  Hospitalization in February at the [facility] where this was diagnosed and improved markedly with steroids.   Completed steroid taper in March.  Recently seen in the ED June 5 with thrombocytopenia of 66 was noted and the patient was given steroids, per the NH MAR was taking 40 mg of prednisone from June 6 - June 8 and then 20 mg on June 9.  Platelets continue to fall today, to 44.  No obvious sequela on exam of from a cytopenia.  -Continue to trend, avoid heparin or NSAIDs  -We will avoid giving steroids overnight hoping to maximize reliability of a.m. cortisol test; if platelets continue to follow substantially may require steroid burst.  -Transfuse platelets for less than 10 although with ITP steroids are ultimately needed    # Acute on chronic gait instability  Patient has had multiple weeks of worsening gait instability and more difficulty with routine tasks such as eating.  Her psychiatrist has already decreased her risperidone from 4 mg twice daily to 3 in the morning and 4 at night without improvement.  She is on multiple psychoactive medications, including Lamictal, trazodone, oxcarbazepine, rispieridone, which may be contributing, and will be difficult to taper given her history of severe epilepsy.   -[LL1.1]Reduce trazodone from 100 mg 8 am, 50 mg 4 pm,[LL1.2] 1[LL1.3]50 mg[LL1.2] qhs[LL1.3] to 50 mg 8 am (50%), 25 mg 4 pm (50%) and continue the[LL1.2] 1[LL1.3]50 mg[LL1.2] qhs[LL1.3]. Gradual taper given gait instability and history of severe seizures[LL1.2] (withdrawal concern)[LL1.3].[LL1.2]   -PT/OT    #Right facial rash[LL1.1], suspect impetigo[LL1.3]  Appears[LL1.1] relatively[LL1.3] well demarcated, erythematous, only slightly warm.  Involves superior aspect of right eyelid that that does not appear to be[LL1.1] tender[LL1.3] with palpation of eye.  Vision is disconjugate but this has been noted on prior neuro exams.  Patient was recently on clindamycin for 7 days, ending June 6, with apparent improvement in the rash and worsening since discontinuation.  We are limited in her antibiotic choice due to a  penicillin allergy and the patient's hyperkalemia, which would not be ideal with Bactrim.[LL1.1] No vesicles to suggest Zoster. Honey-crust noted around right edge of mouth suggestive of impetigo.[LL1.3]  -[LL1.1] doxycycline 100 mg twice daily x5 day[LL1.3]    # Leukopenia  3.6 at the OSH, appears to be hovering mid-3s to 4's since February. On multiple drugs that could contribute, including: Lamictal, oxcarbazepine.  - continue to trend[LL1.1] here[LL1.4]    # Severe epilepsy  -Continue Vimpat 200 mg daily  -Continue Lamictal 100 mg 3 times daily  -Continue Keppra 1000 twice daily  -Continue oxcarbazepine 600 mg twice daily    # Mood  -Continue risperidone 3 mg in the morning, 4 mg at night  -[LL1.1]Reduce trazodone from 100 mg 8 am, 50 mg 4 pm,[LL1.2] 1[LL1.3]50 mg[LL1.2] qhs[LL1.3] to 50 mg 8 am (50%), 25 mg 4 pm (50%) and continue the[LL1.2] 1[LL1.3]50 mg[LL1.2] qhs[LL1.3]. Gradual taper given gait instability and history of severe seizures[LL1.2] (withdrawal concern)[LL1.3].[LL1.2]     # Pain Assessment:[LL1.1]  Current Pain Score 2/26/2018   Patient currently in pain? yes   Pain location Neck   Pain descriptors -[LL1.5]   Nella masters pain level was assessed and she currently denies pain.[LL1.2]        Active Diet Order      Advance Diet as Tolerated: Clear Liquid Diet[LL1.5]  Fluids:[LL1.1] NS 1L overnight[LL1.2]  DVT Prophylaxis:[LL1.1] VTE Prophylaxis contraindicated due to thrombocytopenia[LL1.2]  Code Status:[LL1.1] Full Code, consistent w/ prior hospitalizations and per NH[LL1.2]    Disposition Plan:   Expected discharge:[LL1.1] 2 - 3 days[LL1.2]; recommended to[LL1.1] prior living arrangement[LL1.2] once[LL1.1] diagnosis obtained, sodium normal, gait instability improved.[LL1.2]       Entered: Arpan Field June 11, 2018     The patient was discussed with [LL1.1] Ting[LL1.3]    Arpan Field  [facility][LL1.1] [place][LL1.3]  [facility]   Pager: [phone]  Please see sticky note  for cross cover information    ---------------------------------------------------------------------------------------------    Chief Complaint:[LL1.1]   Weakness, legs giving out, gait instability, hypothermia    History is obtained from the patient, electronic health record, paper chart and patient's advocate[LL1.2]    History of Present Illness   Nella Helms is a 42-year-old female with history of cognitive delay, left sided hemiplegia, severe epilepsy, ITP, who was transferred here from an outside emergency department for hypothermia and hypotension in the setting of multiple weeks of weakness.    I discussed the patient with Rubi, who works with Nella at her care facility, [facility], in [place].  She reports that over the past 1-2 weeks the patient has required increased assistance both with ambulation and simple tasks such as hand coordination while eating.  This morning her knees continue to buckle with walking requiring the assist of 2 to move around the building.  She also appeared to be leaning backwards while walking.  Because of this acute worsening, they called an ambulance to have the patient transported to the emergency department.  Rubi  does not think the patient has been having diarrhea, nausea, or vomiting and has been, as far as she knows, drinking fluids normally.  Nella has had no seizure-like activity since before February when some of her medications were changed at the .    At her baseline the patient is a little unstable with walking, per Rubi, but generally ambulates without a walker including up and down stairs.  She is also usually good at following instructions and knows her age and family.  Altogether Rubi thinks that the wheeze is at her mental baseline.      The patient was recently prescribed prednisone for worsening thrombocytopenia and history of ITP.  The patient was seen in emergency department on June 5 for weakness and slurred speech.  They found that her  platelet count was 66 and started prednisone. She was taking 40 mg of prednisone from June 6 - June 8 and then 20 mg on June 9.  Rubi does not think there was any change in her stability while on steroids.    She saw her internist, Dr. Fritsch, June 1 and he noted multiple weeks of unsteadiness with her legs giving out.  He noted that she had weakness prior to hospitalization here at the  in February and apparently improved while off her psych meds briefly.  He discussed the case with her psychiatrist, Dr. Campuzano. He made the only medication change the patient has had recently: decreasing her risperidone from 4 mg twice daily to 3 mg in the morning and 4 mg at night by her psychiatrist, Dr. Campuzano, due to what were described as gait issues.  The intern is also started clindamycin for possible cellulitis on the right side of her face.  Ruib thinks there may have been some improvement in the facial rash while on clindamycin but that the rash is worse today.    Anyway, the patient was transported to the emergency department where she was noted to be profoundly hypothermic with a bladder temperature as low as 31.9 C with hypotension of 90s over 40s.  The patient was given IV fluids and a  bear hugger.  Notable labs included hyponatremia to 128 mmol/L and slightly elevated hyperkalemia to 6 mmol/L. they sought transfer to the [facility] and were advised to obtain a cortisol level and give hydrocortisone 50 mg IV once.    On arrival to the [facility] the patient is vitally stable and blood pressure as well as temperature have improved.  The patient is a poor historian and although she is able to state her name, she cannot say why she is in the hospital or how old she is.  When asked if she is in pain she says yes, but when I point to an anatomical region were pain might be located, such as her abdomen, or her back, her shoulder, or her legs, she says he has each time.    Regarding [facility], she was admitted for declining gait, coordination, cognitive function, as well as thrombocytopenia, which was discovered incidentally during a routine visit with her epileptic neurologist, Dr. Gomez.  While inpatient, her valproic acid and felbanote was discontinued, she started Keppra and lacosamide, and increased her dose of oxcarbazepine.[LL1.1]  Heme-onc[LL1.6] was consulted due to concern for ITP and they started her on prednisone 60 mg daily with marked improvement in her thrombocytopenia.  Her steroids were subsequently tapered with the last dose given reportedly on March 29.    Review of Systems:   10 point ROS completed and negative unless noted in HPI.    Past Medical History:    I have reviewed this patient's medical history and updated it with pertinent information if needed.[LL1.1]   Past Medical History:   Diagnosis Date     Cortical blindness      Mental retardation      Strabismus[LL1.7]    -Epilepsy  -ITP  -Left hemiplegia    Past Surgical History:   No records of surgeries on file here, the patient is unreliable as a historian.    Social History:   The patient lives in [facility], in [place].  Staff there denies any alcohol or drug use.  Both her parents are alive although per review of recent charting they are ill.  There is a brother that also helps out.    Family History:   No family history on file here, review of the chart suggests that there may be some cardiac history on the father's side.    Prior to Admission Medications:[LL1.1]   Per review of MAR:  Risperidone 3 mg 8 am  Risperidone 4 mg 8 pm  Trazodone 100 mg 8 am, 50 mg 4 pm, 150 mg 9:45 pm  Certavite MVI 1 tab 8 am  Vitamin E 1 cap twice daily  Lamictal 100 mg three times daily  Docusate 1 cap twice daily  Oxcarbazepine 600 mg once daily  Levetiracetam 1000 mg twice daily  Vimpat 200 mg twice daily[LL1.2]    Allergies:[LL1.1]   Allergies   Allergen Reactions     Cefzil  [Cefprozil]      Codeine Phosphate      Dilantin [Phenytoin]      Penicillins[LL1.5]        Physical Exam:   Vital Signs:[LL1.1] Temp: 98.7  F (37.1  C) Temp src: Oral BP: 112/68 Pulse: 84   Resp: 16[LL1.5]        Weight:[LL1.1] 0 lbs 0 oz[LL1.5]    General: Pleasant female, laying in bed, exam is difficult as patient does not reliably follow commands.  HEENT: Well demarcated erythematous rash of right face, some swelling of upper right eyelid, minimally warm, appears nontender.  Tongue deviates to left.  Disconjugate gaze, though extraocular movements appear intact and pupils are equal and reactive bilaterally.  Cardiac: Normal rate, regular rhythm.  Systolic murmur noted.  Distal perfusion is good.  Pulm: CTAB, no wheezes, or crackles. Normal respiratory effort  Abd: Soft, non distended, non tender abdomen. No hepatosplenomegaly.  Mild annular, erythematous rash mid abdomen.  Skin: No jaundice, skin exam detailed above by organ system; see my photos in the media tab  Extremities: No LE edema, weakness of left upper extremity  Joints: No inflammation noted on joints  Neuro: Alert and oriented to self, does not know age, does not know why she is in the hospital, intermittently follows commands, is not obtunded  Psych: Euthymic mood, full affect    Data   As detailed above, the labs in the emergency department were notable for hyponatremia to 128, mild hyperkalemia to 5, mild alkalosis to 31.  Her creatinine is 0.5, slightly up from 0.4 where she has been for the past few months.  Her INR was normal.  She had normal troponin.  She is slightly leukopenic which appears to be at her baseline of 3.6.  She has a slight normocytic anemia of 11.4.  Thrombocytopenia appears to be progressively worsening with platelets today 44.  Her CRP was 0.67.  Her alk phos was elevated at 145, which is new.  Outside hospital obtained a CT which showed chronic encephalomalacia bilaterally as well as enlargement of the atria, occipital  horns of the bilateral lateral ventricles;[LL1.1] the enlarged ventricles was[LL1.3] noted here on a CT February 15.  A chest x-ray at the outside hospital showed no acute abnormalities, and the urinalysis was bland.[LL1.1]     Revision History        User Key Date/Time User Provider Type Action    > LL1.3 6/12/2018  1:13 AM Arpan Field MD Resident Sign     LL1.6 6/12/2018 12:27 AM Arpan Field MD Resident      LL1.4 6/12/2018 12:25 AM Arpan Field MD Resident      LL1.2 6/11/2018 11:36 PM Arpan Field MD Resident      LL1.7 6/11/2018 10:28 PM Arpan Field MD Resident      LL1.5 6/11/2018 10:00 PM Arpan Field MD Resident      LL1.1 6/11/2018  9:40 PM Arpan Field MD Resident                   Discharge Summaries     No notes of this type exist for this encounter.         Consult Notes      Consults by Hudson Strauss MD at 6/18/2018  1:33 PM     Author:  Hudson Strauss MD Service:  Psychiatry Author Type:  Physician    Filed:  6/18/2018  1:33 PM Date of Service:  6/18/2018  1:33 PM Creation Time:  6/18/2018  1:19 PM    Status:  Signed :  Hudson Strauss MD (Physician)     Consult Orders:    1. Psychiatry IP Consult: Worsening behaviors, decreased mental status after changing psych meds.  Please evaluate for optimization.; Consultant may enter orders: No; Patient to be seen: Routine; Call back #: Haresh [phone], [phone] [[id number]] ordered by Haresh Redding MD at 06/18/18 7874                      Psychiatry Consultation; Follow up              Reason for Consult, requesting source:    Follow up regarding medications   Requesting source: Haresh Redding                 Interim history:    Over the weekend she has become more impulsive, more difficulty following commands. Due to impulsivity she now has a sitter. He mentions that she would not be safe without a sitter due to the impulsivity. She was able to stand without difficulty, but she is difficult to  "re-direct.    Medication changes were made as directed (minimal changes).            Medications:[DA1.1]     Current Facility-Administered Medications   Medication     docusate (COLACE) 50 MG/5ML liquid 100 mg     Lacosamide (VIMPAT) tablet 200 mg     lamoTRIgine (LaMICtal) tablet 200 mg     levETIRAcetam (KEPPRA) tablet 1,000 mg     lidocaine (LMX4) kit     lidocaine 1 % 1 mL     naloxone (NARCAN) injection 0.1-0.4 mg     ondansetron (ZOFRAN-ODT) ODT tab 4 mg     OXcarbazepine (TRILEPTAL) tablet 600 mg     risperiDONE (risperDAL) tablet 3 mg     risperiDONE (risperDAL) tablet 3 mg     sodium chloride (PF) 0.9% PF flush 3 mL     sodium chloride (PF) 0.9% PF flush 3 mL     traZODone (DESYREL) tablet 150 mg     traZODone (DESYREL) tablet 50 mg     traZODone (DESYREL) tablet 50 mg[DA1.2]            MSE:     MENTAL STATUS EXAMINATION:  She is sitting in wheelchair, still complaining of a variety of pains.Is able to stand without difficulty. She is overall pleasant, cooperative. but affect is still quite irritable.  She described her mood as \"okay.\"  She was not oriented to day, date or place.  Recent and remote memory, attention and concentration are very substandard.  Use of language, fund of knowledge are very limited.  Insight and judgment poor.     Vital signs:[DA1.1]  Temp: 95.8  F (35.4  C) Temp src: Axillary BP: 135/53 Pulse: 88 Heart Rate: 64 Resp: 18 SpO2: 96 % O2 Device: None (Room air)   Height: 154.9 cm (5' 1\") Weight: 72.4 kg (159 lb 9.6 oz)  Estimated body mass index is 30.16 kg/(m^2) as calculated from the following:    Height as of this encounter: 1.549 m (5' 1\").    Weight as of this encounter: 72.4 kg (159 lb 9.6 oz).[DA1.3]            DSM-5 Diagnosis:   Intellectual Disabilites  318.1 (F72) Severe   Seizure disorder           Assessment:   I don't think that recent changes in medications would account for the problem behaviors. This is likely her baseline.    Unfortunately she does appear to need the " "bedside attendant for now.           Summary of Recommendations:   I would continue with current medications.   Re-consult psychiatry as needed.      \"This dictation was performed with voice recognition software and may contain errors,  omissions and inadvertent word substitution.\"[DA1.1]             Revision History        User Key Date/Time User Provider Type Action    > DA1.2 6/18/2018  1:33 PM Hudson Strauss MD Physician Sign     DA1.3 6/18/2018  1:22 PM Hudson Strauss MD Physician      DA1.1 6/18/2018  1:19 PM Hudson Strauss MD Physician             Consults by Hudson Strauss MD at 6/15/2018 10:58 AM     Author:  Hudson Strauss MD Service:  Psychiatry Author Type:  Physician    Filed:  6/15/2018  1:03 PM Date of Service:  6/15/2018 10:58 AM Creation Time:  6/15/2018 10:55 AM    Status:  Addendum :  Hudson Strauss MD (Physician)     Consult Orders:    1. Psychiatry IP Consult: Having gait instability while on risperidone.; Consultant may enter orders: No; Patient to be seen: Routine; Call back #: Haresh hugo [phone], p [phone] [[id number]] ordered by Haresh Redding MD at 06/15/18 0818                Patient seen and chart reviewed. Note dictated (initial)   RECOMMENDATIONS:  Change trazodone to 50 mg  mg HS  Decrease Risperdal to 3 mg am, 3 mg HS  Consider increasing Lamictal to 400 mg per day[DA1.1] (I checked with neurology, and they were ok with this)   Re-consult psychiatry as needed.[DA1.2]        Revision History        User Key Date/Time User Provider Type Action    > DA1.2 6/15/2018  1:03 PM Hudson Strauss MD Physician Addend     DA1.1 6/15/2018 10:58 AM Hudson Strauss MD Physician Sign            Consults signed by Hudson Strauss MD at 6/15/2018  1:02 PM      Author:  Hudson Strauss MD Service:  Psychiatry Author Type:  Physician    Filed:  6/15/2018  1:02 PM Date of Service:  6/15/2018 10:55 AM Creation Time:  6/15/2018 12:25 PM    Status:  " Signed :  Hudson Strauss MD (Physician)         Consult Date:  06/15/2018      REQUESTING SOURCE:  Gold 1 team.      IDENTIFYING DATA:  This is a 42-year-old woman seen today for psychiatric consultation to evaluate her for possible changes in her psychiatric medications.      HISTORY OF PRESENT ILLNESS:  Ms. Helms is a 42-year-old woman with history of cognitive delay, left-sided hemiplegia, severe epilepsy who was transferred from an outside Emergency Department for hypothermia and hypotension.  I had an opportunity to speak with Alina at her group home ([phone]), who verified that she got to the point where she had to be taken out of the group home by ambulance due to her inability to ambulate.  At baseline, she does walk without assistance.  I was reminded by Alina that Nella will have very problematic behaviors; she has a tendency towards self-injurious behavior and also outbursts where she will attempt to strike staff, etc.  She was very concerned about reducing her medications too much, but she also was mindful that she could be having side effects.  I reviewed the situation with her nurse and she mentioned that Nella is able to ambulate without difficulty and she has not seen any problematic behavior, although she does engage in a lot of attention-seeking behaviors.  Apparently this is common at the group home as well.    During this hospitalization, her trazodone was decreased from 150 mg a.m., 50 mg at noon and 150 mg at bedtime, to 50 mg in the morning, 25 mg during the day and 150 mg at bedtime.  As an outpatient, her new psychiatrist, Dr. Campuzano at [facility], had reduced her Risperdal from 4 mg twice a day to 3 mg in morning and 4 mg in the evening.  She was hospitalized here on the Neurology Service just February for thrombocytopenia.  At that time Depakote was stopped and Keppra and lacosamide started. No change in behaviors since Keppra started.  Of  "interest, at that time, she was on 4 mg twice a day +2 mg at bedtime for a total of 10 mg per day of Risperdal, and she was taking 700 mg per day of trazodone.      Per my interview, she was minimally verbal, but complaining of a variety of pains.  She said her mood was okay.  She seemed pleasant, cooperative during my visit without any agitation.      PAST PSYCHIATRIC HISTORY:  She had recent medication changes as indicated above.  She recently has changed psychiatrists from Dr. Guillaume Feldman to Dr. Campuzano.  I am not aware of any psychiatric admissions.      CHEMICAL USE HISTORY:  No use of drugs, alcohol or tobacco.      PAST MEDICAL HISTORY:  Cortical blindness, mental retardation, strabismus, severe epilepsy, history of ITP and left hemiplegia.        No records of surgical procedures      HAS MULTIPLE ALLERGIES; see EMR      REVIEW OF SYSTEMS:  Ten-point review of systems today is negative except for pain in her nose and in her feet and diffuse aches and pains.      CURRENT PSYCHIATRIC MEDICATIONS:  Risperdal 3 mg morning, 4 mg p.m., trazodone 50 mg morning, 25 mg 4 pm, 150 mg at bedtime. Lamictal 100 mg 3 times per day and 3 additional epilepsy meds.      FAMILY HISTORY:  Unable to obtain.      SOCIAL HISTORY:  She lives in a group home in [place], [place].      MENTAL STATUS EXAMINATION:  She is lying on her right side in bed, complaining of a variety of pains.  She is overall pleasant, cooperative but her affect was quite irritable.  She described her mood as \"okay.\"  It is difficult to assess for muscular rigidity since, in trying to assess this, she was refusing to relax her arm.  She was not oriented to day, date or place.  Recent and remote memory, attention and concentration are very substandard.  Use of language, fund of knowledge are very limited.  Insight and judgment poor.      VITAL SIGNS:  Blood pressure 136/76, pulse 67, temperature 95.5.      DIAGNOSIS:  Intellectual disability and seizure " disorder.      IMPRESSION:  Acute problems on admission were likely related to her being on the high doses of Risperdal and trazodone which are both strong alpha blockers and will cause hypotension.  Also, it is conceivable that some of her problematic behaviors were actually response to akathisia or other extrapyramidal side effects from the high dose of Risperdal.  She has improved markedly during this hospitalization and has no evidence of more problematic behaviors emerging.  I think it would be reasonable to get her on a more conventional dose of 6 mg per day of Risperdal.      RECOMMENDATIONS:   1.  I would continue with 50 mg of trazodone in the morning, but increase the PM dose to 50 mg as well and continue the 150 mg at bedtime.   2.  Risperdal could be changed to 3 mg twice a day.   3.  I would consider increasing the Lamictal to 200 mg BID; this often times does help with some of the problematic behaviors in this population.   4.  Please reconsult Psychiatry as needed.         TONY FERRARA MD             D: 06/15/2018   T: 06/15/2018   MT: NTS      Name:     JOHANNA SIMONS   MRN:      [id number]        Account:       [id number]   :      1975           Consult Date:  06/15/2018      Document: [id number]       cc: Bryan Fritsch DO[DA1.1]        Revision History        User Key Date/Time User Provider Type Action    > DA1.1 6/15/2018  1:02 PM Tony Ferrara MD Physician Sign     [N/A] 6/15/2018 12:25 PM Tony Ferrara MD Physician Edit            Consults by Octavio Ugalde MD at 2018  1:07 PM     Author:  Octavio Ugalde MD Service:  Neurology Author Type:  Resident    Filed:  2018  2:52 PM Date of Service:  2018  1:07 PM Creation Time:  2018  1:07 PM    Status:  Attested :  Octavio Ugalde MD (Resident)    Cosigner:  Maximiliano Rivera MD at 2018  4:09 PM         Consult Orders:    1. Neurology General Adult IP Consult: Patient to  be seen: Routine within 24 hrs; Call back #: (c) [phone], (d) [phone]; Hx of intractable epilepsy, acute on chronic worsening weakness and decreased mental status; Consultant may enter orders: Yes [[id number]] ordered by Haresh Redding MD at 06/13/18 1108           Attestation signed by Maximiliano Rviera MD at 6/13/2018  4:09 PM        I saw the patient with resident Dr Ugalde today and agree with his assessment and recommendations as written in his note. TT spent for patient care 25 minutes; more than half was counseling. Mrs Helms is a 42 year old woman with developmental delay, epilepsy, and left sided hemiplegia who presented with gradually worsening gait over the past month according to her brother. She is more unsteady and weak and cannot transfer out of bed without assistance at this point. On admission she was found hypothermic, with hyponatremia, hyperkalemia, and a random evening cortisol of 2.9; it is my understanding she has adrenal insufficiency, which may have to do with intermittent use of exogenous steroids (prednisone) for ITP.     Examination is very limited due to cognitive deficits. Gait is very shuffling with small steps and she is clearly weak despite the fact she does not cooperate at all for strength exam; her knees give out when standing. She does not stand with a particularly wide base; she does not cooperate for finger to nose exam to assess dysmetria. Upper extremity strength appears intact. Reflexes are 2+ in upper extremities, 2+ at knees and 3+ at ankles and symmetric.     I think her proximal weakness is due to the adrenal insufficiency. This is expected to gradually improve with steroid replacement and physical therapy. Anticonvulsants do not cause weakness; they cause cerebellar ataxia which in my opinion is not likely there. Because her seizures are very well controlled at this point we recommend NO changes to her outpatient AED regimen. Please check CK. 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 Our recommendation would be to cautiously taper this medication further, but please consult Psychiatry before doing this, because the drug cannot be stopped; it is necessary to control aggressive or self-harming behaviors. Thank you for consult.    Maximiliano Rivera MD   of Neurology                                     Neurology Initial Consult  June 13, 2018      Nella Helms MRN:[id number] YOB: 1975  Date of Admission:6/11/2018  Primary care provider: Fritsch, Bryan  Requesting physician: Haresh Redding MD    ASSESSMENT AND RECOMMENDATIONS:   Nella Helms is a 42 year old with PMH of cognitive delay, left sided hemiplegia, severe epilepsy, ITP, who was transferred here from an outside emergency department for hypothermia and hypotension in the setting of multiple weeks of weakness.[KS1.1]     #Generalized weakness  Patient presented with progressively worsening generalized weakness since May culminating in severe weakness and admission to hospital on Monday for adrenal insufficiency.  TSH on admission was 3.16. From a neurological standpoint, anti-epileptic medications may result in gait ataxia and instability.  However it is highly unlikely that these medications could contribute to patient's current generalized weakness.  Instead the more likely etiology is weakness from adrenal insufficiency.  Furthermore on exam patient exhibited signs of Parkinsonian shuffled gait, and this may be an LB of patient's risperidone.    Recommendations  -Obtain the following lab if not already done: CK  -Check a.m. anti-epileptic drug levels  -Recommend consultation with psychiatry in order to reduce risperidone dose as this could be contributing to patient's gait instability[KS1.2]    Recommendations were communicated to primary team via[KS1.1] phone.[KS1.2]    Seen and discussed with   "Nicole.[KS1.1]    Neurology service will continue to follow this interesting patient with you. Thank you for the consultation.[KS1.2]    Octavio Ugalde MD   [phone]      REASON FOR CONSULT: \"Hx of intractable epilepsy, acute on chronic worsening weakness and decreased mental status\"    HISTORY OF PRESENT ILLNESS:  Nella Helms is a 42 year old with PMH of cognitive delay, left sided hemiplegia, severe epilepsy, ITP, who was transferred here from an outside emergency department for hypothermia and hypotension in the setting of multiple weeks of weakness.[KS1.1]     Patient is a poor historian, history gathered from chart review and from brother in the room.  Patient was recently hospitalized in February for her thrombocytopenia and found to have ITP.  At the time patient complained of similar generalized weakness.  Valproic acid had been discontinued prior to admission.  Felbamate was discontinued at admission.  Keppra and lacosamide were initiated in their place.  Lamotrigine was continued at prior to admission dose and oxcarbazepine was increased to 600 mg BID from 450 BID.  Patient's generalized weakness subsequently improved on discharge.  At baseline patient is able to ambulate independently without a walker with minimal gait disturbances.  Over the past month, patient has been experiencing progressively worsening generalized weakness and gait instability.[KS1.2] Her legs have been giving out on her and have been very unsteady. At times she has required the assistance of two staff members to stand up or get up out of bed, as well as getting in and out of the bathtub. She apparently has not injured herself with any of these falls.[KS1.1]  During this time, h[KS1.2]er psychiatrist has decreased her risperidone from 4 mg twice daily to 3 in the morning and 4 at night without improvement.[KS1.1]   On Monday, June 11 patient was found to be very fatigued and unable to get out of bed.  She was taken to the " ER and found to be hypothermic, hyponatremic and very lethargic.  Patient was started on stress dose steroid and subsequently improved.    On seeing the patient today, she was unable to follow while any commands.  She does spontaneously move all extremities.  Of the limited review of systems, she denied muscle twitching and dysphagia.[KS1.2]    PAST MEDICAL HISTORY:  Reviewed with patient on 06/13/2018     Past Medical History:   Diagnosis Date     Cortical blindness      Mental retardation      Strabismus        No past surgical history on file.     MEDICATIONS:  PTA Meds  Prior to Admission medications    Medication Sig Last Dose Taking? Auth Provider   docusate sodium (COLACE) 100 MG capsule Take by mouth 2 times daily 6/12/2018 at Unknown time Yes Reported, Patient   lacosamide (VIMPAT) 200 MG TABS tablet Take 1 tablet (200 mg) by mouth 2 times daily 6/12/2018 at Unknown time Yes Elysia Gomez MD   LAMICTAL 100 MG tablet TAKE (1) TABLET THREE TIMES DAILY. 6/12/2018 at Unknown time Yes Elysia Gomez MD   levETIRAcetam 1000 MG TABS Take 1,000 mg by mouth 2 times daily 6/12/2018 at Unknown time Yes Megan Chin MD   Multiple Vitamin (MULTIVITAMINS PO) Take by mouth daily 6/12/2018 at Unknown time Yes Reported, Patient   OXcarbazepine (TRILEPTAL) 600 MG tablet Take 1 tablet (600 mg) by mouth 2 times daily 6/12/2018 at Unknown time Yes Megan Chin MD   risperiDONE (RISPERDAL) 2 MG tablet 4 mg bid, 2 mg hs 6/12/2018 at Unknown time Yes Reported, Patient   Tetrahydrozoline-Zn Sulfate (EYE DROPS AR OP) Apply 2 drops to eye daily as needed 6/12/2018 at Unknown time Yes Reported, Patient   traZODone (DESYREL) 100 MG tablet 1 tablet 8am. 2 tablets noon, 2 tablets 4pm, 2 tablets 8pm 6/12/2018 at Unknown time Yes Reported, Patient   VITAMIN E 2 times daily 6/12/2018 at Unknown time Yes Reported, Patient   diazepam (DIAZEPAM INTENSOL) 5 MG/ML (HIGH CONC) solution (1)ML (5MG) AS NEEDED FOR ANY SEIZURE. WHEN  ADMINISTERED NOTIFY PC, RN & PD. Unknown at  time  Elysia Gomez MD      Current Meds    docusate sodium  100 mg Oral BID     doxycycline  100 mg Oral Q12H BHAVNA     lacosamide  200 mg Oral BID     lamoTRIgine  100 mg Oral TID     levETIRAcetam  1,000 mg Oral BID     OXcarbazepine  600 mg Oral BID     risperiDONE  3 mg Oral QAM     risperiDONE  4 mg Oral Daily at 8 pm     sodium chloride (PF)  3 mL Intracatheter Q8H     traZODone  25 mg Oral Daily at 4 pm     traZODone  150 mg Oral At Bedtime     traZODone  50 mg Oral QAM     Infusion Meds      ALLERGIES:    Allergies   Allergen Reactions     Cefzil [Cefprozil]      Codeine Phosphate      Dilantin [Phenytoin]      Penicillins        REVIEW OF SYSTEMS:  A comprehensive of systems was negative except as noted above.    SOCIAL HISTORY:   Social History     Social History     Marital status: Single     Spouse name: N/A     Number of children: N/A     Years of education: N/A     Occupational History     Not on file.     Social History Main Topics     Smoking status: Never Smoker     Smokeless tobacco: Never Used     Alcohol use No     Drug use: No     Sexual activity: No     Other Topics Concern     Not on file     Social History Narrative     FAMILY MEDICAL HISTORY:   No family history on file.    PHYSICAL EXAM:   Temp  Av.1  F (36.2  C)  Min: 96.2  F (35.7  C)  Max: 98.7  F (37.1  C)      Pulse  Av  Min: 84  Max: 84 Resp  Av.2  Min: 16  Max: 18  SpO2  Av.8 %  Min: 94 %  Max: 99 %       /79 (BP Location: Right arm)  Pulse 84  Temp 96.2  F (35.7  C) (Axillary)  Resp 16  Wt 79.4 kg (175 lb 0.7 oz)  SpO2 95%  BMI 33.07 kg/m2   Date 18 0700 - 18 0659   Shift 9006-8184 7552-7674 9848-1276 24 Hour Total   I  N  T  A  K  E   P.O. 240   240    Shift Total  (mL/kg) 240  (3.02)   240  (3.02)   O  U  T  P  U  T   Urine 750   750    Shift Total  (mL/kg) 750  (9.45)   750  (9.45)   Weight (kg) 79.4 79.4 79.4 79.4        Admit Weight: 79.2 kg  (174 lb 9.7 oz) (bed)     GENERAL APPEARANCE:[KS1.1] Awake, not following commands, in no acute[KS1.2] distress  EYES: No scleral icterus, pupils equal  HENT: NC/AT,[KS1.1] pupils equally round and reactive to light, tongue without any fasciculation, uvula midline[KS1.2]  MS: no evidence of inflammation in joints, no muscle tenderness  : rankin[KS1.1] present[KS1.2]  SKIN: no rash, warm, dry, no cyanosis  NEURO:[KS1.1] Neuro exam was very limited as patient does not follow commands, she is able to move all extremities but strength and sensation were difficult to assess, 2+ radial and patellar reflexes, Babinski equivocal, gait shuffled with inclination to fall during ambulation[KS1.2]    LABS:   CMP  Recent Labs  Lab 06/13/18  0525 06/11/18 2232    128*   POTASSIUM 4.4 4.6   CHLORIDE 97 97   CO2 30 30   ANIONGAP 7 1*   GLC 86 78   BUN 6* 8   CR 0.38* 0.35*   GFRESTIMATED >90 >90   GFRESTBLACK >90 >90   SHARMILA 9.2 8.6   PROTTOTAL  --  5.7*   ALBUMIN  --  2.8*   BILITOTAL  --  0.2   ALKPHOS  --  130   AST  --  14   ALT  --  36     CBC  Recent Labs  Lab 06/13/18  0939 06/11/18 2232   HGB 11.1* 9.8*   WBC 4.5 2.8*   RBC 3.47* 3.04*   HCT 32.7* 28.8*   MCV 94 95   MCH 32.0 32.2   MCHC 33.9 34.0   RDW 13.4 13.1   PLT 57* 51*     INR  Recent Labs  Lab 06/11/18  2232   INR 0.94   PTT 33     ABGNo lab results found in last 7 days.   URINE STUDIES  Recent Labs   Lab Test  02/18/18   0450   COLOR  Yellow   APPEARANCE  Clear   URINEGLC  Negative   URINEBILI  Negative   URINEKETONE  Negative   SG  1.017   UBLD  Negative   URINEPH  7.5*   PROTEIN  10*   NITRITE  Negative   LEUKEST  Negative   RBCU  0   WBCU  1     No lab results found.  PTH  No lab results found.  IRON STUDIES  Recent Labs   Lab Test  02/15/18   2026   TRUPTI  280*       IMAGING:[KS1.1]  All imaging studies reviewed by me.[KS1.2]     Octavio Ugalde MD[KS1.1]           Revision History        User Key Date/Time User Provider Type Action    > KS1.2 6/13/2018   2:52 PM Octavio Ugalde MD Resident Sign     KS1.1 6/13/2018  1:07 PM Octavio Ugalde MD Resident                      Progress Notes - Physician (Notes from 06/17/18 through 06/20/18)      Progress Notes by Dilia Acharya RN at 6/20/2018 11:25 AM     Author:  Dilia Acharya RN Service:  Care Coordinator Author Type:  Care Coordinator    Filed:  6/20/2018 11:29 AM Date of Service:  6/20/2018 11:25 AM Creation Time:  6/20/2018 11:25 AM    Status:  Signed :  Dilia Acharya RN (Care Coordinator)           Care Coordinator - Discharge Planning    Admission Date/Time:  6/11/2018  Attending MD:  Candice Liu*     Data  Chart reviewed, discussed with interdisciplinary team.   Patient was admitted for:   1. Generalized muscle weakness         Assessment   Full assessment completed in previous note    Coordination of Care and Referrals: Provided patient/family with options for  came in today for evaluation for home safety at dc time.   agrees with dc back today and feels pt is near her baseline.  They are requesting out pt PT, orders placed for MD signature. .   RNCC met with pts Cristopher Cuate.  Pt is happy in the room with Dad present and talking to him.  Dad has concerns for md team to address, RNCC discussed with Dr. Liu who will see pt.  PT states that pt is set for dc.   has no other concerns for this pt.      DC pending final AVS orders.        Plan  Anticipated Discharge Date:[TP1.1]  6/20/2018[TP1.2]   Anticipated Discharge Plan: dc back to md JANETH updated staff on hospital progression, avs and Mar to be given to  at time of dc.      CTS Handoff completed:  YES     Dilia Acharya, SANDYCC, BSN    [facility]    [facility]  [address] [place] [id number]    [email or web address]  [email or web address]    Office: [phone] Pager: [phone][TP1.1]        Revision History        User Key Date/Time User Provider Type Action    > TP1.2 6/20/2018 11:29 AM  "Dilia Acharya RN Care Coordinator Sign     TP1.1 6/20/2018 11:25 AM Dilia Acharya RN Care Coordinator             Progress Notes by Candice Liu MD at 6/19/2018  5:00 PM     Author:  Candice Liu MD Service:  Hospitalist Author Type:  Physician    Filed:  6/19/2018  5:02 PM Date of Service:  6/19/2018  5:00 PM Creation Time:  6/19/2018  5:00 PM    Status:  Signed :  Candice Liu MD (Physician)             Internal Medicine Progress Note    Date of Service (when I saw the patient):[KK1.1] 06/19/2018    Assessment & Plan[KK1.2]   Nella Helms is a 42-year-old female with history of cognitive delay, left sided hemiplegia, severe epilepsy, ITP, who was transferred here from an outside emergency department for hypothermia and hypotension in the setting of multiple weeks of weakness and falls.  The patient has received 50 mg of hydrocortisone as well as intravenous fluids with improvement in both hypothermia and hypotension and is currently vitally stable.     At the outside ED, bladder temperature was noted to be in the low 30 C range.  She was given 50 mg of IV hydrocortisone with improvement.  She was noted to be hyponatremic to 128 and slightly hyperkalemic to 5.  She had recently stopped steroids June 9 for worsening thrombocytopenia in the context of ITP (taking 40 mg of prednisone from June 6 - June 8 and then 20 mg on June 9).    Seizures.  Controlled.     Hypothermia and hypotension with hyponatremia and mild hyperkalemia (resolved)  Most likely due to adrenal insufficiency due to frequent steroid intake.  Clinical features are suggestive and other likely diagnoses have been ruled out.  Also, she responded appropriately to hydrocortisone administration.  TSH was 3.16, am cortisol was 11.4 the day after getting hydrocortisone.  Procalcitonin negative make infection unlikely.  Not sure how rash (below) may be playing a role.  Per psych, \"Acute problems on admission " "were likely related to her being on the high doses of Risperdal and trazodone which are both strong alpha blockers and will cause hypotension\"   - Monitor closely   - Continue reduced trazadone.    - Continue increased Lamictal at 200 mg BID      Right facial rash likely due to impetigo (resolved)  She was recently on clindamycin for 7 days, ending June 6, with apparent improvement in the rash and worsening since discontinuation.  No vesicles to suggest Zoster. Honey-crust noted around right edge of mouth suggestive of impetigo.  Improving again after starting doxycycline.  Doxycycline course completed.   - Monitor for return of symptoms      Thrombocytopenia likely due to ITP  Hospitalization in February at the [facility] where this was diagnosed and improved markedly with steroids.  Completed steroid taper in March.  Recently seen in the ED June 5 with thrombocytopenia of 66 was noted and the patient was given steroids, per the [facility] MAR was taking 40 mg of prednisone from June 6 - June 8 and then 20 mg on June 9.  Plts continues to improve.  Today is 79.   - Continue to trend, avoid heparin or NSAIDs   - Transfuse platelets for less than 10 although with ITP steroids are ultimately needed      Acute on chronic gait instability  Patient has had multiple weeks of worsening gait instability and more difficulty with routine tasks such as eating.  Her psychiatrist has already decreased her risperidone from 4 mg twice daily to 3 in the morning and 4 at night without improvement. Neurology consulted and reports her antiepileptics don't typically cause weakness.  More likely secondary to steroid use.    - Reduce trazodone from 100 mg 8 am, 50 mg 4 pm, 150 mg qhs to 50 mg 8 am (50%), 25 mg 4 pm (50%) and continue the 150 mg qhs. Gradual taper given gait instability and history of severe seizures (withdrawal concern).   - Avoid steroids    - PT/OT   - Reduction of risperidone to 3 mg BID per psych     Leukopenia  3.6 at the " OSH and down to 2.8 here.  appears to be hovering mid-3s to 4's since February. On multiple drugs that could contribute, including: Lamictal, oxcarbazepine.  - continue to trend here      Severe epilepsy, well controlled.  (See H&P dated 2/17/18 for description of seizure types.)  No changes in medication per neurology. Appeared to have a typical seizure today (6/17).  Self resolved.   - Monitor for further seizure activity   - Continue Vimpat 200 mg daily   - Increase Lamictal to 200 mg two times daily per psych   - Continue Keppra 1000 twice daily   - Continue oxcarbazepine 600 mg twice daily    Dysphagia.  Followed by SLP.   - Change to regular diet.     Problematic behaviors. Could be akathisia or other extrapyramidal side effects from the high dose of Risperdal.  She has improved markedly during this hospitalization and has no evidence of more problematic behaviors emerging.  Discussed with psych today who feels that the small changes made shouldn't cause the behaviors seen these last two days.   - Continue Risperdal at 3 mg twice a day.     Diet:[KK1.1]   Active Diet Order      Regular Diet Adult[KK1.2]  DVT Prophylaxis: VTE Prophylaxis contraindicated due to thrombocytopenia  Code Status:[KK1.1] Full Code[KK1.2]    Disposition: Medically ready for discharge.  Expected discharge in 1 - 3 days once TCU placement found.[KK1.1]    Candice Liu[KK1.2], MD  Internal Medicine-Pediatrics Staff  Pager: [phone]    Please see sticky note for cross cover information     ------[KK1.1]    Interval History[KK1.2]     Eating better. No vomiting. Walked to PT. No new issues reported. She still needs assistance going to the bathroom etc. Sitter discontinued.     No fevers or chills.      Unable to obtain further history or ROS.[KK1.1]    Physical Exam     Vitals:    06/14/18 2157 06/15/18 1705 06/17/18 2028   Weight: 78.8 kg (173 lb 11.6 oz) 77.1 kg (169 lb 15.6 oz) 72.4 kg (159 lb 9.6 oz)[KK1.2]     Vital Signs with  Ranges[KK1.1]  Temp:  [95  F (35  C)-97.5  F (36.4  C)] 96.9  F (36.1  C)  Heart Rate:  [58-76] 76  Resp:  [16-18] 16  BP: (116-173)/(47-80) 125/72  SpO2:  [94 %-98 %] 94 %  I/O last 3 completed shifts:  In: 490 [P.O.:490]  Out: 400 [Urine:400][KK1.2]    Constitutional:sleepy but arousable with decreased responsiveness, no distress   Cardiovascular: RRR. + systolic murmur  Neuro: Challenging to assess, left side weaker then right  Psychiatric: affect at baseline; concentration poor  Joints:Minor abrasions to knees improved, no effusions[KK1.1]    Medications       docusate  100 mg Oral BID     lacosamide  200 mg Oral BID     lamoTRIgine  200 mg Oral BID     levETIRAcetam  1,000 mg Oral BID     OXcarbazepine  600 mg Oral BID     risperiDONE  3 mg Oral Daily at 8 pm     risperiDONE  3 mg Oral QAM     sodium chloride (PF)  3 mL Intracatheter Q8H     traZODone  150 mg Oral At Bedtime     traZODone  50 mg Oral Daily at 4 pm     traZODone  50 mg Oral QAM       Data[KK1.2]   ---    Nursing notes reviewed.  Patient seen with bedside nurse.     I have reviewed today's Medications and Vital Signs[KK1.1]         Revision History        User Key Date/Time User Provider Type Action    > KK1.2 6/19/2018  5:02 PM Candice Liu MD Physician Sign     KK1.1 6/19/2018  5:00 PM Candice Liu MD Physician             Progress Notes by Dilia Acharya RN at 6/19/2018  7:51 AM     Author:  Dilia Acharya RN Service:  Care Coordinator Author Type:  Care Coordinator    Filed:  6/19/2018  9:05 AM Date of Service:  6/19/2018  7:51 AM Creation Time:  6/19/2018  7:51 AM    Status:  Addendum :  Dilia Acharya RN (Care Coordinator)           Care Coordinator Progress Note    Admission Date/Time:  6/11/2018  Attending MD:  Haresh Redding MD    Data  Chart reviewed, discussed with interdisciplinary team.   Patient was admitted for: Data Unavailable.    Barriers to Discharge: chronically ill, cognitively impaired  and dependent with mobility/activities of daily living    Assessment[TP1.1]  6/19/2018 7:55 AM[TP1.2] RNCC reached out to  today asking if they can come to see pt today and assess this pt together with PT or OT.  Ariane will reach out to RNCC today to discuss timing, Ariane will update pts RN Ely and Alina (supervisor) and will discuss timing today and call this RNCC back.  Bedside rN to evaluate safety to remove 1:1 today.[TP1.1]      6/19/2018 9:03 AM[TP1.3]  staff that know this pt will not be in till tomorrow.  Appointment scheduled for tomorrow at 11 am with their staff and PT OT working with this pt.  IF pt is ok to dc  And they agree with pt coming home to , pt can dc at this time with them.  MD team updated, [facility] continues to work on TCU placement.[TP1.4]      Plan[TP1.1]  E[TP1.4]sandra by  today for dc safety with plan for home PT OT to improve strength and safety at home VS sw plan for TCU     Dilia Acharya RNCC, BSN    [facility]    Medicine Group  [address] [place] [id number]    [email or web address]  [email or web address]    Office: [phone] Pager: [phone][TP1.1]           Revision History        User Key Date/Time User Provider Type Action    > TP1.3 6/19/2018  9:05 AM Dilia Acharya RN Care Coordinator Addend     TP1.4 6/19/2018  9:02 AM Dilia Acharya RN Care Coordinator      TP1.2 6/19/2018  7:55 AM Dilia Acharya RN Care Coordinator Sign     TP1.1 6/19/2018  7:51 AM Dilia Acharya RN Care Coordinator             Progress Notes by Haresh Redding MD at 6/18/2018 10:08 PM     Author:  Haresh Redding MD Service:  Hospitalist Author Type:  Physician    Filed:  6/18/2018 10:27 PM Date of Service:  6/18/2018 10:08 PM Creation Time:  6/18/2018 10:08 PM    Status:  Signed :  Haresh Redding MD (Physician)             Internal Medicine Progress Note    Date of Service (when I saw the patient): 06/18/2018    Assessment & Plan   Nella Helms is  "a 42-year-old female with history of cognitive delay, left sided hemiplegia, severe epilepsy, ITP, who was transferred here from an outside emergency department for hypothermia and hypotension in the setting of multiple weeks of weakness and falls.  The patient has received 50 mg of hydrocortisone as well as intravenous fluids with improvement in both hypothermia and hypotension and is currently vitally stable.     At the outside ED, bladder temperature was noted to be in the low 30 C range.  She was given 50 mg of IV hydrocortisone with improvement.  She was noted to be hyponatremic to 128 and slightly hyperkalemic to 5.  She had recently stopped steroids June 9 for worsening thrombocytopenia in the context of ITP (taking 40 mg of prednisone from June 6 - June 8 and then 20 mg on June 9).    Today: Another episode of vomiting this morning but without post-ictal period.  Attempted to remove sitter but remained impulsive and weak.    Seizures.      Hypothermia and hypotension with hyponatremia and mild hyperkalemia (resolved)  Most likely due to adrenal insufficiency due to frequent steroid intake.  Clinical features are suggestive and other likely diagnoses have been ruled out.  Also, she responded appropriately to hydrocortisone administration.  TSH was 3.16, am cortisol was 11.4 the day after getting hydrocortisone.  Procalcitonin negative make infection unlikely.  Not sure how rash (below) may be playing a role.  Per psych, \"Acute problems on admission were likely related to her being on the high doses of Risperdal and trazodone which are both strong alpha blockers and will cause hypotension\"   - Monitor closely   - Continue reduced trazadone.    - Continue increased Lamictal at 200 mg BID      Right facial rash likely due to impetigo (resolved)  She was recently on clindamycin for 7 days, ending June 6, with apparent improvement in the rash and worsening since discontinuation.  No vesicles to suggest Zoster. " Honey-crust noted around right edge of mouth suggestive of impetigo.  Improving again after starting doxycycline.  Doxycycline course completed.   - Monitor for return of symptoms      Thrombocytopenia likely due to ITP  Hospitalization in February at the [facility] where this was diagnosed and improved markedly with steroids.  Completed steroid taper in March.  Recently seen in the ED June 5 with thrombocytopenia of 66 was noted and the patient was given steroids, per the [facility] MAR was taking 40 mg of prednisone from June 6 - June 8 and then 20 mg on June 9.  Plts continues to improve.  Today is 79.   - Continue to trend, avoid heparin or NSAIDs   - Transfuse platelets for less than 10 although with ITP steroids are ultimately needed      Acute on chronic gait instability  Patient has had multiple weeks of worsening gait instability and more difficulty with routine tasks such as eating.  Her psychiatrist has already decreased her risperidone from 4 mg twice daily to 3 in the morning and 4 at night without improvement. Neurology consulted and reports her antiepileptics don't typically cause weakness.  More likely secondary to steroid use.    - Reduce trazodone from 100 mg 8 am, 50 mg 4 pm, 150 mg qhs to 50 mg 8 am (50%), 25 mg 4 pm (50%) and continue the 150 mg qhs. Gradual taper given gait instability and history of severe seizures (withdrawal concern).   - Avoid steroids    - PT/OT   - Reduction of risperidone to 3 mg BID per psych     Leukopenia  3.6 at the OSH and down to 2.8 here.  appears to be hovering mid-3s to 4's since February. On multiple drugs that could contribute, including: Lamictal, oxcarbazepine.  - continue to trend here      Severe epilepsy, well controlled.  (See H&P dated 2/17/18 for description of seizure types.)  No changes in medication per neurology. Appeared to have a typical seizure today (6/17).  Self resolved.   - Monitor for further seizure activity   - Continue Vimpat 200 mg daily   -  Increase Lamictal to 200 mg two times daily per psych   - Continue Keppra 1000 twice daily   - Continue oxcarbazepine 600 mg twice daily    Dysphagia.  Followed by SLP.   - Change to regular diet.     Problematic behaviors. Could be akathisia or other extrapyramidal side effects from the high dose of Risperdal.  She has improved markedly during this hospitalization and has no evidence of more problematic behaviors emerging.  Discussed with psych today who feels that the small changes made shouldn't cause the behaviors seen these last two days.   - Continue Risperdal at 3 mg twice a day.     Diet:   Active Diet Order      Regular Diet Adult  DVT Prophylaxis: VTE Prophylaxis contraindicated due to thrombocytopenia  Code Status: Full Code    Disposition: Medically ready for discharge.  Expected discharge in 1 - 3 days once TCU placement found.    Haresh Redding MD  Internal Medicine-Pediatrics Staff  Pager: [phone]    Please see sticky note for cross cover information     ------    Interval History   Per nursing another episode of vomiting today but without a post-ictal period.  Still showing impulsive and sometimes aggressive behaviors.  Nursing contacted group home who endorse that these are not new but combined with her weakness makes her more dangerous to herself.    Otherwise unchanged.    No fevers or chills.      Unable to obtain further history or ROS.    Physical Exam     Vitals:    06/14/18 2157 06/15/18 1705 06/17/18 2028   Weight: 78.8 kg (173 lb 11.6 oz) 77.1 kg (169 lb 15.6 oz) 72.4 kg (159 lb 9.6 oz)     Vital Signs with Ranges  Temp:  [95.2  F (35.1  C)-97.2  F (36.2  C)] 95.2  F (35.1  C)  Pulse:  [88] 88  Heart Rate:  [64-74] 73  Resp:  [17-18] 17  BP: (125-173)/(53-85) 173/75  SpO2:  [95 %-98 %] 98 %  I/O last 3 completed shifts:  In: 1250 [P.O.:1250]  Out: 1150 [Urine:1150]    Constitutional:sleepy but arousable with decreased responsiveness, no distress   Cardiovascular: RRR. + systolic  murmur  Neuro: Challenging to assess, left side weaker then right  Psychiatric: affect at baseline; concentration poor  Joints:Minor abrasions to knees improved, no effusions    Medications       docusate  100 mg Oral BID     lacosamide  200 mg Oral BID     lamoTRIgine  200 mg Oral BID     levETIRAcetam  1,000 mg Oral BID     OXcarbazepine  600 mg Oral BID     risperiDONE  3 mg Oral Daily at 8 pm     risperiDONE  3 mg Oral QAM     sodium chloride (PF)  3 mL Intracatheter Q8H     traZODone  150 mg Oral At Bedtime     traZODone  50 mg Oral Daily at 4 pm     traZODone  50 mg Oral QAM       Data   ---    Nursing notes reviewed.  Patient seen with bedside nurse.     I have reviewed today's Medications and Vital Signs    Discussed with Psychiatry.[MK1.1]       Revision History        User Key Date/Time User Provider Type Action    > MK1.1 6/18/2018 10:27 PM Haresh Redding MD Physician Sign            Progress Notes by Codi Pierce RD at 6/18/2018  2:00 PM     Author:  Codi Pierce RD Service:  Nutrition Author Type:  Registered Dietitian    Filed:  6/18/2018  2:01 PM Date of Service:  6/18/2018  2:00 PM Creation Time:  6/18/2018  2:00 PM    Status:  Signed :  Codi Pierce RD (Registered Dietitian)         CLINICAL NUTRITION SERVICES - Brief note:     Reviewed nutrition risk factors due to LOS x7 days. Pt is tolerating her Regular diet, eating well per nursing documentation. No nutrition issues identified at this time. RD will continue to follow per nutrition protocol.    Codi Pierce RD/ELMER  Pager [phone][FW1.1]               Revision History        User Key Date/Time User Provider Type Action    > FW1.1 6/18/2018  2:01 PM Codi Pierce RD Registered Dietitian Sign            Progress Notes by Dilia Acharya, RN at 6/18/2018 10:29 AM     Author:  Dilia Acharya, RN Service:  Care Coordinator Author Type:  Care Coordinator    Filed:  6/18/2018 12:12 PM Date of Service:  6/18/2018 10:29 AM Creation  "Time:  6/18/2018 10:29 AM    Status:  Addendum :  Dilia Acharya RN (Care Coordinator)             Care Coordinator - Discharge Planning    Admission Date/Time:  6/11/2018  Attending MD:  Haresh Redding MD    Concerns with insurance coverage for discharge needs: None.  Current Living Situation: Patient lives in a group home.  Support System: Supportive and Involved  Services Involved: Group home staffed with 1 aide to 4 residents  Transportation at Discharge:  staff will come to [facility] for dc ride  Transportation to Medical Appointments:   - Name of caregiver: Abdon- brother and Legal Guardian  Barriers to Discharge: Needs to be able to be mainly independant with walking, stairs, and grooming.     Medical work up continues.  Pt is not yet ready to be considered for DC, pending continued medical work up, PT to eval and work with pt once md team deems appropriate.      Current Living Situation: Patient lives in a group home.  [facility] in [place]. Main phone: [phone]. Ely Renteria, Nurse or Rosalina Jaime, Director.       Coordination of Care and Referrals  Abdon, juder,  is pt's legal guardian. - this was scanned in \"Scan on 3/4/2018  9:40 AM : ORDER APPOINTING GENERAL GUARDIAN OF THE PERSON 1/6/95\".  Pt has been at [facility] in [place] for about 20 years. It is a split level home. Pt's mobility has declined in the last few weeks.  Per PT pt is at her baseline with stairs and mobility, however after talking with  pt is not currently at baseline as pt was indep with transfers and toileting in the home.  RNCC spoke with PT/OT to see pt today to eval for safety at home with indep transfers and toileting.  Bedside RN states pt has been on a 1:1 here at [facility] for safety reasons, fall 6/16.  RNCC reached out to the group home, [facility], and [facility] with SANDY Fuller at [phone], who is also the  Alina, to discuss dc needs and dc plan.  Also reached out " to [facility], and spoke with SANDY Thakkar about pts pending dc.  Ariane is going to call and speak with their director about pt needs and RNCC will update her on mobility once PT OT see pt today.   Per PCA pt is a hand hold transfer and toilet.  Ariane can be reached @ [phone], rncc will fax avs to them once dc is known.   Per md team pt is medically ready for dc today.      Transport is usually provided by staff at  upon dc from the hospital and to/ from appointments.      Pharmacy of choice [facility] in [place]- per md no new dc meds will be prescribed, pt completed abx course here.      RNCC reached out to Abdon via phone to update on probable dc today, will update him if plans change.[TP1.1]     6/18/2018 12:09 PM[TP1.2] OT worked with pt, she is not safe to dc today, md to eval pt.  Pt vomiting X3 per nursing.  RNCC update  on change of plan that pt is not ready for Dc.  Sw and RNCC continue to follow pt.[TP1.3]      Dilia Acharya, MITCH, BSN    [facility]    Medicine Group  [address] [place] [id number]    [email or web address]  [email or web address]    Office: [phone] Pager: [phone][TP1.1]        Revision History        User Key Date/Time User Provider Type Action    > TP1.2 6/18/2018 12:12 PM Dilia Acharya RN Care Coordinator Addend     TP1.3 6/18/2018 12:06 PM Dilia Acharya RN Care Coordinator      TP1.1 6/18/2018 11:06 AM Dilia Acharya RN Care Coordinator Sign            Progress Notes by Haresh Redding MD at 6/17/2018  3:35 PM     Author:  Haresh Redding MD Service:  Hospitalist Author Type:  Physician    Filed:  6/17/2018  9:47 PM Date of Service:  6/17/2018  3:35 PM Creation Time:  6/17/2018  3:35 PM    Status:  Signed :  Haresh Redding MD (Physician)             Internal Medicine Progress Note    Date of Service (when I saw the patient): 06/17/2018    Assessment & Plan   Nella Helms is a 42-year-old female with history  "of cognitive delay, left sided hemiplegia, severe epilepsy, ITP, who was transferred here from an outside emergency department for hypothermia and hypotension in the setting of multiple weeks of weakness and falls.  The patient has received 50 mg of hydrocortisone as well as intravenous fluids with improvement in both hypothermia and hypotension and is currently vitally stable.     At the outside ED, bladder temperature was noted to be in the low 30 C range.  She was given 50 mg of IV hydrocortisone with improvement.  She was noted to be hyponatremic to 128 and slightly hyperkalemic to 5.  She had recently stopped steroids June 9 for worsening thrombocytopenia in the context of ITP (taking 40 mg of prednisone from June 6 - June 8 and then 20 mg on June 9).    Today:[MK1.1] Had an episode this morning where she suddenly through up and then was awake but unresponsive for some time.  Appeared to be a post-ictal.  Returned to baseline.    Seizures.[MK1.2]      Hypothermia and hypotension with hyponatremia and mild hyperkalemia[MK1.1] (resolved)[MK1.2]  Most likely due to adrenal insufficiency due to frequent steroid intake.  Clinical features are suggestive and other likely diagnoses have been ruled out.  Also, she responded appropriately to hydrocortisone administration.  TSH was 3.16, am cortisol was 11.4 the day after getting hydrocortisone.  Procalcitonin negative make infection unlikely.  Not sure how rash (below) may be playing a role.  Per psych, \"Acute problems on admission were likely related to her being on the high doses of Risperdal and trazodone which are both strong alpha blockers and will cause hypotension\"   - Monitor closely   - Continue[MK1.1] reduced trazadone[MK1.2].    -[MK1.1] Continue[MK1.2] increas[MK1.1]ed[MK1.2] Lamictal[MK1.1] at[MK1.2] 200 mg BID      Right facial rash likely due to impetigo[MK1.1] (resolved)[MK1.2]  She was recently on clindamycin for 7 days, ending June 6, with apparent " improvement in the rash and worsening since discontinuation.  No vesicles to suggest Zoster. Honey-crust noted around right edge of mouth suggestive of impetigo.  Improving again after starting doxycycline[MK1.1].  D[MK1.2]oxycycline[MK1.1] course completed.   - Monitor for return of symptoms[MK1.2]      Thrombocytopenia likely due to ITP  Hospitalization in February at the [facility] where this was diagnosed and improved markedly with steroids.  Completed steroid taper in March.  Recently seen in the ED June 5 with thrombocytopenia of 66 was noted and the patient was given steroids, per the NH MAR was taking 40 mg of prednisone from June 6 - June 8 and then 20 mg on June 9.  Plts[MK1.1] continues to improve.  Today is 79.[MK1.2]   - Continue to trend, avoid heparin or NSAIDs   - Transfuse platelets for less than 10 although with ITP steroids are ultimately needed      Acute on chronic gait instability  Patient has had multiple weeks of worsening gait instability and more difficulty with routine tasks such as eating.  Her psychiatrist has already decreased her risperidone from 4 mg twice daily to 3 in the morning and 4 at night without improvement. Neurology consulted and reports her antiepileptics don't typically cause weakness.  More likely secondary to steroid use.    - Reduce trazodone from 100 mg 8 am, 50 mg 4 pm, 150 mg qhs to 50 mg 8 am (50%), 25 mg 4 pm (50%) and continue the 150 mg qhs. Gradual taper given gait instability and history of severe seizures (withdrawal concern).   - Avoid steroids    - PT/OT   - Reduction of risperidone to 3 mg BID per psych     Leukopenia  3.6 at the OSH and down to 2.8[MK1.1] here.[MK1.2]  appears to be hovering mid-3s to 4's since February. On multiple drugs that could contribute, including: Lamictal, oxcarbazepine.  - continue to trend here      Severe epilepsy, well controlled.[MK1.1]  (See H&P dated 2/17/18 for description of seizure types.)[MK1.2]  No changes[MK1.1] in  medication[MK1.2] per neurology.[MK1.1] Appeared to have a typical seizure today (6/17).  Self resolved.   - Monitor for further seizure activity[MK1.2]   - Continue Vimpat 200 mg daily   - Increase Lamictal to 200 mg two times daily per psych   - Continue Keppra 1000 twice daily   - Continue oxcarbazepine 600 mg twice daily    Dysphagia.  Followed by SLP.   - Change[MK1.1] to regular diet.[MK1.2]     Problematic behaviors. Could be akathisia or other extrapyramidal side effects from the high dose of Risperdal.  She has improved markedly during this hospitalization and has no evidence of more problematic behaviors emerging.   - Risperdal changed to 3 mg twice a day.     Diet:   Active Diet Order      Dysphagia Diet Level 3 Advanced Thin Liquids (water, ice chips, juice, milk gelatin, ice cream, etc)  DVT Prophylaxis: VTE Prophylaxis contraindicated due to thrombocytopenia  Code Status: Full Code    Disposition:[MK1.1] Medically ready for discharge.[MK1.2]  Expected discharge in[MK1.1] 1[MK1.2] - 3 days once TCU placement found.    Haresh Redding MD  Internal Medicine-Pediatrics Staff  Pager: [phone]    Please see sticky note for cross cover information     ------    Interval History[MK1.1]   Episode of vomiting today followed by post-ictal period[MK1.2]    Otherwise unchanged.    No fevers or chills.      Unable to obtain further history or ROS.    Physical Exam     Vitals:    06/13/18 2000 06/14/18 2157 06/15/18 1705   Weight: 79.9 kg (176 lb 2.4 oz) 78.8 kg (173 lb 11.6 oz) 77.1 kg (169 lb 15.6 oz)     Vital Signs with Ranges  Temp:  [95.8  F (35.4  C)-98.7  F (37.1  C)] 97.1  F (36.2  C)  Pulse:  [82-84] 82  Heart Rate:  [70-84] 83  Resp:  [16-18] 18  BP: (115-141)/(60-89) 141/72  SpO2:  [93 %-97 %] 97 %  I/O last 3 completed shifts:  In: 1540 [P.O.:1540]  Out: 300 [Urine:300]    Constitutional:[MK1.1] (after event) awake but sleepy with decreased responsiveness, no[MK1.2] distress   Cardiovascular: RRR. + systolic  murmur  Neuro: Challenging to assess, left side weaker then right  Psychiatric: affect at baseline per brother; concentration poor  Joints: (after slumping to knees) Minor abrasions to knees, no effusions    Medications       docusate  100 mg Oral BID     lacosamide  200 mg Oral BID     lamoTRIgine  200 mg Oral BID     levETIRAcetam  1,000 mg Oral BID     OXcarbazepine  600 mg Oral BID     risperiDONE  3 mg Oral Daily at 8 pm     risperiDONE  3 mg Oral QAM     sodium chloride (PF)  3 mL Intracatheter Q8H     traZODone  150 mg Oral At Bedtime     traZODone  50 mg Oral Daily at 4 pm     traZODone  50 mg Oral QAM       Data   ---    Nursing notes reviewed.  Patient seen with bedside nurse.     I have reviewed today's Medications[MK1.1] and[MK1.2] Vital Signs[MK1.1]       Revision History        User Key Date/Time User Provider Type Action    > MK1.2 6/17/2018  9:47 PM Haresh Redding MD Physician Sign     MK1.1 6/17/2018  3:35 PM Haresh Redding MD Physician                   Procedure Notes     No notes of this type exist for this encounter.      Progress Notes - Therapies (Notes from 06/17/18 through 06/20/18)     No notes of this type exist for this encounter.

## 2018-06-11 NOTE — IP AVS SNAPSHOT
"    UNIT 5B [facility] BANK: [phone]                                              INTERAGENCY TRANSFER FORM - LAB / IMAGING / EKG / EMG RESULTS   2018                    Hospital Admission Date: 2018  JOHANNA SIMONS   : 1975  Sex: Female        Attending Provider: Candice Liu MD     Allergies:  Cefzil [Cefprozil], Codeine Phosphate, Dilantin [Phenytoin], Penicillins    Infection:  None   Service:  INTERNAL MED    Ht:  1.549 m (5' 1\")   Wt:  71.6 kg (157 lb 14.4 oz)   Admission Wt:  79.2 kg (174 lb 9.7 oz)    BMI:  29.83 kg/m 2   BSA:  1.76 m 2            Patient PCP Information     Provider PCP Type    [facility]         Lab Results - 3 Days      Basic metabolic panel [[id number]] (Abnormal)  Resulted: 18 0700, Result status: Final result    Ordering provider: Haresh Redding MD  18 0000 Resulting lab: [facility]    Specimen Information    Type Source Collected On   Blood  18 0553          Components       Value Reference Range Flag Lab   Sodium 139 133 - 144 mmol/L  51   Potassium 3.9 3.4 - 5.3 mmol/L  51   Chloride 102 94 - 109 mmol/L  51   Carbon Dioxide 29 20 - 32 mmol/L  51   Anion Gap 7 3 - 14 mmol/L  51   Glucose 88 70 - 99 mg/dL  51   Urea Nitrogen 6 7 - 30 mg/dL L 51   Creatinine 0.37 0.52 - 1.04 mg/dL L 51   GFR Estimate >90 >60 mL/min/1.7m2  51   Comment:  Non  GFR Calc   GFR Estimate If Black >90 >60 mL/min/1.7m2  51   Comment:  African American GFR Calc   Calcium 9.6 8.5 - 10.1 mg/dL  51            CBC with platelets [[id number]] (Abnormal)  Resulted: 18 0640, Result status: Final result    Ordering provider: Haresh Redding MD  18 0000 Resulting lab: [facility]    Specimen Information    Type Source Collected On   Blood  18 0553          Components       Value Reference Range Flag Lab   WBC 4.3 4.0 - 11.0 10e9/L  51   RBC Count 3.35 3.8 - 5.2 " 10e12/L L 51   Hemoglobin 10.7 11.7 - 15.7 g/dL L 51   Hematocrit 32.5 35.0 - 47.0 % L 51   MCV 97 78 - 100 fl  51   MCH 31.9 26.5 - 33.0 pg  51   MCHC 32.9 31.5 - 36.5 g/dL  51   RDW 13.4 10.0 - 15.0 %  51   Platelet Count 155 150 - 450 10e9/L  51            Lacosamide Level [[id number]]  Resulted: 06/17/18 0409, Result status: Final result    Ordering provider: Haresh Redding MD  06/15/18 0001 Resulting lab: [facility]    Specimen Information    Type Source Collected On   Blood  06/15/18 0537          Components       Value Reference Range Flag Lab   Lacosamide 6.3 5.0 - 10.0 ug/mL  51   Comment:         (Note)  INTERPRETIVE INFORMATION: Lacosamide, Serum or Plasma  Therapeutic Range: Not well established.   Suggested range 5.0 - 10.0 ug/mL  Dose-related range (values at doses of 200-600 mg/day):   2.5 - 18.0 ug/mL  Toxic: Not well established.  Adverse effects may include dizziness, fatigue, nausea,   vomiting, blurred vision and tremor.  Test developed and characteristics determined by [employer]. See Compliance Statement B: [employer]  Performed by [employer],  [address] [place] [address] [phone]  [email or web address], Dennis Sharpe MD, Lab. Director              Testing Performed By     Lab - Abbreviation Name Director Address Valid Date Range    51 - Unknown [facility] Unknown [address] [facility] [address] 12/31/14 1010 - Present            Unresulted Labs     None      Encounter-Level Documents:     There are no encounter-level documents.      Order-Level Documents:     There are no order-level documents.

## 2018-06-11 NOTE — IP AVS SNAPSHOT
` `     UNIT 5B [facility] BANK: [phone]            Medication Administration Report for Nella Helms as of 06/20/18 1155   Legend:    Given Hold Not Given Due Canceled Entry Other Actions    Time Time (Time) Time  Time-Action       Inactive    Active    Linked        Medications 06/14/18 06/15/18 06/16/18 06/17/18 06/18/18 06/19/18 06/20/18    acetaminophen (TYLENOL) tablet 650 mg  Dose: 650 mg  Freq: EVERY 4 HOURS PRN Route: PO  PRN Reasons: mild pain,fever  Start: 06/18/18 2044   Admin Instructions: Maximum acetaminophen dose from all sources = 75 mg/kg/day not to exceed 4 grams/day.    Admin. Amount: 2 tablet (2 × 325 mg tablet)  Last Admin: 06/20/18 0428  Dispense Loc: [facility] ADS 5B1         2115 (650 mg)-Given        0439 (650 mg)-Given       0824 (650 mg)-Given       1421 (650 mg)-Given       1849 (650 mg)-Given        0001 (650 mg)-Given       0428 (650 mg)-Given           docusate (COLACE) 50 MG/5ML liquid 100 mg  Dose: 100 mg  Freq: 2 TIMES DAILY Route: PO  Start: 06/13/18 2000   Admin. Amount: 100 mg = 10 mL Conc: 10 mg/mL  Last Admin: 06/20/18 0836  Dispense Loc: [facility]  Volume: 10 mL     (0825)-Not Given       (0834)-Not Given [C]       (1946)-Not Given [C]        0859 (100 mg)-Given       2139 (100 mg)-Given        (0900)-Not Given       2020 (100 mg)-Given        0829 (100 mg)-Given       2257 (100 mg)-Given        0954 (100 mg)-Given       2024 (100 mg)-Given        0822 (100 mg)-Given       (2034)-Not Given        0836 (100 mg)-Given       [ ] 2000           Lacosamide (VIMPAT) tablet 200 mg  Dose: 200 mg  Freq: 2 TIMES DAILY Route: PO  Start: 06/12/18 0000   Admin. Amount: 1 tablet (1 × 200 mg tablet)  Last Admin: 06/20/18 0836  Dispense Loc: [facility] ADS 5B1     0824 (200 mg)-Given       1945 (200 mg)-Given        0858 (200 mg)-Given       2137 (200 mg)-Given        0849 (200 mg)-Given       2019 (200 mg)-Given        0830 (200 mg)-Given       2014 (200 mg)-Given        0956  "(200 mg)-Given       2024 (200 mg)-Given        0823 (200 mg)-Given       2036 (200 mg)-Given        0836 (200 mg)-Given       [ ] 2000           lamoTRIgine (LaMICtal) tablet 200 mg  Dose: 200 mg  Freq: 2 TIMES DAILY Route: PO  Start: 06/16/18 2000   Admin. Amount: 2 tablet (2 × 100 mg tablet)  Last Admin: 06/20/18 0835  Dispense Loc: [facility] ADS 5B1       2017 (200 mg)-Given        0831 (200 mg)-Given       2014 (200 mg)-Given        0955 (200 mg)-Given       2025 (200 mg)-Given        0823 (200 mg)-Given       2037 (200 mg)-Given        0835 (200 mg)-Given       [ ] 2000           levETIRAcetam (KEPPRA) tablet 1,000 mg  Dose: 1,000 mg  Freq: 2 TIMES DAILY Route: PO  Start: 06/12/18 0000   Admin. Amount: 2 tablet (2 × 500 mg tablet)  Last Admin: 06/20/18 0835  Dispense Loc: [facility] ADS 5B1     0825 (1,000 mg)-Given       1945 (1,000 mg)-Given        0859 (1,000 mg)-Given       2137 (1,000 mg)-Given        0849 (1,000 mg)-Given       2018 (1,000 mg)-Given        0831 (1,000 mg)-Given       2014 (1,000 mg)-Given        0955 (1,000 mg)-Given       2025 (1,000 mg)-Given        0823 (1,000 mg)-Given       2037 (1,000 mg)-Given        0835 (1,000 mg)-Given       [ ] 2000           lidocaine (LMX4) kit  Freq: EVERY 1 HOUR PRN Route: Top  PRN Reason: pain  PRN Comment: with VAD insertion or accessing implanted port.  Start: 06/11/18 2055   Admin Instructions: Do NOT give if patient has a history of allergy to any local anesthetic or any \"souleymane\" product.   Apply 30 minutes prior to VAD insertion or port access.  MAX Dose:  2.5 g (  of 5 g tube)    Dispense Loc: [facility] ADS 5B1               lidocaine 1 % 1 mL  Dose: 1 mL  Freq: EVERY 1 HOUR PRN Route: OTHER  PRN Comment: mild pain with VAD insertion or accessing implanted port  Start: 06/11/18 2055   Admin Instructions: Do NOT give if patient has a history of allergy to any local anesthetic or any \"souleymane\" product. MAX dose 1 mL subcutaneous OR intradermal in divided doses. "    Admin. Amount: 1 mL  Dispense Loc: [facility] ADS 5B1  Volume: 2 mL               naloxone (NARCAN) injection 0.1-0.4 mg  Dose: 0.1-0.4 mg  Freq: EVERY 2 MIN PRN Route: IV  PRN Reason: opioid reversal  Start: 06/11/18 2054   Admin Instructions: For respiratory rate LESS than or EQUAL to 8.  Partial reversal dose:  0.1 mg titrated q 2 minutes for Analgesia Side Effects Monitoring Sedation Level of 3 (frequently drowsy, arousable, drifts to sleep during conversation).Full reversal dose:  0.4 mg bolus for Analgesia Side Effects Monitoring Sedation Level of 4 (somnolent, minimal or no response to stimulation).  For ordered doses up to 2mg give IVP. Give each 0.4mg over 15 seconds in emergency situations. For non-emergent situations further dilute in 9mL of NS to facilitate titration of response.    Admin. Amount: 0.1-0.4 mg = 0.25-1 mL Conc: 0.4 mg/mL  Dispense Loc: [facility] ADS 5B1  Volume: 1 mL               ondansetron (ZOFRAN-ODT) ODT tab 4 mg  Dose: 4 mg  Freq: EVERY 6 HOURS PRN Route: PO  PRN Reasons: nausea,vomiting  Start: 06/17/18 1039   Admin Instructions: With dry hands, peel back foil backing and gently remove tablet; do not push oral disintegrating tablet through foil backing; administer immediately on tongue and oral disintegrating tablet dissolves in seconds; then swallow with saliva; liquid not required.    Admin. Amount: 1 tablet (1 × 4 mg tablet)  Last Admin: 06/20/18 0834  Dispense Loc: [facility] ADS 5B1        1100 (4 mg)-Given        1120 (4 mg)-Given        0822 (4 mg)-Given        0834 (4 mg)-Given           OXcarbazepine (TRILEPTAL) tablet 600 mg  Dose: 600 mg  Freq: 2 TIMES DAILY Route: PO  Start: 06/12/18 0000   Admin. Amount: 2 tablet (2 × 300 mg tablet)  Last Admin: 06/20/18 0835  Dispense Loc: [facility] ADS 5B1     0825 (600 mg)-Given       1945 (600 mg)-Given        0859 (600 mg)-Given       2138 (600 mg)-Given        0849 (600 mg)-Given       2018 (600 mg)-Given        0832 (600 mg)-Given        2014 (600 mg)-Given        0955 (600 mg)-Given       2025 (600 mg)-Given        0823 (600 mg)-Given       2037 (600 mg)-Given        0835 (600 mg)-Given       [ ] 2000           risperiDONE (risperDAL) tablet 3 mg  Dose: 3 mg  Freq: DAILY AT 8PM Route: PO  Start: 06/16/18 2000   Admin. Amount: 1 tablet (1 × 3 mg tablet)  Last Admin: 06/19/18 2037  Dispense Loc: [facility] ADS 5B1       2019 (3 mg)-Given        2015 (3 mg)-Given        2025 (3 mg)-Given        2037 (3 mg)-Given        [ ] 2000           risperiDONE (risperDAL) tablet 3 mg  Dose: 3 mg  Freq: EVERY MORNING Route: PO  Start: 06/12/18 0800   Admin. Amount: 1 tablet (1 × 3 mg tablet)  Last Admin: 06/20/18 0836  Dispense Loc: [facility] ADS 5B1     0825 (3 mg)-Given        0859 (3 mg)-Given        0850 (3 mg)-Given        0832 (3 mg)-Given        0955 (3 mg)-Given        0823 (3 mg)-Given        0836 (3 mg)-Given           sodium chloride (PF) 0.9% PF flush 3 mL  Dose: 3 mL  Freq: EVERY 8 HOURS Route: IK  Start: 06/11/18 2100   Admin Instructions: And Q1H PRN, to lock peripheral IV dormant line.    Admin. Amount: 3 mL  Last Admin: 06/20/18 0353  Dispense Loc: [facility] Floor Stock  Volume: 3 mL   Current Line: Peripheral IV 06/11/18 Left Lower forearm    0348 (3 mL)-Given       (1248)-Not Given       2220 (3 mL)-Given        0455 (3 mL)-Given       (1328)-Not Given       2151 (3 mL)-Given               1115 (3 mL)-Given       (2148)-Not Given [C]        0845 (3 mL)-Given       (1444)-Not Given [C]       2259 (3 mL)-Given        0522 (3 mL)-Given       (1350)-Not Given       2025 (3 mL)-Given        0442 (3 mL)-Given       1346 (3 mL)-Given       2037 (3 mL)-Given        0353 (3 mL)-Given [C]       [ ] 1200       [ ] 2000           sodium chloride (PF) 0.9% PF flush 3 mL  Dose: 3 mL  Freq: EVERY 1 HOUR PRN Route: IK  PRN Reason: line flush  PRN Comment: for peripheral IV flush post IV meds  Start: 06/11/18 2055   Admin. Amount: 3 mL  Dispense Loc: [facility] Floor  Stock  Volume: 3 mL               traZODone (DESYREL) tablet 150 mg  Dose: 150 mg  Freq: AT BEDTIME Route: PO  Start: 06/12/18 0015   Admin. Amount: 3 tablet (3 × 50 mg tablet)  Last Admin: 06/19/18 2116  Dispense Loc: [facility] ADS 5B1     2220 (150 mg)-Given        2232 (150 mg)-Given        2220 (150 mg)-Given        2257 (150 mg)-Given        2115 (150 mg)-Given        2116 (150 mg)-Given        [ ] 2200           traZODone (DESYREL) tablet 50 mg  Dose: 50 mg  Freq: DAILY AT 4 Route: PO  Start: 06/17/18 1600   Admin. Amount: 1 tablet (1 × 50 mg tablet)  Last Admin: 06/19/18 1646  Dispense Loc: [facility] ADS 5B1        1637 (50 mg)-Given        1648 (50 mg)-Given        1646 (50 mg)-Given        [ ] 1600           traZODone (DESYREL) tablet 50 mg  Dose: 50 mg  Freq: EVERY MORNING Route: PO  Start: 06/12/18 0800   Admin. Amount: 1 tablet (1 × 50 mg tablet)  Last Admin: 06/20/18 0836  Dispense Loc: [facility] ADS 5B1     0825 (50 mg)-Given        0859 (50 mg)-Given        0849 (50 mg)-Given        0832 (50 mg)-Given        0956 (50 mg)-Given        0824 (50 mg)-Given        0836 (50 mg)-Given        Medications 06/14/18 06/15/18 06/16/18 06/17/18 06/18/18 06/19/18 06/20/18

## 2018-06-11 NOTE — IP AVS SNAPSHOT
MRN:[id number]                      After Visit Summary   6/11/2018    Nella Helms    MRN: [id number]           Thank you!     Thank you for choosing [facility] for your care. Our goal is always to provide you with excellent care. Hearing back from our patients is one way we can continue to improve our services. Please take a few minutes to complete the written survey that you may receive in the mail after you visit with us. Thank you!        Patient Information     Date Of Birth          1975        Designated Caregiver       Most Recent Value    Caregiver    Will someone help with your care after discharge? yes    Name of designated caregiver [facility]    Phone number of caregiver N/A    Caregiver address N/A      About your hospital stay     You were admitted on:  June 11, 2018 You last received care in the:  [address] [facility]    You were discharged on:  June 20, 2018        Reason for your hospital stay       Hypotension, Hypothermia and high K due to adrenal insufficiency. Got better with IV steroid dose. SP treatment of impetigo on face with doxycycline.                  Who to Call     For medical emergencies, please call 911.  For non-urgent questions about your medical care, please call your primary care provider or clinic, [phone]          Attending Provider     Provider Specialty    Vincent Dent MD Internal Medicine    Matthias Deluna MD Internal Medicine    Haresh Redding MD Internal Medicine    Alexa, Candice Bernabe MD Internal Medicine       Primary Care Provider Office Phone # Fax #    Bryan Fritsch [phone]      After Care Instructions     Activity       Your activity upon discharge: activity as tolerated            Diet       Follow this diet upon discharge: Orders Placed This Encounter      Room Service      Regular Diet Adult            Discharge Instructions       Okay to resume work as before.    Okay to Resume  "Standing order from the group home ([facility])                  Follow-up Appointments     Follow Up and recommended labs and tests       Follow up with primary care provider, Bryan Fritsch, within 7 days for hospital follow- up.  No follow up labs or test are needed.                  Your next 10 appointments already scheduled     Dec 18, 2018  2:30 PM CST   [facility]    [address] [place], [address]  [facility] [address] [phone]              Additional Services     Physical Therapy Referral       *This therapy referral will be filtered to a centralized scheduling office at [facility] and the patient will receive a call to schedule an appointment at a [facility] location most convenient for them. *     [facility] provides Physical Therapy evaluation and treatment and many specialty services across the [facility] system.  If requesting a specialty program, please choose from the list below.    If you have not heard from the scheduling office within 2 business days, please call [phone] for all locations, with the exception of [place], please call [phone] and [facility], please call [phone]  Treatment: Evaluation & Treatment  Special Instructions/Modalities: strength  Special Programs: NONE    Please be aware that coverage of these services is subject to the terms and limitations of your health insurance plan.  Call member services at your health plan with any benefit or coverage questions.      **Note to Provider:  If you are referring outside of [facility] for the therapy appointment, please list the name of the location in the \"special instructions\" above, print the referral and give to the patient to schedule the appointment.            Physical Therapy Referral       Evaluate and treat                  Pending Results     No orders found from 6/9/2018 to 6/12/2018.    " "        Statement of Approval     Ordered          18 1501  I have reviewed and agree with all the recommendations and orders detailed in this document.  EFFECTIVE NOW     Approved and electronically signed by:  Candice Liu MD           18 1227  I have reviewed and agree with all the recommendations and orders detailed in this document.  EFFECTIVE NOW     Approved and electronically signed by:  Candice Liu MD           18 1149  I have reviewed and agree with all the recommendations and orders detailed in this document.  EFFECTIVE NOW     Approved and electronically signed by:  Candice Liu MD             Admission Information     Date & Time Provider Department Dept. Phone    2018 Candice Liu MD [address] [facility] [phone]      Your Vitals Were     Blood Pressure Pulse Temperature Respirations Height Weight    138/71 (BP Location: Left arm) 74 97.3  F (36.3  C) (Axillary) 16 1.549 m (5' 1\") 71.6 kg (157 lb 14.4 oz)    Pulse Oximetry BMI (Body Mass Index)                94% 29.83 kg/m2          [employer] Information     [employer] lets you send messages to your doctor, view your test results, renew your prescriptions, schedule appointments and more. To sign up, go to [email or web address] . Click on \"Log in\" on the left side of the screen, which will take you to the Welcome page. Then click on \"Sign up Now\" on the right side of the page.     You will be asked to enter the access code listed below, as well as some personal information. Please follow the directions to create your username and password.     Your access code is: [email or web address]  Expires: 2018  3:13 PM     Your access code will  in 90 days. If you need help or a new code, please call your [facility] or [phone].        Care EveryWhere ID     This is your Care EveryWhere ID. This could be used by other organizations to access your [facility] medical " records  [id number]        Equal Access to Services     [facility] : Hadii aad ku hadadoreallyson Keita, waronaldda oj, qabridgerta morgan garrido, sahara conley. So [facility] [phone].    ATENCIÓN: Si habla español, tiene a singh disposición servicios gratuitos de asistencia lingüística. [facility] [phone].    We comply with applicable federal civil rights laws and [place] laws. We do not discriminate on the basis of race, color, national origin, age, disability, sex, sexual orientation, or gender identity.               Review of your medicines      CONTINUE these medicines which may have CHANGED, or have new prescriptions. If we are uncertain of the size of tablets/capsules you have at home, strength may be listed as something that might have changed.        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)        Dose:  200 mg   Take 1 tablet (200 mg) by mouth 2 times daily   Quantity:  60 tablet   Refills:  0       risperiDONE 3 MG tablet   Commonly known as:  RISPERDAL   This may have changed:    - medication strength  - how much to take  - when to take this  - additional instructions   Used for:  Agitation        Dose:  3 mg   Take 1 tablet (3 mg) by mouth 2 times daily   Quantity:  60 tablet   Refills:  0       traZODone 50 MG tablet   Commonly known as:  DESYREL   This may have changed:    - medication strength  - how to take this  - additional instructions   Used for:  Agitation        1 tablet (50 mg) q 8am. 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 WHEN ADMINISTERED NOTIFY PC, RN & PD.   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)        Dose:  1000 mg   Take 1,000 mg by mouth 2 times daily   Quantity:  60 tablet   Refills:  11       MULTIVITAMINS PO        Take by mouth daily   Refills:  0       OXcarbazepine 600 MG tablet   Commonly known as:  TRILEPTAL   Used for:  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             Medication List: This is a list of all your medications and when to take them. Check marks below indicate your daily home schedule. Keep this list as a reference.      Medications           Morning Afternoon Evening Bedtime As Needed    COLACE 100 MG capsule   Take by mouth 2 times  daily   Last time this was given:  100 mg on 6/13/2018  7:52 AM   Generic drug:  docusate sodium                                diazepam 5 MG/ML (HIGH CONC) solution   Commonly known as:  DIAZEPAM INTENSOL   (1)ML (5MG) AS NEEDED FOR ANY SEIZURE. WHEN ADMINISTERED NOTIFY PC, RN & PD.                                EYE DROPS AR OP   Apply 2 drops to eye daily as needed                                lacosamide 200 MG Tabs tablet   Commonly known as:  VIMPAT   Take 1 tablet (200 mg) by mouth 2 times daily   Last time this was given:  200 mg on 6/20/2018  8:36 AM                                lamoTRIgine 200 MG tablet   Commonly known as:  LAMICTAL   Take 1 tablet (200 mg) by mouth 2 times daily   Last time this was given:  200 mg on 6/20/2018  8:35 AM                                levETIRAcetam 1000 MG Tabs   Take 1,000 mg by mouth 2 times daily   Last time this was given:  1,000 mg on 6/20/2018  8:35 AM                                MULTIVITAMINS PO   Take by mouth daily                                OXcarbazepine 600 MG tablet   Commonly known as:  TRILEPTAL   Take 1 tablet (600 mg) by mouth 2 times daily   Last time this was given:  600 mg on 6/20/2018  8:35 AM                                risperiDONE 3 MG tablet   Commonly known as:  RISPERDAL   Take 1 tablet (3 mg) by mouth 2 times daily   Last time this was given:  3 mg on 6/20/2018  8:36 AM                                traZODone 50 MG tablet   Commonly known as:  DESYREL   1 tablet (50 mg) q 8am. 1 tablet (50 mg) q 4 pm,  3 tablets (150 mg) 8pm   Last time this was given:  50 mg on 6/20/2018  8:36 AM                                VITAMIN E   2 times daily

## 2018-06-11 NOTE — IP AVS SNAPSHOT
"` `     [address] [facility]: [phone]                                              INTERAGENCY TRANSFER FORM - NURSING   2018                    Hospital Admission Date: 2018  JOHANNA SIMONS   : 1975  Sex: Female        Attending Provider: Candice Liu MD     Allergies:  Cefzil [Cefprozil], Codeine Phosphate, Dilantin [Phenytoin], Penicillins    Infection:  None   Service:  INTERNAL MED    Ht:  1.549 m (5' 1\")   Wt:  71.6 kg (157 lb 14.4 oz)   Admission Wt:  79.2 kg (174 lb 9.7 oz)    BMI:  29.83 kg/m 2   BSA:  1.76 m 2            Patient PCP Information     Provider PCP Type    Bryan Fritsch [facility]      Current Code Status     Date Active Code Status Order ID Comments User Context       Prior      Code Status History     Date Active Date Inactive Code Status Order ID Comments User Context    2018 11:49 AM  Full Code [id number]  Candice Liu MD Outpatient    2018  8:57 PM 2018 11:49 AM Full Code [id number]  Arpan Field MD Inpatient    2018 11:23 AM 2018  8:57 PM Full Code [id number]  Megan Chin MD Outpatient    2/15/2018  7:15 PM 2018 11:23 AM Full Code [id number]  Abraham Cummings MD Inpatient      Advance Directives        Scanned docmt in ACP Activity?           No scanned doc        Hospital Problems as of 2018              Priority Class Noted POA    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) High  10/23/2013 Yes    Thrombocytopenia (H) Medium  2/15/2018 Yes    Hypothermia Medium  2018 Yes      Non-Hospital Problems as of 2018              Priority Class Noted    ACP (advance care planning) Medium  2018      Immunizations     None         END      ASSESSMENT     Discharge Profile Flowsheet     EXPECTED DISCHARGE     SKIN      Expected Discharge Date  -- (TCU) 18 1020   Inspection of bony prominences  Full 18 0248    DISCHARGE NEEDS ASSESSMENT     Inspection under " "devices  Full 06/19/18 1041    Equipment Currently Used at Home  shower chair;walker, rolling;grab bar 06/12/18 1701   Skin WDL  ex 06/20/18 0248    Transportation Available  agency transportation 06/12/18 1701   Skin Color/Characteristics  pale 06/20/18 0248    GASTROINTESTINAL (ADULT,PEDIATRIC,OB)     Skin Temperature  warm 06/20/18 0248    GI WDL  WDL 06/20/18 0248   Skin Moisture  dry 06/20/18 0248    Abdominal Appearance  rounded 06/20/18 0248   Skin Elasticity  quick return to original state 06/19/18 1717    All Quadrants Bowel Sounds  audible and active in all quadrants 06/20/18 0248   Skin Integrity  wound(s);scar(s) 06/20/18 0248    All Quadrants Palpation  soft/nontender 06/19/18 1101   Additional Documentation  Rash (LDA) 06/11/18 2128    Last Bowel Movement  06/18/18 06/20/18 0248   SAFETY      GI Signs/Symptoms  belching;passing flatus 06/20/18 0248   Safety WDL  WDL 06/20/18 0248    Passing flatus  yes 06/20/18 0248   All Alarms  none present 06/20/18 0251    COMMUNICATION ASSESSMENT     Safety Factors  wheels locked;call light in reach;ID band on 06/20/18 0248    Patient's communication style  spoken language (English or Bilingual) 02/15/18 2127                      Assessment WDL (Within Defined Limits) Definitions           Safety WDL     Effective: 09/28/15    Row Information: <b>WDL Definition:</b> Bed in low position, wheels locked; call light in reach; upper side rails up x 2; ID band on<br> <font color=\"gray\"><i>Item=AS safety wdl>>List=AS safety wdl>>Version=F14</i></font>      Skin WDL     Effective: 09/28/15    Row Information: <b>WDL Definition:</b> Warm; dry; intact; elastic; without discoloration; pressure points without redness<br> <font color=\"gray\"><i>Item=AS skin wdl>>List=AS skin wdl>>Version=F14</i></font>      Vitals     Vital Signs Flowsheet     VITAL SIGNS     Sleep  awake with occasional pain 06/20/18 0018    Temp  97.3  F (36.3  C) 06/20/18 0708   PAIN ASSESSMENT/NONVERBAL ICU " "(ADULT)      Temp src  Axillary 06/20/18 0708   Presence of Pain  No nonverbal indicators of pain present 06/15/18 1011    Resp  16 06/20/18 0708   Assessment Indicator  PRN assessment 06/15/18 1011    Pulse  74 06/20/18 0347   Pain Management Interventions  Massage 06/13/18 0459    Heart Rate  71 06/20/18 0708   Response to Interventions  Absence of nonverbal indicators of pain 06/13/18 0459    Pulse/Heart Rate Source  Monitor 06/20/18 0347   ANALGESIA SIDE EFFECTS MONITORING      BP  138/71 06/20/18 0708   Side Effects Monitoring: Respiratory Quality  R 06/19/18 1654    BP Location  Left arm 06/20/18 0708   Side Effects Monitoring: Respiratory Depth  N 06/19/18 1654    OXYGEN THERAPY     Side Effects Monitoring: Sedation Level  1 06/19/18 1654    SpO2  94 % 06/20/18 0708   HEIGHT AND WEIGHT      O2 Device  None (Room air) 06/20/18 0708   Height  1.549 m (5' 1\") 06/13/18 2020    PAIN/COMFORT     Weight  71.6 kg (157 lb 14.4 oz) 06/19/18 2055    Patient Currently in Pain  yes 06/20/18 0018   Weight Method  Standing scale 06/19/18 2055    Preferred Pain Scale  CAPA (Clinically Aligned Pain Assessment) ([facility], [facility] Adults Only) 06/20/18 0018   BSA (Calculated - sq m)  1.85 06/13/18 2020    Patient's Stated Pain Goal  Unable to Assess 06/20/18 0018   BMI (Calculated)  33.35 06/13/18 2020    Pain Location  Head 06/20/18 0018   POSITIONING      Pain Orientation  Right;Left 06/20/18 0018   Body Position  independently positioning (pt went back into bed) 06/20/18 1026    Pain Descriptors  Patient unable to describe 06/20/18 0018   Head of Bed (HOB)  HOB flat 06/20/18 1026    Pain Management Interventions  diversional activity encouraged 06/20/18 0018   Positioning/Transfer Devices  pillows;in use 06/20/18 1026    Pain Intervention(s)  Medication (See eMAR) 06/20/18 0018   Chair  Upright in chair 06/20/18 0804    Response to Interventions  Absence of nonverbal indicators of pain 06/20/18 0524   DAILY CARE      " CLINICALLY ALIGNED PAIN ASSESSMENT (CAPA) ([facility], [facility] [place] [facility] ADULTS ONLY)     Activity Management  activity adjusted per tolerance 06/20/18 0251    Comfort  comfortably manageable 06/20/18 0524   Activity Assistance Provided  assistance, 1 person 06/20/18 0251    Change in Pain  about the same 06/20/18 0524   ECG      Pain Control  partially effective 06/20/18 0524   ECG Rhythm  Sinus rhythm;First degree AV block 06/13/18 1533    Functioning  can do most things, but pain gets in the way of some 06/20/18 0524                 Patient Lines/Drains/Airways Status    Active LINES/DRAINS/AIRWAYS     Name: Placement date: Placement time: Site: Days: Last dressing change:    Peripheral IV 06/11/18 Left Lower forearm 06/11/18      Lower forearm   9     Rash 06/12/18 0149 Bilateral groin 06/12/18   0149    8             Patient Lines/Drains/Airways Status    Active PICC/CVC     None            Intake/Output Detail Report     Date Intake   Output Net    Shift P.O. IV Piggyback Total Urine Total       Day 06/19/18 0000 - 06/19/18 0659 120 -- 120 -- -- 120    Denise 06/19/18 0700 - 06/19/18 1459 250 -- 250 -- -- 250    Noc 06/19/18 1500 - 06/19/18 2359 240 -- 240 -- -- 240    Day 06/20/18 0000 - 06/20/18 0659 240 -- 240 -- -- 240    Denise 06/20/18 0700 - 06/20/18 1459 240 -- 240 100 100 140      Last Void/BM       Most Recent Value    Urine Occurrence 1 at 06/20/2018 0900    Stool Occurrence 1 at 06/20/2018 0900      Case Management/Discharge Planning     Case Management/Discharge Planning Flowsheet     LIVING ENVIRONMENT     Equipment Currently Used at Home  shower chair;walker, rolling;grab bar 06/12/18 1701    Lives With  facility resident 06/12/18 1701   / CAREGIVER      Living Arrangements  group home 06/12/18 1701   Filed Complexity Screen Score  8 06/12/18 0948    COPING/STRESS     ABUSE RISK SCREEN      Major Change/Loss/Stressor  hospitalization 06/12/18 1720   QUESTION TO PATIENT:  Has a member of your family or  a partner(now or in the past) intimidated, hurt, manipulated, or controlled you in any way?  patient declined to answer or is unable to answer (pt is unable to answer) 06/12/18 1736    EXPECTED DISCHARGE     QUESTION TO PATIENT: Do you feel safe going back to the place where you are living?  patient declined to answer or is unable to answer (pt is unable to respond) 06/12/18 1736    Expected Discharge Date  -- (TCU) 06/14/18 1020   OBSERVATION: Is there reason to believe there has been maltreatment of a vulnerable adult (ie. Physical/Sexual/Emotional abuse, self neglect, lack of adequate food, shelter, medical care, or financial exploitation)?  no 06/12/18 1736    DISCHARGE PLANNING     OTHER      Transportation Available  agency transportation 06/12/18 1701   Are you depressed or being treated for depression?  No (pt is unable to answer) 06/12/18 1737    FINAL RESOURCES

## 2018-06-11 NOTE — PROGRESS NOTES
[facility]  Transfer Triage Note    Date of call: 06/11/18  Time of call: 3:40 PM    Reason for Transfer:Further diagnostic work up, management, and consultation for specialized care  Diagnosis: Hypothermia    Outside Records: Not available  Additional records requested to be faxed to [phone].    Stability of Patient: Patient is at risk for instability, however patient requires urgent transfer and does not meet ICU criteria     Expected Time of Arrival for Transfer: 0-8 hours    Recommendations for Management and Stabilization: Given    Additional Comments Patient is a 41 yo female with history of cognitive delay, lives in a group home, ITP (on chronic prednisone) epilepsy who come with 3 weeks of weakness. Patient is normally fairly independent, works in a structured environment, ambulates on her own but lately has been doing less, needing more help when moving around.    Today she presented to a local ED in [facility] where she was noted to be nontoxic but found to be hypothermic to 90 F. Her infectious workup was negative (- CXR, - UA, - CXR). Her Na 128, K 6.  Was given warm IVFs and a luis hugger.    Due to her being on prolonged steroids I did ask the ED provider to add on a cortisol and give her a dose of hydrocortisone. Plan for transfer for possible workup of central causes of hypothermia, adrenal insufficiency, medication side effect.    Vincent Dent MD

## 2018-06-12 ENCOUNTER — APPOINTMENT (OUTPATIENT)
Dept: PHYSICAL THERAPY | Facility: CLINIC | Age: 43
DRG: 644 | End: 2018-06-12
Attending: HOSPITALIST
Payer: MEDICARE

## 2018-06-12 ENCOUNTER — APPOINTMENT (OUTPATIENT)
Dept: OCCUPATIONAL THERAPY | Facility: CLINIC | Age: 43
DRG: 644 | End: 2018-06-12
Attending: HOSPITALIST
Payer: MEDICARE

## 2018-06-12 ENCOUNTER — APPOINTMENT (OUTPATIENT)
Dept: SPEECH THERAPY | Facility: CLINIC | Age: 43
DRG: 644 | End: 2018-06-12
Attending: PEDIATRICS
Payer: MEDICARE

## 2018-06-12 LAB
CORTIS SERPL-MCNC: 11.4 UG/DL (ref 4–22)
PROCALCITONIN SERPL-MCNC: <0.05 NG/ML
TSH SERPL DL<=0.005 MIU/L-ACNC: 3.16 MU/L (ref 0.4–4)

## 2018-06-12 PROCEDURE — 12000006 ZZH R&B IMCU INTERMEDIATE UMMC

## 2018-06-12 PROCEDURE — 97535 SELF CARE MNGMENT TRAINING: CPT | Mod: GO | Performed by: OCCUPATIONAL THERAPIST

## 2018-06-12 PROCEDURE — 97530 THERAPEUTIC ACTIVITIES: CPT | Mod: GP

## 2018-06-12 PROCEDURE — 25000128 H RX IP 250 OP 636: Performed by: INTERNAL MEDICINE

## 2018-06-12 PROCEDURE — 97162 PT EVAL MOD COMPLEX 30 MIN: CPT | Mod: GP

## 2018-06-12 PROCEDURE — 40000133 ZZH STATISTIC OT WARD VISIT: Performed by: OCCUPATIONAL THERAPIST

## 2018-06-12 PROCEDURE — 92610 EVALUATE SWALLOWING FUNCTION: CPT | Mod: GN

## 2018-06-12 PROCEDURE — 36415 COLL VENOUS BLD VENIPUNCTURE: CPT | Performed by: INTERNAL MEDICINE

## 2018-06-12 PROCEDURE — 93005 ELECTROCARDIOGRAM TRACING: CPT

## 2018-06-12 PROCEDURE — A9270 NON-COVERED ITEM OR SERVICE: HCPCS | Mod: GY | Performed by: INTERNAL MEDICINE

## 2018-06-12 PROCEDURE — 93010 ELECTROCARDIOGRAM REPORT: CPT | Performed by: INTERNAL MEDICINE

## 2018-06-12 PROCEDURE — 40000225 ZZH STATISTIC SLP WARD VISIT

## 2018-06-12 PROCEDURE — 99233 SBSQ HOSP IP/OBS HIGH 50: CPT | Performed by: PEDIATRICS

## 2018-06-12 PROCEDURE — 84443 ASSAY THYROID STIM HORMONE: CPT | Performed by: INTERNAL MEDICINE

## 2018-06-12 PROCEDURE — 40000275 ZZH STATISTIC RCP TIME EA 10 MIN

## 2018-06-12 PROCEDURE — 97165 OT EVAL LOW COMPLEX 30 MIN: CPT | Mod: GO | Performed by: OCCUPATIONAL THERAPIST

## 2018-06-12 PROCEDURE — 25000132 ZZH RX MED GY IP 250 OP 250 PS 637: Mod: GY | Performed by: INTERNAL MEDICINE

## 2018-06-12 PROCEDURE — 97116 GAIT TRAINING THERAPY: CPT | Mod: GP

## 2018-06-12 PROCEDURE — 40000193 ZZH STATISTIC PT WARD VISIT

## 2018-06-12 PROCEDURE — 82533 TOTAL CORTISOL: CPT | Performed by: INTERNAL MEDICINE

## 2018-06-12 RX ORDER — RISPERIDONE 4 MG/1
4 TABLET ORAL
Status: DISCONTINUED | OUTPATIENT
Start: 2018-06-12 | End: 2018-06-16

## 2018-06-12 RX ORDER — DOXYCYCLINE 100 MG/1
100 CAPSULE ORAL EVERY 12 HOURS SCHEDULED
Status: DISPENSED | OUTPATIENT
Start: 2018-06-12 | End: 2018-06-16

## 2018-06-12 RX ORDER — TRAZODONE HYDROCHLORIDE 50 MG/1
50 TABLET, FILM COATED ORAL EVERY MORNING
Status: DISCONTINUED | OUTPATIENT
Start: 2018-06-12 | End: 2018-06-20 | Stop reason: HOSPADM

## 2018-06-12 RX ORDER — TRAZODONE HYDROCHLORIDE 50 MG/1
150 TABLET, FILM COATED ORAL AT BEDTIME
Status: DISCONTINUED | OUTPATIENT
Start: 2018-06-12 | End: 2018-06-20 | Stop reason: HOSPADM

## 2018-06-12 RX ADMIN — OXCARBAZEPINE 600 MG: 600 TABLET, FILM COATED ORAL at 20:37

## 2018-06-12 RX ADMIN — Medication 25 MG: at 15:44

## 2018-06-12 RX ADMIN — LEVETIRACETAM 1000 MG: 500 TABLET, FILM COATED ORAL at 01:23

## 2018-06-12 RX ADMIN — TRAZODONE HYDROCHLORIDE 150 MG: 150 TABLET ORAL at 22:08

## 2018-06-12 RX ADMIN — LEVETIRACETAM 1000 MG: 500 TABLET, FILM COATED ORAL at 20:37

## 2018-06-12 RX ADMIN — TRAZODONE HYDROCHLORIDE 150 MG: 150 TABLET ORAL at 01:23

## 2018-06-12 RX ADMIN — LAMOTRIGINE 100 MG: 25 TABLET ORAL at 20:38

## 2018-06-12 RX ADMIN — DOCUSATE SODIUM 100 MG: 100 CAPSULE, LIQUID FILLED ORAL at 20:37

## 2018-06-12 RX ADMIN — OXCARBAZEPINE 600 MG: 600 TABLET, FILM COATED ORAL at 10:52

## 2018-06-12 RX ADMIN — RISPERIDONE 3 MG: 3 TABLET ORAL at 09:16

## 2018-06-12 RX ADMIN — LACOSAMIDE 200 MG: 200 TABLET, FILM COATED ORAL at 10:52

## 2018-06-12 RX ADMIN — OXCARBAZEPINE 600 MG: 600 TABLET, FILM COATED ORAL at 01:23

## 2018-06-12 RX ADMIN — LAMOTRIGINE 100 MG: 25 TABLET ORAL at 09:17

## 2018-06-12 RX ADMIN — LACOSAMIDE 200 MG: 200 TABLET, FILM COATED ORAL at 20:37

## 2018-06-12 RX ADMIN — RISPERIDONE 4 MG: 4 TABLET ORAL at 20:37

## 2018-06-12 RX ADMIN — LEVETIRACETAM 1000 MG: 500 TABLET, FILM COATED ORAL at 10:52

## 2018-06-12 RX ADMIN — DOXYCYCLINE HYCLATE 100 MG: 100 CAPSULE ORAL at 11:56

## 2018-06-12 RX ADMIN — TRAZODONE HYDROCHLORIDE 50 MG: 50 TABLET ORAL at 09:14

## 2018-06-12 RX ADMIN — SODIUM CHLORIDE 1000 ML: 9 INJECTION, SOLUTION INTRAVENOUS at 01:28

## 2018-06-12 RX ADMIN — LACOSAMIDE 200 MG: 200 TABLET, FILM COATED ORAL at 01:18

## 2018-06-12 RX ADMIN — LAMOTRIGINE 100 MG: 25 TABLET ORAL at 13:52

## 2018-06-12 RX ADMIN — DOXYCYCLINE HYCLATE 100 MG: 100 CAPSULE ORAL at 02:05

## 2018-06-12 ASSESSMENT — ACTIVITIES OF DAILY LIVING (ADL)
ADLS_ACUITY_SCORE: 26
ADLS_ACUITY_SCORE: 26
WHICH_OF_THE_ABOVE_FUNCTIONAL_RISKS_HAD_A_RECENT_ONSET_OR_CHANGE?: FALL HISTORY;AMBULATION;TRANSFERRING
ADLS_ACUITY_SCORE: 26
ADLS_ACUITY_SCORE: 26
FALL_HISTORY_WITHIN_LAST_SIX_MONTHS: YES
ADLS_ACUITY_SCORE: 26
ADLS_ACUITY_SCORE: 26
NUMBER_OF_TIMES_PATIENT_HAS_FALLEN_WITHIN_LAST_SIX_MONTHS: 10

## 2018-06-12 NOTE — PLAN OF CARE
Problem: Patient Care Overview  Goal: Plan of Care/Patient Progress Review  Discharge Planner PT   Patient plan for discharge: unable to discuss  Current status:  Ambulates 5ft forward x2 with CGAx2.  Unable to correct due to cognitive impairments.  Mobility level depends on willingness to participate.  Supine <>sit with mod Ax2.  STS x3 with min-CGAx2.  Able to follow commands 50% of time.  Sits on bed x10 min with manual cues to correct with minimal correction.    Transfers from bed to chair with min-CGAx1.   Barriers to return to prior living situation: cognitive status, weakness, self limiting behavior   Recommendations for discharge: rehab  Rationale for recommendations: below baseline functional activity, requires additional mobility but likely near baseline when pt decides to participate.         Entered by: Jarad Deleon 06/12/2018 5:36 PM

## 2018-06-12 NOTE — PLAN OF CARE
"Problem: Patient Care Overview  Goal: Plan of Care/Patient Progress Review  Outcome: No Change  Neuro: Alert, orientated to self. Knows brother's name \"Abdon\". Baseline cognitive delay and slight left sided weakness.  Cardiac: SR with 1st degree AV block. VSS.   Respiratory: Sating>90% on RA.  GI/: Adequate urine output. Reyes in place. No BM this shift. Pt is passing gas.   Diet/appetite: Tolerating DD2 diet with necktar thickened liquids. Eating well.  Activity:  Assist of 1-2, up to chair.  Pain: Difficult to asses. No nonverbal signs of pain noted.    Skin: Red rash on R side of face, improving. Red rash to groin appears unchanged.  LDA's: 2 peripherals.    Speech and PT/OT consulted. Pt continues on antibiotics for facial/groin rash. Pt brother and legal guardian, Abdon, at bedside this evening.     Plan: Continue with POC. Notify primary team with changes.      "

## 2018-06-12 NOTE — PLAN OF CARE
Problem: Patient Care Overview  Goal: Plan of Care/Patient Progress Review  /68 (BP Location: Left arm)  Pulse 84  Temp 98.7  F (37.1  C) (Oral)  Resp 16  Neuro: JOCELIN, pt is cognitively delayed.Sometimes follows commands. Has mitts , attempts to pull out IV lines.  Cardiac: VSS. SR  Respiratory: RA   GI/: Voiding spontaneously. No BM this shift.   Diet/appetite: No diet orders at this point. Denies nausea   Activity: Up with A-2  Pain: .JOCELIN  Skin: Intact, no new deficits noted. Has a rash on the face.  Lines: PIV x2  Drains:Amy     He brother  Abdon  has been updated of POC . Phone ( [phone]).Pt has been resting comfortably throughout night, will continue to monitor and follow plan of care.

## 2018-06-12 NOTE — PLAN OF CARE
Problem: Patient Care Overview  Goal: Plan of Care/Patient Progress Review  Discharge Planner OT   Patient plan for discharge: pt. unable to state  Current status: pt. A X2 with bed mobility, [place]/CGA with maintaining sitting bal. EOB. Pt. maxA X 2 with sit<>stand from EOB, pt. Unable to reach full stand. Pt. [place] with lt. G/h tasks with set up and cues to initiate and sequence each task.  Barriers to return to prior living situation: appears below baseline with ADLs, mobility  Recommendations for discharge: rehab;TCU   Rationale for recommendations: increase activity keyshawn./strength, to max. Safety/indep. With ADls and mobility necessary for safe d/c back to group home.       Entered by: Jimena Bangura 06/12/2018 11:05 AM

## 2018-06-12 NOTE — PROGRESS NOTES
06/12/18 1200   General Information   Onset Date 06/11/18   Start of Care Date 06/12/18   Referring Physician Haresh Redding MD   Patient Profile Review/OT: Additional Occupational Profile Info See Profile for full history and prior level of function   Patient/Family Goals Statement not able to state a goal   Swallowing Evaluation Bedside swallow evaluation   Behaviorial Observations Other (Comment)  (patient was alert, repeated words, unable to follow commands)   Mode of current nutrition NPO  (pending SLP eval due to difficulty noted with thin liquid)   Respiratory Status Room air   Comments Nella Helms is a 42-year-old female with history of cognitive delay, left sided hemiplegia, severe epilepsy, ITP, who was transferred here from an outside emergency department for hypothermia and hypotension in the setting of multiple weeks of weakness.  The patient has received 50 mg of hydrocortisone as well as intravenous fluids with improvement in both hypothermia and hypotension and is currently vitally stable. SLP consulted due to RN concern for difficulty swallowing thin liquids.   Clinical Swallow Evaluation   Oral Musculature unable to assess due to poor participation/comprehension   Dentition present and adequate   Secretion Management other (see comments)  (anterior drooling )   Oral Musculature Comments appears deconditioned & uncoordinated  (pt has kyphotic positioning)   Clinical Swallow Eval: Thin Liquid Texture Trial   Mode of Presentation, Thin Liquids straw;fed by clinician   Volume of Liquid or Food Presented 2 sips of thin H20   Oral Phase of Swallow WFL   Pharyngeal Phase of Swallow impaired;coughing/choking;no awareness of problems;reduction in laryngeal movement   Diagnostic Statement high aspiration risk with thin liquid   Clinical Swallow Eval: Nectar Thick Liquid Texture Trial   Mode of Presentation, Nectar straw;fed by clinician   Volume of Nectar Presented 6 oz nectar thick H20   Oral Phase,  Nectar WFL   Pharyngeal Phase, [place] intact   Diagnostic Statement WFL with nectar thick consistency   Clinical Swallow Eval: Puree Solid Texture Trial   Mode of Presentation, Puree spoon;fed by clinician   Volume of Puree Presented 3 oz puree applesauce   Oral Phase, Puree WFL   Pharyngeal Phase, Puree intact   Diagnostic Statement WFL with puree texture   Clinical Swallow Eval: Semisolid Texture Trial   Mode of Presentation, Semisolid spoon;fed by clinician   Volume of Semisolid Food Presented 2 destinee crackers   Oral Phase, Semisolid WFL   Pharyngeal Phase, Semisolid intact   Diagnostic Statement WFL with semi solid texture   Esophageal Phase of Swallow   Patient reports or presents with symptoms of esophageal dysphagia No   General Therapy Interventions   Planned Therapy Interventions Dysphagia Treatment   Dysphagia treatment Modified diet education;Instruction of safe swallow strategies   Swallow Eval: Clinical Impressions   Skilled Criteria for Therapy Intervention Skilled criteria met.  Treatment indicated.   Functional Assessment Scale (FAS) 5   Treatment Diagnosis mild oropharyngeal dysphagia   Diet texture recommendations Dysphagia diet level 2;Nectar thick liquids   Recommended Feeding/Eating Techniques alternate between small bites and sips of food/liquid;maintain upright posture during/after eating for 30 mins;small sips/bites   Demonstrates Need for Referral to Another Service occupational therapy;physical therapy   Therapy Frequency 5 times/wk   Predicted Duration of Therapy Intervention (days/wks) 2 weeks   Anticipated Discharge Disposition inpatient rehabilitation facility   Risks and Benefits of Treatment have been explained. Yes   Patient, family and/or staff in agreement with Plan of Care Yes   Clinical Impression Comments Bedside swallowing evaluation completed per MD order. The patient was not able to follow commands for an oral mechanism exam, but overall coordination appears reduced.  This  session she demonstrated good bolus control and oral clearance with puree and semi solid textures. Positive signs/symptoms of aspiration present with thin liquid trials (also present with RN) but not with solid textures or with nectar thick consistency. Recommend dysphagia diet 2 and nectar thick liquids. The patient is currently unable to feed herself, requires 1:1 assist for small bites/sips, slow pacing, and upright positioning.   Total Evaluation Time   Total Evaluation Time (Minutes) 16

## 2018-06-12 NOTE — PROGRESS NOTES
06/12/18 1600   Quick Adds   Type of Visit Initial PT Evaluation   Living Environment   Lives With facility resident   Living Arrangements group home   Home Accessibility stairs (1 railing present);stairs within home   Number of Stairs to Enter Home 14   Number of Stairs Within Home 0   Stair Railings at Home inside, present on left side   Transportation Available agency transportation   Living Environment Comment lives in a split level group home, has assist with bathing.   Self-Care   Usual Activity Tolerance moderate   Current Activity Tolerance fair   Regular Exercise no   Equipment Currently Used at Home shower chair;walker, rolling;grab bar   Functional Level Prior   Ambulation 2-->assistive person   Transferring 2-->assistive person   Toileting 2-->assistive person   Bathing 2-->assistive person   Dressing 2-->assistive person   Eating 0-->independent   Communication 2-->difficulty understanding (not related to language barrier)   Swallowing 0-->swallows foods/liquids without difficulty   Cognition 2 - difficulty with organizing thoughts   Prior Functional Level Comment PLOF unclear.  Pt unable to give history. Per chart pt. was indep. with self feeding, toileting, had S with bathing and was ambulating throughout group home with out AD. most recently requring A X 2 with mobility, falls and increased A with BADLs.   General Information   Onset of Illness/Injury or Date of Surgery - Date 06/12/18   Referring Physician Arpan Field MD   Patient/Family Goals Statement none stated   Pertinent History of Current Problem (include personal factors and/or comorbidities that impact the POC) 42-year-old female with history of cognitive delay, left sided hemiplegia, severe epilepsy, ITP, who was transferred here from an outside emergency department for hypothermia and hypotension in the setting of multiple weeks of weakness.  The patient has received 50 mg of hydrocortisone as well as intravenous fluids with  "improvement in both hypothermia and hypotension and is currently vitally stable.   Precautions/Limitations fall precautions   General Observations mostly non-verbal, only stating \"happy\" or her name.     Cognitive Status Examination   Orientation not oriented to person, place or time   Level of Consciousness alert   Follows Commands and Answers Questions 50% of the time   Personal Safety and Judgment impaired   Memory impaired   Posture    Posture Kyphosis;Forward head position   Range of Motion (ROM)   ROM Comment WFL   Strength   Strength Comments WFL   Bed Mobility   Bed Mobility Comments mod Ax2   Transfer Skills   Transfer Comments STS with CGA-min Ax1   Gait   Gait Comments Ambualtes 5ft with BHHA, knees buckle occasionally   General Therapy Interventions   Planned Therapy Interventions balance training;bed mobility training;gait training;neuromuscular re-education;strengthening;transfer training;wheelchair management/propulsion training;progressive activity/exercise   Clinical Impression   Criteria for Skilled Therapeutic Intervention yes, treatment indicated   PT Diagnosis impaired functional mobility   Influenced by the following impairments weakness, cognitive impairment, impaired balance   Functional limitations due to impairments IND mobility   Clinical Presentation Evolving/Changing   Clinical Presentation Rationale developmental delay, self limiting behavior   Clinical Decision Making (Complexity) Moderate complexity   Therapy Frequency` 3 times/week   Predicted Duration of Therapy Intervention (days/wks) 6/19/18   Anticipated Equipment Needs at Discharge front wheeled walker   Anticipated Discharge Disposition Long Term Care Facility;Transitional Care Facility   Risk & Benefits of therapy have been explained Yes   Patient, Family & other staff in agreement with plan of care Yes   [facility] AM-PAC TM \"6 Clicks\"   2016, Trustees of [facility], under license to [employer].  All rights " "reserved.   6 Clicks Short Forms Basic Mobility Inpatient Short Form   [facility] AM-PAC  \"6 Clicks\" V.2 Basic Mobility Inpatient Short Form   1. Turning from your back to your side while in a flat bed without using bedrails? 3 - A Little   2. Moving from lying on your back to sitting on the side of a flat bed without using bedrails? 2 - A Lot   3. Moving to and from a bed to a chair (including a wheelchair)? 3 - A Little   4. Standing up from a chair using your arms (e.g., wheelchair, or bedside chair)? 3 - A Little   5. To walk in hospital room? 3 - A Little   6. Climbing 3-5 steps with a railing? 2 - A Lot   Basic Mobility Raw Score (Score out of 24.Lower scores equate to lower levels of function) 16   Total Evaluation Time   Total Evaluation Time (Minutes) 10     "

## 2018-06-12 NOTE — PROGRESS NOTES
06/12/18 0905   Quick Adds   Type of Visit Initial Occupational Therapy Evaluation   Living Environment   Lives With facility resident   Living Arrangements group home   Home Accessibility stairs (1 railing present);stairs within home   Number of Stairs to Enter Home 14  (7 up, 7 down)   Number of Stairs Within Home 0   Stair Railings at Home inside, present on left side   Living Environment Comment lives in a split level group home, has assist with bathing.   Self-Care   Dominant Hand right   Usual Activity Tolerance moderate   Current Activity Tolerance poor   Regular Exercise no   Equipment Currently Used at Home shower chair;walker, rolling;grab bar   Functional Level Prior   Prior Functional Level Comment PLOF unclear per pt. and chart pt. was indep. with self feeding, toileting, had S with bathing and was ambulating throughout group home with out AD. most recently requring A X 2 with mobility, falls and increased A with BADLs.   General Information   Onset of Illness/Injury or Date of Surgery - Date 06/11/18   Referring Physician Arpan Field MD   Additional Occupational Profile Info/Pertinent History of Current Problem 41 yo female with history of cognitive delay, lives in a group home, ITP (on chronic prednisone) epilepsy who come with 3 weeks of weakness. Patient is normally fairly independent, works in a structured environment, ambulates on her own but lately has been doing less, needing more help when moving around.   Precautions/Limitations fall precautions   General Info Comments activity orders;  up with A   Cognitive Status Examination   Orientation (oriented to self)   Level of Consciousness alert   Able to Follow Commands success, 1-step commands   Pain Assessment   Patient Currently in Pain No   Range of Motion (ROM)   ROM Comment RUE AROM WFL, LUE PROM WFL   Strength   Strength Comments RUE strength grossly 4/5, LUE grossly 3-/5   Transfer Skill: Sit to Stand   Level of [place]:  "Sit/Stand maximum assist (25% patients effort)   Physical Assist/Nonphysical Assist: Sit/Stand 2 persons   Upper Body Dressing   Level of [place]: Dress Upper Body moderate assist (50% patients effort)   Physical Assist/Nonphysical Assist: Dress Upper Body set-up required;verbal cues;1 person assist   Grooming   Level of [place]: Grooming minimum assist (75% patients effort)   Physical Assist/Nonphysical Assist: Grooming verbal cues;1 person assist   Eating/Self Feeding   Level of [place]: Eating minimum assist (75% patients effort)   Activities of Daily Living Analysis   Impairments Contributing to Impaired Activities of Daily Living balance impaired;cognition impaired;strength decreased;ROM decreased;coordination impaired   General Therapy Interventions   Planned Therapy Interventions ADL retraining;fine motor coordination training;cognition;strengthening;ROM;transfer training;home program guidelines;progressive activity/exercise   Clinical Impression   Criteria for Skilled Therapeutic Interventions Met yes, treatment indicated   OT Diagnosis impaired ADls and functional mobility   Influenced by the following impairments weakness, cognition   Assessment of Occupational Performance 3-5 Performance Deficits   Identified Performance Deficits toileting, dressing, bathing/grooming   Clinical Decision Making (Complexity) Low complexity   Therapy Frequency 5 times/wk   Predicted Duration of Therapy Intervention (days/wks) ~1 week   Anticipated Equipment Needs at Discharge (TBD)   Anticipated Discharge Disposition Transitional Care Facility;Other (see comments)  (back to group home with increased services)   Risks and Benefits of Treatment have been explained. Yes   Patient, Family & other staff in agreement with plan of care Yes   [facility] AM-PAC TM \"6 Clicks\"   2016, Trustees of [facility], under license to [employer].  All rights reserved.   6 Clicks Short Forms Daily Activity Inpatient " "Short Form   [facility] AM-PAC  \"6 Clicks\" Daily Activity Inpatient Short Form   1. Putting on and taking off regular lower body clothing? 2 - A Lot   2. Bathing (including washing, rinsing, drying)? 2 - A Lot   3. Toileting, which includes using toilet, bedpan or urinal? 2 - A Lot   4. Putting on and taking off regular upper body clothing? 2 - A Lot   5. Taking care of personal grooming such as brushing teeth? 3 - A Little   6. Eating meals? 3 - A Little   Daily Activity Raw Score (Score out of 24.Lower scores equate to lower levels of function) 14   Total Evaluation Time   Total Evaluation Time (Minutes) 10     "

## 2018-06-12 NOTE — PLAN OF CARE
Problem: Patient Care Overview  Goal: Plan of Care/Patient Progress Review  Discharge Planner SLP   Patient plan for discharge: not discussed  Current status: Bedside swallowing evaluation completed per MD order. The patient was not able to follow commands for an oral mechanism exam, but overall coordination appears reduced.  This session she demonstrated good bolus control and oral clearance with puree and semi solid textures. Positive signs/symptoms of aspiration present with thin liquid trials (also present with RN) but not with solid textures or with nectar thick consistency. Recommend dysphagia diet 2 and nectar thick liquids. The patient is currently unable to feed herself, requires 1:1 assist for small bites/sips, slow pacing, and upright positioning.  Barriers to return to prior living situation: weakness, modified diet  Recommendations for discharge: TCU  Rationale for recommendations: below baseline swallowing function       Entered by: Faith Parr 06/12/2018 2:23 PM

## 2018-06-12 NOTE — PLAN OF CARE
"Problem: Patient Care Overview  Goal: Plan of Care/Patient Progress Review  Care Provided: [id number]    Neuro: Alert - orientated to self. Able to name her brother, but saying \"july\" for birthday. Intermittently says \"hospital\", but sometimes \"right here\" when asked about place. Pupils 2, reactive, disconjugate.   Cardiac: SR with 1st deg AV block. /56  Pulse 84  Temp 97.7  F (36.5  C) (Oral)  Resp 16  SpO2 95%  Respiratory: Sating mid 90's on RA. Breaths shallow; lungs diminished.   GI/: Adequate urine output via rankin. No BM on shift - attempted bedpan x2 d/t pt stating \"go bathroom, go poop\", no results. BS active, pt is passing gas.  Diet/appetite: Clear liquid diet. Pt did well with pills in applesauce.   Pain: Difficult to assess pain. When asked if she hurts, she says yes; then ask where and she usually states \"back\". However pt also says yes when asked if other areas hurt such as hair, legs, hand. No nonverbal indicators present. Pt appeared to rest comfortably between cares.  Skin: Red, raised rash w/ papules on right portion of face & ear; scant purulent drainage noted from r. Eye. Rash in vivi area - red, located around hair follicles  IV:  PIV x2. NS bolus infusing 1L/8hr.     R: Will continue to monitor pt closely and notify primary team with any changes.      Problem: Mood Alteration Comorbidity  Goal: Mood Alteration Comorbidity  Patient comorbidity will be monitored for signs and symptoms of Mood Alteration condition.  Problems will be absent, minimized or managed by discharge/transition of care.  Pt at baseline cognition. Increased visualization of patient between cares, bed alarm on, mitts on hands to protect from lines.       "

## 2018-06-12 NOTE — H&P
[facility], [place]  Internal Medicine History and Physical - [facility]       Date of Admission:  6/11/2018    Assessment & Plan:   Nella Helms is a 42-year-old female with history of cognitive delay, left sided hemiplegia, severe epilepsy, ITP, who was transferred here from an outside emergency department for hypothermia and hypotension in the setting of multiple weeks of weakness.  The patient has received 50 mg of hydrocortisone as well as intravenous fluids with improvement in both hypothermia and hypotension and is currently vitally stable.    # Hypothermia, hypotension  # Hyponatremia, mild hyperkalemia  At the outside ED, bladder temperature was noted to be in the low 30 C range.  She was given IV fluids, bear hugger, as well as 50 mg of IV hydrocortisone with improvement.  She was noted to be hyponatremic to 128 and slightly hyperkalemic to 5.  She had recently stopped steroids June 9 for worsening thrombocytopenia in the context of ITP (taking 40 mg of prednisone from June 6 - June 8 and then 20 mg on June 9).  The differential at this point is quite broad but her constellation of symptoms (hyponatremia, relative hyperkalemia, hypothermia, hypotension) are possibly suggestive of adrenal insufficiency given recent steroid use, though admittedly she was not on steroid long enough for this to reasonably occur.  As also possible but unlikely is severe hypothyroidism, hypovolemia perhaps from her progressive weakness/deconditioning noted at the NH.  She does not appear to be septic though will get procal.  -Repeat CMP here  -8 AM serum cortisol level, TSH  -Serum cortisol level is pending at the outside emergency department  -1L NS tonight, urine sodium  - procalcitonin; if elevated will treat empirically for sepsis    # Worsening thrombocytopenia in the context of ITP  Hospitalization in February at the [facility] where this was diagnosed and improved markedly with steroids.   Completed steroid taper in March.  Recently seen in the ED June 5 with thrombocytopenia of 66 was noted and the patient was given steroids, per the NH MAR was taking 40 mg of prednisone from June 6 - June 8 and then 20 mg on June 9.  Platelets continue to fall today, to 44.  No obvious sequela on exam of from a cytopenia.  -Continue to trend, avoid heparin or NSAIDs  -We will avoid giving steroids overnight hoping to maximize reliability of a.m. cortisol test; if platelets continue to follow substantially may require steroid burst.  -Transfuse platelets for less than 10 although with ITP steroids are ultimately needed    # Acute on chronic gait instability  Patient has had multiple weeks of worsening gait instability and more difficulty with routine tasks such as eating.  Her psychiatrist has already decreased her risperidone from 4 mg twice daily to 3 in the morning and 4 at night without improvement.  She is on multiple psychoactive medications, including Lamictal, trazodone, oxcarbazepine, rispieridone, which may be contributing, and will be difficult to taper given her history of severe epilepsy.   -Reduce trazodone from 100 mg 8 am, 50 mg 4 pm, 150 mg qhs to 50 mg 8 am (50%), 25 mg 4 pm (50%) and continue the 150 mg qhs. Gradual taper given gait instability and history of severe seizures (withdrawal concern).   -PT/OT    #Right facial rash, suspect impetigo  Appears relatively well demarcated, erythematous, only slightly warm.  Involves superior aspect of right eyelid that that does not appear to be tender with palpation of eye.  Vision is disconjugate but this has been noted on prior neuro exams.  Patient was recently on clindamycin for 7 days, ending June 6, with apparent improvement in the rash and worsening since discontinuation.  We are limited in her antibiotic choice due to a penicillin allergy and the patient's hyperkalemia, which would not be ideal with Bactrim. No vesicles to suggest Zoster.  Honey-crust noted around right edge of mouth suggestive of impetigo.  - doxycycline 100 mg twice daily x5 day    # Leukopenia  3.6 at the OSH, appears to be hovering mid-3s to 4's since February. On multiple drugs that could contribute, including: Lamictal, oxcarbazepine.  - continue to trend here    # Severe epilepsy  -Continue Vimpat 200 mg daily  -Continue Lamictal 100 mg 3 times daily  -Continue Keppra 1000 twice daily  -Continue oxcarbazepine 600 mg twice daily    # Mood  -Continue risperidone 3 mg in the morning, 4 mg at night  -Reduce trazodone from 100 mg 8 am, 50 mg 4 pm, 150 mg qhs to 50 mg 8 am (50%), 25 mg 4 pm (50%) and continue the 150 mg qhs. Gradual taper given gait instability and history of severe seizures (withdrawal concern).     # Pain Assessment:  Current Pain Score 2/26/2018   Patient currently in pain? yes   Pain location Neck   Pain descriptors -   Nella masters pain level was assessed and she currently denies pain.        Active Diet Order      Advance Diet as Tolerated: Clear Liquid Diet  Fluids: NS 1L overnight  DVT Prophylaxis: VTE Prophylaxis contraindicated due to thrombocytopenia  Code Status: Full Code, consistent w/ prior hospitalizations and per NH    Disposition Plan:   Expected discharge: 2 - 3 days; recommended to prior living arrangement once diagnosis obtained, sodium normal, gait instability improved.       Entered: Arpan Field June 11, 2018     The patient was discussed with Dr. Ting Field  [facility]   Pager: [phone]  Please see sticky note for cross cover information    ---------------------------------------------------------------------------------------------    Chief Complaint:   Weakness, legs giving out, gait instability, hypothermia    History is obtained from the patient, electronic health record, paper chart and patient's advocate    History of Present Illness   Nella Helms is a 42-year-old female with  history of cognitive delay, left sided hemiplegia, severe epilepsy, ITP, who was transferred here from an outside emergency department for hypothermia and hypotension in the setting of multiple weeks of weakness.    I discussed the patient with Rubi, who works with Nella at her care facility, [facility], in [place].  She reports that over the past 1-2 weeks the patient has required increased assistance both with ambulation and simple tasks such as hand coordination while eating.  This morning her knees continue to buckle with walking requiring the assist of 2 to move around the building.  She also appeared to be leaning backwards while walking.  Because of this acute worsening, they called an ambulance to have the patient transported to the emergency department.  Rubi  does not think the patient has been having diarrhea, nausea, or vomiting and has been, as far as she knows, drinking fluids normally.  Nella has had no seizure-like activity since before February when some of her medications were changed at the .    At her baseline the patient is a little unstable with walking, per Rubi, but generally ambulates without a walker including up and down stairs.  She is also usually good at following instructions and knows her age and family.  Altogether Rubi thinks that the wheeze is at her mental baseline.      The patient was recently prescribed prednisone for worsening thrombocytopenia and history of ITP.  The patient was seen in emergency department on June 5 for weakness and slurred speech.  They found that her platelet count was 66 and started prednisone. She was taking 40 mg of prednisone from June 6 - June 8 and then 20 mg on June 9.  Rubi does not think there was any change in her stability while on steroids.    She saw her internist, Dr. Fritsch, June 1 and he noted multiple weeks of unsteadiness with her legs giving out.  He noted that she had weakness [facility] and apparently improved while off her psych meds briefly.  He discussed the case with her psychiatrist, Dr. Campuzano. He made the only medication change the patient has had recently: decreasing her risperidone from 4 mg twice daily to 3 mg in the morning and 4 mg at night by her psychiatrist, Dr. Campuzano, due to what were described as gait issues.  The intern is also started clindamycin for possible cellulitis on the right side of her face.  Rubi thinks there may have been some improvement in the facial rash while on clindamycin but that the rash is worse today.    Anyway, the patient was transported to the emergency department where she was noted to be profoundly hypothermic with a bladder temperature as low as 31.9 C with hypotension of 90s over 40s.  The patient was given IV fluids and a  bear hugger.  Notable labs included hyponatremia to 128 mmol/L and slightly elevated hyperkalemia to 6 mmol/L. they sought transfer to the [facility] and were advised to obtain a cortisol level and give hydrocortisone 50 mg IV once.    On arrival to the [facility] the patient is vitally stable and blood pressure as well as temperature have improved.  The patient is a poor historian and although she is able to state her name, she cannot say why she is in the hospital or how old she is.  When asked if she is in pain she says yes, but when I point to an anatomical region were pain might be located, such as her abdomen, or her back, her shoulder, or her legs, she says he has each time.    Regarding the patient's past hospitalization here at the [facility] back in February, she was admitted for declining gait, coordination, cognitive function, as well as thrombocytopenia, which was discovered incidentally during a routine visit with her epileptic neurologist, Dr. Gomez.  While inpatient, her valproic acid and felbanote was discontinued, she started Keppra and lacosamide, and increased her dose of  oxcarbazepine.  Heme-onc was consulted due to concern for ITP and they started her on prednisone 60 mg daily with marked improvement in her thrombocytopenia.  Her steroids were subsequently tapered with the last dose given reportedly on March 29.    Review of Systems:   10 point ROS completed and negative unless noted in HPI.    Past Medical History:    I have reviewed this patient's medical history and updated it with pertinent information if needed.   Past Medical History:   Diagnosis Date     Cortical blindness      Mental retardation      Strabismus    -Epilepsy  -ITP  -Left hemiplegia    Past Surgical History:   No records of surgeries on file here, the patient is unreliable as a historian.    Social History:   The patient lives in [facility], in [place].  Staff there denies any alcohol or drug use.  Both her parents are alive although per review of recent charting they are ill.  There is a brother that also helps out.    Family History:   No family history on file here, review of the chart suggests that there may be some cardiac history on the father's side.    Prior to Admission Medications:   Per review of MAR:  Risperidone 3 mg 8 am  Risperidone 4 mg 8 pm  Trazodone 100 mg 8 am, 50 mg 4 pm, 150 mg 9:45 pm  Certavite MVI 1 tab 8 am  Vitamin E 1 cap twice daily  Lamictal 100 mg three times daily  Docusate 1 cap twice daily  Oxcarbazepine 600 mg once daily  Levetiracetam 1000 mg twice daily  Vimpat 200 mg twice daily    Allergies:   Allergies   Allergen Reactions     Cefzil [Cefprozil]      Codeine Phosphate      Dilantin [Phenytoin]      Penicillins        Physical Exam:   Vital Signs: Temp: 98.7  F (37.1  C) Temp src: Oral BP: 112/68 Pulse: 84   Resp: 16        Weight: 0 lbs 0 oz    General: Pleasant female, laying in bed, exam is difficult as patient does not reliably follow commands.  HEENT: Well demarcated erythematous rash of right face, some swelling of upper right eyelid, minimally warm, appears  nontender.  Tongue deviates to left.  Disconjugate gaze, though extraocular movements appear intact and pupils are equal and reactive bilaterally.  Cardiac: Normal rate, regular rhythm.  Systolic murmur noted.  Distal perfusion is good.  Pulm: CTAB, no wheezes, or crackles. Normal respiratory effort  Abd: Soft, non distended, non tender abdomen. No hepatosplenomegaly.  Mild annular, erythematous rash mid abdomen.  Skin: No jaundice, skin exam detailed above by organ system; see my photos in the media tab  Extremities: No LE edema, weakness of left upper extremity  Joints: No inflammation noted on joints  Neuro: Alert and oriented to self, does not know age, does not know why she is in the hospital, intermittently follows commands, is not obtunded  Psych: Euthymic mood, full affect    Data   As detailed above, the labs in the emergency department were notable for hyponatremia to 128, mild hyperkalemia to 5, mild alkalosis to 31.  Her creatinine is 0.5, slightly up from 0.4 where she has been for the past few months.  Her INR was normal.  She had normal troponin.  She is slightly leukopenic which appears to be at her baseline of 3.6.  She has a slight normocytic anemia of 11.4.  Thrombocytopenia appears to be progressively worsening with platelets today 44.  Her CRP was 0.67.  Her alk phos was elevated at 145, which is new.  Outside hospital obtained a CT which showed chronic encephalomalacia bilaterally as well as enlargement of the atria, occipital horns of the bilateral lateral ventricles; the enlarged ventricles was noted here on a CT February 15.  A chest x-ray at the outside hospital showed no acute abnormalities, and the urinalysis was bland.

## 2018-06-13 ENCOUNTER — APPOINTMENT (OUTPATIENT)
Dept: SPEECH THERAPY | Facility: CLINIC | Age: 43
DRG: 644 | End: 2018-06-13
Attending: HOSPITALIST
Payer: MEDICARE

## 2018-06-13 LAB
ANION GAP SERPL CALCULATED.3IONS-SCNC: 7 MMOL/L (ref 3–14)
BUN SERPL-MCNC: 6 MG/DL (ref 7–30)
CALCIUM SERPL-MCNC: 9.2 MG/DL (ref 8.5–10.1)
CHLORIDE SERPL-SCNC: 97 MMOL/L (ref 94–109)
CO2 SERPL-SCNC: 30 MMOL/L (ref 20–32)
CREAT SERPL-MCNC: 0.38 MG/DL (ref 0.52–1.04)
ERYTHROCYTE [DISTWIDTH] IN BLOOD BY AUTOMATED COUNT: 13.4 % (ref 10–15)
GFR SERPL CREATININE-BSD FRML MDRD: >90 ML/MIN/1.7M2
GLUCOSE SERPL-MCNC: 86 MG/DL (ref 70–99)
HCT VFR BLD AUTO: 32.7 % (ref 35–47)
HGB BLD-MCNC: 11.1 G/DL (ref 11.7–15.7)
INTERPRETATION ECG - MUSE: NORMAL
MCH RBC QN AUTO: 32 PG (ref 26.5–33)
MCHC RBC AUTO-ENTMCNC: 33.9 G/DL (ref 31.5–36.5)
MCV RBC AUTO: 94 FL (ref 78–100)
PLATELET # BLD AUTO: 57 10E9/L (ref 150–450)
POTASSIUM SERPL-SCNC: 4.4 MMOL/L (ref 3.4–5.3)
RBC # BLD AUTO: 3.47 10E12/L (ref 3.8–5.2)
SODIUM SERPL-SCNC: 134 MMOL/L (ref 133–144)
WBC # BLD AUTO: 4.5 10E9/L (ref 4–11)

## 2018-06-13 PROCEDURE — 92526 ORAL FUNCTION THERAPY: CPT | Mod: GN | Performed by: SPEECH-LANGUAGE PATHOLOGIST

## 2018-06-13 PROCEDURE — 85027 COMPLETE CBC AUTOMATED: CPT | Performed by: PEDIATRICS

## 2018-06-13 PROCEDURE — 36415 COLL VENOUS BLD VENIPUNCTURE: CPT | Performed by: INTERNAL MEDICINE

## 2018-06-13 PROCEDURE — 80048 BASIC METABOLIC PNL TOTAL CA: CPT | Performed by: INTERNAL MEDICINE

## 2018-06-13 PROCEDURE — 40000225 ZZH STATISTIC SLP WARD VISIT: Performed by: SPEECH-LANGUAGE PATHOLOGIST

## 2018-06-13 PROCEDURE — 99233 SBSQ HOSP IP/OBS HIGH 50: CPT | Performed by: PEDIATRICS

## 2018-06-13 PROCEDURE — 25000132 ZZH RX MED GY IP 250 OP 250 PS 637: Mod: GY | Performed by: PEDIATRICS

## 2018-06-13 PROCEDURE — 25000132 ZZH RX MED GY IP 250 OP 250 PS 637: Mod: GY | Performed by: INTERNAL MEDICINE

## 2018-06-13 PROCEDURE — A9270 NON-COVERED ITEM OR SERVICE: HCPCS | Mod: GY | Performed by: INTERNAL MEDICINE

## 2018-06-13 PROCEDURE — 36415 COLL VENOUS BLD VENIPUNCTURE: CPT | Performed by: PEDIATRICS

## 2018-06-13 PROCEDURE — A9270 NON-COVERED ITEM OR SERVICE: HCPCS | Mod: GY | Performed by: PEDIATRICS

## 2018-06-13 PROCEDURE — 12000008 ZZH R&B INTERMEDIATE UMMC

## 2018-06-13 RX ADMIN — LACOSAMIDE 200 MG: 200 TABLET, FILM COATED ORAL at 07:54

## 2018-06-13 RX ADMIN — DOXYCYCLINE HYCLATE 100 MG: 100 CAPSULE ORAL at 11:52

## 2018-06-13 RX ADMIN — DOXYCYCLINE HYCLATE 100 MG: 100 CAPSULE ORAL at 00:21

## 2018-06-13 RX ADMIN — DOCUSATE SODIUM 100 MG: 50 LIQUID ORAL at 20:24

## 2018-06-13 RX ADMIN — RISPERIDONE 3 MG: 3 TABLET ORAL at 07:54

## 2018-06-13 RX ADMIN — LAMOTRIGINE 100 MG: 25 TABLET ORAL at 20:24

## 2018-06-13 RX ADMIN — TRAZODONE HYDROCHLORIDE 50 MG: 50 TABLET ORAL at 07:54

## 2018-06-13 RX ADMIN — LAMOTRIGINE 100 MG: 25 TABLET ORAL at 08:02

## 2018-06-13 RX ADMIN — OXCARBAZEPINE 600 MG: 600 TABLET, FILM COATED ORAL at 07:54

## 2018-06-13 RX ADMIN — Medication 25 MG: at 15:38

## 2018-06-13 RX ADMIN — DOCUSATE SODIUM 100 MG: 100 CAPSULE, LIQUID FILLED ORAL at 07:52

## 2018-06-13 RX ADMIN — TRAZODONE HYDROCHLORIDE 150 MG: 150 TABLET ORAL at 21:52

## 2018-06-13 RX ADMIN — LEVETIRACETAM 1000 MG: 500 TABLET, FILM COATED ORAL at 07:55

## 2018-06-13 RX ADMIN — LACOSAMIDE 200 MG: 200 TABLET, FILM COATED ORAL at 20:24

## 2018-06-13 RX ADMIN — LAMOTRIGINE 100 MG: 25 TABLET ORAL at 13:50

## 2018-06-13 RX ADMIN — OXCARBAZEPINE 600 MG: 600 TABLET, FILM COATED ORAL at 20:23

## 2018-06-13 RX ADMIN — RISPERIDONE 4 MG: 4 TABLET ORAL at 20:24

## 2018-06-13 RX ADMIN — LEVETIRACETAM 1000 MG: 500 TABLET, FILM COATED ORAL at 20:25

## 2018-06-13 ASSESSMENT — ACTIVITIES OF DAILY LIVING (ADL)
ADLS_ACUITY_SCORE: 28
ADLS_ACUITY_SCORE: 26
ADLS_ACUITY_SCORE: 28
ADLS_ACUITY_SCORE: 26
ADLS_ACUITY_SCORE: 28

## 2018-06-13 NOTE — PLAN OF CARE
"Problem: Patient Care Overview  Goal: Plan of Care/Patient Progress Review  Outcome: No Change  Neuro: Alert, orientated to self. Knows brother's name \"Abdon\". Baseline cognitive delay and slight left sided weakness.   Cardiac: SR with 1st degree AV block. BPs elevated to 140s/80s-90s compared to day shift, Gold cross-cover notified. Afebrile.                      Respiratory: Sating>90% on RA.  GI/: Adequate urine output. Reyes in place. No BM this shift. Pt is passing gas.   Diet/appetite: Tolerating DD2 diet with necktar thickened liquids. Eating well.  Activity:  Assist of 1-2, up to chair.  Pain: No nonverbal indicators of pain. Will occasionally complain of discomfort in neck/back relieved with repositioning.     Skin: Red rash on R side of face, improving. Red rash to groin appears unchanged. Cleansed with sterile NS.   LDA's: R PIV and L PIV, both saline locked.      Speech and PT/OT consulted. Pt continues on antibiotics for facial/groin rash.      Plan: Continue with POC. Notify primary team with changes.    Problem: Mood Alteration Comorbidity  Goal: Mood Alteration Comorbidity  Patient comorbidity will be monitored for signs and symptoms of Mood Alteration condition.  Problems will be absent, minimized or managed by discharge/transition of care.   Outcome: No Change  Continue PTA meds.       "

## 2018-06-13 NOTE — PLAN OF CARE
Problem: Patient Care Overview  Goal: Plan of Care/Patient Progress Review  Discharge Planner SLP   Patient plan for discharge: Not stated. Abdon would eventually like her to return to group home.  Current status: Pt presents with tolerance of dysphagia 2 diet. Overt s/sx aspiration with thin liquids, advanced solids. Recommend continue dysphagia 2 diet and nectar liquids, with 1:1 assist with liquids to limit sip to one sip, no consecutive swallows. SLP to follow per POC.   Barriers to return to prior living situation: Medical stability, dysphagia  Recommendations for discharge: TCU  Rationale for recommendations: Pt will benefit from ongoing SLP services for dysphagia to improve oropharyngeal swallow function to baseline level of a regular diet and thin liquids. Baseline diet reported by brother.        Entered by: Courtney Dorsey 06/13/2018 3:23 PM

## 2018-06-13 NOTE — PROGRESS NOTES
"SPIRITUAL HEALTH SERVICES  SPIRITUAL ASSESSMENT Progress Note  [facility] ([place]) 6B     REFERRAL SOURCE: Hospital  Request    Met with Nella and her brother \"Abdon\" to offer spiritual and emotional support.  We shared in some conversation and prayed for Nella's health, her father C[place] and for peace and comfort.    PLAN: No plans to follow up.  SH remains available.     Tania Voss  Chaplain Resident  Pager [phone]    "

## 2018-06-13 NOTE — CONSULTS
"  Neurology Initial Consult  June 13, 2018      Nella Helms MRN:[id number] YOB: 1975  Date of Admission:6/11/2018  Primary care provider: Fritsch, Bryan  Requesting physician: Haresh Redding MD    ASSESSMENT AND RECOMMENDATIONS:   Nella Helms is a 42 year old with PMH of cognitive delay, left sided hemiplegia, severe epilepsy, ITP, who was transferred here from an outside emergency department for hypothermia and hypotension in the setting of multiple weeks of weakness.     #Generalized weakness  Patient presented with progressively worsening generalized weakness since May culminating in severe weakness and admission to hospital on Monday for adrenal insufficiency.  TSH on admission was 3.16. From a neurological standpoint, anti-epileptic medications may result in gait ataxia and instability.  However it is highly unlikely that these medications could contribute to patient's current generalized weakness.  Instead the more likely etiology is weakness from adrenal insufficiency.  Furthermore on exam patient exhibited signs of Parkinsonian shuffled gait, and this may be an LB of patient's risperidone.    Recommendations  -Obtain the following lab if not already done: CK  -Check a.m. anti-epileptic drug levels  -Recommend consultation with psychiatry in order to reduce risperidone dose as this could be contributing to patient's gait instability    Recommendations were communicated to primary team via phone.    Seen and discussed with Dr. Rivera.    Neurology service will continue to follow this interesting patient with you. Thank you for the consultation.    Octavio Ugalde MD   [phone]      REASON FOR CONSULT: \"Hx of intractable epilepsy, acute on chronic worsening weakness and decreased mental status\"    HISTORY OF PRESENT ILLNESS:  Nella Helms is a 42 year old with PMH of cognitive delay, left sided hemiplegia, severe epilepsy, ITP, who was transferred here from an outside emergency " department for hypothermia and hypotension in the setting of multiple weeks of weakness.     Patient is a poor historian, history gathered from chart review and from brother in the room.  Patient was recently hospitalized in February for her thrombocytopenia and found to have ITP.  At the time patient complained of similar generalized weakness.  Valproic acid had been discontinued prior to admission.  Felbamate was discontinued at admission.  Keppra and lacosamide were initiated in their place.  Lamotrigine was continued at prior to admission dose and oxcarbazepine was increased to 600 mg BID from 450 BID.  Patient's generalized weakness subsequently improved on discharge.  At baseline patient is able to ambulate independently without a walker with minimal gait disturbances.  Over the past month, patient has been experiencing progressively worsening generalized weakness and gait instability. Her legs have been giving out on her and have been very unsteady. At times she has required the assistance of two staff members to stand up or get up out of bed, as well as getting in and out of the bathtub. She apparently has not injured herself with any of these falls.  During this time, her psychiatrist has decreased her risperidone from 4 mg twice daily to 3 in the morning and 4 at night without improvement.   On Monday, June 11 patient was found to be very fatigued and unable to get out of bed.  She was taken to the ER and found to be hypothermic, hyponatremic and very lethargic.  Patient was started on stress dose steroid and subsequently improved.    On seeing the patient today, she was unable to follow while any commands.  She does spontaneously move all extremities.  Of the limited review of systems, she denied muscle twitching and dysphagia.    PAST MEDICAL HISTORY:  Reviewed with patient on 06/13/2018     Past Medical History:   Diagnosis Date     Cortical blindness      Mental retardation      Strabismus        No  past surgical history on file.     MEDICATIONS:  PTA Meds  Prior to Admission medications    Medication Sig Last Dose Taking? Auth Provider   docusate sodium (COLACE) 100 MG capsule Take by mouth 2 times daily 6/12/2018 at Unknown time Yes Reported, Patient   lacosamide (VIMPAT) 200 MG TABS tablet Take 1 tablet (200 mg) by mouth 2 times daily 6/12/2018 at Unknown time Yes Elysia Gomez MD   LAMICTAL 100 MG tablet TAKE (1) TABLET THREE TIMES DAILY. 6/12/2018 at Unknown time Yes Elysia Gomez MD   levETIRAcetam 1000 MG TABS Take 1,000 mg by mouth 2 times daily 6/12/2018 at Unknown time Yes Megan Chin MD   Multiple Vitamin (MULTIVITAMINS PO) Take by mouth daily 6/12/2018 at Unknown time Yes Reported, Patient   OXcarbazepine (TRILEPTAL) 600 MG tablet Take 1 tablet (600 mg) by mouth 2 times daily 6/12/2018 at Unknown time Yes Megan Chin MD   risperiDONE (RISPERDAL) 2 MG tablet 4 mg bid, 2 mg hs 6/12/2018 at Unknown time Yes Reported, Patient   Tetrahydrozoline-Zn Sulfate (EYE DROPS AR OP) Apply 2 drops to eye daily as needed 6/12/2018 at Unknown time Yes Reported, Patient   traZODone (DESYREL) 100 MG tablet 1 tablet 8am. 2 tablets noon, 2 tablets 4pm, 2 tablets 8pm 6/12/2018 at Unknown time Yes Reported, Patient   VITAMIN E 2 times daily 6/12/2018 at Unknown time Yes Reported, Patient   diazepam (DIAZEPAM INTENSOL) 5 MG/ML (HIGH CONC) solution (1)ML (5MG) AS NEEDED FOR ANY SEIZURE. WHEN ADMINISTERED NOTIFY PC, RN & PD. Unknown at  time  Elysia Gomez MD      Current Meds    docusate sodium  100 mg Oral BID     doxycycline  100 mg Oral Q12H BHAVNA     lacosamide  200 mg Oral BID     lamoTRIgine  100 mg Oral TID     levETIRAcetam  1,000 mg Oral BID     OXcarbazepine  600 mg Oral BID     risperiDONE  3 mg Oral QAM     risperiDONE  4 mg Oral Daily at 8 pm     sodium chloride (PF)  3 mL Intracatheter Q8H     traZODone  25 mg Oral Daily at 4 pm     traZODone  150 mg Oral At Bedtime     traZODone  50 mg Oral QAM      Infusion Meds      ALLERGIES:    Allergies   Allergen Reactions     Cefzil [Cefprozil]      Codeine Phosphate      Dilantin [Phenytoin]      Penicillins        REVIEW OF SYSTEMS:  A comprehensive of systems was negative except as noted above.    SOCIAL HISTORY:   Social History     Social History     Marital status: Single     Spouse name: N/A     Number of children: N/A     Years of education: N/A     Occupational History     Not on file.     Social History Main Topics     Smoking status: Never Smoker     Smokeless tobacco: Never Used     Alcohol use No     Drug use: No     Sexual activity: No     Other Topics Concern     Not on file     Social History Narrative     FAMILY MEDICAL HISTORY:   No family history on file.    PHYSICAL EXAM:   Temp  Av.1  F (36.2  C)  Min: 96.2  F (35.7  C)  Max: 98.7  F (37.1  C)      Pulse  Av  Min: 84  Max: 84 Resp  Av.2  Min: 16  Max: 18  SpO2  Av.8 %  Min: 94 %  Max: 99 %       /79 (BP Location: Right arm)  Pulse 84  Temp 96.2  F (35.7  C) (Axillary)  Resp 16  Wt 79.4 kg (175 lb 0.7 oz)  SpO2 95%  BMI 33.07 kg/m2   Date 18 0700 - 18 0659   Shift 3687-5738 8812-7212 5245-6842 24 Hour Total   I  N  T  A  K  E   P.O. 240   240    Shift Total  (mL/kg) 240  (3.02)   240  (3.02)   O  U  T  P  U  T   Urine 750   750    Shift Total  (mL/kg) 750  (9.45)   750  (9.45)   Weight (kg) 79.4 79.4 79.4 79.4        Admit Weight: 79.2 kg (174 lb 9.7 oz) (bed)     GENERAL APPEARANCE: Awake, not following commands, in no acute distress  EYES: No scleral icterus, pupils equal  HENT: NC/AT, pupils equally round and reactive to light, tongue without any fasciculation, uvula midline  MS: no evidence of inflammation in joints, no muscle tenderness  : rankin present  SKIN: no rash, warm, dry, no cyanosis  NEURO: Neuro exam was very limited as patient does not follow commands, she is able to move all extremities but strength and sensation were difficult to  assess, 2+ radial and patellar reflexes, Babinski equivocal, gait shuffled with inclination to fall during ambulation    LABS:   CMP  Recent Labs  Lab 06/13/18  0525 06/11/18  2232    128*   POTASSIUM 4.4 4.6   CHLORIDE 97 97   CO2 30 30   ANIONGAP 7 1*   GLC 86 78   BUN 6* 8   CR 0.38* 0.35*   GFRESTIMATED >90 >90   GFRESTBLACK >90 >90   SHARMILA 9.2 8.6   PROTTOTAL  --  5.7*   ALBUMIN  --  2.8*   BILITOTAL  --  0.2   ALKPHOS  --  130   AST  --  14   ALT  --  36     CBC  Recent Labs  Lab 06/13/18  0939 06/11/18 2232   HGB 11.1* 9.8*   WBC 4.5 2.8*   RBC 3.47* 3.04*   HCT 32.7* 28.8*   MCV 94 95   MCH 32.0 32.2   MCHC 33.9 34.0   RDW 13.4 13.1   PLT 57* 51*     INR  Recent Labs  Lab 06/11/18 2232   INR 0.94   PTT 33     ABGNo lab results found in last 7 days.   URINE STUDIES  Recent Labs   Lab Test  02/18/18   0450   COLOR  Yellow   APPEARANCE  Clear   URINEGLC  Negative   URINEBILI  Negative   URINEKETONE  Negative   SG  1.017   UBLD  Negative   URINEPH  7.5*   PROTEIN  10*   NITRITE  Negative   LEUKEST  Negative   RBCU  0   WBCU  1     No lab results found.  PTH  No lab results found.  IRON STUDIES  Recent Labs   Lab Test  02/15/18   2026   TRUPTI  280*       IMAGING:  All imaging studies reviewed by me.     Octavio Ugalde MD

## 2018-06-13 NOTE — PROGRESS NOTES
Internal Medicine Progress Note    Date of Service (when I saw the patient): 06/13/2018    Assessment & Plan   Nella Helms is a 42-year-old female with history of cognitive delay, left sided hemiplegia, severe epilepsy, ITP, who was transferred here from an outside emergency department for hypothermia and hypotension in the setting of multiple weeks of weakness and falls.  The patient has received 50 mg of hydrocortisone as well as intravenous fluids with improvement in both hypothermia and hypotension and is currently vitally stable.    At the outside ED, bladder temperature was noted to be in the low 30 C range.  She was given IV fluids, bear hugger, as well as 50 mg of IV hydrocortisone with improvement.  She was noted to be hyponatremic to 128 and slightly hyperkalemic to 5.  She had recently stopped steroids June 9 for worsening thrombocytopenia in the context of ITP (taking 40 mg of prednisone from June 6 - June 8 and then 20 mg on June 9).     Hypothermia and hypotension with hyponatremia and mild hyperkalemia  Most likely due to adrenal insufficiency due to frequent steroid intake.  Clinical features are suggestive and other likely diagnoses have been ruled out.  Also, she responded appropriately to hydrocortisone administration.  TSH was 3.16, am cortisol was 11.4 the day after getting hydrocortisone.  Procalcitonin negative make infection unlikely.  Not sure how rash (below) may be playing a role.   - Monitor closely   - Consider endocrine consult for further management, possibly cosyntropin stim     Right facial rash likely due to impetigo  She was recently on clindamycin for 7 days, ending June 6, with apparent improvement in the rash and worsening since discontinuation.  No vesicles to suggest Zoster. Honey-crust noted around right edge of mouth suggestive of impetigo.  Improving again after starting doxycycline  - Continue course of doxycycline 100 mg twice daily x5 day     Thrombocytopenia likely  due to ITP  Hospitalization in February at the [facility] where this was diagnosed and improved markedly with steroids.  Completed steroid taper in March.  Recently seen in the ED June 5 with thrombocytopenia of 66 was noted and the patient was given steroids, per the [facility] MAR was taking 40 mg of prednisone from June 6 - June 8 and then 20 mg on June 9.  Plts were 44 on admit but increased to 51 today.   - Continue to trend, avoid heparin or NSAIDs   - We will avoid giving steroids overnight hoping to maximize reliability of a.m. cortisol test   - Transfuse platelets for less than 10 although with ITP steroids are ultimately needed     Acute on chronic gait instability  Patient has had multiple weeks of worsening gait instability and more difficulty with routine tasks such as eating.  Her psychiatrist has already decreased her risperidone from 4 mg twice daily to 3 in the morning and 4 at night without improvement.  She is on multiple psychoactive medications, including Lamictal, trazodone, oxcarbazepine, rispieridone, which may be contributing, and will be difficult to taper given her history of severe epilepsy.    - Reduce trazodone from 100 mg 8 am, 50 mg 4 pm, 150 mg qhs to 50 mg 8 am (50%), 25 mg 4 pm (50%) and continue the 150 mg qhs. Gradual taper given gait instability and history of severe seizures (withdrawal concern).    - PT/OT   - Consult neurology for suggestions on further work up.    Leukopenia  3.6 at the OSH and down to 2.8 todayappears to be hovering mid-3s to 4's since February. On multiple drugs that could contribute, including: Lamictal, oxcarbazepine.  - continue to trend here     Severe epilepsy, well controlled   - Continue Vimpat 200 mg daily   - Continue Lamictal 100 mg 3 times daily   - Continue Keppra 1000 twice daily   - Continue oxcarbazepine 600 mg twice daily   - Consult neurology team as above     Mood  -Continue risperidone 3 mg in the morning, 4 mg at night  -Reduce trazodone from  100 mg 8 am, 50 mg 4 pm, 150 mg qhs to 50 mg 8 am (50%), 25 mg 4 pm (50%) and continue the 150 mg qhs. Gradual taper given gait instability and history of severe seizures (withdrawal concern).       Diet:   Active Diet Order      Dysphagia Diet Level 2 [facility] Altered Nectar Thickened Liquids (pre-thickened or use instant food thickener)  DVT Prophylaxis: VTE Prophylaxis contraindicated due to thrombocytopenia  Code Status: Full Code    Disposition: Expected discharge in > 3 days once stable and weakness addressed.    I have reviewed vitals, labs, and medications.   Seen with bedside nurse.    Haresh Redding MD  Internal Medicine-Pediatrics Staff  Pager: [phone]    Please see sticky note for cross cover information     ------    Interval History   Discussed with brother, Abdon, who knows her the best and is her legal decision maker.  Improved from admission but still less interactive and engaged then her baseline.  Rash improved.    No fevers.  Other ROS limited due to developmental delay.    Physical Exam     Vitals:    06/12/18 1000 06/13/18 0033   Weight: 79.2 kg (174 lb 9.7 oz) 79.4 kg (175 lb 0.7 oz)     Vital Signs with Ranges  Temp:  [96.4  F (35.8  C)-97  F (36.1  C)] 96.4  F (35.8  C)  Heart Rate:  [66-72] 70  Resp:  [16-18] 18  BP: (118-154)/(67-90) 154/82  SpO2:  [94 %-99 %] 96 %  I/O last 3 completed shifts:  In: 730 [P.O.:480; IV Piggyback:250]  Out: 3675 [Urine:3675]    Constitutional: alert and mild distress (challenging to access)  Cardiovascular: RRR. +systollic murmur, good peripheral pulses  Respiratory: Fair, diaphragmatic excursion. Lungs clear  Skin: rash noted on face, improved (per family) compared to yesterday  Psychiatric: anxious, easily directable by family      Medications       docusate sodium  100 mg Oral BID     doxycycline  100 mg Oral Q12H BHAVNA     lacosamide  200 mg Oral BID     lamoTRIgine  100 mg Oral TID     levETIRAcetam  1,000 mg Oral BID     OXcarbazepine  600 mg Oral BID      risperiDONE  3 mg Oral QAM     risperiDONE  4 mg Oral Daily at 8 pm     sodium chloride (PF)  3 mL Intracatheter Q8H     traZODone  25 mg Oral Daily at 4 pm     traZODone  150 mg Oral At Bedtime     traZODone  50 mg Oral QAM       Data   ---    Nursing notes reviewed.  Patient seen with bedside nurse.     I have reviewed today's Medications, Vital Signs and Labs

## 2018-06-14 ENCOUNTER — APPOINTMENT (OUTPATIENT)
Dept: OCCUPATIONAL THERAPY | Facility: CLINIC | Age: 43
DRG: 644 | End: 2018-06-14
Attending: HOSPITALIST
Payer: MEDICARE

## 2018-06-14 ENCOUNTER — APPOINTMENT (OUTPATIENT)
Dept: PHYSICAL THERAPY | Facility: CLINIC | Age: 43
DRG: 644 | End: 2018-06-14
Attending: HOSPITALIST
Payer: MEDICARE

## 2018-06-14 ENCOUNTER — APPOINTMENT (OUTPATIENT)
Dept: SPEECH THERAPY | Facility: CLINIC | Age: 43
DRG: 644 | End: 2018-06-14
Attending: HOSPITALIST
Payer: MEDICARE

## 2018-06-14 PROCEDURE — 40000133 ZZH STATISTIC OT WARD VISIT: Performed by: OCCUPATIONAL THERAPIST

## 2018-06-14 PROCEDURE — 25000132 ZZH RX MED GY IP 250 OP 250 PS 637: Mod: GY | Performed by: INTERNAL MEDICINE

## 2018-06-14 PROCEDURE — 40000193 ZZH STATISTIC PT WARD VISIT

## 2018-06-14 PROCEDURE — A9270 NON-COVERED ITEM OR SERVICE: HCPCS | Mod: GY | Performed by: PEDIATRICS

## 2018-06-14 PROCEDURE — 97530 THERAPEUTIC ACTIVITIES: CPT | Mod: GO | Performed by: OCCUPATIONAL THERAPIST

## 2018-06-14 PROCEDURE — 25000132 ZZH RX MED GY IP 250 OP 250 PS 637: Mod: GY | Performed by: PEDIATRICS

## 2018-06-14 PROCEDURE — 97535 SELF CARE MNGMENT TRAINING: CPT | Mod: GO | Performed by: OCCUPATIONAL THERAPIST

## 2018-06-14 PROCEDURE — A9270 NON-COVERED ITEM OR SERVICE: HCPCS | Mod: GY | Performed by: INTERNAL MEDICINE

## 2018-06-14 PROCEDURE — 40000225 ZZH STATISTIC SLP WARD VISIT: Performed by: SPEECH-LANGUAGE PATHOLOGIST

## 2018-06-14 PROCEDURE — 12000008 ZZH R&B INTERMEDIATE UMMC

## 2018-06-14 PROCEDURE — 97530 THERAPEUTIC ACTIVITIES: CPT | Mod: GP

## 2018-06-14 PROCEDURE — 92526 ORAL FUNCTION THERAPY: CPT | Mod: GN | Performed by: SPEECH-LANGUAGE PATHOLOGIST

## 2018-06-14 PROCEDURE — 99233 SBSQ HOSP IP/OBS HIGH 50: CPT | Performed by: PEDIATRICS

## 2018-06-14 RX ADMIN — RISPERIDONE 3 MG: 3 TABLET ORAL at 08:25

## 2018-06-14 RX ADMIN — LAMOTRIGINE 100 MG: 25 TABLET ORAL at 14:37

## 2018-06-14 RX ADMIN — DOXYCYCLINE HYCLATE 100 MG: 100 CAPSULE ORAL at 14:37

## 2018-06-14 RX ADMIN — LACOSAMIDE 200 MG: 200 TABLET, FILM COATED ORAL at 19:45

## 2018-06-14 RX ADMIN — RISPERIDONE 4 MG: 4 TABLET ORAL at 19:45

## 2018-06-14 RX ADMIN — TRAZODONE HYDROCHLORIDE 150 MG: 150 TABLET ORAL at 22:20

## 2018-06-14 RX ADMIN — OXCARBAZEPINE 600 MG: 600 TABLET, FILM COATED ORAL at 08:25

## 2018-06-14 RX ADMIN — LACOSAMIDE 200 MG: 200 TABLET, FILM COATED ORAL at 08:24

## 2018-06-14 RX ADMIN — LAMOTRIGINE 100 MG: 25 TABLET ORAL at 19:45

## 2018-06-14 RX ADMIN — OXCARBAZEPINE 600 MG: 600 TABLET, FILM COATED ORAL at 19:45

## 2018-06-14 RX ADMIN — Medication 25 MG: at 16:38

## 2018-06-14 RX ADMIN — TRAZODONE HYDROCHLORIDE 50 MG: 50 TABLET ORAL at 08:25

## 2018-06-14 RX ADMIN — LEVETIRACETAM 1000 MG: 500 TABLET, FILM COATED ORAL at 19:45

## 2018-06-14 RX ADMIN — DOXYCYCLINE HYCLATE 100 MG: 100 CAPSULE ORAL at 00:54

## 2018-06-14 RX ADMIN — LEVETIRACETAM 1000 MG: 500 TABLET, FILM COATED ORAL at 08:25

## 2018-06-14 RX ADMIN — LAMOTRIGINE 100 MG: 25 TABLET ORAL at 08:25

## 2018-06-14 NOTE — PROGRESS NOTES
Internal Medicine Progress Note    Date of Service (when I saw the patient): 06/13/2018    Assessment & Plan   Nella Helms is a 42-year-old female with history of cognitive delay, left sided hemiplegia, severe epilepsy, ITP, who was transferred here from an outside emergency department for hypothermia and hypotension in the setting of multiple weeks of weakness and falls.  The patient has received 50 mg of hydrocortisone as well as intravenous fluids with improvement in both hypothermia and hypotension and is currently vitally stable.     At the outside ED, bladder temperature was noted to be in the low 30 C range.  She was given 50 mg of IV hydrocortisone with improvement.  She was noted to be hyponatremic to 128 and slightly hyperkalemic to 5.  She had recently stopped steroids June 9 for worsening thrombocytopenia in the context of ITP (taking 40 mg of prednisone from June 6 - June 8 and then 20 mg on June 9).    Today: Slight decrease in temperature but blood pressures and electrolytes stable.  PT/OT recommending rehab/TCU    Hypothermia and hypotension with hyponatremia and mild hyperkalemia  Most likely due to adrenal insufficiency due to frequent steroid intake.  Clinical features are suggestive and other likely diagnoses have been ruled out.  Also, she responded appropriately to hydrocortisone administration.  TSH was 3.16, am cortisol was 11.4 the day after getting hydrocortisone.  Procalcitonin negative make infection unlikely.  Not sure how rash (below) may be playing a role.   - Monitor closely        Right facial rash likely due to impetigo  She was recently on clindamycin for 7 days, ending June 6, with apparent improvement in the rash and worsening since discontinuation.  No vesicles to suggest Zoster. Honey-crust noted around right edge of mouth suggestive of impetigo.  Improving again after starting doxycycline  - Continue course of doxycycline 100 mg twice daily x5 day      Thrombocytopenia  likely due to ITP  Hospitalization in February at the [facility] where this was diagnosed and improved markedly with steroids.  Completed steroid taper in March.  Recently seen in the ED June 5 with thrombocytopenia of 66 was noted and the patient was given steroids, per the NH MAR was taking 40 mg of prednisone from June 6 - June 8 and then 20 mg on June 9.  Plts were 44 on admit but increased to 51 today.   - Continue to trend, avoid heparin or NSAIDs   - We will avoid giving steroids overnight hoping to maximize reliability of a.m. cortisol test   - Transfuse platelets for less than 10 although with ITP steroids are ultimately needed      Acute on chronic gait instability  Patient has had multiple weeks of worsening gait instability and more difficulty with routine tasks such as eating.  Her psychiatrist has already decreased her risperidone from 4 mg twice daily to 3 in the morning and 4 at night without improvement. Neurology consulted and reports her antiepileptics don't typically cause weakness.  More likely secondary to steroid use.    - Reduce trazodone from 100 mg 8 am, 50 mg 4 pm, 150 mg qhs to 50 mg 8 am (50%), 25 mg 4 pm (50%) and continue the 150 mg qhs. Gradual taper given gait instability and history of severe seizures (withdrawal concern).   - Avoid steroids    - PT/OT   - Discuss further reduction of risperidone with psychiatry     Leukopenia  3.6 at the OSH and down to 2.8 todayappears to be hovering mid-3s to 4's since February. On multiple drugs that could contribute, including: Lamictal, oxcarbazepine.  - continue to trend here      Severe epilepsy, well controlled.  No changes per neurology.   - Continue Vimpat 200 mg daily   - Continue Lamictal 100 mg 3 times daily   - Continue Keppra 1000 twice daily   - Continue oxcarbazepine 600 mg twice daily    Dysphagia.  Followed by SLP.   - Change diet to dysphagia 2 with nectar think liquids    Diet:   Active Diet Order      Dysphagia Diet Level 2  [facility] Altered Nectar Thickened Liquids (pre-thickened or use instant food thickener)  DVT Prophylaxis: VTE Prophylaxis contraindicated due to thrombocytopenia  Code Status: Full Code    Disposition: Expected discharge in 3-5 days once TCU placement found.    I have reviewed vitals, labs, and medications.   Seen with bedside nurse.    Haresh Redding MD  Internal Medicine-Pediatrics Staff  Pager: [phone]    Please see sticky note for cross cover information     ------    Interval History   More awake today per brother.  Still very weak.    No fevers or chills.      Unable to obtain further history or ROS.    Physical Exam     Vitals:    06/12/18 1000 06/13/18 0033 06/13/18 2000   Weight: 79.2 kg (174 lb 9.7 oz) 79.4 kg (175 lb 0.7 oz) 79.9 kg (176 lb 2.4 oz)     Vital Signs with Ranges  Temp:  [96.2  F (35.7  C)-97.4  F (36.3  C)] 96.8  F (36  C)  Heart Rate:  [66-72] 71  Resp:  [16-18] 16  BP: (127-154)/(78-89) 141/89  SpO2:  [94 %-97 %] 96 %  I/O last 3 completed shifts:  In: 900 [P.O.:900]  Out: 3400 [Urine:3400]    Constitutional: alert and no distress   Cardiovascular: RRR. + systolic murmur  Respiratory:Good diaphragmatic excursion. Lungs clear  Neuro: Challenging to assess, left side weaker then right  Psychiatric: affect at baseline per brother; concentration poor    Medications       docusate  100 mg Oral BID     doxycycline  100 mg Oral Q12H BHAVNA     lacosamide  200 mg Oral BID     lamoTRIgine  100 mg Oral TID     levETIRAcetam  1,000 mg Oral BID     OXcarbazepine  600 mg Oral BID     risperiDONE  3 mg Oral QAM     risperiDONE  4 mg Oral Daily at 8 pm     sodium chloride (PF)  3 mL Intracatheter Q8H     traZODone  25 mg Oral Daily at 4 pm     traZODone  150 mg Oral At Bedtime     traZODone  50 mg Oral QAM       Data   ---    Nursing notes reviewed.  Patient seen with bedside nurse.     I have reviewed today's Medications, Vital Signs and Labs    Discussed with: Neurology

## 2018-06-14 NOTE — PLAN OF CARE
Problem: Patient Care Overview  Goal: Plan of Care/Patient Progress Review  Discharge Planner OT   Patient plan for discharge: not stated  Current status: Pt mod A x1-2 bed mobility and to maintain EOB balance, pt max A UB/LB dressing and g/h.   Barriers to return to prior living situation: medical status  Recommendations for discharge: TCU  Rationale for recommendations: to increase ind in ADLS/IADLS       Entered by: Wandy Conteh 06/14/2018 3:14 PM

## 2018-06-14 NOTE — PLAN OF CARE
Problem: Patient Care Overview  Goal: Plan of Care/Patient Progress Review  Discharge Planner PT   Patient plan for discharge: Not able to discuss with pt due to cognitive status  Current status: Pt requires min-modAx2 for supine<>sit with frequent cues for performance, CGA for sitting EOB for safety, ambulates impulsively upon standing 20ftx2 with CGA. Pt cries out at end of session stating she has neck pain, RN notified.   Barriers to return to prior living situation: medical status, weakness, stairs at group home  Recommendations for discharge: TCU  Rationale for recommendations: Pt would benefit from skilled therapy services to progress independence with functional mobility.       Entered by: Allie Figueroa 06/14/2018 3:55 PM

## 2018-06-14 NOTE — PLAN OF CARE
"Problem: Patient Care Overview  Goal: Plan of Care/Patient Progress Review  Outcome: No Change  Assumed cares 0700 to 1500.    /90 (BP Location: Right arm)  Pulse 84  Temp 96.5  F (35.8  C) (Axillary)  Resp 18  Ht 1.549 m (5' 1\")  Wt 79.9 kg (176 lb 2.4 oz)  SpO2 95%  BMI 33.28 kg/m2    Pt alert. [facility] orientation. Speech is garbled. Lung sounds diminished on RA. HR and pulses WDL. Telemetry reveals first degree heart block. Bowel sounds active. No BM this shift. Good UOP. Rankin in place most of shift, however DC'd rankin around 1300 per orders. Fair PO intake. Pt advanced to dysphagia 3 diet with thin liquids this shift (per speech). Rash in groin and on face. Pt up to wheelchair this shift, pivoted with 2 assist and gait belt. Pt turns self in bed with minimal to no assistance. PIVs in both upper extremities, saline locked, sites WDL. Mitts on pt to prevent pt from pulling at PIVs.    Continue to monitor.      "

## 2018-06-14 NOTE — PLAN OF CARE
Problem: Patient Care Overview  Goal: Plan of Care/Patient Progress Review  Discharge Planner SLP   Patient plan for discharge: Not able to state.  Current status: Pt with improved oral bolus control this date. All trials with no overt s/sx aspiration/penetration. Recommend thin liquids and dysphagia 3 diet with 1:1 assistance for feeding, pills in puree, pt to sit fully upright for all PO intake.   Barriers to return to prior living situation: Medical stability  Recommendations for discharge: Group home vs TCU  Rationale for recommendations: Pt not likely to require ongoing SLP services at next level of care, appears likely to meet swallowing baseline function prior to discharge from this facility.       Entered by: Courtney Dorsey 06/14/2018 9:09 AM

## 2018-06-14 NOTE — PLAN OF CARE
Problem: Patient Care Overview  Goal: Plan of Care/Patient Progress Review  Outcome: Improving  Pt is alert and oriented to self. VSS on RA. Baseline cognitive delay makes orientation difficult to assess overall. Displays notable left sided weakness. Tele monitoring reveals sinus rhythm with 1st degree AV block. Rash on right side of face and groin unchanged, working thru PO antibiotics at present. Mitts on hands for safety. Prefers PO medications crushed and in pudding or apple sauce. Expressed interest in sitting up in chair in room on day shift - chair alarm ordered to accommodate this request.

## 2018-06-14 NOTE — PLAN OF CARE
Problem: Patient Care Overview  Goal: Plan of Care/Patient Progress Review  Outcome: No Change  Neuro: Alert, orientated to self. Baseline cognitive delay and slight left sided weakness. Pt is able to move all extremities.  Cardiac: SR with 1st degree AV block. VSS. Pt temp 96-97F. Per MD [place], notify if temp is under 96F.                        Respiratory: Sating>90% on RA.  GI/: Adequate urine output. Reyes in place. No BM this shift. Pt is passing gas.   Diet/appetite: Tolerating DD2 diet with necktar thickened liquids. Eating well.  Activity:  Assist of 1-2, up to chair.  Pain: Difficult to asses. No nonverbal signs of pain noted.    Skin: Red rash on R side of face, improving. Red rash to groin appears unchanged.  LDA's: 2 peripherals.     Neuro consult placed. Pt's brother at bedside throughout the shift. Pt transferred to .

## 2018-06-14 NOTE — PLAN OF CARE
Problem: Functional Deficit (Adult,Obstetrics,Pediatric)  Goal: Signs and Symptoms of Listed Potential Problems Will be Absent, Minimized or Managed (Functional Deficit)  Signs and symptoms of listed potential problems will be absent, minimized or managed by discharge/transition of care (reference Functional Deficit (Adult,Obstetrics,Pediatric) CPG).   MIRIAM  Pt. Transferred to  from  around 1920.  Had supper when she got here.  Ate everything that was on her tray except here banana.  Had some nectar thickened liquids also.  Occasionally said ouch but every time I would ask her where it hurt she would say something different.  She was smiling and happy much of the time.  Reyes is patent.  No stool this shift.  Vitals stable.  Takes her pills crushed in pudding or apple sauce.  Has pneumo boots on.  Tele shows heart block.  P. Continue to monitor and continue plan of care.

## 2018-06-15 ENCOUNTER — APPOINTMENT (OUTPATIENT)
Dept: PHYSICAL THERAPY | Facility: CLINIC | Age: 43
DRG: 644 | End: 2018-06-15
Attending: HOSPITALIST
Payer: MEDICARE

## 2018-06-15 ENCOUNTER — APPOINTMENT (OUTPATIENT)
Dept: SPEECH THERAPY | Facility: CLINIC | Age: 43
DRG: 644 | End: 2018-06-15
Attending: HOSPITALIST
Payer: MEDICARE

## 2018-06-15 LAB
CK SERPL-CCNC: 33 U/L (ref 30–225)
ERYTHROCYTE [DISTWIDTH] IN BLOOD BY AUTOMATED COUNT: 13.1 % (ref 10–15)
HCT VFR BLD AUTO: 33.2 % (ref 35–47)
HGB BLD-MCNC: 11.1 G/DL (ref 11.7–15.7)
MCH RBC QN AUTO: 31.9 PG (ref 26.5–33)
MCHC RBC AUTO-ENTMCNC: 33.4 G/DL (ref 31.5–36.5)
MCV RBC AUTO: 95 FL (ref 78–100)
PLATELET # BLD AUTO: 79 10E9/L (ref 150–450)
RBC # BLD AUTO: 3.48 10E12/L (ref 3.8–5.2)
WBC # BLD AUTO: 4.6 10E9/L (ref 4–11)

## 2018-06-15 PROCEDURE — 97530 THERAPEUTIC ACTIVITIES: CPT | Mod: GP

## 2018-06-15 PROCEDURE — 99233 SBSQ HOSP IP/OBS HIGH 50: CPT | Performed by: PEDIATRICS

## 2018-06-15 PROCEDURE — 80175 DRUG SCREEN QUAN LAMOTRIGINE: CPT | Performed by: PEDIATRICS

## 2018-06-15 PROCEDURE — 80183 DRUG SCRN QUANT OXCARBAZEPIN: CPT | Performed by: PEDIATRICS

## 2018-06-15 PROCEDURE — 25000132 ZZH RX MED GY IP 250 OP 250 PS 637: Mod: GY | Performed by: INTERNAL MEDICINE

## 2018-06-15 PROCEDURE — 40000225 ZZH STATISTIC SLP WARD VISIT: Performed by: SPEECH-LANGUAGE PATHOLOGIST

## 2018-06-15 PROCEDURE — 40000193 ZZH STATISTIC PT WARD VISIT

## 2018-06-15 PROCEDURE — 12000008 ZZH R&B INTERMEDIATE UMMC

## 2018-06-15 PROCEDURE — A9270 NON-COVERED ITEM OR SERVICE: HCPCS | Mod: GY | Performed by: INTERNAL MEDICINE

## 2018-06-15 PROCEDURE — 99221 1ST HOSP IP/OBS SF/LOW 40: CPT | Performed by: PSYCHIATRY & NEUROLOGY

## 2018-06-15 PROCEDURE — 85027 COMPLETE CBC AUTOMATED: CPT | Performed by: PEDIATRICS

## 2018-06-15 PROCEDURE — 25000132 ZZH RX MED GY IP 250 OP 250 PS 637: Mod: GY | Performed by: PEDIATRICS

## 2018-06-15 PROCEDURE — 80177 DRUG SCRN QUAN LEVETIRACETAM: CPT | Performed by: PEDIATRICS

## 2018-06-15 PROCEDURE — 92526 ORAL FUNCTION THERAPY: CPT | Mod: GN | Performed by: SPEECH-LANGUAGE PATHOLOGIST

## 2018-06-15 PROCEDURE — A9270 NON-COVERED ITEM OR SERVICE: HCPCS | Mod: GY | Performed by: PEDIATRICS

## 2018-06-15 PROCEDURE — 36415 COLL VENOUS BLD VENIPUNCTURE: CPT | Performed by: PEDIATRICS

## 2018-06-15 PROCEDURE — 80299 QUANTITATIVE ASSAY DRUG: CPT | Performed by: PEDIATRICS

## 2018-06-15 PROCEDURE — 82550 ASSAY OF CK (CPK): CPT | Performed by: PEDIATRICS

## 2018-06-15 RX ADMIN — Medication 25 MG: at 17:36

## 2018-06-15 RX ADMIN — LEVETIRACETAM 1000 MG: 500 TABLET, FILM COATED ORAL at 21:37

## 2018-06-15 RX ADMIN — LEVETIRACETAM 1000 MG: 500 TABLET, FILM COATED ORAL at 08:59

## 2018-06-15 RX ADMIN — TRAZODONE HYDROCHLORIDE 50 MG: 50 TABLET ORAL at 08:59

## 2018-06-15 RX ADMIN — LAMOTRIGINE 100 MG: 25 TABLET ORAL at 14:11

## 2018-06-15 RX ADMIN — OXCARBAZEPINE 600 MG: 600 TABLET, FILM COATED ORAL at 21:38

## 2018-06-15 RX ADMIN — DOCUSATE SODIUM 100 MG: 50 LIQUID ORAL at 21:39

## 2018-06-15 RX ADMIN — RISPERIDONE 3 MG: 3 TABLET ORAL at 08:59

## 2018-06-15 RX ADMIN — LACOSAMIDE 200 MG: 200 TABLET, FILM COATED ORAL at 21:37

## 2018-06-15 RX ADMIN — TRAZODONE HYDROCHLORIDE 150 MG: 150 TABLET ORAL at 22:32

## 2018-06-15 RX ADMIN — DOXYCYCLINE HYCLATE 100 MG: 100 CAPSULE ORAL at 00:08

## 2018-06-15 RX ADMIN — DOCUSATE SODIUM 100 MG: 50 LIQUID ORAL at 08:59

## 2018-06-15 RX ADMIN — LACOSAMIDE 200 MG: 200 TABLET, FILM COATED ORAL at 08:58

## 2018-06-15 RX ADMIN — LAMOTRIGINE 100 MG: 25 TABLET ORAL at 21:38

## 2018-06-15 RX ADMIN — LAMOTRIGINE 100 MG: 25 TABLET ORAL at 08:59

## 2018-06-15 RX ADMIN — OXCARBAZEPINE 600 MG: 600 TABLET, FILM COATED ORAL at 08:59

## 2018-06-15 RX ADMIN — DOXYCYCLINE HYCLATE 100 MG: 100 CAPSULE ORAL at 12:00

## 2018-06-15 RX ADMIN — RISPERIDONE 4 MG: 4 TABLET ORAL at 21:38

## 2018-06-15 NOTE — PLAN OF CARE
Problem: Patient Care Overview  Goal: Plan of Care/Patient Progress Review  Outcome: Improving  Pt is alert and oriented, limited ability to communicate due to cognitive delays, but appropriately responsive to questions with simple phrases and able to make needs known at this time.  Up with assist of 1 to bathroom periodically on this shift with good output and stools x 3. Notably improved stable ambulation c/t recent nights. Continues to utilize mitts on both hands to protect her peripheral IV sites. No seizure activity observed. Occasionally excitable but easily redirected. Continue to monitor.

## 2018-06-15 NOTE — CONSULTS
Consult Date:  06/15/2018      REQUESTING SOURCE:  Gold 1 team.      IDENTIFYING DATA:  This is a 42-year-old woman seen today for psychiatric consultation to evaluate her for possible changes in her psychiatric medications.      HISTORY OF PRESENT ILLNESS:  Ms. Helms is a 42-year-old woman with history of cognitive delay, left-sided hemiplegia, severe epilepsy who was transferred from an outside Emergency Department for hypothermia and hypotension.  I had an opportunity to speak with Alina at her group home ([phone]), who verified that she got to the point where she had to be taken out of the group home by ambulance due to her inability to ambulate.  At baseline, she does walk without assistance.  I was reminded by Alina that Nella will have very problematic behaviors; she has a tendency towards self-injurious behavior and also outbursts where she will attempt to strike staff, etc.  She was very concerned about reducing her medications too much, but she also was mindful that she could be having side effects.  I reviewed the situation with her nurse and she mentioned that Nella is able to ambulate without difficulty and she has not seen any problematic behavior, although she does engage in a lot of attention-seeking behaviors.  Apparently this is common at the group home as well.    During this hospitalization, her trazodone was decreased from 150 mg a.m., 50 mg at noon and 150 mg at bedtime, to 50 mg in the morning, 25 mg during the day and 150 mg at bedtime.  As an outpatient, her new psychiatrist, Dr. Campuzano at [facility], had reduced her Risperdal from 4 mg twice a day to 3 mg in morning and 4 mg in the evening.  She was hospitalized here on the Neurology Service just February for thrombocytopenia.  At that time Depakote was stopped and Keppra and lacosamide started. No change in behaviors since Keppra started.  Of interest, at that time, she was on 4 mg twice a day +2 mg at  "bedtime for a total of 10 mg per day of Risperdal, and she was taking 700 mg per day of trazodone.      Per my interview, she was minimally verbal, but complaining of a variety of pains.  She said her mood was okay.  She seemed pleasant, cooperative during my visit without any agitation.      PAST PSYCHIATRIC HISTORY:  She had recent medication changes as indicated above.  She recently has changed psychiatrists from Dr. Guillaume Feldman to Dr. Campuzano.  I am not aware of any psychiatric admissions.      CHEMICAL USE HISTORY:  No use of drugs, alcohol or tobacco.      PAST MEDICAL HISTORY:  Cortical blindness, mental retardation, strabismus, severe epilepsy, history of ITP and left hemiplegia.        No records of surgical procedures      HAS MULTIPLE ALLERGIES; see EMR      REVIEW OF SYSTEMS:  Ten-point review of systems today is negative except for pain in her nose and in her feet and diffuse aches and pains.      CURRENT PSYCHIATRIC MEDICATIONS:  Risperdal 3 mg morning, 4 mg p.m., trazodone 50 mg morning, 25 mg 4 pm, 150 mg at bedtime. Lamictal 100 mg 3 times per day and 3 additional epilepsy meds.      FAMILY HISTORY:  Unable to obtain.      SOCIAL HISTORY:  She lives in a group home in [place], [place].      MENTAL STATUS EXAMINATION:  She is lying on her right side in bed, complaining of a variety of pains.  She is overall pleasant, cooperative but her affect was quite irritable.  She described her mood as \"okay.\"  It is difficult to assess for muscular rigidity since, in trying to assess this, she was refusing to relax her arm.  She was not oriented to day, date or place.  Recent and remote memory, attention and concentration are very substandard.  Use of language, fund of knowledge are very limited.  Insight and judgment poor.      VITAL SIGNS:  Blood pressure 136/76, pulse 67, temperature 95.5.      DIAGNOSIS:  Intellectual disability and seizure disorder.      IMPRESSION:  Acute problems on admission were " likely related to her being on the high doses of Risperdal and trazodone which are both strong alpha blockers and will cause hypotension.  Also, it is conceivable that some of her problematic behaviors were actually response to akathisia or other extrapyramidal side effects from the high dose of Risperdal.  She has improved markedly during this hospitalization and has no evidence of more problematic behaviors emerging.  I think it would be reasonable to get her on a more conventional dose of 6 mg per day of Risperdal.      RECOMMENDATIONS:   1.  I would continue with 50 mg of trazodone in the morning, but increase the PM dose to 50 mg as well and continue the 150 mg at bedtime.   2.  Risperdal could be changed to 3 mg twice a day.   3.  I would consider increasing the Lamictal to 200 mg BID; this often times does help with some of the problematic behaviors in this population.   4.  Please reconsult Psychiatry as needed.         TONY FERRARA MD             D: 06/15/2018   T: 06/15/2018   MT: NTS      Name:     JOHANNA SIMONS   MRN:      [id number]        Account:       [id number]   :      1975           Consult Date:  06/15/2018      Document: [id number]       cc: Bryan Fritsch DO

## 2018-06-15 NOTE — PLAN OF CARE
Problem: Patient Care Overview  Goal: Plan of Care/Patient Progress Review  Discharge Planner SLP   Patient plan for discharge: Brother/guardian planning for TCU then return to group home  Current status: Pt with improved strength and coordination this date. Pt impulsive with eating, though without overt signs penetration/aspiration; with exception of one instance of dry cough, followed by giggling and suspect to be behavioral. Recommend regular diet and thin liquids with 1:1 supervision, pt is able to self-feed if mitts are off. SLP to follow per POC.  Barriers to return to prior living situation: Medical stability  Recommendations for discharge: Defer to OT/PT  Rationale for recommendations: Pt likely to meet swallowing goal prior to discharge from this facility given progress in oropharyngeal swallow function this date.        Entered by: Courtney Dorsey 06/15/2018 8:36 AM

## 2018-06-15 NOTE — PLAN OF CARE
Problem: Functional Deficit (Adult,Obstetrics,Pediatric)  Goal: Signs and Symptoms of Listed Potential Problems Will be Absent, Minimized or Managed (Functional Deficit)  Signs and symptoms of listed potential problems will be absent, minimized or managed by discharge/transition of care (reference Functional Deficit (Adult,Obstetrics,Pediatric) CPG).   D.  Pt. Ambulated to the bathroom with assist of 1 to 2 4 times this shift.  Had 3 stools and voided each time.  Ate all of her supper.  Earlier was talking about her head hurting from a fall but with distraction she didn't mention it again.  Bite her cap off of one of her ivs and was very distressed about that.  Continues to wear mitts.  As soon as you take them off she starts picking at the other iv.  No seizure activity noted today.   P. Continue to monitor and continue plan of care.

## 2018-06-15 NOTE — PROGRESS NOTES
"    Internal Medicine Progress Note    Date of Service (when I saw the patient): 06/15/2018    Assessment & Plan   Nella Helms is a 42-year-old female with history of cognitive delay, left sided hemiplegia, severe epilepsy, ITP, who was transferred here from an outside emergency department for hypothermia and hypotension in the setting of multiple weeks of weakness and falls.  The patient has received 50 mg of hydrocortisone as well as intravenous fluids with improvement in both hypothermia and hypotension and is currently vitally stable.     At the outside ED, bladder temperature was noted to be in the low 30 C range.  She was given 50 mg of IV hydrocortisone with improvement.  She was noted to be hyponatremic to 128 and slightly hyperkalemic to 5.  She had recently stopped steroids June 9 for worsening thrombocytopenia in the context of ITP (taking 40 mg of prednisone from June 6 - June 8 and then 20 mg on June 9).    Today: No change from yesterday.  No alterations in temperature or other vitals.  Awaiting placement in TCU for rehab.  Psych saw today and recommended adjustments to medications to reduce hypotension and address problem behaviors.    Hypothermia and hypotension with hyponatremia and mild hyperkalemia  Most likely due to adrenal insufficiency due to frequent steroid intake.  Clinical features are suggestive and other likely diagnoses have been ruled out.  Also, she responded appropriately to hydrocortisone administration.  TSH was 3.16, am cortisol was 11.4 the day after getting hydrocortisone.  Procalcitonin negative make infection unlikely.  Not sure how rash (below) may be playing a role.  Per psych, \"Acute problems on admission were likely related to her being on the high doses of Risperdal and trazodone which are both strong alpha blockers and will cause hypotension\"   - Monitor closely      Right facial rash likely due to impetigo  She was recently on clindamycin for 7 days, ending June 6, " with apparent improvement in the rash and worsening since discontinuation.  No vesicles to suggest Zoster. Honey-crust noted around right edge of mouth suggestive of impetigo.  Improving again after starting doxycycline  - Continue course of doxycycline 100 mg twice daily x5 day      Thrombocytopenia likely due to ITP  Hospitalization in February at the [facility] where this was diagnosed and improved markedly with steroids.  Completed steroid taper in March.  Recently seen in the ED June 5 with thrombocytopenia of 66 was noted and the patient was given steroids, per the [facility] was taking 40 mg of prednisone from June 6 - June 8 and then 20 mg on June 9.  Plts were 44 on admit but increased to 51 today.   - Continue to trend, avoid heparin or NSAIDs   - We will avoid giving steroids overnight hoping to maximize reliability of a.m. cortisol test   - Transfuse platelets for less than 10 although with ITP steroids are ultimately needed      Acute on chronic gait instability  Patient has had multiple weeks of worsening gait instability and more difficulty with routine tasks such as eating.  Her psychiatrist has already decreased her risperidone from 4 mg twice daily to 3 in the morning and 4 at night without improvement. Neurology consulted and reports her antiepileptics don't typically cause weakness.  More likely secondary to steroid use.    - Reduce trazodone from 100 mg 8 am, 50 mg 4 pm, 150 mg qhs to 50 mg 8 am (50%), 25 mg 4 pm (50%) and continue the 150 mg qhs. Gradual taper given gait instability and history of severe seizures (withdrawal concern).   - Avoid steroids    - PT/OT     Leukopenia  3.6 at the OSH and down to 2.8 todayappears to be hovering mid-3s to 4's since February. On multiple drugs that could contribute, including: Lamictal, oxcarbazepine.  - continue to trend here      Severe epilepsy, well controlled.  No changes per neurology.   - Continue Vimpat 200 mg daily   - Continue Lamictal to 100 mg  three times daily   - Continue Keppra 1000 twice daily   - Continue oxcarbazepine 600 mg twice daily   - Will check levels tomorrow am    Dysphagia.  Followed by SLP.   - Change diet to dysphagia 3 with thin liquids    Problematic behaviors. Could be akathisia or other extrapyramidal side effects from the high dose of Risperdal.  She has improved markedly during this hospitalization and has no evidence of more problematic behaviors emerging.   - Risperdal changed to 3 mg twice a day.     Diet:   Active Diet Order      Dysphagia Diet Level 3 Advanced Thin Liquids (water, ice chips, juice, milk gelatin, ice cream, etc)  DVT Prophylaxis: VTE Prophylaxis contraindicated due to thrombocytopenia  Code Status: Full Code    Disposition: Expected discharge in 3-5 days once TCU placement found.    I have reviewed vitals, labs, and medications.   Seen with bedside nurse.    Haresh Redding MD  Internal Medicine-Pediatrics Staff  Pager: [phone]    Please see sticky note for cross cover information     ------    Interval History   Alone in room.  Asking questions appropriate for her baseline.  Complains of pain in feet.    No fevers or chills.      Unable to obtain further history or ROS.    Physical Exam     Vitals:    06/13/18 0033 06/13/18 2000 06/14/18 2157   Weight: 79.4 kg (175 lb 0.7 oz) 79.9 kg (176 lb 2.4 oz) 78.8 kg (173 lb 11.6 oz)     Vital Signs with Ranges  Temp:  [95.5  F (35.3  C)-97.3  F (36.3  C)] 97.3  F (36.3  C)  Pulse:  [65-68] 67  Heart Rate:  [70] 70  Resp:  [16-18] 16  BP: (116-142)/(51-82) 136/76  SpO2:  [95 %-98 %] 98 %  I/O last 3 completed shifts:  In: 1300 [P.O.:1300]  Out: 775 [Urine:775]    (unchanged from yesterday)  Constitutional: alert and no distress   Cardiovascular: RRR. + systolic murmur  Neuro: Challenging to assess, left side weaker then right  Psychiatric: affect at baseline per brother; concentration poor    Medications       docusate  100 mg Oral BID     doxycycline  100 mg Oral Q12H BHAVNA      lacosamide  200 mg Oral BID     lamoTRIgine  100 mg Oral TID     levETIRAcetam  1,000 mg Oral BID     OXcarbazepine  600 mg Oral BID     risperiDONE  3 mg Oral QAM     risperiDONE  4 mg Oral Daily at 8 pm     sodium chloride (PF)  3 mL Intracatheter Q8H     traZODone  25 mg Oral Daily at 4 pm     traZODone  150 mg Oral At Bedtime     traZODone  50 mg Oral QAM       Data   ---    Nursing notes reviewed.  Patient seen with bedside nurse.     I have reviewed today's Medications, Vital Signs and Labs

## 2018-06-15 NOTE — PLAN OF CARE
Problem: Patient Care Overview  Goal: Plan of Care/Patient Progress Review  Discharge Planner PT  5B  Patient plan for discharge: Not able to discuss due to pt cognitive status  Current status: Pt performs supine>sit SBA, impulsively stands without instruction. Ambulates with SBA to perform transfer to . Pt ascends/descends 4 stairs x2 reps with one rail and CGAx2, assist of 2 required secondary to poor command following though likely would have been safe with assist of 1. Pt demonstrates behavioral factors that limit session, refer to psych note. Pt had one posterior LOB when ascending requiring Aguila to recover balance.   Barriers to return to prior living situation: medical status  Recommendations for discharge: Return to group home with home PT  Rationale for recommendations: At baseline, pt receives hand hold assist and verbal cues to complete stairs. Adjustment made to medications today for behavioral factors, therefore stairs to be assessed tomorrow to assess if returned to baseline. Pt would benefit from continued home PT services to address balance, strength, and safety with functional mobility.       Entered by: Allie Figueroa 06/15/2018 2:02 PM

## 2018-06-15 NOTE — PLAN OF CARE
"Problem: Patient Care Overview  Goal: Plan of Care/Patient Progress Review  Outcome: Improving  Assumed cares 0700 to 1500.    /71 (BP Location: Left arm)  Pulse 67  Temp 96.8  F (36  C) (Axillary)  Resp 18  Ht 1.549 m (5' 1\")  Wt 78.8 kg (173 lb 11.6 oz)  SpO2 98%  BMI 32.82 kg/m2    Pt alert. JOCELIN orientation. Speech is garbled. Lung sounds diminished on RA. HR and pulses WDL. Telemetry reveals first degree heart block. Bowel sounds active. 2 BMs this shift. Good UOP and PO intake. Rash in groin and on face. Pt up this shift with 1 assist and gait belt. Pt turns self in bed. PIVs in RUE, saline locked, site WDL. Mitts on pt to prevent pt from pulling at PIV.    Continue to monitor.      "

## 2018-06-15 NOTE — PROGRESS NOTES
Internal Medicine Progress Note    Date of Service (when I saw the patient): 06/14/2018    Assessment & Plan   Nella Helms is a 42-year-old female with history of cognitive delay, left sided hemiplegia, severe epilepsy, ITP, who was transferred here from an outside emergency department for hypothermia and hypotension in the setting of multiple weeks of weakness and falls.  The patient has received 50 mg of hydrocortisone as well as intravenous fluids with improvement in both hypothermia and hypotension and is currently vitally stable.     At the outside ED, bladder temperature was noted to be in the low 30 C range.  She was given 50 mg of IV hydrocortisone with improvement.  She was noted to be hyponatremic to 128 and slightly hyperkalemic to 5.  She had recently stopped steroids June 9 for worsening thrombocytopenia in the context of ITP (taking 40 mg of prednisone from June 6 - June 8 and then 20 mg on June 9).    Today: Slight decrease in temperature but blood pressures and electrolytes stable.  PT/OT recommending rehab/TCU    Hypothermia and hypotension with hyponatremia and mild hyperkalemia  Most likely due to adrenal insufficiency due to frequent steroid intake.  Clinical features are suggestive and other likely diagnoses have been ruled out.  Also, she responded appropriately to hydrocortisone administration.  TSH was 3.16, am cortisol was 11.4 the day after getting hydrocortisone.  Procalcitonin negative make infection unlikely.  Not sure how rash (below) may be playing a role.   - Monitor closely      Right facial rash likely due to impetigo  She was recently on clindamycin for 7 days, ending June 6, with apparent improvement in the rash and worsening since discontinuation.  No vesicles to suggest Zoster. Honey-crust noted around right edge of mouth suggestive of impetigo.  Improving again after starting doxycycline  - Continue course of doxycycline 100 mg twice daily x5 day      Thrombocytopenia  likely due to ITP  Hospitalization in February at the [facility] where this was diagnosed and improved markedly with steroids.  Completed steroid taper in March.  Recently seen in the ED June 5 with thrombocytopenia of 66 was noted and the patient was given steroids, per the [facility] MAR was taking 40 mg of prednisone from June 6 - June 8 and then 20 mg on June 9.  Plts were 44 on admit but increased to 51 today.   - Continue to trend, avoid heparin or NSAIDs   - We will avoid giving steroids overnight hoping to maximize reliability of a.m. cortisol test   - Transfuse platelets for less than 10 although with ITP steroids are ultimately needed      Acute on chronic gait instability  Patient has had multiple weeks of worsening gait instability and more difficulty with routine tasks such as eating.  Her psychiatrist has already decreased her risperidone from 4 mg twice daily to 3 in the morning and 4 at night without improvement. Neurology consulted and reports her antiepileptics don't typically cause weakness.  More likely secondary to steroid use.    - Reduce trazodone from 100 mg 8 am, 50 mg 4 pm, 150 mg qhs to 50 mg 8 am (50%), 25 mg 4 pm (50%) and continue the 150 mg qhs. Gradual taper given gait instability and history of severe seizures (withdrawal concern).   - Avoid steroids    - PT/OT   - Discuss further reduction of risperidone with psychiatry     Leukopenia  3.6 at the OSH and down to 2.8 todayappears to be hovering mid-3s to 4's since February. On multiple drugs that could contribute, including: Lamictal, oxcarbazepine.  - continue to trend here      Severe epilepsy, well controlled.  No changes per neurology.   - Continue Vimpat 200 mg daily   - Continue Lamictal 100 mg 3 times daily   - Continue Keppra 1000 twice daily   - Continue oxcarbazepine 600 mg twice daily   - Will check levels tomorrow am    Dysphagia.  Followed by SLP.   - Change diet to dysphagia 3 with thin liquids    Diet:   Active Diet Order       Dysphagia Diet Level 3 Advanced Thin Liquids (water, ice chips, juice, milk gelatin, ice cream, etc)  DVT Prophylaxis: VTE Prophylaxis contraindicated due to thrombocytopenia  Code Status: Full Code    Disposition: Expected discharge in 3-5 days once TCU placement found.    I have reviewed vitals, labs, and medications.   Seen with bedside nurse.    Haresh Redding MD  Internal Medicine-Pediatrics Staff  Pager: [phone]    Please see sticky note for cross cover information     ------    Interval History   Interacting with father.  Unchanged behaviors.    No fevers or chills.      Unable to obtain further history or ROS.    Physical Exam     Vitals:    06/12/18 1000 06/13/18 0033 06/13/18 2000   Weight: 79.2 kg (174 lb 9.7 oz) 79.4 kg (175 lb 0.7 oz) 79.9 kg (176 lb 2.4 oz)     Vital Signs with Ranges  Temp:  [95.6  F (35.3  C)-97.1  F (36.2  C)] 95.7  F (35.4  C)  Pulse:  [65-68] 68  Heart Rate:  [65-72] 72  Resp:  [16-18] 16  BP: (116-148)/(51-91) 142/82  SpO2:  [95 %-97 %] 97 %  I/O last 3 completed shifts:  In: 800 [P.O.:800]  Out: 2875 [Urine:2875]    (unchanged from yesterday)  Constitutional: alert and no distress   Cardiovascular: RRR. + systolic murmur  Neuro: Challenging to assess, left side weaker then right  Psychiatric: affect at baseline per brother; concentration poor    Medications       docusate  100 mg Oral BID     doxycycline  100 mg Oral Q12H BHAVNA     lacosamide  200 mg Oral BID     lamoTRIgine  100 mg Oral TID     levETIRAcetam  1,000 mg Oral BID     OXcarbazepine  600 mg Oral BID     risperiDONE  3 mg Oral QAM     risperiDONE  4 mg Oral Daily at 8 pm     sodium chloride (PF)  3 mL Intracatheter Q8H     traZODone  25 mg Oral Daily at 4 pm     traZODone  150 mg Oral At Bedtime     traZODone  50 mg Oral QAM       Data   ---    Nursing notes reviewed.  Patient seen with bedside nurse.     I have reviewed today's Medications, Vital Signs and Labs

## 2018-06-15 NOTE — PLAN OF CARE
Problem: Patient Care Overview  Goal: Plan of Care/Patient Progress Review  Outcome: Improving  Patient's mobility improving based on previous reports. Ambulated x 5 to bathroom, assist of 1. Ambulated in hallway and to wheelchair to work with PT. Ate breakfast with assistance - very good appetite. Napped three times during shift for 1.5 hours, 30 minutes, and was sleeping at the end of the shift. Saw PT at 1345 and walked up stairs. Vital signs stable on room air.

## 2018-06-15 NOTE — PROGRESS NOTES
Social Work Services Progress Note    Hospital Day: 5  Date of Initial Social Work Evaluation:  na  Collaborated with:  David Coppola, PT    Data:  Nella is a 42 year old female admitted to [facility] 6/11/18 from her group home. She is progressing well with therapies, SW had initiated TCU referrals however there are many barriers to this process and it is anticipated she will progress to return to  by early next week. Patient has intellectual disability, brother Abdon is guardian.    Intervention:  Spoke with Abdon at length today. Provided him with the medication changes made by psychiatry as noted today by Dr. Strauss. Also discussed with him her progress and anticipated continued progress and likely ability to return to her group home on Monday or Tuesday next week. He is very hopeful for this plan and feels it would be best for her. SW worked with patient's DD CM today trying to arrange for level II screening for facility placement, however CM is highly unfamiliar with the process and wants SW to dc her to TCU and she has 30 days to see her after she gets there- however this would actually need to be completed prior to DC. She is now out of the office after 10 am today. Amanda Dhillon @ [phone] is DDCM. Patients brother Abdon is legal guardian    Assessment: Two days ago patient was unable to get out of bed, now today has progressed to ambulating with only SBA and completed 3 stairs 2x. SW asked therapy to see her over the weekend to keep her engaged and progressing as she only needs stair ability to return to , however they are unsure if they will have therapy see her over the weekend.    Plan:    Anticipated Disposition:  Facility:  Group Home    Barriers to d/c plan:  stairs    Follow Up:  Vee/rncc following    LOVELY Lomas  5B  (Medical/Surgical)  Phone: [phone]  Pager: [phone]

## 2018-06-15 NOTE — CONSULTS
Patient seen and chart reviewed. Note dictated (initial)   RECOMMENDATIONS:  Change trazodone to 50 mg  mg HS  Decrease Risperdal to 3 mg am, 3 mg HS  Consider increasing Lamictal to 400 mg per day (I checked with neurology, and they were ok with this)   Re-consult psychiatry as needed.

## 2018-06-16 ENCOUNTER — APPOINTMENT (OUTPATIENT)
Dept: OCCUPATIONAL THERAPY | Facility: CLINIC | Age: 43
DRG: 644 | End: 2018-06-16
Attending: HOSPITALIST
Payer: MEDICARE

## 2018-06-16 ENCOUNTER — APPOINTMENT (OUTPATIENT)
Dept: SPEECH THERAPY | Facility: CLINIC | Age: 43
DRG: 644 | End: 2018-06-16
Attending: HOSPITALIST
Payer: MEDICARE

## 2018-06-16 ENCOUNTER — APPOINTMENT (OUTPATIENT)
Dept: PHYSICAL THERAPY | Facility: CLINIC | Age: 43
DRG: 644 | End: 2018-06-16
Attending: HOSPITALIST
Payer: MEDICARE

## 2018-06-16 LAB
10OH-CARBAZEPINE SERPL-MCNC: 19.3 UG/ML
LAMOTRIGINE SERPL-MCNC: 2.2 UG/ML (ref 2.5–15)
LEVETIRACETAM SERPL-MCNC: 17 UG/ML (ref 12–46)

## 2018-06-16 PROCEDURE — 40000225 ZZH STATISTIC SLP WARD VISIT: Performed by: SPEECH-LANGUAGE PATHOLOGIST

## 2018-06-16 PROCEDURE — 40000193 ZZH STATISTIC PT WARD VISIT

## 2018-06-16 PROCEDURE — 97116 GAIT TRAINING THERAPY: CPT | Mod: GP

## 2018-06-16 PROCEDURE — 40000133 ZZH STATISTIC OT WARD VISIT

## 2018-06-16 PROCEDURE — 12000008 ZZH R&B INTERMEDIATE UMMC

## 2018-06-16 PROCEDURE — 97530 THERAPEUTIC ACTIVITIES: CPT | Mod: GP

## 2018-06-16 PROCEDURE — 99233 SBSQ HOSP IP/OBS HIGH 50: CPT | Performed by: PEDIATRICS

## 2018-06-16 PROCEDURE — 97535 SELF CARE MNGMENT TRAINING: CPT | Mod: GO

## 2018-06-16 PROCEDURE — 92526 ORAL FUNCTION THERAPY: CPT | Mod: GN | Performed by: SPEECH-LANGUAGE PATHOLOGIST

## 2018-06-16 PROCEDURE — A9270 NON-COVERED ITEM OR SERVICE: HCPCS | Mod: GY | Performed by: INTERNAL MEDICINE

## 2018-06-16 PROCEDURE — A9270 NON-COVERED ITEM OR SERVICE: HCPCS | Mod: GY | Performed by: PEDIATRICS

## 2018-06-16 PROCEDURE — 25000132 ZZH RX MED GY IP 250 OP 250 PS 637: Mod: GY | Performed by: INTERNAL MEDICINE

## 2018-06-16 PROCEDURE — 25000132 ZZH RX MED GY IP 250 OP 250 PS 637: Mod: GY | Performed by: PEDIATRICS

## 2018-06-16 RX ORDER — TRAZODONE HYDROCHLORIDE 50 MG/1
50 TABLET, FILM COATED ORAL
Status: DISCONTINUED | OUTPATIENT
Start: 2018-06-17 | End: 2018-06-20 | Stop reason: HOSPADM

## 2018-06-16 RX ORDER — RISPERIDONE 3 MG/1
3 TABLET ORAL
Status: DISCONTINUED | OUTPATIENT
Start: 2018-06-16 | End: 2018-06-20 | Stop reason: HOSPADM

## 2018-06-16 RX ORDER — LAMOTRIGINE 100 MG/1
200 TABLET ORAL 2 TIMES DAILY
Status: DISCONTINUED | OUTPATIENT
Start: 2018-06-16 | End: 2018-06-20 | Stop reason: HOSPADM

## 2018-06-16 RX ADMIN — OXCARBAZEPINE 600 MG: 600 TABLET, FILM COATED ORAL at 20:18

## 2018-06-16 RX ADMIN — LACOSAMIDE 200 MG: 200 TABLET, FILM COATED ORAL at 08:49

## 2018-06-16 RX ADMIN — Medication 25 MG: at 16:16

## 2018-06-16 RX ADMIN — DOXYCYCLINE HYCLATE 100 MG: 100 CAPSULE ORAL at 00:52

## 2018-06-16 RX ADMIN — RISPERIDONE 3 MG: 3 TABLET ORAL at 20:19

## 2018-06-16 RX ADMIN — LAMOTRIGINE 100 MG: 25 TABLET ORAL at 14:03

## 2018-06-16 RX ADMIN — RISPERIDONE 3 MG: 3 TABLET ORAL at 08:50

## 2018-06-16 RX ADMIN — TRAZODONE HYDROCHLORIDE 150 MG: 150 TABLET ORAL at 22:20

## 2018-06-16 RX ADMIN — LAMOTRIGINE 100 MG: 25 TABLET ORAL at 08:49

## 2018-06-16 RX ADMIN — TRAZODONE HYDROCHLORIDE 50 MG: 50 TABLET ORAL at 08:49

## 2018-06-16 RX ADMIN — LAMOTRIGINE 200 MG: 100 TABLET ORAL at 20:17

## 2018-06-16 RX ADMIN — LEVETIRACETAM 1000 MG: 500 TABLET, FILM COATED ORAL at 08:49

## 2018-06-16 RX ADMIN — DOCUSATE SODIUM 100 MG: 50 LIQUID ORAL at 20:20

## 2018-06-16 RX ADMIN — OXCARBAZEPINE 600 MG: 600 TABLET, FILM COATED ORAL at 08:49

## 2018-06-16 RX ADMIN — LEVETIRACETAM 1000 MG: 500 TABLET, FILM COATED ORAL at 20:18

## 2018-06-16 RX ADMIN — LACOSAMIDE 200 MG: 200 TABLET, FILM COATED ORAL at 20:19

## 2018-06-16 NOTE — PLAN OF CARE
Problem: Patient Care Overview  Goal: Plan of Care/Patient Progress Review  Outcome: Improving  Pt is alert and oriented, limited ability to communicate due to cognitive delays, but appropriately responsive to questions with simple phrases and able to make needs known at this time. Up with light assist of 1, marked improvement in mobility x past two several days. Mitts able to be removed earlier yesterday and pt has had no concerns with tampering with her peripheral IV site. No seizure activity observed. Cardiac telemetry continues. Pt was highly excitable and agitated at beginning of shift and difficult to redirect (see previous RN note), with some self-injurious behavior observed, and a bedside attendant has been utilized on this shift to assist in keeping patient safe. No other acute concerns. Continue to monitor.

## 2018-06-16 NOTE — PROVIDER NOTIFICATION
Provider notified of increased level of patient agitation, and limited ability to be redirected. Patient engaged in light self-injurious behavior, including banging fist against wall and rocking back and forth while seated on toilet, hitting back of neck lightly against metal plumbing fixture. Assessment by writer revealed no apparent signs of injury from this event. Pt intermittently shouting, tearful. Order obtained for bedside attendant at this time.

## 2018-06-16 NOTE — PLAN OF CARE
Problem: Patient Care Overview  Goal: Plan of Care/Patient Progress Review  Discharge Planner PT   Patient plan for discharge: Group home  Current status: Patient motivated to work with PT and redirectable throughout session. SBA/CGA for all transfers and mobility second to cognition and behaviors. Ascended/descended 12 stairs with 1 rail with CGA ascending and CGA + 1 HHA descending. Patient had 2 mild LOB that patient was able to self-correct during gait.     Barriers to return to prior living situation: none  Recommendations for discharge: Group home  Rationale for recommendations: Anticipate patient is near baseline functional status and appropriate to return to previous environment. Skilled PT will continue to work with pt during hospital stay to build overall functional strength, functional independence and activity tolerance.        Entered by: Clarisse Burk 06/16/2018 12:43 PM

## 2018-06-16 NOTE — PLAN OF CARE
Problem: Patient Care Overview  Goal: Plan of Care/Patient Progress Review  Outcome: Improving  Patient has shown improvements in the last 24 hours. During the a.m., she ambulated to the bathroom with a light assist of 1 and subsequently worked with PT, walking for 5 minutes to gym, practicing stairs, and walking back. Patient responded well to prompts in response to her behavior. For example, she walks with her head down, but responded well when prompted to lift her head up. Good appetite and self-fed with encouragement. Patient fell when standing up from the chair to go to the bathroom. Bedside nurse notified. Dr. Redding assessed patient post-fall. Patient remains stable with vital signs stable.

## 2018-06-16 NOTE — PROGRESS NOTES
"    Internal Medicine Progress Note    Date of Service (when I saw the patient): 06/16/2018    Assessment & Plan   Nella Helms is a 42-year-old female with history of cognitive delay, left sided hemiplegia, severe epilepsy, ITP, who was transferred here from an outside emergency department for hypothermia and hypotension in the setting of multiple weeks of weakness and falls.  The patient has received 50 mg of hydrocortisone as well as intravenous fluids with improvement in both hypothermia and hypotension and is currently vitally stable.     At the outside ED, bladder temperature was noted to be in the low 30 C range.  She was given 50 mg of IV hydrocortisone with improvement.  She was noted to be hyponatremic to 128 and slightly hyperkalemic to 5.  She had recently stopped steroids June 9 for worsening thrombocytopenia in the context of ITP (taking 40 mg of prednisone from June 6 - June 8 and then 20 mg on June 9).    Today: No change from yesterday.  No alterations in temperature or other vitals.  Awaiting placement in TCU for rehab.  Changes to risperdal and trazadone to prevent hypotension per psych.  Trazadone adjusted.    Hypothermia and hypotension with hyponatremia and mild hyperkalemia  Most likely due to adrenal insufficiency due to frequent steroid intake.  Clinical features are suggestive and other likely diagnoses have been ruled out.  Also, she responded appropriately to hydrocortisone administration.  TSH was 3.16, am cortisol was 11.4 the day after getting hydrocortisone.  Procalcitonin negative make infection unlikely.  Not sure how rash (below) may be playing a role.  Per psych, \"Acute problems on admission were likely related to her being on the high doses of Risperdal and trazodone which are both strong alpha blockers and will cause hypotension\"   - Monitor closely   - Continue 50 mg of trazodone in the morning, but increase the PM dose to 50 mg as well and continue the 150 mg at bedtime.    -  " I would consider increasing the Lamictal to 200 mg BID      Right facial rash likely due to impetigo  She was recently on clindamycin for 7 days, ending June 6, with apparent improvement in the rash and worsening since discontinuation.  No vesicles to suggest Zoster. Honey-crust noted around right edge of mouth suggestive of impetigo.  Improving again after starting doxycycline  - Continue course of doxycycline 100 mg twice daily x5 day      Thrombocytopenia likely due to ITP  Hospitalization in February at the [facility] where this was diagnosed and improved markedly with steroids.  Completed steroid taper in March.  Recently seen in the ED June 5 with thrombocytopenia of 66 was noted and the patient was given steroids, per the [facility] MAR was taking 40 mg of prednisone from June 6 - June 8 and then 20 mg on June 9.  Plts were 44 on admit but increased to 51 today.   - Continue to trend, avoid heparin or NSAIDs   - We will avoid giving steroids overnight hoping to maximize reliability of a.m. cortisol test   - Transfuse platelets for less than 10 although with ITP steroids are ultimately needed      Acute on chronic gait instability  Patient has had multiple weeks of worsening gait instability and more difficulty with routine tasks such as eating.  Her psychiatrist has already decreased her risperidone from 4 mg twice daily to 3 in the morning and 4 at night without improvement. Neurology consulted and reports her antiepileptics don't typically cause weakness.  More likely secondary to steroid use.    - Reduce trazodone from 100 mg 8 am, 50 mg 4 pm, 150 mg qhs to 50 mg 8 am (50%), 25 mg 4 pm (50%) and continue the 150 mg qhs. Gradual taper given gait instability and history of severe seizures (withdrawal concern).   - Avoid steroids    - PT/OT   - Reduction of risperidone to 3 mg BID per psych     Leukopenia  3.6 at the OSH and down to 2.8 todayappears to be hovering mid-3s to 4's since February. On multiple drugs that  could contribute, including: Lamictal, oxcarbazepine.  - continue to trend here      Severe epilepsy, well controlled.  No changes per neurology.   - Continue Vimpat 200 mg daily   - Increase Lamictal to 200 mg two times daily per psych   - Continue Keppra 1000 twice daily   - Continue oxcarbazepine 600 mg twice daily   - Will check levels tomorrow am    Dysphagia.  Followed by SLP.   - Change diet to dysphagia 3 with thin liquids    Problematic behaviors. Could be akathisia or other extrapyramidal side effects from the high dose of Risperdal.  She has improved markedly during this hospitalization and has no evidence of more problematic behaviors emerging.   - Risperdal changed to 3 mg twice a day.     Diet:   Active Diet Order      Dysphagia Diet Level 3 Advanced Thin Liquids (water, ice chips, juice, milk gelatin, ice cream, etc)  DVT Prophylaxis: VTE Prophylaxis contraindicated due to thrombocytopenia  Code Status: Full Code    Disposition: Expected discharge in 2-3 days once TCU placement found.    I have reviewed vitals, labs, and medications.   Seen with bedside nurse.    Haresh Redding MD  Internal Medicine-Pediatrics Staff  Pager: [phone]    Please see sticky note for cross cover information     ------    Interval History   Had a fall to ground today while transferring to chair.  Was being assisted by nursing assistant.  Her knees gave out and she slumped to the floor while being held by the NA.  Was emotional but calmed when redirected.      Otherwise unchanged.    No fevers or chills.      Unable to obtain further history or ROS.    Physical Exam     Vitals:    06/13/18 2000 06/14/18 2157 06/15/18 1705   Weight: 79.9 kg (176 lb 2.4 oz) 78.8 kg (173 lb 11.6 oz) 77.1 kg (169 lb 15.6 oz)     Vital Signs with Ranges  Temp:  [96.3  F (35.7  C)-98.2  F (36.8  C)] 96.5  F (35.8  C)  Pulse:  [73-77] 77  Heart Rate:  [70-84] 70  Resp:  [18] 18  BP: (116-139)/(49-75) 139/67  SpO2:  [93 %-98 %] 93 %  I/O last 3  completed shifts:  In: 1440 [P.O.:1440]  Out: -     Constitutional: alert and no distress   Cardiovascular: RRR. + systolic murmur  Neuro: Challenging to assess, left side weaker then right  Psychiatric: affect at baseline per brother; concentration poor  Joints: (after slumping to knees) Minor abrasions to knees, non-tender, no effusions    Medications       docusate  100 mg Oral BID     lacosamide  200 mg Oral BID     lamoTRIgine  200 mg Oral BID     levETIRAcetam  1,000 mg Oral BID     OXcarbazepine  600 mg Oral BID     risperiDONE  3 mg Oral Daily at 8 pm     risperiDONE  3 mg Oral QAM     sodium chloride (PF)  3 mL Intracatheter Q8H     traZODone  150 mg Oral At Bedtime     [START ON 6/17/2018] traZODone  50 mg Oral Daily at 4 pm     traZODone  50 mg Oral QAM       Data   ---    Nursing notes reviewed.  Patient seen with bedside nurse.     I have reviewed today's Medications, Vital Signs and Labs

## 2018-06-16 NOTE — PLAN OF CARE
Problem: Patient Care Overview  Goal: Plan of Care/Patient Progress Review  Discharge Planner OT   Patient plan for discharge: none stated  Current status: . Pt ambulates SBA to bathroom with no Assist,completes g/h tasks with min A for thoroughness. Pt easily redirectable to task. completes toilet transfer SBA, mod A pericares after BM, and cuing for sequencing and attention to task. Pt appears to be progressing well with mobility. No concerns for group home d/c with increased ADL assist from OT POV.  Barriers to return to prior living situation: none   Recommendations for discharge: return to group home with increased assist  Rationale for recommendations: anticipate pt will do best in familiar environment and can have increased services for ADLS as needed.       Entered by: Eneida Prather 06/16/2018 12:05 PM

## 2018-06-16 NOTE — PLAN OF CARE
Problem: Functional Deficit (Adult,Obstetrics,Pediatric)  Goal: Signs and Symptoms of Listed Potential Problems Will be Absent, Minimized or Managed (Functional Deficit)  Signs and symptoms of listed potential problems will be absent, minimized or managed by discharge/transition of care (reference Functional Deficit (Adult,Obstetrics,Pediatric) CPG).   Outcome: Improving  Patient is doing much better. She can walk now with only stand by assist. Walked to doorway of her room and then sat down in wheelchair at the doorway all on her own. Sat at the desk saying hi to all who walked by and interacting with nursing staff at the nursing station. Ate whole dinner. Took all medications crushed in applesauce. Getting up to rest room often and does not go every time, but is continent of bowel and bladder. At times she makes loud noises, but calms down with simple reorientation.

## 2018-06-16 NOTE — PROGRESS NOTES
D: Patients brother Abdon was notified of her slight fall earlier today, no complaints of pain or signs of discomfort noted.

## 2018-06-16 NOTE — PROVIDER NOTIFICATION
Dr. Redding notified patient had a fall while transferring with bedside sitter assist from chair to bed, MD will come see patient. No injuries to head noted.

## 2018-06-17 LAB — LACOSAMIDE SERPL-MCNC: 6.3 UG/ML (ref 5–10)

## 2018-06-17 PROCEDURE — A9270 NON-COVERED ITEM OR SERVICE: HCPCS | Mod: GY | Performed by: INTERNAL MEDICINE

## 2018-06-17 PROCEDURE — 99233 SBSQ HOSP IP/OBS HIGH 50: CPT | Performed by: PEDIATRICS

## 2018-06-17 PROCEDURE — A9270 NON-COVERED ITEM OR SERVICE: HCPCS | Mod: GY | Performed by: PEDIATRICS

## 2018-06-17 PROCEDURE — 12000008 ZZH R&B INTERMEDIATE UMMC

## 2018-06-17 PROCEDURE — 25000132 ZZH RX MED GY IP 250 OP 250 PS 637: Mod: GY | Performed by: INTERNAL MEDICINE

## 2018-06-17 PROCEDURE — 25000132 ZZH RX MED GY IP 250 OP 250 PS 637: Mod: GY | Performed by: PEDIATRICS

## 2018-06-17 PROCEDURE — 25000125 ZZHC RX 250: Performed by: PEDIATRICS

## 2018-06-17 RX ORDER — ONDANSETRON 4 MG/1
4 TABLET, ORALLY DISINTEGRATING ORAL EVERY 6 HOURS PRN
Status: DISCONTINUED | OUTPATIENT
Start: 2018-06-17 | End: 2018-06-20 | Stop reason: HOSPADM

## 2018-06-17 RX ADMIN — DOCUSATE SODIUM 100 MG: 50 LIQUID ORAL at 22:57

## 2018-06-17 RX ADMIN — LAMOTRIGINE 200 MG: 100 TABLET ORAL at 08:31

## 2018-06-17 RX ADMIN — LAMOTRIGINE 200 MG: 100 TABLET ORAL at 20:14

## 2018-06-17 RX ADMIN — RISPERIDONE 3 MG: 3 TABLET ORAL at 20:15

## 2018-06-17 RX ADMIN — TRAZODONE HYDROCHLORIDE 50 MG: 50 TABLET ORAL at 08:32

## 2018-06-17 RX ADMIN — TRAZODONE HYDROCHLORIDE 50 MG: 50 TABLET ORAL at 16:37

## 2018-06-17 RX ADMIN — LACOSAMIDE 200 MG: 200 TABLET, FILM COATED ORAL at 20:14

## 2018-06-17 RX ADMIN — LACOSAMIDE 200 MG: 200 TABLET, FILM COATED ORAL at 08:30

## 2018-06-17 RX ADMIN — TRAZODONE HYDROCHLORIDE 150 MG: 150 TABLET ORAL at 22:57

## 2018-06-17 RX ADMIN — ONDANSETRON 4 MG: 4 TABLET, ORALLY DISINTEGRATING ORAL at 11:00

## 2018-06-17 RX ADMIN — LEVETIRACETAM 1000 MG: 500 TABLET, FILM COATED ORAL at 20:14

## 2018-06-17 RX ADMIN — DOCUSATE SODIUM 100 MG: 50 LIQUID ORAL at 08:29

## 2018-06-17 RX ADMIN — LEVETIRACETAM 1000 MG: 500 TABLET, FILM COATED ORAL at 08:31

## 2018-06-17 RX ADMIN — OXCARBAZEPINE 600 MG: 600 TABLET, FILM COATED ORAL at 20:14

## 2018-06-17 RX ADMIN — OXCARBAZEPINE 600 MG: 600 TABLET, FILM COATED ORAL at 08:32

## 2018-06-17 RX ADMIN — RISPERIDONE 3 MG: 3 TABLET ORAL at 08:32

## 2018-06-17 NOTE — PLAN OF CARE
Problem: Patient Care Overview  Goal: Plan of Care/Patient Progress Review  OT/5B: Per PT, pt not appropriate for OT this date due to functional change. Rescheduled.

## 2018-06-17 NOTE — PLAN OF CARE
Problem: Patient Care Overview  Goal: Plan of Care/Patient Progress Review  Outcome: No Change  Pt is alert and oriented, limited ability to communicate due to cognitive delays, but appropriately responsive to questions with simple phrases and able to make needs known at this time. Up with assist of 1 to bathroom. No seizure activity observed. Pt slept well tonight, easily redirectable as needed while awake. Continues with bedside attendant for safety. No other acute concerns. Continue to monitor.

## 2018-06-17 NOTE — PLAN OF CARE
Problem: Functional Deficit (Adult,Obstetrics,Pediatric)  Goal: Signs and Symptoms of Listed Potential Problems Will be Absent, Minimized or Managed (Functional Deficit)  Signs and symptoms of listed potential problems will be absent, minimized or managed by discharge/transition of care (reference Functional Deficit (Adult,Obstetrics,Pediatric) CPG).   Outcome: No Change  Patient continues to be pleasant and able to follow simple commands. She occasionally cries out, but is easily calmed. Ate well. Was up in a wheelchair most of the shift. Gets up to the bathroom with one person standby assist. Has bedside attendant for safety as as she had exhibited some behaviors last night like hitting the wall and rocking back and forth so hard that she banged herself into the wall. She has not acted out like this all shift. Took all medication crushed in applesauce. In bed sleeping at 2330 wit attendant at the bedside.

## 2018-06-17 NOTE — PROGRESS NOTES
"    Internal Medicine Progress Note    Date of Service (when I saw the patient): 06/17/2018    Assessment & Plan   Nella Helms is a 42-year-old female with history of cognitive delay, left sided hemiplegia, severe epilepsy, ITP, who was transferred here from an outside emergency department for hypothermia and hypotension in the setting of multiple weeks of weakness and falls.  The patient has received 50 mg of hydrocortisone as well as intravenous fluids with improvement in both hypothermia and hypotension and is currently vitally stable.     At the outside ED, bladder temperature was noted to be in the low 30 C range.  She was given 50 mg of IV hydrocortisone with improvement.  She was noted to be hyponatremic to 128 and slightly hyperkalemic to 5.  She had recently stopped steroids June 9 for worsening thrombocytopenia in the context of ITP (taking 40 mg of prednisone from June 6 - June 8 and then 20 mg on June 9).    Today: Had an episode this morning where she suddenly through up and then was awake but unresponsive for some time.  Appeared to be a post-ictal.  Returned to baseline.    Seizures.      Hypothermia and hypotension with hyponatremia and mild hyperkalemia (resolved)  Most likely due to adrenal insufficiency due to frequent steroid intake.  Clinical features are suggestive and other likely diagnoses have been ruled out.  Also, she responded appropriately to hydrocortisone administration.  TSH was 3.16, am cortisol was 11.4 the day after getting hydrocortisone.  Procalcitonin negative make infection unlikely.  Not sure how rash (below) may be playing a role.  Per psych, \"Acute problems on admission were likely related to her being on the high doses of Risperdal and trazodone which are both strong alpha blockers and will cause hypotension\"   - Monitor closely   - Continue reduced trazadone.    - Continue increased Lamictal at 200 mg BID      Right facial rash likely due to impetigo (resolved)  She was " recently on clindamycin for 7 days, ending June 6, with apparent improvement in the rash and worsening since discontinuation.  No vesicles to suggest Zoster. Honey-crust noted around right edge of mouth suggestive of impetigo.  Improving again after starting doxycycline.  Doxycycline course completed.   - Monitor for return of symptoms      Thrombocytopenia likely due to ITP  Hospitalization in February at the [facility] where this was diagnosed and improved markedly with steroids.  Completed steroid taper in March.  Recently seen in the ED June 5 with thrombocytopenia of 66 was noted and the patient was given steroids, per the [facility] was taking 40 mg of prednisone from June 6 - June 8 and then 20 mg on June 9.  Plts continues to improve.  Today is 79.   - Continue to trend, avoid heparin or NSAIDs   - Transfuse platelets for less than 10 although with ITP steroids are ultimately needed      Acute on chronic gait instability  Patient has had multiple weeks of worsening gait instability and more difficulty with routine tasks such as eating.  Her psychiatrist has already decreased her risperidone from 4 mg twice daily to 3 in the morning and 4 at night without improvement. Neurology consulted and reports her antiepileptics don't typically cause weakness.  More likely secondary to steroid use.    - Reduce trazodone from 100 mg 8 am, 50 mg 4 pm, 150 mg qhs to 50 mg 8 am (50%), 25 mg 4 pm (50%) and continue the 150 mg qhs. Gradual taper given gait instability and history of severe seizures (withdrawal concern).   - Avoid steroids    - PT/OT   - Reduction of risperidone to 3 mg BID per psych     Leukopenia  3.6 at the OSH and down to 2.8 here.  appears to be hovering mid-3s to 4's since February. On multiple drugs that could contribute, including: Lamictal, oxcarbazepine.  - continue to trend here      Severe epilepsy, well controlled.  (See H&P dated 2/17/18 for description of seizure types.)  No changes in medication  per neurology. Appeared to have a typical seizure today (6/17).  Self resolved.   - Monitor for further seizure activity   - Continue Vimpat 200 mg daily   - Increase Lamictal to 200 mg two times daily per psych   - Continue Keppra 1000 twice daily   - Continue oxcarbazepine 600 mg twice daily    Dysphagia.  Followed by SLP.   - Change to regular diet.     Problematic behaviors. Could be akathisia or other extrapyramidal side effects from the high dose of Risperdal.  She has improved markedly during this hospitalization and has no evidence of more problematic behaviors emerging.   - Risperdal changed to 3 mg twice a day.     Diet:   Active Diet Order      Dysphagia Diet Level 3 Advanced Thin Liquids (water, ice chips, juice, milk gelatin, ice cream, etc)  DVT Prophylaxis: VTE Prophylaxis contraindicated due to thrombocytopenia  Code Status: Full Code    Disposition: Medically ready for discharge.  Expected discharge in 1 - 3 days once TCU placement found.    Haresh Redding MD  Internal Medicine-Pediatrics Staff  Pager: [phone]    Please see sticky note for cross cover information     ------    Interval History   Episode of vomiting today followed by post-ictal period    Otherwise unchanged.    No fevers or chills.      Unable to obtain further history or ROS.    Physical Exam     Vitals:    06/13/18 2000 06/14/18 2157 06/15/18 1705   Weight: 79.9 kg (176 lb 2.4 oz) 78.8 kg (173 lb 11.6 oz) 77.1 kg (169 lb 15.6 oz)     Vital Signs with Ranges  Temp:  [95.8  F (35.4  C)-98.7  F (37.1  C)] 97.1  F (36.2  C)  Pulse:  [82-84] 82  Heart Rate:  [70-84] 83  Resp:  [16-18] 18  BP: (115-141)/(60-89) 141/72  SpO2:  [93 %-97 %] 97 %  I/O last 3 completed shifts:  In: 1540 [P.O.:1540]  Out: 300 [Urine:300]    Constitutional: (after event) awake but sleepy with decreased responsiveness, no distress   Cardiovascular: RRR. + systolic murmur  Neuro: Challenging to assess, left side weaker then right  Psychiatric: affect at baseline  per brother; concentration poor  Joints: (after slumping to knees) Minor abrasions to knees, no effusions    Medications       docusate  100 mg Oral BID     lacosamide  200 mg Oral BID     lamoTRIgine  200 mg Oral BID     levETIRAcetam  1,000 mg Oral BID     OXcarbazepine  600 mg Oral BID     risperiDONE  3 mg Oral Daily at 8 pm     risperiDONE  3 mg Oral QAM     sodium chloride (PF)  3 mL Intracatheter Q8H     traZODone  150 mg Oral At Bedtime     traZODone  50 mg Oral Daily at 4 pm     traZODone  50 mg Oral QAM       Data   ---    Nursing notes reviewed.  Patient seen with bedside nurse.     I have reviewed today's Medications and Vital Signs

## 2018-06-17 NOTE — PLAN OF CARE
Problem: Patient Care Overview  Goal: Plan of Care/Patient Progress Review  5B PT: Attempted to see patient in AM. Consulted with RN student and sitter. Patient vomitted and likely had a seizure an hour ago. Patient unable to hold head up against gravity in bed and did not engage with PT during conversation. Not appropriate for PT. Cx.

## 2018-06-17 NOTE — PLAN OF CARE
Problem: Patient Care Overview  Goal: Plan of Care/Patient Progress Review  Outcome: Improving  Patient was alert and hyper verbal at start of shift.Vs stable.Walked to bathroom with staff assist x1.Took morning medications with apple sauce with no swallowing difficulty. Ate 100% of breakfast. Patient had a vomiting episode and was heaving later for about 45 mins. She was lethargic with limited verbal communication and was assisted to her bed. Suspected petite seizure. Zofran PRN ordered and given around 11 am. Seizure pads ordered and placed around bed rails. Not further vomiting episodes as of 1:40 pm. Up in chair eating her lunch with sitter assistance.Patient is slowly getting back to baseline in terms of communication and behaviors. Patient continues with 1:1 sitter for safety.

## 2018-06-18 ENCOUNTER — APPOINTMENT (OUTPATIENT)
Dept: OCCUPATIONAL THERAPY | Facility: CLINIC | Age: 43
DRG: 644 | End: 2018-06-18
Attending: HOSPITALIST
Payer: MEDICARE

## 2018-06-18 LAB
ANION GAP SERPL CALCULATED.3IONS-SCNC: 7 MMOL/L (ref 3–14)
BUN SERPL-MCNC: 6 MG/DL (ref 7–30)
CALCIUM SERPL-MCNC: 9.6 MG/DL (ref 8.5–10.1)
CHLORIDE SERPL-SCNC: 102 MMOL/L (ref 94–109)
CO2 SERPL-SCNC: 29 MMOL/L (ref 20–32)
CREAT SERPL-MCNC: 0.37 MG/DL (ref 0.52–1.04)
ERYTHROCYTE [DISTWIDTH] IN BLOOD BY AUTOMATED COUNT: 13.4 % (ref 10–15)
GFR SERPL CREATININE-BSD FRML MDRD: >90 ML/MIN/1.7M2
GLUCOSE SERPL-MCNC: 88 MG/DL (ref 70–99)
HCT VFR BLD AUTO: 32.5 % (ref 35–47)
HGB BLD-MCNC: 10.7 G/DL (ref 11.7–15.7)
MCH RBC QN AUTO: 31.9 PG (ref 26.5–33)
MCHC RBC AUTO-ENTMCNC: 32.9 G/DL (ref 31.5–36.5)
MCV RBC AUTO: 97 FL (ref 78–100)
PLATELET # BLD AUTO: 155 10E9/L (ref 150–450)
POTASSIUM SERPL-SCNC: 3.9 MMOL/L (ref 3.4–5.3)
RBC # BLD AUTO: 3.35 10E12/L (ref 3.8–5.2)
SODIUM SERPL-SCNC: 139 MMOL/L (ref 133–144)
WBC # BLD AUTO: 4.3 10E9/L (ref 4–11)

## 2018-06-18 PROCEDURE — 80048 BASIC METABOLIC PNL TOTAL CA: CPT | Performed by: PEDIATRICS

## 2018-06-18 PROCEDURE — A9270 NON-COVERED ITEM OR SERVICE: HCPCS | Mod: GY | Performed by: INTERNAL MEDICINE

## 2018-06-18 PROCEDURE — 25000132 ZZH RX MED GY IP 250 OP 250 PS 637: Mod: GY | Performed by: PEDIATRICS

## 2018-06-18 PROCEDURE — 25000125 ZZHC RX 250: Performed by: PEDIATRICS

## 2018-06-18 PROCEDURE — A9270 NON-COVERED ITEM OR SERVICE: HCPCS | Mod: GY | Performed by: PEDIATRICS

## 2018-06-18 PROCEDURE — 97530 THERAPEUTIC ACTIVITIES: CPT | Mod: GO

## 2018-06-18 PROCEDURE — 12000008 ZZH R&B INTERMEDIATE UMMC

## 2018-06-18 PROCEDURE — A9270 NON-COVERED ITEM OR SERVICE: HCPCS | Mod: GY | Performed by: PHYSICIAN ASSISTANT

## 2018-06-18 PROCEDURE — 25000132 ZZH RX MED GY IP 250 OP 250 PS 637: Mod: GY | Performed by: INTERNAL MEDICINE

## 2018-06-18 PROCEDURE — 36415 COLL VENOUS BLD VENIPUNCTURE: CPT | Performed by: PEDIATRICS

## 2018-06-18 PROCEDURE — 85027 COMPLETE CBC AUTOMATED: CPT | Performed by: PEDIATRICS

## 2018-06-18 PROCEDURE — 99232 SBSQ HOSP IP/OBS MODERATE 35: CPT | Performed by: PSYCHIATRY & NEUROLOGY

## 2018-06-18 PROCEDURE — 99233 SBSQ HOSP IP/OBS HIGH 50: CPT | Performed by: PEDIATRICS

## 2018-06-18 PROCEDURE — 25000132 ZZH RX MED GY IP 250 OP 250 PS 637: Mod: GY | Performed by: PHYSICIAN ASSISTANT

## 2018-06-18 PROCEDURE — 40000133 ZZH STATISTIC OT WARD VISIT

## 2018-06-18 RX ORDER — ACETAMINOPHEN 325 MG/1
650 TABLET ORAL EVERY 4 HOURS PRN
Status: DISCONTINUED | OUTPATIENT
Start: 2018-06-18 | End: 2018-06-20 | Stop reason: HOSPADM

## 2018-06-18 RX ADMIN — LACOSAMIDE 200 MG: 200 TABLET, FILM COATED ORAL at 09:56

## 2018-06-18 RX ADMIN — TRAZODONE HYDROCHLORIDE 50 MG: 50 TABLET ORAL at 09:56

## 2018-06-18 RX ADMIN — TRAZODONE HYDROCHLORIDE 50 MG: 50 TABLET ORAL at 16:48

## 2018-06-18 RX ADMIN — LACOSAMIDE 200 MG: 200 TABLET, FILM COATED ORAL at 20:24

## 2018-06-18 RX ADMIN — RISPERIDONE 3 MG: 3 TABLET ORAL at 20:25

## 2018-06-18 RX ADMIN — LAMOTRIGINE 200 MG: 100 TABLET ORAL at 09:55

## 2018-06-18 RX ADMIN — LEVETIRACETAM 1000 MG: 500 TABLET, FILM COATED ORAL at 20:25

## 2018-06-18 RX ADMIN — DOCUSATE SODIUM 100 MG: 50 LIQUID ORAL at 09:54

## 2018-06-18 RX ADMIN — TRAZODONE HYDROCHLORIDE 150 MG: 150 TABLET ORAL at 21:15

## 2018-06-18 RX ADMIN — ACETAMINOPHEN 650 MG: 325 TABLET, FILM COATED ORAL at 21:15

## 2018-06-18 RX ADMIN — LAMOTRIGINE 200 MG: 100 TABLET ORAL at 20:25

## 2018-06-18 RX ADMIN — DOCUSATE SODIUM 100 MG: 50 LIQUID ORAL at 20:24

## 2018-06-18 RX ADMIN — OXCARBAZEPINE 600 MG: 600 TABLET, FILM COATED ORAL at 20:25

## 2018-06-18 RX ADMIN — RISPERIDONE 3 MG: 3 TABLET ORAL at 09:55

## 2018-06-18 RX ADMIN — LEVETIRACETAM 1000 MG: 500 TABLET, FILM COATED ORAL at 09:55

## 2018-06-18 RX ADMIN — ONDANSETRON 4 MG: 4 TABLET, ORALLY DISINTEGRATING ORAL at 11:20

## 2018-06-18 RX ADMIN — OXCARBAZEPINE 600 MG: 600 TABLET, FILM COATED ORAL at 09:55

## 2018-06-18 NOTE — PROGRESS NOTES
"    Care Coordinator - Discharge Planning    Admission Date/Time:  6/11/2018  Attending MD:  Haresh Redding MD    Concerns with insurance coverage for discharge needs: None.  Current Living Situation: Patient lives in a group home.  Support System: Supportive and Involved  Services Involved: Group home staffed with 1 aide to 4 residents  Transportation at Discharge:  staff will come to [facility] for dc ride  Transportation to Medical Appointments:   - Name of caregiver: Abdon- brother and Legal Guardian  Barriers to Discharge: Needs to be able to be mainly independant with walking, stairs, and grooming.     Medical work up continues.  Pt is not yet ready to be considered for DC, pending continued medical work up, PT to eval and work with pt once md team deems appropriate.      Current Living Situation: Patient lives in a group home.  [facility] in [place]. Main phone: [phone]. Ely Renteria, Nurse or Rosalina Jaime, Director.       Coordination of Care and Referrals  Abdon, juder,  is pt's legal guardian. - this was scanned in \"Scan on 3/4/2018  9:40 AM : ORDER APPOINTING GENERAL GUARDIAN OF THE PERSON 1/6/95\".  Pt has been at [facility] in [place] for about 20 years. It is a split level home. Pt's mobility has declined in the last few weeks.  Per PT pt is at her baseline with stairs and mobility, however after talking with  pt is not currently at baseline as pt was indep with transfers and toileting in the home.  RNCC spoke with PT/OT to see pt today to [facility] for safety at home with indep transfers and toileting.  Bedside RN states pt has been on a 1:1 here at [facility] for safety reasons, fall 6/16.  RNCC reached out to the group [facility], [facility], and [facility] with SANDY Fuller at [phone], who is also the  [facility], to discuss dc needs and dc plan.  Also reached out to [facility], and spoke with SANDY Thakkar about pts pending dc.  [facility] is going to call and speak " with their director about pt needs and RNCC will update her on mobility once PT OT see pt today.   Per PCA pt is a hand hold transfer and toilet.  Ariane can be reached @ [phone], rncc will fax avs to them once dc is known.   Per md team pt is medically ready for dc today.      Transport is usually provided by staff at  upon dc from the hospital and to/ from appointments.      Pharmacy of choice [facility] in [place]- per md no new dc meds will be prescribed, pt completed abx course here.      RNCC reached out to Abdon via phone to update on probable dc today, will update him if plans change.     6/18/2018 12:09 PM OT worked with pt, she is not safe to dc today, md to stacy pt.  Pt vomiting X3 per nursing.  RNCC update  on change of plan that pt is not ready for Dc.  Sw and RNCC continue to follow pt.      Dilia Acharya, SANDYCC, BSN    [facility]    Medicine Group  [address] [place] [id number]    [email or web address]  [email or web address]    Office: [phone] Pager: [phone]

## 2018-06-18 NOTE — PLAN OF CARE
Problem: Patient Care Overview  Goal: Plan of Care/Patient Progress Review  5B PT: Cancel, per discussion with RN and OT this AM upon attempt to see pt, pt not appropriate at this time due to very limited command following. Will reschedule per PT POC.

## 2018-06-18 NOTE — PLAN OF CARE
"Problem: Seizure Disorder/Epilepsy (Adult)  Goal: Signs and Symptoms of Listed Potential Problems Will be Absent, Minimized or Managed (Seizure Disorder/Epilepsy)  Signs and symptoms of listed potential problems will be absent, minimized or managed by discharge/transition of care (reference Seizure Disorder/Epilepsy (Adult) CPG).   Outcome: Improving  Patient has had labile mood at times blowing kisses and at times crying out that she has an \"owie.\" sometimes cries out very loudly for no reason and did this when writer wouldn't immediately come over and sit by her This often happens within minutes of each other. This has been her behavior the last three evenings that this writer has taken care of her. She hasn't appeared to hurt herself in any way this shift. She continues to have a bedside attendant for safety. No seizure activity noted this shift. Has seizure pads on her bed. Walks to the rest room with one person assist. Took all medications without problem crushed in applesauce. Sat up in a wheelchair most of the shift. Is now sleeping. Continue to monitor for safety. Continue current plan of care.      "

## 2018-06-18 NOTE — PROGRESS NOTES
CLINICAL NUTRITION SERVICES - Brief note:     Reviewed nutrition risk factors due to LOS x7 days. Pt is tolerating her Regular diet, eating well per nursing documentation. No nutrition issues identified at this time. RD will continue to follow per nutrition protocol.    Codi Pierce RD/ELMER  Pager [phone]

## 2018-06-18 NOTE — PLAN OF CARE
Problem: Patient Care Overview  Goal: Plan of Care/Patient Progress Review   06/18/18 0542   OTHER   Plan Of Care Reviewed With patient   Plan of Care Review   Progress no change       Problem: Functional Deficit (Adult,Obstetrics,Pediatric)  Goal: Signs and Symptoms of Listed Potential Problems Will be Absent, Minimized or Managed (Functional Deficit)  Signs and symptoms of listed potential problems will be absent, minimized or managed by discharge/transition of care (reference Functional Deficit (Adult,Obstetrics,Pediatric) CPG).    06/18/18 0542   Functional Deficit   Problems Assessed (Functional Deficit) all   Problems Present (Functional Deficit) coordination impairment;muscle tone impairment;muscle strength impairment       Problem: Seizure Disorder/Epilepsy (Adult)  Goal: Signs and Symptoms of Listed Potential Problems Will be Absent, Minimized or Managed (Seizure Disorder/Epilepsy)  Signs and symptoms of listed potential problems will be absent, minimized or managed by discharge/transition of care (reference Seizure Disorder/Epilepsy (Adult) CPG).    06/18/18 0542   Seizure Disorder/Epilepsy   Problems Assessed (Seizure Disorder/Epilepsy) all   Problems Present (Seizure/Epilepsy) none     No s/s of seizure. Sleeping most of night. Up briefly to wheelchair during night. Pt reports some pain to neck and at times when moving blanket. Also screams occasionally and threw self down on pillows in bed when getting vital signs this am. Pt would switch between being chatty and being resistant to cares within seconds of each other. Possibly exacerbated due to being woken up for VS this am. Calming measures used, back rubs and positive reinforcement/statements to help calm down. Bed side attendant present overnight. Pt was picking at PIV overnight, reinforced dressing. Seizure precautions in place. Up with assist of 1-2 GB. Denies nausea. Will continue to monitor.

## 2018-06-18 NOTE — PLAN OF CARE
Problem: Patient Care Overview  Goal: Plan of Care/Patient Progress Review  Discharge Planner OT   Patient plan for discharge: Pt would like to return back to group home.   Current status: Pt had significant difficulty following commands this session, only able to follow simple, one-step commands. Pt was able to complete 4 sit<>stand transfers with close CGA x2. Pt was able to take a few steps forward with CGAx2, unsteady on feet and would impulsively try to sit before having chair behind her. Pt is very high falls risk 2/2 inability to follow purposeful commands and balance deficits. Recommend keeping pt on 1:1 sitter until pt can be redirected, pt is unable to perform basic ADLs and functional mobility safely at this time.   Barriers to return to prior living situation: deconditioning, fatigue, cognition changes from baseline, high fall risk   Recommendations for discharge: Continued IP therapy vs TCU pending progress in therapy.   Rationale for recommendations: Pt would benefit from additional therapy to improve independence with ADL and functional mobility.        Entered by: Gail Allison 06/18/2018 1:09 PM

## 2018-06-18 NOTE — CONSULTS
"      Psychiatry Consultation; Follow up              Reason for Consult, requesting source:    Follow up regarding medications   Requesting source: Haresh PLASENCIAArturo Redding                 Interim history:    Over the weekend she has become more impulsive, more difficulty following commands. Due to impulsivity she now has a sitter. He mentions that she would not be safe without a sitter due to the impulsivity. She was able to stand without difficulty, but she is difficult to re-direct.    Medication changes were made as directed (minimal changes).            Medications:     Current Facility-Administered Medications   Medication     docusate (COLACE) 50 MG/5ML liquid 100 mg     Lacosamide (VIMPAT) tablet 200 mg     lamoTRIgine (LaMICtal) tablet 200 mg     levETIRAcetam (KEPPRA) tablet 1,000 mg     lidocaine (LMX4) kit     lidocaine 1 % 1 mL     naloxone (NARCAN) injection 0.1-0.4 mg     ondansetron (ZOFRAN-ODT) ODT tab 4 mg     OXcarbazepine (TRILEPTAL) tablet 600 mg     risperiDONE (risperDAL) tablet 3 mg     risperiDONE (risperDAL) tablet 3 mg     sodium chloride (PF) 0.9% PF flush 3 mL     sodium chloride (PF) 0.9% PF flush 3 mL     traZODone (DESYREL) tablet 150 mg     traZODone (DESYREL) tablet 50 mg     traZODone (DESYREL) tablet 50 mg            MSE:     MENTAL STATUS EXAMINATION:  She is sitting in wheelchair, still complaining of a variety of pains.Is able to stand without difficulty. She is overall pleasant, cooperative. but affect is still quite irritable.  She described her mood as \"okay.\"  She was not oriented to day, date or place.  Recent and remote memory, attention and concentration are very substandard.  Use of language, fund of knowledge are very limited.  Insight and judgment poor.     Vital signs:  Temp: 95.8  F (35.4  C) Temp src: Axillary BP: 135/53 Pulse: 88 Heart Rate: 64 Resp: 18 SpO2: 96 % O2 Device: None (Room air)   Height: 154.9 cm (5' 1\") Weight: 72.4 kg (159 lb 9.6 oz)  Estimated body mass " "index is 30.16 kg/(m^2) as calculated from the following:    Height as of this encounter: 1.549 m (5' 1\").    Weight as of this encounter: 72.4 kg (159 lb 9.6 oz).            DSM-5 Diagnosis:   Intellectual Disabilites  318.1 (F72) Severe   Seizure disorder           Assessment:   I don't think that recent changes in medications would account for the problem behaviors. This is likely her baseline.    Unfortunately she does appear to need the bedside attendant for now.           Summary of Recommendations:   I would continue with current medications.   Re-consult psychiatry as needed.      \"This dictation was performed with voice recognition software and may contain errors,  omissions and inadvertent word substitution.\"          "

## 2018-06-19 ENCOUNTER — APPOINTMENT (OUTPATIENT)
Dept: PHYSICAL THERAPY | Facility: CLINIC | Age: 43
DRG: 644 | End: 2018-06-19
Attending: HOSPITALIST
Payer: MEDICARE

## 2018-06-19 ENCOUNTER — APPOINTMENT (OUTPATIENT)
Dept: OCCUPATIONAL THERAPY | Facility: CLINIC | Age: 43
DRG: 644 | End: 2018-06-19
Attending: HOSPITALIST
Payer: MEDICARE

## 2018-06-19 PROBLEM — Z71.89 ACP (ADVANCE CARE PLANNING): Chronic | Status: ACTIVE | Noted: 2018-06-19

## 2018-06-19 PROCEDURE — 12000008 ZZH R&B INTERMEDIATE UMMC

## 2018-06-19 PROCEDURE — A9270 NON-COVERED ITEM OR SERVICE: HCPCS | Mod: GY | Performed by: PHYSICIAN ASSISTANT

## 2018-06-19 PROCEDURE — 99232 SBSQ HOSP IP/OBS MODERATE 35: CPT | Performed by: HOSPITALIST

## 2018-06-19 PROCEDURE — 40000133 ZZH STATISTIC OT WARD VISIT: Performed by: OCCUPATIONAL THERAPIST

## 2018-06-19 PROCEDURE — A9270 NON-COVERED ITEM OR SERVICE: HCPCS | Mod: GY | Performed by: INTERNAL MEDICINE

## 2018-06-19 PROCEDURE — 25000132 ZZH RX MED GY IP 250 OP 250 PS 637: Mod: GY | Performed by: PEDIATRICS

## 2018-06-19 PROCEDURE — 99207 ZZC CDG-MDM COMPONENT: MEETS MODERATE - UP CODED: CPT | Performed by: HOSPITALIST

## 2018-06-19 PROCEDURE — 97535 SELF CARE MNGMENT TRAINING: CPT | Mod: GO | Performed by: OCCUPATIONAL THERAPIST

## 2018-06-19 PROCEDURE — 25000125 ZZHC RX 250: Performed by: PEDIATRICS

## 2018-06-19 PROCEDURE — A9270 NON-COVERED ITEM OR SERVICE: HCPCS | Mod: GY | Performed by: PEDIATRICS

## 2018-06-19 PROCEDURE — 25000132 ZZH RX MED GY IP 250 OP 250 PS 637: Mod: GY | Performed by: PHYSICIAN ASSISTANT

## 2018-06-19 PROCEDURE — 25000132 ZZH RX MED GY IP 250 OP 250 PS 637: Mod: GY | Performed by: INTERNAL MEDICINE

## 2018-06-19 PROCEDURE — 97530 THERAPEUTIC ACTIVITIES: CPT | Mod: GP

## 2018-06-19 PROCEDURE — 40000193 ZZH STATISTIC PT WARD VISIT

## 2018-06-19 RX ADMIN — LAMOTRIGINE 200 MG: 100 TABLET ORAL at 08:23

## 2018-06-19 RX ADMIN — TRAZODONE HYDROCHLORIDE 150 MG: 150 TABLET ORAL at 21:16

## 2018-06-19 RX ADMIN — LEVETIRACETAM 1000 MG: 500 TABLET, FILM COATED ORAL at 08:23

## 2018-06-19 RX ADMIN — RISPERIDONE 3 MG: 3 TABLET ORAL at 08:23

## 2018-06-19 RX ADMIN — ONDANSETRON 4 MG: 4 TABLET, ORALLY DISINTEGRATING ORAL at 08:22

## 2018-06-19 RX ADMIN — DOCUSATE SODIUM 100 MG: 50 LIQUID ORAL at 08:22

## 2018-06-19 RX ADMIN — RISPERIDONE 3 MG: 3 TABLET ORAL at 20:37

## 2018-06-19 RX ADMIN — ACETAMINOPHEN 650 MG: 325 TABLET, FILM COATED ORAL at 18:49

## 2018-06-19 RX ADMIN — LACOSAMIDE 200 MG: 200 TABLET, FILM COATED ORAL at 20:36

## 2018-06-19 RX ADMIN — LAMOTRIGINE 200 MG: 100 TABLET ORAL at 20:37

## 2018-06-19 RX ADMIN — OXCARBAZEPINE 600 MG: 600 TABLET, FILM COATED ORAL at 20:37

## 2018-06-19 RX ADMIN — TRAZODONE HYDROCHLORIDE 50 MG: 50 TABLET ORAL at 08:24

## 2018-06-19 RX ADMIN — OXCARBAZEPINE 600 MG: 600 TABLET, FILM COATED ORAL at 08:23

## 2018-06-19 RX ADMIN — ACETAMINOPHEN 650 MG: 325 TABLET, FILM COATED ORAL at 08:24

## 2018-06-19 RX ADMIN — ACETAMINOPHEN 650 MG: 325 TABLET, FILM COATED ORAL at 14:21

## 2018-06-19 RX ADMIN — LACOSAMIDE 200 MG: 200 TABLET, FILM COATED ORAL at 08:23

## 2018-06-19 RX ADMIN — ACETAMINOPHEN 650 MG: 325 TABLET, FILM COATED ORAL at 04:39

## 2018-06-19 RX ADMIN — TRAZODONE HYDROCHLORIDE 50 MG: 50 TABLET ORAL at 16:46

## 2018-06-19 RX ADMIN — LEVETIRACETAM 1000 MG: 500 TABLET, FILM COATED ORAL at 20:37

## 2018-06-19 ASSESSMENT — PAIN DESCRIPTION - DESCRIPTORS
DESCRIPTORS: PATIENT UNABLE TO DESCRIBE
DESCRIPTORS: PATIENT UNABLE TO DESCRIBE

## 2018-06-19 NOTE — PROGRESS NOTES
"    Internal Medicine Progress Note    Date of Service (when I saw the patient): 06/19/2018    Assessment & Plan   Nella Helms is a 42-year-old female with history of cognitive delay, left sided hemiplegia, severe epilepsy, ITP, who was transferred here from an outside emergency department for hypothermia and hypotension in the setting of multiple weeks of weakness and falls.  The patient has received 50 mg of hydrocortisone as well as intravenous fluids with improvement in both hypothermia and hypotension and is currently vitally stable.     At the outside ED, bladder temperature was noted to be in the low 30 C range.  She was given 50 mg of IV hydrocortisone with improvement.  She was noted to be hyponatremic to 128 and slightly hyperkalemic to 5.  She had recently stopped steroids June 9 for worsening thrombocytopenia in the context of ITP (taking 40 mg of prednisone from June 6 - June 8 and then 20 mg on June 9).    Seizures.  Controlled.     Hypothermia and hypotension with hyponatremia and mild hyperkalemia (resolved)  Most likely due to adrenal insufficiency due to frequent steroid intake.  Clinical features are suggestive and other likely diagnoses have been ruled out.  Also, she responded appropriately to hydrocortisone administration.  TSH was 3.16, am cortisol was 11.4 the day after getting hydrocortisone.  Procalcitonin negative make infection unlikely.  Not sure how rash (below) may be playing a role.  Per psych, \"Acute problems on admission were likely related to her being on the high doses of Risperdal and trazodone which are both strong alpha blockers and will cause hypotension\"   - Monitor closely   - Continue reduced trazadone.    - Continue increased Lamictal at 200 mg BID      Right facial rash likely due to impetigo (resolved)  She was recently on clindamycin for 7 days, ending June 6, with apparent improvement in the rash and worsening since discontinuation.  No vesicles to suggest Zoster. " Honey-crust noted around right edge of mouth suggestive of impetigo.  Improving again after starting doxycycline.  Doxycycline course completed.   - Monitor for return of symptoms      Thrombocytopenia likely due to ITP  Hospitalization in February at the [facility] where this was diagnosed and improved markedly with steroids.  Completed steroid taper in March.  Recently seen in the ED June 5 with thrombocytopenia of 66 was noted and the patient was given steroids, per the [facility] MAR was taking 40 mg of prednisone from June 6 - June 8 and then 20 mg on June 9.  Plts continues to improve.  Today is 79.   - Continue to trend, avoid heparin or NSAIDs   - Transfuse platelets for less than 10 although with ITP steroids are ultimately needed      Acute on chronic gait instability  Patient has had multiple weeks of worsening gait instability and more difficulty with routine tasks such as eating.  Her psychiatrist has already decreased her risperidone from 4 mg twice daily to 3 in the morning and 4 at night without improvement. Neurology consulted and reports her antiepileptics don't typically cause weakness.  More likely secondary to steroid use.    - Reduce trazodone from 100 mg 8 am, 50 mg 4 pm, 150 mg qhs to 50 mg 8 am (50%), 25 mg 4 pm (50%) and continue the 150 mg qhs. Gradual taper given gait instability and history of severe seizures (withdrawal concern).   - Avoid steroids    - PT/OT   - Reduction of risperidone to 3 mg BID per psych     Leukopenia  3.6 at the OSH and down to 2.8 here.  appears to be hovering mid-3s to 4's since February. On multiple drugs that could contribute, including: Lamictal, oxcarbazepine.  - continue to trend here      Severe epilepsy, well controlled.  (See H&P dated 2/17/18 for description of seizure types.)  No changes in medication per neurology. Appeared to have a typical seizure today (6/17).  Self resolved.   - Monitor for further seizure activity   - Continue Vimpat 200 mg daily   -  Increase Lamictal to 200 mg two times daily per psych   - Continue Keppra 1000 twice daily   - Continue oxcarbazepine 600 mg twice daily    Dysphagia.  Followed by SLP.   - Change to regular diet.     Problematic behaviors. Could be akathisia or other extrapyramidal side effects from the high dose of Risperdal.  She has improved markedly during this hospitalization and has no evidence of more problematic behaviors emerging.  Discussed with psych today who feels that the small changes made shouldn't cause the behaviors seen these last two days.   - Continue Risperdal at 3 mg twice a day.     Diet:   Active Diet Order      Regular Diet Adult  DVT Prophylaxis: VTE Prophylaxis contraindicated due to thrombocytopenia  Code Status: Full Code    Disposition: Medically ready for discharge.  Expected discharge in 1 - 3 days once TCU placement found.    Candice Liu MD  Internal Medicine-Pediatrics Staff  Pager: [phone]    Please see sticky note for cross cover information     ------    Interval History     Eating better. No vomiting. Walked to PT. No new issues reported. She still needs assistance going to the bathroom etc. Sitter discontinued.     No fevers or chills.      Unable to obtain further history or ROS.    Physical Exam     Vitals:    06/14/18 2157 06/15/18 1705 06/17/18 2028   Weight: 78.8 kg (173 lb 11.6 oz) 77.1 kg (169 lb 15.6 oz) 72.4 kg (159 lb 9.6 oz)     Vital Signs with Ranges  Temp:  [95  F (35  C)-97.5  F (36.4  C)] 96.9  F (36.1  C)  Heart Rate:  [58-76] 76  Resp:  [16-18] 16  BP: (116-173)/(47-80) 125/72  SpO2:  [94 %-98 %] 94 %  I/O last 3 completed shifts:  In: 490 [P.O.:490]  Out: 400 [Urine:400]    Constitutional:sleepy but arousable with decreased responsiveness, no distress   Cardiovascular: RRR. + systolic murmur  Neuro: Challenging to assess, left side weaker then right  Psychiatric: affect at baseline; concentration poor  Joints:Minor abrasions to knees improved, no  effusions    Medications       docusate  100 mg Oral BID     lacosamide  200 mg Oral BID     lamoTRIgine  200 mg Oral BID     levETIRAcetam  1,000 mg Oral BID     OXcarbazepine  600 mg Oral BID     risperiDONE  3 mg Oral Daily at 8 pm     risperiDONE  3 mg Oral QAM     sodium chloride (PF)  3 mL Intracatheter Q8H     traZODone  150 mg Oral At Bedtime     traZODone  50 mg Oral Daily at 4 pm     traZODone  50 mg Oral QAM       Data   ---    Nursing notes reviewed.  Patient seen with bedside nurse.     I have reviewed today's Medications and Vital Signs

## 2018-06-19 NOTE — PLAN OF CARE
Problem: Patient Care Overview  Goal: Plan of Care/Patient Progress Review  Outcome: No Change  Pt is vitally stable on room air. Continues with bedside attendant for safety due to impulsiveness and falls risk. No episodes of calling out on this shift, pt is calm, cooperative and redirectable when awake. PIV saline locked and pt has not been attempting to tamper with her IV line on this shift. All PO meds continue to be administered with apple sauce or pudding per plan of care. Slept well on the night shift with no acute concerns. Continue to monitor closely.

## 2018-06-19 NOTE — PLAN OF CARE
Problem: Patient Care Overview  Goal: Plan of Care/Patient Progress Review  Outcome: No Change  Pt alert with 1:1 attendant, per psyche a 1:1 is still needed for impulsivity and to maintain safety. Pt has been getting up to BR SBA, voiding and had a small BM this evening. Emesis x 3 on day shift, no emesis this shift. Pt was frequently calling out that both feet and back hurt. Notified [facility] cover and orders received for tylenol 650 mg q 4 PRN which is helping. Pt has been taking her meds crushed in applesauce. L PIV S.L, pt has not been pulling at lines. Will continue to monitor pt status.

## 2018-06-19 NOTE — PLAN OF CARE
"Problem: Patient Care Overview  Goal: Plan of Care/Patient Progress Review  Discharge Planner OT   Patient plan for discharge: perseverating on \"going home\"  Current status: Requiring [place] and hand hold with functional ambulation & transfers, maxA toileting, modA simple grooming tasks  Barriers to return to prior living situation: medical status, \"pain\"  Recommendations for discharge: Return to group home assuming staff feels pt is safe per level of care provided (staff to visit during therapy session 6/20)  Rationale for recommendations: Pt is progressing with overall activity level, but continues to need hand hold assist with mobility and mod-maxA with self cares       Entered by: Ariane Caba 06/19/2018 2:27 PM           "

## 2018-06-19 NOTE — PLAN OF CARE
"Problem: Patient Care Overview  Goal: Plan of Care/Patient Progress Review  Discharge Planner PT  5B  Patient plan for discharge: Not able to discuss with pt due to cognitive status, though per discussion with interdisciplinary team - return to group home  Current status: Pt ambulates 100ft + 50ft with CGA and hand hold assist and close wc follow due to pt's impulsivity. Pt needs cueing to avoid walking into stationary objects/walls during ambulation. Pt perseverating on pain in LEs, UEs, feet, back, and neck and repeatedly saying \"fall\" throughout session. Pt variable with command following and frequently crying due to pain. Behavioral factors and willingness to participate continue to limit PT sessions.   Barriers to return to prior living situation: Medical status  Recommendations for discharge: Return to group home pending staff's visit during therapy times tomorrow (6/20)  Rationale for recommendations: Pt's participation in mobility and daily activities likely to improve in familiar environment with hand hold assist available for ambulation and stairs, pt's cognition likely at baseline.        Entered by: Allie Figueroa 06/19/2018 10:29 AM           "

## 2018-06-19 NOTE — PROGRESS NOTES
"    Internal Medicine Progress Note    Date of Service (when I saw the patient): 06/18/2018    Assessment & Plan   Nella Helms is a 42-year-old female with history of cognitive delay, left sided hemiplegia, severe epilepsy, ITP, who was transferred here from an outside emergency department for hypothermia and hypotension in the setting of multiple weeks of weakness and falls.  The patient has received 50 mg of hydrocortisone as well as intravenous fluids with improvement in both hypothermia and hypotension and is currently vitally stable.     At the outside ED, bladder temperature was noted to be in the low 30 C range.  She was given 50 mg of IV hydrocortisone with improvement.  She was noted to be hyponatremic to 128 and slightly hyperkalemic to 5.  She had recently stopped steroids June 9 for worsening thrombocytopenia in the context of ITP (taking 40 mg of prednisone from June 6 - June 8 and then 20 mg on June 9).    Today: Another episode of vomiting this morning but without post-ictal period.  Attempted to remove sitter but remained impulsive and weak.    Seizures.      Hypothermia and hypotension with hyponatremia and mild hyperkalemia (resolved)  Most likely due to adrenal insufficiency due to frequent steroid intake.  Clinical features are suggestive and other likely diagnoses have been ruled out.  Also, she responded appropriately to hydrocortisone administration.  TSH was 3.16, am cortisol was 11.4 the day after getting hydrocortisone.  Procalcitonin negative make infection unlikely.  Not sure how rash (below) may be playing a role.  Per psych, \"Acute problems on admission were likely related to her being on the high doses of Risperdal and trazodone which are both strong alpha blockers and will cause hypotension\"   - Monitor closely   - Continue reduced trazadone.    - Continue increased Lamictal at 200 mg BID      Right facial rash likely due to impetigo (resolved)  She was recently on clindamycin for 7 " days, ending June 6, with apparent improvement in the rash and worsening since discontinuation.  No vesicles to suggest Zoster. Honey-crust noted around right edge of mouth suggestive of impetigo.  Improving again after starting doxycycline.  Doxycycline course completed.   - Monitor for return of symptoms      Thrombocytopenia likely due to ITP  Hospitalization in February at the [facility] where this was diagnosed and improved markedly with steroids.  Completed steroid taper in March.  Recently seen in the ED June 5 with thrombocytopenia of 66 was noted and the patient was given steroids, per the NH MAR was taking 40 mg of prednisone from June 6 - June 8 and then 20 mg on June 9.  Plts continues to improve.  Today is 79.   - Continue to trend, avoid heparin or NSAIDs   - Transfuse platelets for less than 10 although with ITP steroids are ultimately needed      Acute on chronic gait instability  Patient has had multiple weeks of worsening gait instability and more difficulty with routine tasks such as eating.  Her psychiatrist has already decreased her risperidone from 4 mg twice daily to 3 in the morning and 4 at night without improvement. Neurology consulted and reports her antiepileptics don't typically cause weakness.  More likely secondary to steroid use.    - Reduce trazodone from 100 mg 8 am, 50 mg 4 pm, 150 mg qhs to 50 mg 8 am (50%), 25 mg 4 pm (50%) and continue the 150 mg qhs. Gradual taper given gait instability and history of severe seizures (withdrawal concern).   - Avoid steroids    - PT/OT   - Reduction of risperidone to 3 mg BID per psych     Leukopenia  3.6 at the OSH and down to 2.8 here.  appears to be hovering mid-3s to 4's since February. On multiple drugs that could contribute, including: Lamictal, oxcarbazepine.  - continue to trend here      Severe epilepsy, well controlled.  (See H&P dated 2/17/18 for description of seizure types.)  No changes in medication per neurology. Appeared to have  a typical seizure today (6/17).  Self resolved.   - Monitor for further seizure activity   - Continue Vimpat 200 mg daily   - Increase Lamictal to 200 mg two times daily per psych   - Continue Keppra 1000 twice daily   - Continue oxcarbazepine 600 mg twice daily    Dysphagia.  Followed by SLP.   - Change to regular diet.     Problematic behaviors. Could be akathisia or other extrapyramidal side effects from the high dose of Risperdal.  She has improved markedly during this hospitalization and has no evidence of more problematic behaviors emerging.  Discussed with psych today who feels that the small changes made shouldn't cause the behaviors seen these last two days.   - Continue Risperdal at 3 mg twice a day.     Diet:   Active Diet Order      Regular Diet Adult  DVT Prophylaxis: VTE Prophylaxis contraindicated due to thrombocytopenia  Code Status: Full Code    Disposition: Medically ready for discharge.  Expected discharge in 1 - 3 days once TCU placement found.    Haresh Redding MD  Internal Medicine-Pediatrics Staff  Pager: [phone]    Please see sticky note for cross cover information     ------    Interval History   Per nursing another episode of vomiting today but without a post-ictal period.  Still showing impulsive and sometimes aggressive behaviors.  Nursing contacted group home who endorse that these are not new but combined with her weakness makes her more dangerous to herself.    Otherwise unchanged.    No fevers or chills.      Unable to obtain further history or ROS.    Physical Exam     Vitals:    06/14/18 2157 06/15/18 1705 06/17/18 2028   Weight: 78.8 kg (173 lb 11.6 oz) 77.1 kg (169 lb 15.6 oz) 72.4 kg (159 lb 9.6 oz)     Vital Signs with Ranges  Temp:  [95.2  F (35.1  C)-97.2  F (36.2  C)] 95.2  F (35.1  C)  Pulse:  [88] 88  Heart Rate:  [64-74] 73  Resp:  [17-18] 17  BP: (125-173)/(53-85) 173/75  SpO2:  [95 %-98 %] 98 %  I/O last 3 completed shifts:  In: 1250 [P.O.:1250]  Out: 1150  [Urine:1150]    Constitutional:sleepy but arousable with decreased responsiveness, no distress   Cardiovascular: RRR. + systolic murmur  Neuro: Challenging to assess, left side weaker then right  Psychiatric: affect at baseline; concentration poor  Joints:Minor abrasions to knees improved, no effusions    Medications       docusate  100 mg Oral BID     lacosamide  200 mg Oral BID     lamoTRIgine  200 mg Oral BID     levETIRAcetam  1,000 mg Oral BID     OXcarbazepine  600 mg Oral BID     risperiDONE  3 mg Oral Daily at 8 pm     risperiDONE  3 mg Oral QAM     sodium chloride (PF)  3 mL Intracatheter Q8H     traZODone  150 mg Oral At Bedtime     traZODone  50 mg Oral Daily at 4 pm     traZODone  50 mg Oral QAM       Data   ---    Nursing notes reviewed.  Patient seen with bedside nurse.     I have reviewed today's Medications and Vital Signs    Discussed with Psychiatry.

## 2018-06-19 NOTE — PROGRESS NOTES
Care Coordinator Progress Note    Admission Date/Time:  6/11/2018  Attending MD:  Haresh Redding MD    Data  Chart reviewed, discussed with interdisciplinary team.   Patient was admitted for: Data Unavailable.    Barriers to Discharge: chronically ill, cognitively impaired and dependent with mobility/activities of daily living    Assessment  6/19/2018 7:55 AM RNCC reached out to  today asking if they can come to see pt today and assess this pt together with PT or OT.  Ariane will reach out to RNCC today to discuss timing, Ariane will update pts RN Paige (supervisor) and will discuss timing today and call this RNCC back.  Bedside rN to evaluate safety to remove 1:1 today.      6/19/2018 9:03 AM  staff that know this pt will not be in till tomorrow.  Appointment scheduled for tomorrow at 11 am with their staff and PT OT working with this pt.  IF pt is ok to dc  And they agree with pt coming home to , pt can dc at this time with them.  MD team updated, [facility] continues to work on TCU placement.      Plan  Eval by  today for dc safety with plan for home PT OT to improve strength and safety at home VS  plan for TCU     Dilia Acharya RNCC, BSN    [facility]    Medicine Group  [address] [place] [id number]    [email or web address]  [email or web address]    Office: [phone] Pager: [phone]

## 2018-06-20 ENCOUNTER — APPOINTMENT (OUTPATIENT)
Dept: PHYSICAL THERAPY | Facility: CLINIC | Age: 43
DRG: 644 | End: 2018-06-20
Attending: HOSPITALIST
Payer: MEDICARE

## 2018-06-20 VITALS
SYSTOLIC BLOOD PRESSURE: 138 MMHG | BODY MASS INDEX: 29.81 KG/M2 | DIASTOLIC BLOOD PRESSURE: 71 MMHG | RESPIRATION RATE: 16 BRPM | TEMPERATURE: 97.3 F | OXYGEN SATURATION: 94 % | HEIGHT: 61 IN | WEIGHT: 157.9 LBS | HEART RATE: 74 BPM

## 2018-06-20 PROCEDURE — 25000132 ZZH RX MED GY IP 250 OP 250 PS 637: Mod: GY | Performed by: PHYSICIAN ASSISTANT

## 2018-06-20 PROCEDURE — 25000132 ZZH RX MED GY IP 250 OP 250 PS 637: Mod: GY | Performed by: PEDIATRICS

## 2018-06-20 PROCEDURE — A9270 NON-COVERED ITEM OR SERVICE: HCPCS | Mod: GY | Performed by: PHYSICIAN ASSISTANT

## 2018-06-20 PROCEDURE — 97116 GAIT TRAINING THERAPY: CPT | Mod: GP

## 2018-06-20 PROCEDURE — A9270 NON-COVERED ITEM OR SERVICE: HCPCS | Mod: GY | Performed by: PEDIATRICS

## 2018-06-20 PROCEDURE — 25000125 ZZHC RX 250: Performed by: PEDIATRICS

## 2018-06-20 PROCEDURE — 99239 HOSP IP/OBS DSCHRG MGMT >30: CPT | Performed by: HOSPITALIST

## 2018-06-20 PROCEDURE — 97530 THERAPEUTIC ACTIVITIES: CPT | Mod: GP

## 2018-06-20 PROCEDURE — 40000193 ZZH STATISTIC PT WARD VISIT

## 2018-06-20 PROCEDURE — 25000132 ZZH RX MED GY IP 250 OP 250 PS 637: Mod: GY | Performed by: INTERNAL MEDICINE

## 2018-06-20 PROCEDURE — A9270 NON-COVERED ITEM OR SERVICE: HCPCS | Mod: GY | Performed by: INTERNAL MEDICINE

## 2018-06-20 RX ORDER — RISPERIDONE 3 MG/1
3 TABLET ORAL 2 TIMES DAILY
Qty: 60 TABLET | Refills: 0 | Status: ON HOLD | OUTPATIENT
Start: 2018-06-20 | End: 2018-09-26

## 2018-06-20 RX ORDER — LAMOTRIGINE 200 MG/1
200 TABLET ORAL 2 TIMES DAILY
Qty: 60 TABLET | Refills: 0 | Status: SHIPPED | OUTPATIENT
Start: 2018-06-20 | End: 2018-07-13

## 2018-06-20 RX ORDER — TRAZODONE HYDROCHLORIDE 50 MG/1
TABLET, FILM COATED ORAL
Qty: 90 TABLET | Refills: 0 | Status: SHIPPED | OUTPATIENT
Start: 2018-06-20 | End: 2023-11-14 | Stop reason: DRUGHIGH

## 2018-06-20 RX ADMIN — TRAZODONE HYDROCHLORIDE 50 MG: 50 TABLET ORAL at 08:36

## 2018-06-20 RX ADMIN — RISPERIDONE 3 MG: 3 TABLET ORAL at 08:36

## 2018-06-20 RX ADMIN — ACETAMINOPHEN 650 MG: 325 TABLET, FILM COATED ORAL at 00:01

## 2018-06-20 RX ADMIN — LAMOTRIGINE 200 MG: 100 TABLET ORAL at 08:35

## 2018-06-20 RX ADMIN — ACETAMINOPHEN 650 MG: 325 TABLET, FILM COATED ORAL at 04:28

## 2018-06-20 RX ADMIN — ONDANSETRON 4 MG: 4 TABLET, ORALLY DISINTEGRATING ORAL at 08:34

## 2018-06-20 RX ADMIN — DOCUSATE SODIUM 100 MG: 50 LIQUID ORAL at 08:36

## 2018-06-20 RX ADMIN — LEVETIRACETAM 1000 MG: 500 TABLET, FILM COATED ORAL at 08:35

## 2018-06-20 RX ADMIN — OXCARBAZEPINE 600 MG: 600 TABLET, FILM COATED ORAL at 08:35

## 2018-06-20 RX ADMIN — LACOSAMIDE 200 MG: 200 TABLET, FILM COATED ORAL at 08:36

## 2018-06-20 NOTE — PLAN OF CARE
Problem: Patient Care Overview  Goal: Plan of Care/Patient Progress Review  Discharge Planner PT 5B  Patient plan for discharge: return to group home  Current status: pt completes bed mobility with CGA; assisted to remove blankets. Needs assistance to kassie socks and shoes to prepare for ambulation. Ambulates 2 x 200' with hand hold assist vs CGA on gait belt. Pt completes x 3 stairs with CGA, use of 1 rail and cues for stepping. Group home staff present and observed pt's performance; staff member ambulates x 200' with pt providing hand hold assist. Pt remains impulsive as well as demonstrating frequent attention seeking behaviors. To note, pt with improved behavior and redirection throughout session with cues provided from group home staff vs therapist.   Barriers to return to prior living situation: falls, level of assist  Recommendations for discharge: return to group home with staff assist; OP PT  Rationale for recommendations: pt completing mobility adequate for return to group home with assist from staff; OP PT to progress activity tolerance and promote safety with independent mobility.        Entered by: Matthias Chou 06/20/2018 11:24 AM

## 2018-06-20 NOTE — PROGRESS NOTES
Care Coordinator - Discharge Planning    Admission Date/Time:  6/11/2018  Attending MD:  Candice Liu*     Data  Chart reviewed, discussed with interdisciplinary team.   Patient was admitted for:   1. Generalized muscle weakness         Assessment   Full assessment completed in previous note    Coordination of Care and Referrals: Provided patient/family with options for GH came in today for evaluation for home safety at dc time.   agrees with dc back today and feels pt is near her baseline.  They are requesting out pt PT, orders placed for MD signature. .   RNCC met with pts Cuate Nicholas.  Pt is happy in the room with Dad present and talking to him.  Dad has concerns for md team to address, RNCC discussed with Dr. Liu who will see pt.  PT states that pt is set for dc.   has no other concerns for this pt.      DC pending final AVS orders.        Plan  Anticipated Discharge Date:  6/20/2018   Anticipated Discharge Plan: dc back to , md updated staff on hospital progression, avs and Mar to be given to  at time of dc.      6/20/2018 2:50 PM RNCC discussed dc with  staff that are still present in the room.  Earlier they requested MD to resume pts standing orders at the  and needed a few things updated on AVS.  RNCC gave fax number to have  fax Standing orders to MD for review and resumption.  MD has the faxed orders, dc pending review and resumption.  Noted on the standing orders that they were initiated by PCP who wrote that they are effective for one year.  RNCC offered GH and family to take pt and follow up with PCP clinic to resume the standing orders, the  staff declined dc until this is completed at [facility].  MD aware and will follow up with recommendations of these standing orders.       CTS Handoff completed:  YES     Dilia Acharya, SANDYCC, BSN    [facility]    Medicine Group  [address] [place] [address]    [email or web address]  Formerly [facility], [email or web address]     Office: [phone] Pager: [phone]

## 2018-06-20 NOTE — PLAN OF CARE
Problem: Patient Care Overview  Goal: Plan of Care/Patient Progress Review  Outcome: No Change  Pt is vitally stable on room air. Continues with bedside attendant for safety due to impulsiveness and falls risk. Loud, agitated and redirecting poorly at beginning of shift, perseverating on various pains. Q4H tylenol appears to provide good relief as evidenced by improved sleep and fewer episodes of yelling following administration. Peripheral IV saline locked.     Pt does better with single staff member providing consistent, gentle reassurance, and appears to easily become overwhelmed with multiple people around her, especially during transitions. All PO meds continue to be administered with apple sauce or pudding per plan of care. Continue to monitor closely.

## 2018-06-20 NOTE — PLAN OF CARE
Problem: Patient Care Overview  Goal: Plan of Care/Patient Progress Review  OT:  Group home staff present for caregiver training with PT and rehab tech.  Rehab tech discussed ADLs with group home staff and patient needs assist with all ADLs at baseline and group home declined need to meet with OT today.      Occupational Therapy Discharge Summary    Reason for therapy discharge:    No further expectations of functional progress.    Progress towards therapy goal(s). See goals on Care Plan in [facility] electronic health record for goal details.  Goals not met.  Barriers to achieving goals:   cognition/baseline deficits.    Therapy recommendation(s):    No further therapy is recommended.

## 2018-06-20 NOTE — PROGRESS NOTES
SPIRITUAL HEALTH SERVICES  SPIRITUAL ASSESSMENT Progress Note  [facility] ([place]) 5C     REFERRAL SOURCE: Routine Visit    Caught up withpt and family, just before discharge. Was able to offer a blessing to pt!    PLAN: No further visits anticipated.    Fr. Damion Chandler  [employer] [facility] [phone]  Pager [phone]

## 2018-06-20 NOTE — PLAN OF CARE
Problem: Patient Care Overview  Goal: Plan of Care/Patient Progress Review  Outcome: Declining  VSS on RA. Pt doesn't answer orientation questions. Had to reinstate bedside attendant at 1900 d/t pt's increasing impulsivity, getting up out of wheelchair at nurses station and nearly tripping over WC foot rests. Pt screaming out frequently and in a loop about going home and pain in her feet and neck. Tylenol given. Held colace d/t pt having 3 formed stools this evening. Plan for  staff to be present with OT session tomorrow to determine if she is safe to return. Will continue POC.

## 2018-06-21 ENCOUNTER — CARE COORDINATION (OUTPATIENT)
Dept: CARE COORDINATION | Facility: CLINIC | Age: 43
End: 2018-06-21

## 2018-06-21 NOTE — PLAN OF CARE
Problem: Patient Care Overview  Goal: Plan of Care/Patient Progress Review  Physical Therapy Discharge Summary    Reason for therapy discharge:    Discharged to home with outpatient therapy.    Progress towards therapy goal(s). See goals on Care Plan in [facility] electronic health record for goal details.  Goals partially met.  Barriers to achieving goals:   limited tolerance for therapy and discharge from facility.    Therapy recommendation(s):    Continued therapy is recommended.  Rationale/Recommendations:  to improve balance and safety with mobility.

## 2018-07-11 NOTE — DISCHARGE SUMMARY
[facility]  Discharge Summary    Nella Helms MRN# [id number]   YOB: 1975 Age: 42 year old     Date of Admission:  6/11/2018  Date of Discharge:  6/20/2018  3:13 PM  Admitting Physician:  Matthias Deluna MD  Discharge Physician:  Candice Liu MD  Discharging Service:  Internal Medicine     Primary Provider: Fritsch, Bryan              Discharge Diagnosis:     Primary Discharge Diagnosis:    Hypothermia and hypotension with hyponatremia and mild hyperkalemia (resolved)  Most likely due to adrenal insufficiency due to frequent steroid intake.   Right facial rash likely due to impetigo (resolved)  Thrombocytopenia likely due to ITP   Acute on chronic gait instability   Leukopenia, likely drug induced  Severe epilepsy, well controlled  Dysphagia      Past Medical History:   Diagnosis Date     Cortical blindness      Mental retardation      Strabismus                     Discharge Medications:     Discharge Medication List as of 6/20/2018  3:08 PM      CONTINUE these medications which have CHANGED    Details   lamoTRIgine (LAMICTAL) 200 MG tablet Take 1 tablet (200 mg) by mouth 2 times daily, Disp-60 tablet, R-0, E-Prescribe      risperiDONE (RISPERDAL) 3 MG tablet Take 1 tablet (3 mg) by mouth 2 times daily, Disp-60 tablet, R-0, E-Prescribe      traZODone (DESYREL) 50 MG tablet 1 tablet (50 mg) q 8am. 1 tablet (50 mg) q 4 pm,  3 tablets (150 mg) 8pm, Disp-90 tablet, R-0, E-Prescribe         CONTINUE these medications which have NOT CHANGED    Details   docusate sodium (COLACE) 100 MG capsule Take by mouth 2 times daily, Historical      lacosamide (VIMPAT) 200 MG TABS tablet Take 1 tablet (200 mg) by mouth 2 times daily, Disp-60 tablet, R-5, Local Print      levETIRAcetam 1000 MG TABS Take 1,000 mg by mouth 2 times daily, Disp-60 tablet, R-11, E-Prescribe      Multiple Vitamin (MULTIVITAMINS PO) Take by mouth daily, Historical      OXcarbazepine (TRILEPTAL) 600 MG tablet Take  1 tablet (600 mg) by mouth 2 times daily, Disp-60 tablet, R-11, E-Prescribe      Tetrahydrozoline-Zn Sulfate (EYE DROPS AR OP) Apply 2 drops to eye daily as needed, Historical      VITAMIN E 2 times daily, Historical      diazepam (DIAZEPAM INTENSOL) 5 MG/ML (HIGH CONC) solution (1)ML (5MG) AS NEEDED FOR ANY SEIZURE. WHEN ADMINISTERED NOTIFY PC, RN & PD., Disp-120 mL, R-3, Local Print                  Hospital Course:     Nella Helms is a 42-year-old female with history of cognitive delay, left sided hemiplegia, severe epilepsy, ITP, who was transferred here from an outside emergency department for hypothermia and hypotension in the setting of multiple weeks of weakness and falls.  The patient has received 50 mg of hydrocortisone as well as intravenous fluids with improvement in both hypothermia and hypotension and subsequently she ws vitally stable.      At the outside ED, bladder temperature was noted to be in the low 30 C range.  She was given 50 mg of IV hydrocortisone with improvement.  She was noted to be hyponatremic to 128 and slightly hyperkalemic to 5.  She had recently stopped steroids June 9 for worsening thrombocytopenia in the context of ITP (taking 40 mg of prednisone from June 6 - June 8 and then 20 mg on June 9).    Hospital Course:     Suspect Adrenal Insufficiency (Hypothermia and hypotension with hyponatremia and mild hyperkalemia (resolved))    Most likely due to adrenal insufficiency due to frequent steroid intake.  Clinical features are suggestive and other likely diagnoses have been ruled out.  Also, she responded appropriately to hydrocortisone administration.  TSH was 3.16, am cortisol was 11.4 the day after getting hydrocortisone.  Procalcitonin negative make infection unlikely.  Not sure how rash (below) may be playing a role.      Per psych, High doses of Risperdal and trazodone which are both strong alpha blockers and could cause hypotension as well so likely made things worse.      -  she got better with IV hydrocortisone and fluids. Did not require continued use of steroids. No steroids given at the time of discharge. She has been on off on oral steroids as out pt.    - seen by by psychiatry,  continue reduced trazadone.    - Continue increased Lamictal at 200 mg BID  - Her psychiatrist has already decreased her risperidone from 4 mg twice daily to 3 in the morning and 4 at night  - seen by psychiatry here, we decreased the doses to 3 mg BID    Right facial rash likely due to impetigo (resolved)    She was recently on clindamycin for 7 days, ending June 6, with apparent improvement in the rash and worsening since discontinuation.  No vesicles to suggest Zoster. Honey-crust noted around right edge of mouth suggestive of impetigo.  Improving again after starting doxycycline. Doxycycline course completed while here in the hospital. Now stopped.    - Monitor for return of symptoms at  [facility] with PCP     Thrombocytopenia likely due to ITP    Hospitalization in February at the [place] where this was diagnosed and improved markedly with steroids.  Completed steroid taper in March.  Recently seen in the ED June 5 with thrombocytopenia of 66 was noted and the patient was given steroids, per the [facility] MAR was taking 40 mg of prednisone from June 6 - June 8 and then 20 mg on June 9.  Plts continues to improve.  Today is 79.     - Continue to trend, avoid heparin or NSAIDs   - Transfuse platelets for less than 10 although with ITP steroids are ultimately needed     Acute on chronic gait instability    Patient has had multiple weeks of worsening gait instability and more difficulty with routine tasks such as eating.  Her psychiatrist has already decreased her risperidone from 4 mg twice daily to 3 in the morning and 4 at night without improvement. Neurology consulted and reports her antiepileptics don't typically cause weakness.  Gait instability is more likely secondary to steroid use.      - Reduce trazodone  "from 100 mg 8 am, 50 mg 4 pm, 150 mg qhs to 50 mg 8 am (50%), 25 mg 4 pm (50%) and continue the 150 mg qhs. Gradual taper given gait instability and history of severe seizures (withdrawal concern).   - Avoid steroids    - PT/OT   - Reduction of risperidone to 3 mg BID per psych     Leukopenia  3.6 at the OSH and down to 2.8 here.  appears to be hovering mid-3s to 4's since February. On multiple drugs that could contribute, including: Lamictal, oxcarbazepine.  - continue to FU at out pt     Severe epilepsy, well controlled.  (See H&P dated 2/17/18 for description of seizure types.)  No changes in medication per neurology. Appeared to have a typical seizure today (6/17).  Self resolved.   - Monitor for further seizure activity   - Continue Vimpat 200 mg daily   - Increase Lamictal to 200 mg two times daily per psych   - Continue Keppra 1000 twice daily   - Continue oxcarbazepine 600 mg twice daily     Dysphagia.  Followed by SLP.   - Change to regular diet.      Problematic behaviors. Could be akathisia or other extrapyramidal side effects from the high dose of Risperdal.  She has improved markedly during this hospitalization and has no evidence of more problematic behaviors emerging.  Discussed with psych today who feels that the small changes made shouldn't cause the behaviors seen these last two days.     - Continue Risperdal at 3 mg twice a day.                     Discharge Disposition:   Discharged to home           Condition on Discharge:   Discharge condition: Stable   Code status on discharge: Full Code             Consultations:   Consultation during this admission received from psychiatry and neurology.               Final Day of Progress before Discharge:       Physical Exam:  Blood pressure 138/71, pulse 74, temperature 97.3  F (36.3  C), temperature source Axillary, resp. rate 16, height 1.549 m (5' 1\"), weight 71.6 kg (157 lb 14.4 oz), SpO2 94 %, unknown if currently breastfeeding.  GENERAL: Alert and " oriented x 3. NAD.   HEENT: Anicteric sclera. PERRL. Mucous membranes moist and without lesions.   CV: RRR. S1, S2. No murmurs appreciated.   RESPIRATORY: Effort normal. Lungs CTAB with no wheezing, rales, rhonchi.   GI: Abdomen soft and non distended with normoactive bowel sounds present in all quadrants. No tenderness, rebound, guarding.      Data:  All laboratory data reviewed             Significant Results:     Results for orders placed or performed during the hospital encounter of 06/11/18   CBC with platelets differential   Result Value Ref Range    WBC 2.8 (L) 4.0 - 11.0 10e9/L    RBC Count 3.04 (L) 3.8 - 5.2 10e12/L    Hemoglobin 9.8 (L) 11.7 - 15.7 g/dL    Hematocrit 28.8 (L) 35.0 - 47.0 %    MCV 95 78 - 100 fl    MCH 32.2 26.5 - 33.0 pg    MCHC 34.0 31.5 - 36.5 g/dL    RDW 13.1 10.0 - 15.0 %    Platelet Count 51 (L) 150 - 450 10e9/L    Diff Method Automated Method     % Neutrophils 48.7 %    % Lymphocytes 43.1 %    % Monocytes 6.8 %    % Eosinophils 1.4 %    % Basophils 0.0 %    % Immature Granulocytes 0.0 %    Nucleated RBCs 0 0 /100    Absolute Neutrophil 1.4 (L) 1.6 - 8.3 10e9/L    Absolute Lymphocytes 1.2 0.8 - 5.3 10e9/L    Absolute Monocytes 0.2 0.0 - 1.3 10e9/L    Absolute Eosinophils 0.0 0.0 - 0.7 10e9/L    Absolute Basophils 0.0 0.0 - 0.2 10e9/L    Abs Immature Granulocytes 0.0 0 - 0.4 10e9/L    Absolute Nucleated RBC 0.0    Comprehensive metabolic panel   Result Value Ref Range    Sodium 128 (L) 133 - 144 mmol/L    Potassium 4.6 3.4 - 5.3 mmol/L    Chloride 97 94 - 109 mmol/L    Carbon Dioxide 30 20 - 32 mmol/L    Anion Gap 1 (L) 3 - 14 mmol/L    Glucose 78 70 - 99 mg/dL    Urea Nitrogen 8 7 - 30 mg/dL    Creatinine 0.35 (L) 0.52 - 1.04 mg/dL    GFR Estimate >90 >60 mL/min/1.7m2    GFR Estimate If Black >90 >60 mL/min/1.7m2    Calcium 8.6 8.5 - 10.1 mg/dL    Bilirubin Total 0.2 0.2 - 1.3 mg/dL    Albumin 2.8 (L) 3.4 - 5.0 g/dL    Protein Total 5.7 (L) 6.8 - 8.8 g/dL    Alkaline Phosphatase 130  40 - 150 U/L    ALT 36 0 - 50 U/L    AST 14 0 - 45 U/L   INR   Result Value Ref Range    INR 0.94 0.86 - 1.14   Partial thromboplastin time   Result Value Ref Range    PTT 33 22 - 37 sec   Cortisol   Result Value Ref Range    Cortisol Serum 2.9 (L) 4 - 22 ug/dL   Procalcitonin   Result Value Ref Range    Procalcitonin <0.05 ng/ml   TSH   Result Value Ref Range    TSH 3.16 0.40 - 4.00 mU/L   Cortisol   Result Value Ref Range    Cortisol Serum 11.4 4 - 22 ug/dL   Basic metabolic panel   Result Value Ref Range    Sodium 134 133 - 144 mmol/L    Potassium 4.4 3.4 - 5.3 mmol/L    Chloride 97 94 - 109 mmol/L    Carbon Dioxide 30 20 - 32 mmol/L    Anion Gap 7 3 - 14 mmol/L    Glucose 86 70 - 99 mg/dL    Urea Nitrogen 6 (L) 7 - 30 mg/dL    Creatinine 0.38 (L) 0.52 - 1.04 mg/dL    GFR Estimate >90 >60 mL/min/1.7m2    GFR Estimate If Black >90 >60 mL/min/1.7m2    Calcium 9.2 8.5 - 10.1 mg/dL   CBC (AM Draw)   Result Value Ref Range    WBC 4.5 4.0 - 11.0 10e9/L    RBC Count 3.47 (L) 3.8 - 5.2 10e12/L    Hemoglobin 11.1 (L) 11.7 - 15.7 g/dL    Hematocrit 32.7 (L) 35.0 - 47.0 %    MCV 94 78 - 100 fl    MCH 32.0 26.5 - 33.0 pg    MCHC 33.9 31.5 - 36.5 g/dL    RDW 13.4 10.0 - 15.0 %    Platelet Count 57 (L) 150 - 450 10e9/L   Lacosamide Level   Result Value Ref Range    Lacosamide 6.3 5.0 - 10.0 ug/mL   Lamotrigine Level   Result Value Ref Range    Lamotrigine Level 2.2 (L) 2.5 - 15.0 ug/mL   Keppra (Levetiracetam) Level   Result Value Ref Range    Keppra (Levetiracetam) Level 17 12 - 46 ug/mL   Oxcarbazepine level   Result Value Ref Range    10 Hydroxy Metabolite Level 19.3 10.0 - 35.0 ug/ml   CBC with platelets   Result Value Ref Range    WBC 4.6 4.0 - 11.0 10e9/L    RBC Count 3.48 (L) 3.8 - 5.2 10e12/L    Hemoglobin 11.1 (L) 11.7 - 15.7 g/dL    Hematocrit 33.2 (L) 35.0 - 47.0 %    MCV 95 78 - 100 fl    MCH 31.9 26.5 - 33.0 pg    MCHC 33.4 31.5 - 36.5 g/dL    RDW 13.1 10.0 - 15.0 %    Platelet Count 79 (L) 150 - 450 10e9/L   CK  total   Result Value Ref Range    CK Total 33 30 - 225 U/L   Basic metabolic panel   Result Value Ref Range    Sodium 139 133 - 144 mmol/L    Potassium 3.9 3.4 - 5.3 mmol/L    Chloride 102 94 - 109 mmol/L    Carbon Dioxide 29 20 - 32 mmol/L    Anion Gap 7 3 - 14 mmol/L    Glucose 88 70 - 99 mg/dL    Urea Nitrogen 6 (L) 7 - 30 mg/dL    Creatinine 0.37 (L) 0.52 - 1.04 mg/dL    GFR Estimate >90 >60 mL/min/1.7m2    GFR Estimate If Black >90 >60 mL/min/1.7m2    Calcium 9.6 8.5 - 10.1 mg/dL   CBC with platelets   Result Value Ref Range    WBC 4.3 4.0 - 11.0 10e9/L    RBC Count 3.35 (L) 3.8 - 5.2 10e12/L    Hemoglobin 10.7 (L) 11.7 - 15.7 g/dL    Hematocrit 32.5 (L) 35.0 - 47.0 %    MCV 97 78 - 100 fl    MCH 31.9 26.5 - 33.0 pg    MCHC 32.9 31.5 - 36.5 g/dL    RDW 13.4 10.0 - 15.0 %    Platelet Count 155 150 - 450 10e9/L   EKG 12-lead, complete   Result Value Ref Range    Interpretation ECG Click View Image link to view waveform and result    Neurology General Adult IP Consult: Patient to be seen: Routine within 24 hrs; Call back #: [phone]) [phone], p) [phone]; Hx of intractable epilepsy, acute on chronic worsening weakness and decreased mental status; Consultant may enter orders: Yes    Narrative    Octavio Ugalde MD     6/13/2018  2:52 PM    Neurology Initial Consult  June 13, 2018      Nella Helms MRN:[id number] YOB: 1975  Date of Admission:6/11/2018  Primary care provider: Fritsch, Bryan  Requesting physician: Haresh Redding MD    ASSESSMENT AND RECOMMENDATIONS:   Nella Helms is a 42 year old with PMH of cognitive delay, left   sided hemiplegia, severe epilepsy, ITP, who was transferred here   from an outside emergency department for hypothermia and   hypotension in the setting of multiple weeks of weakness.     #Generalized weakness  Patient presented with progressively worsening generalized   weakness since May culminating in severe weakness and admission   to hospital on Monday for adrenal  "insufficiency.  TSH on   admission was 3.16. From a neurological standpoint,   anti-epileptic medications may result in gait ataxia and   instability.  However it is highly unlikely that these   medications could contribute to patient's current generalized   weakness.  Instead the more likely etiology is weakness from   adrenal insufficiency.  Furthermore on exam patient exhibited   signs of Parkinsonian shuffled gait, and this may be an LB of   patient's risperidone.    Recommendations  -Obtain the following lab if not already done: CK  -Check a.m. anti-epileptic drug levels  -Recommend consultation with psychiatry in order to reduce   risperidone dose as this could be contributing to patient's gait   instability    Recommendations were communicated to primary team via phone.    Seen and discussed with Dr. Rivera.    Neurology service will continue to follow this interesting   patient with you. Thank you for the consultation.    Octavio Ugalde MD   [phone]      REASON FOR CONSULT: \"Hx of intractable epilepsy, acute on chronic   worsening weakness and decreased mental status\"    HISTORY OF PRESENT ILLNESS:  Nella Helms is a 42 year old with PMH of cognitive delay, left   sided hemiplegia, severe epilepsy, ITP, who was transferred here   from an outside emergency department for hypothermia and   hypotension in the setting of multiple weeks of weakness.     Patient is a poor historian, history gathered from chart review   and from brother in the room.  Patient was recently hospitalized   in February for her thrombocytopenia and found to have ITP.  At   the time patient complained of similar generalized weakness.    Valproic acid had been discontinued prior to admission.    Felbamate was discontinued at admission.  Keppra and lacosamide   were initiated in their place.  Lamotrigine was continued at   prior to admission dose and oxcarbazepine was increased to 600 mg   BID from 450 BID.  Patient's " generalized weakness subsequently   improved on discharge.  At baseline patient is able to ambulate   independently without a walker with minimal gait disturbances.    Over the past month, patient has been experiencing progressively   worsening generalized weakness and gait instability. Her legs   have been giving out on her and have been very unsteady. At times   she has required the assistance of two staff members to stand up   or get up out of bed, as well as getting in and out of the   bathtub. She apparently has not injured herself with any of these   falls.  During this time, her psychiatrist has decreased her   risperidone from 4 mg twice daily to 3 in the morning and 4 at   night without improvement.   On Monday, June 11 patient was found   to be very fatigued and unable to get out of bed.  She was taken   to the ER and found to be hypothermic, hyponatremic and very   lethargic.  Patient was started on stress dose steroid and   subsequently improved.    On seeing the patient today, she was unable to follow while any   commands.  She does spontaneously move all extremities.  Of the   limited review of systems, she denied muscle twitching and   dysphagia.    PAST MEDICAL HISTORY:  Reviewed with patient on 06/13/2018     Past Medical History:   Diagnosis Date     Cortical blindness      Mental retardation      Strabismus        No past surgical history on file.     MEDICATIONS:  PTA Meds  Prior to Admission medications    Medication Sig Last Dose Taking? Auth Provider   docusate sodium (COLACE) 100 MG capsule Take by mouth 2 times   daily 6/12/2018 at Unknown time Yes Reported, Patient   lacosamide (VIMPAT) 200 MG TABS tablet Take 1 tablet (200 mg) by   mouth 2 times daily 6/12/2018 at Unknown time Yes Elysia Gomez MD     LAMICTAL 100 MG tablet TAKE (1) TABLET THREE TIMES DAILY.   6/12/2018 at Unknown time Yes Elysia Gomez MD   levETIRAcetam 1000 MG TABS Take 1,000 mg by mouth 2 times daily   6/12/2018 at  Unknown time Yes Megan Chin MD   Multiple Vitamin (MULTIVITAMINS PO) Take by mouth daily 6/12/2018   at Unknown time Yes Reported, Patient   OXcarbazepine (TRILEPTAL) 600 MG tablet Take 1 tablet (600 mg) by   mouth 2 times daily 6/12/2018 at Unknown time Yes Megan Chin MD   risperiDONE (RISPERDAL) 2 MG tablet 4 mg bid, 2 mg hs 6/12/2018   at Unknown time Yes Reported, Patient   Tetrahydrozoline-Zn Sulfate (EYE DROPS AR OP) Apply 2 drops to   eye daily as needed 6/12/2018 at Unknown time Yes Reported,   Patient   traZODone (DESYREL) 100 MG tablet 1 tablet 8am. 2 tablets noon, 2   tablets 4pm, 2 tablets 8pm 6/12/2018 at Unknown time Yes   Reported, Patient   VITAMIN E 2 times daily 6/12/2018 at Unknown time Yes Reported,   Patient   diazepam (DIAZEPAM INTENSOL) 5 MG/ML (HIGH CONC) solution (1)ML   (5MG) AS NEEDED FOR ANY SEIZURE. WHEN ADMINISTERED NOTIFY PC, RN   & PD. Unknown at  time  Elysia Gomez MD      Current Meds    docusate sodium  100 mg Oral BID     doxycycline  100 mg Oral Q12H BHAVNA     lacosamide  200 mg Oral BID     lamoTRIgine  100 mg Oral TID     levETIRAcetam  1,000 mg Oral BID     OXcarbazepine  600 mg Oral BID     risperiDONE  3 mg Oral QAM     risperiDONE  4 mg Oral Daily at 8 pm     sodium chloride (PF)  3 mL Intracatheter Q8H     traZODone  25 mg Oral Daily at 4 pm     traZODone  150 mg Oral At Bedtime     traZODone  50 mg Oral QAM     Infusion Meds      ALLERGIES:    Allergies   Allergen Reactions     Cefzil [Cefprozil]      Codeine Phosphate      Dilantin [Phenytoin]      Penicillins        REVIEW OF SYSTEMS:  A comprehensive of systems was negative except as noted above.    SOCIAL HISTORY:   Social History     Social History     Marital status: Single     Spouse name: N/A     Number of children: N/A     Years of education: N/A     Occupational History     Not on file.     Social History Main Topics     Smoking status: Never Smoker     Smokeless tobacco: Never Used      Alcohol use No     Drug use: No     Sexual activity: No     Other Topics Concern     Not on file     Social History Narrative     FAMILY MEDICAL HISTORY:   No family history on file.    PHYSICAL EXAM:   Temp  Av.1  F (36.2  C)  Min: 96.2  F (35.7  C)  Max: 98.7    F (37.1  C)      Pulse  Av  Min: 84  Max: 84 Resp  Av.2  Min: 16  Max:   18  SpO2  Av.8 %  Min: 94 %  Max: 99 %       /79 (BP Location: Right arm)  Pulse 84  Temp 96.2  F   (35.7  C) (Axillary)  Resp 16  Wt 79.4 kg (175 lb 0.7 oz)    SpO2 95%  BMI 33.07 kg/m2   Date 18 07 - 18 0659   Shift 1585-9655 9413-8561 2590-9112 24 Hour Total   I  N  T  A  K  E   P.O. 240   240    Shift Total  (mL/kg) 240  (3.02)   240  (3.02)   O  U  T  P  U  T   Urine 750   750    Shift Total  (mL/kg) 750  (9.45)   750  (9.45)   Weight (kg) 79.4 79.4 79.4 79.4        Admit Weight: 79.2 kg (174 lb 9.7 oz) (bed)     GENERAL APPEARANCE: Awake, not following commands, in no acute   distress  EYES: No scleral icterus, pupils equal  HENT: NC/AT, pupils equally round and reactive to light, tongue   without any fasciculation, uvula midline  MS: no evidence of inflammation in joints, no muscle tenderness  : rankin present  SKIN: no rash, warm, dry, no cyanosis  NEURO: Neuro exam was very limited as patient does not follow   commands, she is able to move all extremities but strength and   sensation were difficult to assess, 2+ radial and patellar   reflexes, Babinski equivocal, gait shuffled with inclination to   fall during ambulation    LABS:   CMP  Recent Labs  Lab 18  0525 18  2232    128*   POTASSIUM 4.4 4.6   CHLORIDE 97 97   CO2 30 30   ANIONGAP 7 1*   GLC 86 78   BUN 6* 8   CR 0.38* 0.35*   GFRESTIMATED >90 >90   GFRESTBLACK >90 >90   SHARMILA 9.2 8.6   PROTTOTAL  --  5.7*   ALBUMIN  --  2.8*   BILITOTAL  --  0.2   ALKPHOS  --  130   AST  --  14   ALT  --  36     CBC  Recent Labs  Lab 18  0939 18  2232   HGB  11.1* 9.8*   WBC 4.5 2.8*   RBC 3.47* 3.04*   HCT 32.7* 28.8*   MCV 94 95   MCH 32.0 32.2   MCHC 33.9 34.0   RDW 13.4 13.1   PLT 57* 51*     INR  Recent Labs  Lab 06/11/18  2232   INR 0.94   PTT 33     ABGNo lab results found in last 7 days.   URINE STUDIES  Recent Labs   Lab Test  02/18/18   0450   COLOR  Yellow   APPEARANCE  Clear   URINEGLC  Negative   URINEBILI  Negative   URINEKETONE  Negative   SG  1.017   UBLD  Negative   URINEPH  7.5*   PROTEIN  10*   NITRITE  Negative   LEUKEST  Negative   RBCU  0   WBCU  1     No lab results found.  PTH  No lab results found.  IRON STUDIES  Recent Labs   Lab Test  02/15/18   2026   TRUPTI  280*       IMAGING:  All imaging studies reviewed by me.     Octavio Ugalde MD         Psychiatry IP Consult: Having gait instability while on risperidone.; Consultant may enter orders: No; Patient to be seen: Routine; Call back #: Haresh hugo [phone], p [phone]    Narrative    Hudson Strauss MD     6/15/2018  1:03 PM  Patient seen and chart reviewed. Note dictated (initial)   RECOMMENDATIONS:  Change trazodone to 50 mg  mg HS  Decrease Risperdal to 3 mg am, 3 mg HS  Consider increasing Lamictal to 400 mg per day (I checked with   neurology, and they were ok with this)   Re-consult psychiatry as needed.      Psychiatry IP Consult: Worsening behaviors, decreased mental status after changing psych meds.  Please evaluate for optimization.; Consultant may enter orders: No; Patient to be seen: Routine; Call back #: Haresh [phone], [phone]    Hudson Argueta MD     6/18/2018  1:33 PM        Psychiatry Consultation; Follow up              Reason for Consult, requesting source:    Follow up regarding medications   Requesting source: Haresh Redding                 Interim history:    Over the weekend she has become more impulsive, more difficulty   following commands. Due to impulsivity she now has a sitter. He   mentions that she would not be safe without a sitter  "due to the   impulsivity. She was able to stand without difficulty, but she is   difficult to re-direct.    Medication changes were made as directed (minimal changes).            Medications:     Current Facility-Administered Medications   Medication     docusate (COLACE) 50 MG/5ML liquid 100 mg     Lacosamide (VIMPAT) tablet 200 mg     lamoTRIgine (LaMICtal) tablet 200 mg     levETIRAcetam (KEPPRA) tablet 1,000 mg     lidocaine (LMX4) kit     lidocaine 1 % 1 mL     naloxone (NARCAN) injection 0.1-0.4 mg     ondansetron (ZOFRAN-ODT) ODT tab 4 mg     OXcarbazepine (TRILEPTAL) tablet 600 mg     risperiDONE (risperDAL) tablet 3 mg     risperiDONE (risperDAL) tablet 3 mg     sodium chloride (PF) 0.9% PF flush 3 mL     sodium chloride (PF) 0.9% PF flush 3 mL     traZODone (DESYREL) tablet 150 mg     traZODone (DESYREL) tablet 50 mg     traZODone (DESYREL) tablet 50 mg            MSE:     MENTAL STATUS EXAMINATION:  She is sitting in wheelchair, still   complaining of a variety of pains.Is able to stand without   difficulty. She is overall pleasant, cooperative. but affect is   still quite irritable.  She described her mood as \"okay.\"  She   was not oriented to day, date or place.  Recent and remote   memory, attention and concentration are very substandard.  Use of   language, fund of knowledge are very limited.  Insight and   judgment poor.     Vital signs:  Temp: 95.8  F (35.4  C) Temp src: Axillary BP: 135/53 Pulse: 88   Heart Rate: 64 Resp: 18 SpO2: 96 % O2 Device: None (Room air)     Height: 154.9 cm (5' 1\") Weight: 72.4 kg (159 lb 9.6 oz)  Estimated body mass index is 30.16 kg/(m^2) as calculated from   the following:    Height as of this encounter: 1.549 m (5' 1\").    Weight as of this encounter: 72.4 kg (159 lb 9.6 oz).            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 This is likely her baseline.    Unfortunately she does appear to need the bedside attendant for   now.           Summary of Recommendations:   I would continue with current medications.   Re-consult psychiatry as needed.      \"This dictation was performed with voice recognition software and   may contain errors,  omissions and inadvertent word   substitution.\"              No results found for this or any previous visit (from the past 48 hour(s)).             Pending Results:   Unresulted Labs Ordered in the Past 30 Days of this Admission     No orders found from 4/12/2018 to 6/12/2018.                  Discharge Instructions and Follow-Up:     Discharge Procedure Orders  Physical Therapy Referral   Referral Type: Rehab Therapy Physical Therapy     Physical Therapy Referral   Referral Type: Rehab Therapy Physical Therapy     Reason for your hospital stay   Order Comments: Hypotension, Hypothermia and high K due to adrenal insufficiency. Got better with IV steroid dose. SP treatment of impetigo on face with doxycycline.     Follow Up and recommended labs and tests   Order Comments: Follow up with primary care provider, Bryan Fritsch, within 7 days for hospital follow- up.  No follow up labs or test are needed.     Activity   Order Comments: Your activity upon discharge: activity as tolerated   Order Specific Question Answer Comments   Is discharge order? Yes      Discharge Instructions   Order Comments: Okay to resume work as before.    Okay to Resume Standing order from the group home ([facility])     Full Code     Diet   Order Comments: Follow this diet upon discharge: Orders Placed This Encounter     Room Service     Regular Diet Adult   Order Specific Question Answer Comments   Is discharge order? Yes             Candice Liu  Internal Medicine Staff Hospitalist  [phone]  July 11, 2018        Time spent on patient: 35 minutes total including face to face and coordinating " care time reviewing current illness, any medication changes, and the care plan for today.

## 2018-07-13 ENCOUNTER — TELEPHONE (OUTPATIENT)
Dept: NEUROLOGY | Facility: CLINIC | Age: 43
End: 2018-07-13

## 2018-07-13 DIAGNOSIS — G40.319 GENERALIZED CONVULSIVE EPILEPSY WITH INTRACTABLE EPILEPSY (H): Chronic | ICD-10-CM

## 2018-07-13 RX ORDER — LAMOTRIGINE 200 MG/1
200 TABLET ORAL 2 TIMES DAILY
Qty: 60 TABLET | Refills: 5 | Status: SHIPPED | OUTPATIENT
Start: 2018-07-13 | End: 2018-12-28

## 2018-09-21 ENCOUNTER — HOSPITAL ENCOUNTER (INPATIENT)
Facility: CLINIC | Age: 43
LOS: 4 days | Discharge: NURSING, REHAB OR LONG-TERM CARE | DRG: 057 | End: 2018-09-26
Attending: HOSPITALIST | Admitting: HOSPITALIST
Payer: MEDICARE

## 2018-09-21 ENCOUNTER — TRANSFERRED RECORDS (OUTPATIENT)
Dept: HEALTH INFORMATION MANAGEMENT | Facility: CLINIC | Age: 43
End: 2018-09-21

## 2018-09-21 DIAGNOSIS — G40.319 GENERALIZED CONVULSIVE EPILEPSY WITH INTRACTABLE EPILEPSY (H): Chronic | ICD-10-CM

## 2018-09-21 DIAGNOSIS — R45.1 AGITATION: ICD-10-CM

## 2018-09-21 PROCEDURE — 99223 1ST HOSP IP/OBS HIGH 75: CPT | Mod: AI | Performed by: HOSPITALIST

## 2018-09-21 RX ORDER — DIAZEPAM 10 MG/2ML
5 INJECTION, SOLUTION INTRAMUSCULAR; INTRAVENOUS ONCE
Status: COMPLETED | OUTPATIENT
Start: 2018-09-22 | End: 2018-09-22

## 2018-09-21 NOTE — PROGRESS NOTES
[facility]  Transfer Triage Note    Date of call: 09/21/18  Time of call: 3:31 PM    Reason for Transfer:Patient has established care here  Diagnosis: weakness    Outside Records: Not available  Additional records requested to be faxed to [phone].    Stability of Patient: Patient is vitally stable, with no critical labs, and will likely remain stable throughout the transfer process    Expected Time of Arrival for Transfer: 8-24 hours    Recommendations for Management and Stabilization: Given    Additional Comments Patient with a  History of cognitive delay, ITP (has been on chronic prednisone in the past) multiple past admissions for hypotension, hypothermia, weakness, ataxia who presents to outside er with weakness. She fell today and needs help ambulating which is unusual. No hypotension or hypothermia. In the past patient has received stress dose steroids and seemingly improved. At this time due to stability of patient have held off until she can be evaluated here.    Vincent Dent MD

## 2018-09-21 NOTE — IP AVS SNAPSHOT
MRN:[id number]                      After Visit Summary   9/21/2018    Nella Helms    MRN: [id number]           Thank you!     Thank you for choosing [facility] for your care. Our goal is always to provide you with excellent care. Hearing back from our patients is one way we can continue to improve our services. Please take a few minutes to complete the written survey that you may receive in the mail after you visit with us. Thank you!        Patient Information     Date Of Birth          1975        Designated Caregiver       Most Recent Value    Caregiver    Will someone help with your care after discharge? yes    Name of designated caregiver -- [Group home care staff]      About your hospital stay     You were admitted on:  September 21, 2018 You last received care in the:  [address] [facility]    You were discharged on:  September 26, 2018        Reason for your hospital stay       Falls, most likely due to medication                  Who to Call     For medical emergencies, please call 911.  For non-urgent questions about your medical care, please call your primary care provider or clinic, [phone]          Attending Provider     Provider Specialty    Vincent Dent MD Internal Medicine    Melanie Aguila MD Internal Medicine    Archie Corona MD Internal Medicine       Primary Care Provider Office Phone # Fax #    Bryan Fritsch [phone]      After Care Instructions     Activity       Your activity upon discharge: activity as tolerated            Diet       Follow this diet upon discharge: Orders Placed This Encounter      Combination Diet Regular Diet Adult                  Follow-up Appointments     Follow Up and recommended labs and tests       Follow up with primary care provider, Bryan Fritsch, within 7 days to evaluate medication change.  No follow up labs or test are needed.    Follow-up needed with general neurology in 2-4  "weeks (patient does have an outpatient neurologist for seizures, but she needs to be seen at [facility] for general neurology to evaluate for weakness and falls)    Follow-up with Psychiatrist in 2 weeks to note change in meds                  Your next 10 appointments already scheduled     Dec 18, 2018  2:30 PM CST   Return Visit with [facility]    [address], [address]  [facility] [address] [phone]              Pending Results     No orders found from 2018 to 2018.            Statement of Approval     Ordered          18 0934  I have reviewed and agree with all the recommendations and orders detailed in this document.  EFFECTIVE NOW     Approved and electronically signed by:  Archie Corona MD             Admission Information     Date & Time Provider Department Dept. Phone    2018 Archie Corona MD [address] [facility] [place] [phone]      Your Vitals Were     Blood Pressure Pulse Temperature Respirations Weight Pulse Oximetry    155/95 (BP Location: Left arm) 84 97.1  F (36.2  C) (Oral) 18 72.6 kg (160 lb 1.6 oz) 97%    BMI (Body Mass Index)                   30.25 kg/m2           [employer] Information     [employer] lets you send messages to your doctor, view your test results, renew your prescriptions, schedule appointments and more. To sign up, go to [email or web address] . Click on \"Log in\" on the left side of the screen, which will take you to the Welcome page. Then click on \"Sign up Now\" on the right side of the page.     You will be asked to enter the access code listed below, as well as some personal information. Please follow the directions to create your username and password.     Your access code is: [email or web address]  Expires: 2018  3:54 PM     Your access code will  in 90 days. If you need help or a new code, please call your [facility] or [phone].        Care EveryWhere " ID     This is your Care EveryWhere ID. This could be used by other organizations to access your [place] medical records  [id number]        Equal Access to Services     KATHRYN BROCK : Hadii aad ku hadadoreallyson Keita, baljeetallie gordilloaliyahha, thai coripaloma garrido, sahara giftyin hayaavaughn wongmiryam worley So [facility] [phone].    ATENCIÓN: Si habla español, tiene a singh disposición servicios gratuitos de asistencia lingüística. Llame al [phone].    We comply with applicable federal civil rights laws and [place] laws. We do not discriminate on the basis of race, color, national origin, age, disability, sex, sexual orientation, or gender identity.               Review of your medicines      CONTINUE these medicines which may have CHANGED, or have new prescriptions. If we are uncertain of the size of tablets/capsules you have at home, strength may be listed as something that might have changed.        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)        Dose:  1500 mg   Take 1,500 mg by mouth 2 times daily   Quantity:  60 tablet   Refills:  11       risperiDONE 2 MG tablet   Commonly known as:  risperDAL   This may have changed:    - medication strength  - how much to take   Used for:  Agitation        Dose:  2 mg   Take 1 tablet (2 mg) by mouth 2 times daily   Quantity:  90 tablet   Refills:  0         CONTINUE these medicines which have NOT CHANGED        Dose / Directions    COLACE 100 MG capsule   Generic drug:  docusate sodium        Take by mouth 2 times daily   Refills:  0       diazepam 5 MG/ML (HIGH CONC) solution   Commonly known as:  DIAZEPAM INTENSOL   Used for:  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 WHEN ADMINISTERED NOTIFY PC, RN & PD.   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)        Dose:  200 mg   Take 1 tablet (200 mg) by mouth 2 times daily   Quantity:  60 tablet   Refills:  5       LIQUITEARS 1.4 % ophthalmic solution   Generic drug:  polyvinyl alcohol        Dose:  2-3 drop   2-3 drops as needed for dry eyes   Refills:  0       MULTIVITAMINS PO        Take by mouth daily   Refills:  0       OXcarbazepine 600 MG tablet   Commonly known as:  TRILEPTAL   Used for:  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 1 tablet (50 mg) q 4 pm,  3 tablets (150 mg) 8pm   Quantity:  90 tablet   Refills:  0       VITAMIN E        2 times daily   Refills:  0            Where to get your medicines      These medications were sent to [facility] [place], [place] - [address] [place] [id number]     Phone:  [phone]     levETIRAcetam 1000 MG Tabs    risperiDONE 2 MG tablet                Protect others around you: Learn how to safely use, store and throw away your medicines at www.disposemymeds.org.             Medication List: This is a list of all your medications and when to take them. Check marks below indicate your daily home schedule. Keep this list as a reference.      Medications           Morning Afternoon Evening Bedtime As Needed    COLACE 100 MG capsule   Take by mouth 2 times daily   Last time this was given:  100 mg on 9/26/2018  8:31 AM   Generic drug:  docusate sodium                                 diazepam 5 MG/ML (HIGH CONC) solution   Commonly known as:  DIAZEPAM INTENSOL   (1)ML (5MG) AS NEEDED FOR ANY SEIZURE. WHEN ADMINISTERED NOTIFY PC, RN & PD.                                lacosamide 200 MG Tabs tablet   Commonly known as:  VIMPAT   Take 1 tablet (200 mg) by mouth 2 times daily   Last time this was given:  200 mg on 9/26/2018  8:31 AM                                lamoTRIgine 200 MG tablet   Commonly known as:  LaMICtal   Take 1 tablet (200 mg) by mouth 2 times daily   Last time this was given:  200 mg on 9/26/2018  8:31 AM                                levETIRAcetam 1000 MG Tabs   Take 1,500 mg by mouth 2 times daily   Last time this was given:  1,500 mg on 9/26/2018  8:31 AM                                LIQUITEARS 1.4 % ophthalmic solution   2-3 drops as needed for dry eyes   Generic drug:  polyvinyl alcohol                                MULTIVITAMINS PO   Take by mouth daily                                OXcarbazepine 600 MG tablet   Commonly known as:  TRILEPTAL   Take 1 tablet (600 mg) by mouth 2 times daily   Last time this was given:  600 mg on 9/26/2018  8:31 AM                                risperiDONE 2 MG tablet   Commonly known as:  risperDAL   Take 1 tablet (2 mg) by mouth 2 times daily   Last time this was given:  2 mg on 9/26/2018  8:31 AM                                traZODone 50 MG tablet   Commonly known as:  DESYREL   1 tablet (50 mg) q 8am. 1 tablet (50 mg) q 4 pm,  3 tablets (150 mg) 8pm   Last time this was given:  50 mg on 9/26/2018  8:31 AM                                VITAMIN E   2 times daily

## 2018-09-21 NOTE — IP AVS SNAPSHOT
[address] [address]    Phone:  [phone]                                       After Visit Summary   9/21/2018    Nella Helms    MRN: [id number]           After Visit Summary Signature Page     I have received my discharge instructions, and my questions have been answered. I have discussed any challenges I see with this plan with the nurse or doctor.    ..........................................................................................................................................  Patient/Patient Representative Signature      ..........................................................................................................................................  Patient Representative Print Name and Relationship to Patient    ..................................................               ................................................  Date                                   Time    ..........................................................................................................................................  Reviewed by Signature/Title    ...................................................              ..............................................  Date                                               Time          22EPIC Rev 08/18

## 2018-09-22 ENCOUNTER — APPOINTMENT (OUTPATIENT)
Dept: CT IMAGING | Facility: CLINIC | Age: 43
DRG: 057 | End: 2018-09-22
Attending: HOSPITALIST
Payer: MEDICARE

## 2018-09-22 ENCOUNTER — ALLIED HEALTH/NURSE VISIT (OUTPATIENT)
Dept: NEUROLOGY | Facility: CLINIC | Age: 43
DRG: 057 | End: 2018-09-22
Attending: PSYCHIATRY & NEUROLOGY
Payer: MEDICARE

## 2018-09-22 DIAGNOSIS — G40.919 BREAKTHROUGH SEIZURE (H): Primary | ICD-10-CM

## 2018-09-22 LAB
ALBUMIN SERPL-MCNC: 3.8 G/DL (ref 3.4–5)
ALP SERPL-CCNC: 166 U/L (ref 40–150)
ALT SERPL W P-5'-P-CCNC: 38 U/L (ref 0–50)
ANION GAP SERPL CALCULATED.3IONS-SCNC: 8 MMOL/L (ref 3–14)
AST SERPL W P-5'-P-CCNC: 21 U/L (ref 0–45)
BASOPHILS # BLD AUTO: 0 10E9/L (ref 0–0.2)
BASOPHILS NFR BLD AUTO: 0.1 %
BILIRUB SERPL-MCNC: 0.2 MG/DL (ref 0.2–1.3)
BUN SERPL-MCNC: 7 MG/DL (ref 7–30)
CALCIUM SERPL-MCNC: 9.4 MG/DL (ref 8.5–10.1)
CHLORIDE SERPL-SCNC: 96 MMOL/L (ref 94–109)
CO2 SERPL-SCNC: 28 MMOL/L (ref 20–32)
CREAT SERPL-MCNC: 0.43 MG/DL (ref 0.52–1.04)
DIFFERENTIAL METHOD BLD: ABNORMAL
EOSINOPHIL # BLD AUTO: 0.1 10E9/L (ref 0–0.7)
EOSINOPHIL NFR BLD AUTO: 1.2 %
ERYTHROCYTE [DISTWIDTH] IN BLOOD BY AUTOMATED COUNT: 14 % (ref 10–15)
GFR SERPL CREATININE-BSD FRML MDRD: >90 ML/MIN/1.7M2
GLUCOSE SERPL-MCNC: 103 MG/DL (ref 70–99)
HCT VFR BLD AUTO: 36.4 % (ref 35–47)
HGB BLD-MCNC: 12.7 G/DL (ref 11.7–15.7)
IMM GRANULOCYTES # BLD: 0 10E9/L (ref 0–0.4)
IMM GRANULOCYTES NFR BLD: 0 %
LACTATE BLD-SCNC: 0.7 MMOL/L (ref 0.7–2)
LACTATE BLD-SCNC: 2.7 MMOL/L (ref 0.7–2)
LYMPHOCYTES # BLD AUTO: 2.4 10E9/L (ref 0.8–5.3)
LYMPHOCYTES NFR BLD AUTO: 35.6 %
MCH RBC QN AUTO: 32.7 PG (ref 26.5–33)
MCHC RBC AUTO-ENTMCNC: 34.9 G/DL (ref 31.5–36.5)
MCV RBC AUTO: 94 FL (ref 78–100)
MONOCYTES # BLD AUTO: 0.5 10E9/L (ref 0–1.3)
MONOCYTES NFR BLD AUTO: 7.3 %
NEUTROPHILS # BLD AUTO: 3.8 10E9/L (ref 1.6–8.3)
NEUTROPHILS NFR BLD AUTO: 55.8 %
NRBC # BLD AUTO: 0 10*3/UL
NRBC BLD AUTO-RTO: 0 /100
PLATELET # BLD AUTO: 112 10E9/L (ref 150–450)
POTASSIUM SERPL-SCNC: 4.2 MMOL/L (ref 3.4–5.3)
PROT SERPL-MCNC: 7.8 G/DL (ref 6.8–8.8)
RBC # BLD AUTO: 3.88 10E12/L (ref 3.8–5.2)
SODIUM SERPL-SCNC: 132 MMOL/L (ref 133–144)
TSH SERPL DL<=0.005 MIU/L-ACNC: 3.83 MU/L (ref 0.4–4)
WBC # BLD AUTO: 6.8 10E9/L (ref 4–11)

## 2018-09-22 PROCEDURE — 36415 COLL VENOUS BLD VENIPUNCTURE: CPT | Performed by: INTERNAL MEDICINE

## 2018-09-22 PROCEDURE — A9270 NON-COVERED ITEM OR SERVICE: HCPCS | Mod: GY | Performed by: STUDENT IN AN ORGANIZED HEALTH CARE EDUCATION/TRAINING PROGRAM

## 2018-09-22 PROCEDURE — 83605 ASSAY OF LACTIC ACID: CPT | Performed by: HOSPITALIST

## 2018-09-22 PROCEDURE — A9270 NON-COVERED ITEM OR SERVICE: HCPCS | Mod: GY | Performed by: HOSPITALIST

## 2018-09-22 PROCEDURE — 80053 COMPREHEN METABOLIC PANEL: CPT | Performed by: INTERNAL MEDICINE

## 2018-09-22 PROCEDURE — 25000132 ZZH RX MED GY IP 250 OP 250 PS 637: Mod: GY | Performed by: STUDENT IN AN ORGANIZED HEALTH CARE EDUCATION/TRAINING PROGRAM

## 2018-09-22 PROCEDURE — 95951 ZZHC EEG VIDEO < 12 HR: CPT | Mod: 52,ZF

## 2018-09-22 PROCEDURE — 70450 CT HEAD/BRAIN W/O DYE: CPT

## 2018-09-22 PROCEDURE — 40000556 ZZH STATISTIC PERIPHERAL IV START W US GUIDANCE

## 2018-09-22 PROCEDURE — 25000128 H RX IP 250 OP 636: Performed by: INTERNAL MEDICINE

## 2018-09-22 PROCEDURE — 12000001 ZZH R&B MED SURG/OB UMMC

## 2018-09-22 PROCEDURE — 84443 ASSAY THYROID STIM HORMONE: CPT | Performed by: HOSPITALIST

## 2018-09-22 PROCEDURE — 25000132 ZZH RX MED GY IP 250 OP 250 PS 637: Mod: GY | Performed by: HOSPITALIST

## 2018-09-22 PROCEDURE — 36415 COLL VENOUS BLD VENIPUNCTURE: CPT | Performed by: HOSPITALIST

## 2018-09-22 PROCEDURE — 85025 COMPLETE CBC W/AUTO DIFF WBC: CPT | Performed by: INTERNAL MEDICINE

## 2018-09-22 PROCEDURE — 25000128 H RX IP 250 OP 636: Performed by: HOSPITALIST

## 2018-09-22 PROCEDURE — 99232 SBSQ HOSP IP/OBS MODERATE 35: CPT | Performed by: INTERNAL MEDICINE

## 2018-09-22 RX ORDER — LORAZEPAM 2 MG/ML
4 INJECTION INTRAMUSCULAR
Status: DISCONTINUED | OUTPATIENT
Start: 2018-09-22 | End: 2018-09-26 | Stop reason: HOSPADM

## 2018-09-22 RX ORDER — LEVETIRACETAM 500 MG/1
1000 TABLET ORAL 2 TIMES DAILY
Status: DISCONTINUED | OUTPATIENT
Start: 2018-09-22 | End: 2018-09-22

## 2018-09-22 RX ORDER — LAMOTRIGINE 100 MG/1
200 TABLET ORAL 2 TIMES DAILY
Status: DISCONTINUED | OUTPATIENT
Start: 2018-09-22 | End: 2018-09-26 | Stop reason: HOSPADM

## 2018-09-22 RX ORDER — DIAZEPAM 10 MG/2ML
5 INJECTION, SOLUTION INTRAMUSCULAR; INTRAVENOUS
Status: DISCONTINUED | OUTPATIENT
Start: 2018-09-22 | End: 2018-09-22

## 2018-09-22 RX ORDER — DOCUSATE SODIUM 100 MG/1
100 CAPSULE, LIQUID FILLED ORAL 2 TIMES DAILY
Status: DISCONTINUED | OUTPATIENT
Start: 2018-09-22 | End: 2018-09-26 | Stop reason: HOSPADM

## 2018-09-22 RX ORDER — LEVETIRACETAM 500 MG/1
1500 TABLET ORAL 2 TIMES DAILY
Status: DISCONTINUED | OUTPATIENT
Start: 2018-09-22 | End: 2018-09-26 | Stop reason: HOSPADM

## 2018-09-22 RX ORDER — TRAZODONE HYDROCHLORIDE 150 MG/1
150 TABLET ORAL AT BEDTIME
Status: DISCONTINUED | OUTPATIENT
Start: 2018-09-22 | End: 2018-09-26 | Stop reason: HOSPADM

## 2018-09-22 RX ORDER — ONDANSETRON 4 MG/1
4 TABLET, ORALLY DISINTEGRATING ORAL EVERY 6 HOURS PRN
Status: DISCONTINUED | OUTPATIENT
Start: 2018-09-22 | End: 2018-09-26 | Stop reason: HOSPADM

## 2018-09-22 RX ORDER — LACOSAMIDE 200 MG/1
200 TABLET ORAL 2 TIMES DAILY
Status: DISCONTINUED | OUTPATIENT
Start: 2018-09-22 | End: 2018-09-26 | Stop reason: HOSPADM

## 2018-09-22 RX ORDER — ACETAMINOPHEN 325 MG/1
650 TABLET ORAL EVERY 4 HOURS PRN
Status: DISCONTINUED | OUTPATIENT
Start: 2018-09-22 | End: 2018-09-26 | Stop reason: HOSPADM

## 2018-09-22 RX ORDER — ONDANSETRON 2 MG/ML
4 INJECTION INTRAMUSCULAR; INTRAVENOUS EVERY 6 HOURS PRN
Status: DISCONTINUED | OUTPATIENT
Start: 2018-09-22 | End: 2018-09-26 | Stop reason: HOSPADM

## 2018-09-22 RX ORDER — LEVETIRACETAM 500 MG/1
500 TABLET ORAL ONCE
Status: COMPLETED | OUTPATIENT
Start: 2018-09-22 | End: 2018-09-22

## 2018-09-22 RX ORDER — NALOXONE HYDROCHLORIDE 0.4 MG/ML
.1-.4 INJECTION, SOLUTION INTRAMUSCULAR; INTRAVENOUS; SUBCUTANEOUS
Status: DISCONTINUED | OUTPATIENT
Start: 2018-09-22 | End: 2018-09-26 | Stop reason: HOSPADM

## 2018-09-22 RX ORDER — TETRAHYDROZOLINE HCL 0.05 %
2 DROPS OPHTHALMIC (EYE) DAILY PRN
Status: DISCONTINUED | OUTPATIENT
Start: 2018-09-22 | End: 2018-09-26 | Stop reason: HOSPADM

## 2018-09-22 RX ORDER — TRAZODONE HYDROCHLORIDE 50 MG/1
50 TABLET, FILM COATED ORAL 2 TIMES DAILY
Status: DISCONTINUED | OUTPATIENT
Start: 2018-09-22 | End: 2018-09-26 | Stop reason: HOSPADM

## 2018-09-22 RX ORDER — LORAZEPAM 2 MG/ML
2 INJECTION INTRAMUSCULAR ONCE
Status: COMPLETED | OUTPATIENT
Start: 2018-09-22 | End: 2018-09-22

## 2018-09-22 RX ORDER — RISPERIDONE 3 MG/1
3 TABLET ORAL 2 TIMES DAILY
Status: DISCONTINUED | OUTPATIENT
Start: 2018-09-22 | End: 2018-09-23

## 2018-09-22 RX ORDER — OXCARBAZEPINE 600 MG/1
600 TABLET, FILM COATED ORAL 2 TIMES DAILY
Status: DISCONTINUED | OUTPATIENT
Start: 2018-09-22 | End: 2018-09-26 | Stop reason: HOSPADM

## 2018-09-22 RX ADMIN — DOCUSATE SODIUM 100 MG: 100 CAPSULE, LIQUID FILLED ORAL at 08:22

## 2018-09-22 RX ADMIN — LAMOTRIGINE 200 MG: 100 TABLET ORAL at 08:22

## 2018-09-22 RX ADMIN — LACOSAMIDE 200 MG: 200 TABLET, FILM COATED ORAL at 08:22

## 2018-09-22 RX ADMIN — LORAZEPAM 2 MG: 2 INJECTION INTRAMUSCULAR; INTRAVENOUS at 15:34

## 2018-09-22 RX ADMIN — RISPERIDONE 3 MG: 3 TABLET ORAL at 20:19

## 2018-09-22 RX ADMIN — TRAZODONE HYDROCHLORIDE 150 MG: 150 TABLET ORAL at 20:19

## 2018-09-22 RX ADMIN — DOCUSATE SODIUM 100 MG: 100 CAPSULE, LIQUID FILLED ORAL at 20:18

## 2018-09-22 RX ADMIN — LAMOTRIGINE 200 MG: 100 TABLET ORAL at 20:18

## 2018-09-22 RX ADMIN — LEVETIRACETAM 500 MG: 500 TABLET ORAL at 14:13

## 2018-09-22 RX ADMIN — OXCARBAZEPINE 600 MG: 600 TABLET, FILM COATED ORAL at 20:18

## 2018-09-22 RX ADMIN — LACOSAMIDE 200 MG: 200 TABLET, FILM COATED ORAL at 20:18

## 2018-09-22 RX ADMIN — RISPERIDONE 3 MG: 3 TABLET ORAL at 08:21

## 2018-09-22 RX ADMIN — TRAZODONE HYDROCHLORIDE 50 MG: 50 TABLET ORAL at 15:35

## 2018-09-22 RX ADMIN — OXCARBAZEPINE 600 MG: 600 TABLET, FILM COATED ORAL at 08:22

## 2018-09-22 RX ADMIN — LEVETIRACETAM 1500 MG: 500 TABLET, FILM COATED ORAL at 20:18

## 2018-09-22 RX ADMIN — DIAZEPAM 5 MG: 5 INJECTION, SOLUTION INTRAMUSCULAR; INTRAVENOUS at 05:15

## 2018-09-22 RX ADMIN — DIAZEPAM 5 MG: 5 INJECTION, SOLUTION INTRAMUSCULAR; INTRAVENOUS at 00:19

## 2018-09-22 RX ADMIN — TRAZODONE HYDROCHLORIDE 50 MG: 50 TABLET ORAL at 08:21

## 2018-09-22 RX ADMIN — LEVETIRACETAM 1000 MG: 500 TABLET ORAL at 08:22

## 2018-09-22 ASSESSMENT — ACTIVITIES OF DAILY LIVING (ADL)
ADLS_ACUITY_SCORE: 19
ADLS_ACUITY_SCORE: 15
ADLS_ACUITY_SCORE: 19
ADLS_ACUITY_SCORE: 19
ADLS_ACUITY_SCORE: 20

## 2018-09-22 NOTE — PLAN OF CARE
Problem: Patient Care Overview  Goal: Plan of Care/Patient Progress Review  Pt has been alert but oriented to name only, and occasionally oriented to place. Pt tended to want people in her room, screamed out when left alone. Denied any pain all day, tolerated regular diet well, took pills well in apple sauce. Pt triggered sepsis protocol before change of day shift and Lactate 2.7 at the time, value dropped to 0.7 at recheck. EEG monitoring started this afternoon and pt was sent to have head CT scan done after 2 mg of Ativan given. Currently sleeping with brother sitting at the bedside.

## 2018-09-22 NOTE — PROGRESS NOTES
X-Cover    Patient had another seizure. Diazepam given. In CT scan now, not cooperating likely secondary to being post-ictal, was more compliant prior to seizure.  - Transfer to

## 2018-09-22 NOTE — PROGRESS NOTES
[facility] [place]    Sepsis Evaluation Progress Note    Date of Service: 09/22/2018    I was called to see Nella Helms due to abnormal vital signs triggering the Sepsis SIRS screening alert. She is not known to have an infection.     Physical Exam    Vital Signs:  Temp: 95.5  F (35.3  C) Temp src: Axillary BP: 112/43 Pulse: 104   Resp: 18 SpO2: 93 % O2 Device: None (Room air)      Lab:  Lactic Acid   Date Value Ref Range Status   09/22/2018 0.7 0.7 - 2.0 mmol/L Final     Lactate for Sepsis Protocol   Date Value Ref Range Status   09/22/2018 2.7 (H) 0.7 - 2.0 mmol/L Final     Comment:     Significant value called to and read back by  SANDY ANDRES ON 09/22/18 AT 0634 BY TG          The patient is at baseline mental status per report, history of DD, seizure disorder.     The rest of their physical exam is significant for developmental delay, contractures, but answering most questions appropriately though portions unintelligible.    Assessment and Plan    The SIRS and exam findings are likely due to seizure activity, there is no sign of sepsis at this time.    Disposition: The patient will be transferred to  given ongoing seizures, lactate rechecked and normalized.    Keerthi Arvizu MD

## 2018-09-22 NOTE — CONSULTS
[facility]  Neurology Consultation    Patient Name:  Nella Helms  MRN:  [id number]    :  1975  Date of Service:  2018  Primary care provider:  Fritsch, Bryan      Neurology consultation service was asked to see Nella Helms by Dr. Aguila to evaluate szs, falls, weakness.    History of Present Illness:   42 year old female h/o ITP, cognitive delay, possible adrenal insufficiency related to recent steroid use who presented to outside ED w/ weakness/falls and possible seizure. Patient unable to provide history besides she fell and her back hurts.    Presented to outside ED w/ weakness, transferred to [facility] because her care  is established here. Reportedly fell today + requires help ambulating - buckling + giving away. Had Reported sz lasting ~1min upon arrival to ED, self-resolved, given valium; had another seizure  Around 5am. Previous PT eval on  showed that pt was able to ambulate 2 x 200' w/ hand hold assist/CGA on gait belt; able to complete x 3 stairs w/ CGA, one hand rail + cues for stepping. Patient has had multiple presentations over at least the past year for weakness + falls; Has had many medication changes over the past summer to help w/ this (risperdal decreased, trazodone decreased, ); reportedly rarely having seizures (per  appt). Also reportedly after having aggression toward others and attention seeking behavior.      Previously admitted from 2/15- w/ low platelets +  thrombocytopenia thought to be 2/2 AED, started on steroids w/ taper. Recently admitted from - due to hypotension, hypothermia, hyponatremia thought to be 2/2 adrenal insufficiency. This presentation,  no hypotension or hypothermia, mild hyponatremia at OSH.  Unclear seizure frequency, per last Epilepsy note, patient has had 204 seizures over the past year.    Seizure types, per Dr. Gomez   In  seizure types were described as sitting, both  arms jerk up, her head turns to the right, eyes go to the right and roll up.  These events last 2 minutes and at that time she was having 6 a month.       Second seizure type is described as her arms go up, legs stiffen and she moans.  She can be lowered to the floor and then her arms and legs shake with some erratic breathing and sweating profusely.  These occurred at that time, 3-4 per month.         Seizure type 3 was described as being alert.  Her arms fly out and her eyes blink.       Seizure type 4 described as stares    1 episode of status 1978    ROS  Unable to assess.    [facility]  Past Medical History:   Diagnosis Date     Cortical blindness      Mental retardation      Strabismus      No past surgical history on file.    Medications   Prescriptions Prior to Admission   Medication Sig Dispense Refill Last Dose     diazepam (DIAZEPAM INTENSOL) 5 MG/ML (HIGH CONC) solution (1)ML (5MG) AS NEEDED FOR ANY SEIZURE. WHEN ADMINISTERED NOTIFY PC, RN & PD. 120 mL 3 Unknown at  time     docusate sodium (COLACE) 100 MG capsule Take by mouth 2 times daily   6/12/2018 at Unknown time     lacosamide (VIMPAT) 200 MG TABS tablet Take 1 tablet (200 mg) by mouth 2 times daily 60 tablet 5 6/12/2018 at Unknown time     lamoTRIgine (LAMICTAL) 200 MG tablet Take 1 tablet (200 mg) by mouth 2 times daily 60 tablet 5      levETIRAcetam 1000 MG TABS Take 1,000 mg by mouth 2 times daily 60 tablet 11 6/12/2018 at Unknown time     Multiple Vitamin (MULTIVITAMINS PO) Take by mouth daily   6/12/2018 at Unknown time     OXcarbazepine (TRILEPTAL) 600 MG tablet Take 1 tablet (600 mg) by mouth 2 times daily 60 tablet 11 6/12/2018 at Unknown time     risperiDONE (RISPERDAL) 3 MG tablet Take 1 tablet (3 mg) by mouth 2 times daily 60 tablet 0      Tetrahydrozoline-Zn Sulfate (EYE DROPS AR OP) Apply 2 drops to eye daily as needed   6/12/2018 at Unknown time     traZODone (DESYREL) 50 MG tablet 1 tablet (50 mg) q 8am. 1 tablet (50 mg) q 4 pm,  3  tablets (150 mg) 8pm 90 tablet 0      VITAMIN E 2 times daily   6/12/2018 at Unknown time       Allergies  Allergies   Allergen Reactions     Cefzil [Cefprozil]      Codeine Phosphate      Dilantin [Phenytoin]      Penicillins        Social History  Social History   Substance Use Topics     Smoking status: Never Smoker     Smokeless tobacco: Never Used     Alcohol use No       Family History    No family history on file.      Physical Examination   Vitals: /43  Pulse 104  Temp 95.5  F (35.3  C) (Axillary)  Resp 18  Wt 72.6 kg (160 lb 1.6 oz)  SpO2 93%  BMI 30.25 kg/m2  General: Adult, NAD, waxing/waning, diminished attention, slumped over in bed  HEENT: NC/AT, no scleral icterus, op pink and moist, drooling intermittently  Chest: breathing unlabored  Extremities: No LE swelling.  Skin: No rash or lesion.   Psych: flat, limited expression  Neuro: limited due to participation  Mental status: intermittently somnolent but arousable. Answers simple questions (able to say name, say that she's in pain in her back), follows limited one step commands. Speech slowed, slurred.    Cranial nerves: Eyes disconjugate (R eye abd at rest), unable to bury R sclera on L-eason gaze; marginally asymmetric pupils (R>L),  face asymmetric (mild L nasolabial fold flattening), facial sensation intact, shoulder shrug strong, tongue mildly deviated/pointing to L.   Motor: Increased rigidity in BUE R>L, Unable to assess strength due to participation but moves BUE spontaneous. No pronator drift. Significantly Decreased FFM b/l  Reflexes: brisk throughout w/o gross asymmetry.  Sensory: Unable to fully attest. Does not w/d to noxious stimuli.   Coordination: Unable to assess, does FNF w/ fist w/o gross dysmetria  Gait: deferred.    Investigations   CBC - wnl  BMP - wnl  UA - needs to be drawn  TSH - wnl    CTH - No acute intracranial pathology. Stable appearing enlargement of posterior horns of lateral ventricles bilaterally w  associated biparietal lobe atrophy.    EEG - This EEG is abnormal due to the presences of  Intermittent genneralized polymorphic delta/theta slowing with a somewhat disorganized background. There is a  7-8 hz symmetric parieto-occipital rhythm. There are atypical generalized epileptiform discharges every 5-10 seconds, these epileptiform discharges are maximum in the bifrontal region, 3-5 hz, at times may be polyspike discharges.  No  electrographic seizures were seen in this record. If events of interest are noted, please press the event button and complete behavioral testing (ROAR testing) if possible. Clinical correlation is advised.       Impression  41yo F w/ pmh of epilepsy, thrombocytopenia, cognitive delay who presents w/ report of breakthrough seizures and weakness/falls. Etiology likely multifactorial, related to recent steroid use, hospitalizations c/b deconditioning, refractory/breakthrough seizures, AED toxicity, in the setting of low baseline functional status.     Recommendations  -vEEG to characterize/quantify spells  - Increase Keppra from 1000mg BID to 1500mg BID  - Continue PTA Lacosamide 200mg BID  - Continue PTA lamotrigine 200mg BID  - Continue PTA Trileptal 600mg BID  - Trough levels of AEDs  - Evaluate for other etiologies of intermittent/variable weakness/falls 2/2 to infection or metabolic derangement (e.g. UA, CK, telemetry)  - If patient's falls appear to be less related to seizures and more tied to somnolence/fatigue, consider further titration of patient's other sedating medications  - PT / OT when able    Thank you for involving neurology in the care of Nella Helms.  Please do not hesitate to call with questions/concerns.      Patient was seen and discussed with Dr. Balderas.    Rajan Campuzano MD  Neurology PGY2  [phone]

## 2018-09-22 NOTE — PROVIDER NOTIFICATION
09/21/18 2300   Call Information   Date of Call 09/21/18   Time of Call 2341   Name of person requesting the team Liane   Title of person requesting team RN   RRT Arrival time 2344   Time RRT ended 0110   Reason for call   Type of RRT Adult   Primary reason for call Neurological   Neurological Seizure   Was patient transferred from the ED, ICU, or PACU within last 24 hours prior to RRT call? Yes   SBAR   Situation Patient had a seizure   Background admitted for [facility] Seizures, fall with injuries.   Notable History/Conditions Neurological;Seizures  (Epilepsy, Thrombocytopenia, CognitiveDelay, Blind, Strabismu)   Assessment having a seizure    Interventions Meds;Portable monitor;Suction   Patient Outcome   Patient Outcome Stabilized on unit   RRT Team   Physician(s) Melanie Aguila MD   Lead RN Rubi Staley RN   RN Liane Liriano RN   RT Moises Baez RT   Post RRT Intervention Assessment   Post RRT Assessment Other (see comment)  (Patient had another seizure ~ 0500.)   Date Follow Up Done 09/22/18   Time Follow Up Done 0520   Comments attempted a CT scan but unable to keep Patient flat on her back.

## 2018-09-22 NOTE — MR AVS SNAPSHOT
After Visit Summary   9/22/2018    Nella Helms    MRN: [id number]           Patient Information     Date Of Birth          1975        Visit Information        Provider Department      9/22/2018 2:00 PM [facility] EEG TECH 4 [facility]        Today's Diagnoses     Breakthrough seizure (H)    -  1       Follow-ups after your visit        Your next 10 appointments already scheduled     Sep 23, 2018  7:00 AM CDT   24 Hour Video Visit with [facility] EEG TECH 4   [facility] ([facility])    [facility]  [address] [address]   [phone]           [place]: Your appointment is scheduled at [facility]. [address] [place] [address]            Dec 18, 2018  2:30 PM CST   Return Visit with Elysia Gomez MD   [facility] ([facility])    [address], [address]  [facility] [id number] [phone]              Future tests that were ordered for you today     Open Standing Orders        Priority Remaining Interval Expires Ordered    Magnesium Routine 1/1 AM DRAW  9/22/2018    Activity: Up with assist Routine [id number] PRN  9/22/2018    Hepatic panel Routine 1/1 AM DRAW  9/22/2018    Magnesium Routine 1/1 AM DRAW  9/22/2018            Who to contact     Please call your clinic at [phone] to:    Ask questions about your health    Make or cancel appointments    Discuss your medicines    Learn about your test results    Speak to your doctor            Additional Information About Your Visit        MyChart Information     [employer] is an electronic gateway that provides easy, online access to your medical records. With [employer] technology, you can request a clinic appointment, read your test results, renew a prescription or communicate with your care team.     To sign up for [employer] visit the website at [email or web address]   You will be asked to enter the access code listed below, as well  as some personal information. Please follow the directions to create your username and password.     Your access code is: [email or web address]  Expires: 2018  3:54 PM     Your access code will  in 90 days. If you need help or a new code, please contact your [facility] or call [phone] for assistance.        Care EveryWhere ID     This is your Care EveryWhere ID. This could be used by other organizations to access your [facility] medical records  [id number]         Blood Pressure from Last 3 Encounters:   18 114/74   18 138/71   18 (!) 137/91    Weight from Last 3 Encounters:   18 72.6 kg (160 lb 1.6 oz)   18 71.6 kg (157 lb 14.4 oz)   18 71.2 kg (157 lb)              Today, you had the following     No orders found for display         Today's Medication Changes      Notice     This visit is during an admission. Changes to the med list made in this visit will be reflected in the After Visit Summary of the admission.             Primary Care Provider Office Phone # Fax #    Bryan Fritsch [phone]       [address] [address]        Equal Access to Services     KATHRYN BROCK : Hadii arslan ku hadasho Soomaali, waaxda luqadaha, qaybta kaalmada adeegyada, waxay idiin hayfernandezn osvaldo sparks . So [facility] [phone].    ATENCIÓN: Si habla español, tiene a singh disposición servicios gratuitos de asistencia lingüística. Llame al [phone].    We comply with applicable federal civil rights laws and [place] laws. We do not discriminate on the basis of race, color, national origin, age, disability, sex, sexual orientation, or gender identity.            Thank you!     Thank you for choosing [facility]  for your care. Our goal is always to provide you with excellent care. Hearing back from our patients is one way we can continue to improve our services. Please take a few minutes to complete the  written survey that you may receive in the mail after your visit with us. Thank you!             Your Updated Medication List - Protect others around you: Learn how to safely use, store and throw away your medicines at www.disposemymeds.org.      Notice     This visit is during an admission. Changes to the med list made in this visit will be reflected in the After Visit Summary of the admission.

## 2018-09-22 NOTE — PLAN OF CARE
Problem: Patient Care Overview  Goal: Plan of Care/Patient Progress Review  Lactate 2.7 this am. Medicine team was paged to come assess pt. Awaiting response.

## 2018-09-22 NOTE — PLAN OF CARE
Problem: Patient Care Overview  Goal: Plan of Care/Patient Progress Review  Outcome: No Change  Reason for Admission: Syncope and weakness  PMHx: Developmental delay, hypotension, hypothermia, epilepsy    Vitals: Tachycardic with seizures up to 160s, otherwise stable in 90s. On RA. Soft BPS, however, pt tends to lay on side.   Activity: At baseline up SBA. Pt unsteady on feet this shift.   Pain: Unable to assess. Pt appears comfortable at this time.   Neuro: Developmental delay. Pt does not say much. Visually tracking. PERRLA. Tonic clonic seizure x2 this shift. 1st seizure occurred at 2340 and lasted 3 minutes. IV diazepam administered. Second seizure occurred at 0500 and lasted approximately 4 minutes. IV diazepam administered. MD notified. Attempted to bring pt to CT, but unable to complete, as pt uncooperative 2/2 being post ictal.   Cardiac: Tachycardic with seizures. Tele NSR, but sinus tachy with seizures.   Respiratory: Stable on RA.   GI/: Last BM 9/21. Incontinent of urine during seizure.   Diet: Bedside swallow eval completed. Pt able to swallow without difficulty or any evidence of aspiration. Regular diet.   Skin: No skin issues noted ex bruise on LLE.   LDAs: R PIV SL.     Pt triggered sepsis protocol this shift, lactic 2.7, MD notified. MD came to see pt. Elevated LA likely d/t seizure activity. Repeat lactic ordered.     Plan: Transfer to . Update given to pt's brother/guardian.

## 2018-09-22 NOTE — H&P
[facility] [place]    Internal Medicine History and Physical - Gold Service       Date of Admission:  9/21/2018    Assessment & Plan   Nella Helms is a 42 year old female  admitted on 9/21/2018. She has a history of seizure, developmental delay and is admitted for falls.    Frequent falls  Unclear etiology at this time.  Patient has prior history of stroke.  Does have a number of medications that make her prone to fall.  Lamotrigine level is slightly low rest of her anti-seizure medications are within therapeutic level.  Electrolytes are satisfactory CBC not pointing to words infectious etiology.  Continue antiepileptic medications as home  Check CT head, TSH, Mg  IV Valium for breakthrough through seizures  Neurology input      Diet: Combination Diet Regular Diet Adult  Fluids: PO  Reyes Catheter: not present    DVT Prophylaxis: Ambulate every shift  Code Status: Full Code    Disposition Plan   Expected discharge: 2 - 3 days, recommended to prior living arrangement once Improvement in falls..     Entered: Melanie Aguila MD 09/22/2018, 2:17 AM   Information in the above section will display in the discharge planner report.      The patient's care was discussed with the Bedside Nurse.    Melanie Aguila MD  Internal Medicine Staff Hospitalist Service  [facility]  Pager: [phone]  Please see sticky note for cross cover information  ______________________________________________________________________    Chief Complaint   Frequent falls    Unable to obtain a history from the patient due to mental status    History of Present Illness   Nella Helms is a 42 year old female  who has a history of seizure, developmental delay and is admitted for falls    42-year-old female with history of developmental delay and difficult to control seizure is resident of group home, presented with frequent fallsl.  On arrival patient had breakthrough seizure witnessed by the  nursing staff lasting about a minute or so abated on its own. She was given Valium IV 5 mg ×1 afterwards.  History is limited to chart review as patient is not contributing much to history likely secondary to post-ictal state. On review of systems her answers are yes or no, not sure if she is comprehending questions.  She was accompanied by care taker from group home at outside ER according to caretaker count patient had multiple admissions for lower extremity weakness and falls without clear diagnosis.  Recently her psych medications were decreased in anticipation of with the thought of possible contribution to falls.  Patient walks well at baseline but lately while walking her leg next to her knee buckled up and let us give away and she falls.  No seizure activity or injury was reported per her caretaker.  No other acute illness noted.  Outside labs; you a negative for UTI.  Sodium 130 potassium 4.1 chloride 90 bicarbonate 29 glucose 94 calcium 9.4 BUN 6 creatinine 0.4  WBC 3.7 hemoglobin 11.6 platelet 106 47% neutrophils.  Patient was transferred to [facility] because of her prior workup and neurology evaluation available at our facility.  Review of system was performed 12 system review was negative with the limitation stated as above.      Review of Systems   The 10 point Review of Systems is negative other than noted in the HPI or here.     Past Medical History    I have reviewed this patient's medical history and updated it with pertinent information if needed.   Past Medical History:   Diagnosis Date     Cortical blindness      Mental retardation      Strabismus         Past Surgical History   I have reviewed this patient's surgical history and updated it with pertinent information if needed.  No past surgical history on file.     Social History   Social History   Substance Use Topics     Smoking status: Never Smoker     Smokeless tobacco: Never Used     Alcohol use No       Family History   I  have reviewed this patient's family history and updated it with pertinent information if needed.   No family history on file.    Prior to Admission Medications   Prior to Admission Medications   Prescriptions Last Dose Informant Patient Reported? Taking?   Multiple Vitamin (MULTIVITAMINS PO)   Yes No   Sig: Take by mouth daily   OXcarbazepine (TRILEPTAL) 600 MG tablet   No No   Sig: Take 1 tablet (600 mg) by mouth 2 times daily   Tetrahydrozoline-Zn Sulfate (EYE DROPS AR OP)   Yes No   Sig: Apply 2 drops to eye daily as needed   VITAMIN E   Yes No   Si times daily   diazepam (DIAZEPAM INTENSOL) 5 MG/ML (HIGH CONC) solution   No No   Sig: (1)ML (5MG) AS NEEDED FOR ANY SEIZURE. WHEN ADMINISTERED NOTIFY PC, RN & PD.   docusate sodium (COLACE) 100 MG capsule   Yes No   Sig: Take by mouth 2 times daily   lacosamide (VIMPAT) 200 MG TABS tablet   No No   Sig: Take 1 tablet (200 mg) by mouth 2 times daily   lamoTRIgine (LAMICTAL) 200 MG tablet   No No   Sig: Take 1 tablet (200 mg) by mouth 2 times daily   levETIRAcetam 1000 MG TABS   No No   Sig: Take 1,000 mg by mouth 2 times daily   risperiDONE (RISPERDAL) 3 MG tablet   No No   Sig: Take 1 tablet (3 mg) by mouth 2 times daily   traZODone (DESYREL) 50 MG tablet   No No   Si tablet (50 mg) q 8am. 1 tablet (50 mg) q 4 pm,  3 tablets (150 mg) 8pm      Facility-Administered Medications: None     Allergies   Allergies   Allergen Reactions     Cefzil [Cefprozil]      Codeine Phosphate      Dilantin [Phenytoin]      Penicillins        Physical Exam   Vital Signs: Temp: 95.6  F (35.3  C) Temp src: Axillary BP: 175/70 Pulse: 128   Resp: 16 SpO2: 97 % O2 Device: None (Room air)    Weight: 160 lbs 1.6 oz    General Appearance: NAD, crawled, avoiding eye contact  Eyes: PERRLA  HEENT: ATNC  Respiratory: CTA B/L, no crackles or wheezing  Cardiovascular: S1, S2, no gallop  GI: Soft, NT, ND, Bowel sounds normal in all quadrants  Lymph/Hematologic: Normal  Genitourinary:  Normal  Skin: No rashes  Musculoskeletal: Normal  Neurologic: Gross motor normal  Psychiatric: Avoiding eye contact, simple yes and no answer    Data   Data reviewed today: I reviewed all medications, new labs and imaging results over the last 24 hours. I personally reviewed no images or EKG's today.    Data   No lab results found in last 7 days. Outside labs as stated above.

## 2018-09-22 NOTE — PROGRESS NOTES
Preliminary Video EEG impression on September 22, 2018  for the first 60 minutes.  Full report to follow. Please look in inpatient chart, under procedure section.       This EEG is abnormal due to the presences of  Intermittent genneralized polymorphic delta/theta slowing with a somewhat disorganized background. There is a  7-8 hz symmetric parieto-occipital rhythm. There are atypical generalized epileptiform discharges every 5-10 seconds, these epileptiform discharges are maximum in the bifrontal region, 3-5 hz, at times may be polyspike discharges.  No  electrographic seizures were seen in this record. If events of interest are noted, please press the event button and complete behavioral testing (ROAR testing) if possible. Clinical correlation is advised.     Melida Barnes MD  Staff Epilepsy Neurologist   [phone]

## 2018-09-23 ENCOUNTER — ALLIED HEALTH/NURSE VISIT (OUTPATIENT)
Dept: NEUROLOGY | Facility: CLINIC | Age: 43
DRG: 057 | End: 2018-09-23
Attending: PSYCHIATRY & NEUROLOGY
Payer: MEDICARE

## 2018-09-23 ENCOUNTER — APPOINTMENT (OUTPATIENT)
Dept: PHYSICAL THERAPY | Facility: CLINIC | Age: 43
DRG: 057 | End: 2018-09-23
Attending: INTERNAL MEDICINE
Payer: MEDICARE

## 2018-09-23 DIAGNOSIS — G40.319 GENERALIZED CONVULSIVE EPILEPSY WITH INTRACTABLE EPILEPSY (H): Primary | Chronic | ICD-10-CM

## 2018-09-23 LAB
10OH-CARBAZEPINE SERPL-MCNC: NORMAL UG/ML
ALBUMIN SERPL-MCNC: 3.6 G/DL (ref 3.4–5)
ALBUMIN UR-MCNC: 10 MG/DL
ALP SERPL-CCNC: 154 U/L (ref 40–150)
ALT SERPL W P-5'-P-CCNC: 34 U/L (ref 0–50)
AMORPH CRY #/AREA URNS HPF: ABNORMAL /HPF
ANION GAP SERPL CALCULATED.3IONS-SCNC: 7 MMOL/L (ref 3–14)
APPEARANCE UR: ABNORMAL
AST SERPL W P-5'-P-CCNC: 21 U/L (ref 0–45)
BACTERIA #/AREA URNS HPF: ABNORMAL /HPF
BILIRUB DIRECT SERPL-MCNC: <0.1 MG/DL (ref 0–0.2)
BILIRUB SERPL-MCNC: 0.2 MG/DL (ref 0.2–1.3)
BILIRUB UR QL STRIP: NEGATIVE
BUN SERPL-MCNC: 7 MG/DL (ref 7–30)
CALCIUM SERPL-MCNC: 9.3 MG/DL (ref 8.5–10.1)
CHLORIDE SERPL-SCNC: 99 MMOL/L (ref 94–109)
CK SERPL-CCNC: 168 U/L (ref 30–225)
CO2 SERPL-SCNC: 29 MMOL/L (ref 20–32)
COLOR UR AUTO: YELLOW
CREAT SERPL-MCNC: 0.46 MG/DL (ref 0.52–1.04)
GFR SERPL CREATININE-BSD FRML MDRD: >90 ML/MIN/1.7M2
GLUCOSE SERPL-MCNC: 93 MG/DL (ref 70–99)
GLUCOSE UR STRIP-MCNC: NEGATIVE MG/DL
HGB UR QL STRIP: NEGATIVE
KETONES UR STRIP-MCNC: 5 MG/DL
LACOSAMIDE SERPL-MCNC: NORMAL UG/ML
LEUKOCYTE ESTERASE UR QL STRIP: NEGATIVE
LEVETIRACETAM SERPL-MCNC: NORMAL UG/ML
MAGNESIUM SERPL-MCNC: 1.5 MG/DL (ref 1.6–2.3)
MUCOUS THREADS #/AREA URNS LPF: PRESENT /LPF
NITRATE UR QL: NEGATIVE
PH UR STRIP: 6.5 PH (ref 5–7)
POTASSIUM SERPL-SCNC: 4 MMOL/L (ref 3.4–5.3)
PROT SERPL-MCNC: 7.1 G/DL (ref 6.8–8.8)
RBC #/AREA URNS AUTO: <1 /HPF (ref 0–2)
SODIUM SERPL-SCNC: 135 MMOL/L (ref 133–144)
SOURCE: ABNORMAL
SP GR UR STRIP: 1.02 (ref 1–1.03)
SQUAMOUS #/AREA URNS AUTO: 6 /HPF (ref 0–1)
TRANS CELLS #/AREA URNS HPF: <1 /HPF (ref 0–1)
UROBILINOGEN UR STRIP-MCNC: NORMAL MG/DL (ref 0–2)
WBC #/AREA URNS AUTO: 3 /HPF (ref 0–5)

## 2018-09-23 PROCEDURE — 36415 COLL VENOUS BLD VENIPUNCTURE: CPT | Performed by: HOSPITALIST

## 2018-09-23 PROCEDURE — 80177 DRUG SCRN QUAN LEVETIRACETAM: CPT | Performed by: HOSPITALIST

## 2018-09-23 PROCEDURE — 12000001 ZZH R&B MED SURG/OB UMMC

## 2018-09-23 PROCEDURE — A9270 NON-COVERED ITEM OR SERVICE: HCPCS | Mod: GY | Performed by: HOSPITALIST

## 2018-09-23 PROCEDURE — 99232 SBSQ HOSP IP/OBS MODERATE 35: CPT | Performed by: INTERNAL MEDICINE

## 2018-09-23 PROCEDURE — 81001 URINALYSIS AUTO W/SCOPE: CPT | Performed by: STUDENT IN AN ORGANIZED HEALTH CARE EDUCATION/TRAINING PROGRAM

## 2018-09-23 PROCEDURE — 97162 PT EVAL MOD COMPLEX 30 MIN: CPT | Mod: GP

## 2018-09-23 PROCEDURE — 25000132 ZZH RX MED GY IP 250 OP 250 PS 637: Mod: GY | Performed by: HOSPITALIST

## 2018-09-23 PROCEDURE — 82550 ASSAY OF CK (CPK): CPT | Performed by: HOSPITALIST

## 2018-09-23 PROCEDURE — A9270 NON-COVERED ITEM OR SERVICE: HCPCS | Mod: GY | Performed by: STUDENT IN AN ORGANIZED HEALTH CARE EDUCATION/TRAINING PROGRAM

## 2018-09-23 PROCEDURE — 80048 BASIC METABOLIC PNL TOTAL CA: CPT | Performed by: HOSPITALIST

## 2018-09-23 PROCEDURE — 80076 HEPATIC FUNCTION PANEL: CPT | Performed by: HOSPITALIST

## 2018-09-23 PROCEDURE — 83735 ASSAY OF MAGNESIUM: CPT | Performed by: HOSPITALIST

## 2018-09-23 PROCEDURE — 40000193 ZZH STATISTIC PT WARD VISIT

## 2018-09-23 PROCEDURE — 97116 GAIT TRAINING THERAPY: CPT | Mod: GP

## 2018-09-23 PROCEDURE — 80048 BASIC METABOLIC PNL TOTAL CA: CPT | Performed by: INTERNAL MEDICINE

## 2018-09-23 PROCEDURE — 95951 ZZHC EEG VIDEO EACH 24 HR: CPT | Mod: ZF

## 2018-09-23 PROCEDURE — 25000132 ZZH RX MED GY IP 250 OP 250 PS 637: Mod: GY | Performed by: INTERNAL MEDICINE

## 2018-09-23 PROCEDURE — 80183 DRUG SCRN QUANT OXCARBAZEPIN: CPT | Performed by: HOSPITALIST

## 2018-09-23 PROCEDURE — 25000132 ZZH RX MED GY IP 250 OP 250 PS 637: Mod: GY | Performed by: STUDENT IN AN ORGANIZED HEALTH CARE EDUCATION/TRAINING PROGRAM

## 2018-09-23 PROCEDURE — 97530 THERAPEUTIC ACTIVITIES: CPT | Mod: GP

## 2018-09-23 PROCEDURE — A9270 NON-COVERED ITEM OR SERVICE: HCPCS | Mod: GY | Performed by: INTERNAL MEDICINE

## 2018-09-23 PROCEDURE — 80175 DRUG SCREEN QUAN LAMOTRIGINE: CPT | Performed by: HOSPITALIST

## 2018-09-23 RX ORDER — RISPERIDONE 2 MG/1
2 TABLET ORAL 2 TIMES DAILY
Status: DISCONTINUED | OUTPATIENT
Start: 2018-09-23 | End: 2018-09-26 | Stop reason: HOSPADM

## 2018-09-23 RX ADMIN — ACETAMINOPHEN 650 MG: 325 TABLET, FILM COATED ORAL at 18:28

## 2018-09-23 RX ADMIN — LEVETIRACETAM 1500 MG: 500 TABLET, FILM COATED ORAL at 20:57

## 2018-09-23 RX ADMIN — LACOSAMIDE 200 MG: 200 TABLET, FILM COATED ORAL at 09:14

## 2018-09-23 RX ADMIN — LAMOTRIGINE 200 MG: 100 TABLET ORAL at 20:57

## 2018-09-23 RX ADMIN — TRAZODONE HYDROCHLORIDE 50 MG: 50 TABLET ORAL at 18:25

## 2018-09-23 RX ADMIN — LAMOTRIGINE 200 MG: 100 TABLET ORAL at 09:14

## 2018-09-23 RX ADMIN — OXCARBAZEPINE 600 MG: 600 TABLET, FILM COATED ORAL at 09:14

## 2018-09-23 RX ADMIN — OXCARBAZEPINE 600 MG: 600 TABLET, FILM COATED ORAL at 20:57

## 2018-09-23 RX ADMIN — DOCUSATE SODIUM 100 MG: 100 CAPSULE, LIQUID FILLED ORAL at 20:57

## 2018-09-23 RX ADMIN — TRAZODONE HYDROCHLORIDE 50 MG: 50 TABLET ORAL at 09:14

## 2018-09-23 RX ADMIN — DOCUSATE SODIUM 100 MG: 100 CAPSULE, LIQUID FILLED ORAL at 09:15

## 2018-09-23 RX ADMIN — LEVETIRACETAM 1500 MG: 500 TABLET, FILM COATED ORAL at 09:14

## 2018-09-23 RX ADMIN — RISPERIDONE 2 MG: 2 TABLET ORAL at 20:57

## 2018-09-23 RX ADMIN — RISPERIDONE 3 MG: 3 TABLET ORAL at 09:14

## 2018-09-23 RX ADMIN — TRAZODONE HYDROCHLORIDE 150 MG: 150 TABLET ORAL at 20:57

## 2018-09-23 RX ADMIN — LACOSAMIDE 200 MG: 200 TABLET, FILM COATED ORAL at 20:57

## 2018-09-23 ASSESSMENT — ACTIVITIES OF DAILY LIVING (ADL)
ADLS_ACUITY_SCORE: 19

## 2018-09-23 NOTE — PLAN OF CARE
Problem: Patient Care Overview  Goal: Plan of Care/Patient Progress Review  OT/5A: OT orders received, per chart review and consult with Family- patient remains at same  with split entry, was walking with staff hand hold assist approximately 2 weeks ago, before decline to current level.  OT will hold at this time, awaiting clarification from  about level of assist for toileting/bathing/ADLS. Anticipate no OT needs this hospital stay, as per OT note in chart (6/11/18)  staff declined OT caregiver training, stating that they provide A for all ADLS and have no concerns with this. PT to initiate and inform OT of needs as they arise.

## 2018-09-23 NOTE — PROGRESS NOTES
Brief Neurology update    EEG showed patient's baseline known epileptiform activity (slow background, atypical GPDs) but no seizures seen. Thus, not concerned for ongoing seizures/non-convulsive status.     Recommend:  - discontinue vEEG (discontinued the order)  - continue home AEDs: lacosamide, lamotrigine, keppra, oxcarbazepine  - f/u with outpatient neurologist upon discharge  - further w/u of falls and weakness per primary team    Neurology service will sign off. Please call with questions.    Eboni Barnes  G4 Neurology

## 2018-09-23 NOTE — MR AVS SNAPSHOT
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)    -  1       Follow-ups after your visit        Your next 10 appointments already scheduled     Dec 18, 2018  2:30 PM CST   Return Visit with Elysia Gomez MD   [facility] ([facility])    [address], [address]  [facility] [address] [phone]              Who to contact     Please call your clinic at [phone] to:    Ask questions about your health    Make or cancel appointments    Discuss your medicines    Learn about your test results    Speak to your doctor            Additional Information About Your Visit        MyChart Information     [employer] is an electronic gateway that provides easy, online access to your medical records. With [employer], you can request a clinic appointment, read your test results, renew a prescription or communicate with your care team.     To sign up for [employer] visit the website at [email or web address]   You will be asked to enter the access code listed below, as well as some personal information. Please follow the directions to create your username and password.     Your access code is: [email or web address]  Expires: 2018  3:54 PM     Your access code will  in 90 days. If you need help or a new code, please contact your [facility] or call [phone] for assistance.        Care EveryWhere ID     This is your Care EveryWhere ID. This could be used by other organizations to access your [facility] medical records  [id number]         Blood Pressure from Last 3 Encounters:   18 110/69   18 138/71   18 (!) 137/91    Weight from Last 3 Encounters:   18  72.6 kg (160 lb 1.6 oz)   06/19/18 71.6 kg (157 lb 14.4 oz)   04/17/18 71.2 kg (157 lb)              Today, you had the following     No orders found for display         Today's Medication Changes      Notice     This visit is during an admission. Changes to the med list made in this visit will be reflected in the After Visit Summary of the admission.             Primary Care Provider Office Phone # Fax #    Bryan Fritsch [phone]       [facility] [address]  [facility] [address]        Equal Access to Services     [facility]: Hadii aad ku hadasho Soomaali, waaxda luqadaha, qaybta kaalmada adeegyada, sahara sparks . So [facility] [phone].    ATENCIÓN: Si habla español, tiene a singh disposición servicios gratuitos de asistencia lingüística. Llame al [phone].    We comply with applicable federal civil rights laws and [place] laws. We do not discriminate on the basis of race, color, national origin, age, disability, sex, sexual orientation, or gender identity.            Thank you!     Thank you for choosing [facility]  for your care. Our goal is always to provide you with excellent care. Hearing back from our patients is one way we can continue to improve our services. Please take a few minutes to complete the written survey that you may receive in the mail after your visit with us. Thank you!             Your Updated Medication List - Protect others around you: Learn how to safely use, store and throw away your medicines at www.disposemymeds.org.      Notice     This visit is during an admission. Changes to the med list made in this visit will be reflected in the After Visit Summary of the admission.

## 2018-09-23 NOTE — PROGRESS NOTES
[facility], [place]    Internal Medicine Progress Note - Gold Service      Assessment & Plan   Nella Helms is a 42 year old female admitted on 9/21/2018. She has a history of cognitive delay, left sided hemiplegia, severe epilepsy, cortical blindness, ITP, chronic gait instability, who was admitted with increased seizure activity.      # Epilepsy   # Increased seizure frequency   Patient presenting with 1 day increased seizure activity at her group home, requiring several rescue mediations at admission. On multiple agents, overall difficult-to-control seizures. No signs of infection in terms of fever, leukocytosis, or focal findings on exam, UA normal. Elevated lactate post-seizure, resolved, other electrolytes without significant derangement to explain seizures. On vEEG, she did not have subclinical seizures, and it demonstrated her known epileptiform baseline activity.   Per neuro who had seen her before in clinic, this is close to her baseline mental status/exam.   - Neurology consulted, appreciate involvement   - continue home AEDs, rescue lorazepam available   - checked AED levels, lamotrigine mildly low but otherwise WNL   - vEEG per neurology   - increased keppra to 1500 mg BID     # Chronic gait instability and falls   Had had some recent changes in medications PTA, otherwise has been followed by neuro for this outpatient with ongoing work-up. May be related to polypharmacy, concern per neuro about behavioral component. Brother identifies risperidone as the usual culprit in prior admissions/outpatient trials.    - fall precautions   - will decrease risperidone to 4 mg BID    Other issues:   # Developmental Delay/OCD/behavioral issues: intermittently crying out, suspect this is behavioral given that she has no complaints and is calm when she has company. Continue trazodone, risperidone as above   # ITP: stable, plts 112 at admission, on no medications (previoius issues on long  term prednisone). Will monitor      Diet: Combination Diet Regular Diet Adult  Fluids: none   Reyes Catheter: not present     DVT Prophylaxis: Low Risk/Ambulatory with no VTE prophylaxis indicated and Pneumatic Compression Devices  Code Status: Full Code        Disposition Plan   Expected discharge: 1-2 days, recommended to prior living arrangement once mental status at baseline and safe disposition plan/ TCU bed available.     Entered: Keerthi Arvizu MD 09/23/2018, 6:03 PM   Information in the above section will display in the discharge planner report.      The patient's care was discussed with the Bedside Nurse, Patient and Patient's Family.    Keerthi Arviuz MD  Internal Medicine Staff Hospitalist Service  [facility]  Pager: [phone]  Please see sticky note for cross cover information    Interval History   No acute events overnight, no seizures on EEG or clinical. Having some outbursts, mostly wanting company. Tolerating diet, no abdominal pain elicited, no shortness of breath, no other complaints. Brother at bedside, very knowledgeable about patient course, identifies risperidone as possible culprit as worsening weakness but often restarted by an outside psychiatrist.     Data reviewed today: I reviewed all medications, new labs and imaging results over the last 24 hours. I personally reviewed no images or EKG's today.    Physical Exam   Vital Signs: Temp: 96.2  F (35.7  C) Temp src: Axillary BP: 114/83 Pulse: 90   Resp: 18 SpO2: 97 % O2 Device: None (Room air)    Weight: 160 lbs 1.6 oz  Gen: alert, sitting up in bed, answering some questions appropriately, sometimes unintelligible.   HEENT: Normocephalic/atraumatic, EEG leads in place, sclera clear, oropharynx with moist mucous membranes,  Resp: Clear bilaterally, easy work of breathing on room air  CV: Regular rate and rhythm, no murmurs noted   Abd: obese, soft, non-tender, nondistended  Ext: no lower extremity edema, warm and well-perfused  with brisk capillary refill   Neuro: Alert, answering simple questions, not oriented, generally not following commands for strength testing but moving R > L side       Data   Data     Recent Labs  Lab 09/23/18  0743 09/22/18  1334   WBC  --  6.8   HGB  --  12.7   MCV  --  94   PLT  --  112*    132*   POTASSIUM 4.0 4.2   CHLORIDE 99 96   CO2 29 28   BUN 7 7   CR 0.46* 0.43*   ANIONGAP 7 8   SHARMILA 9.3 9.4   GLC 93 103*   ALBUMIN 3.6 3.8   PROTTOTAL 7.1 7.8   BILITOTAL 0.2 0.2   ALKPHOS 154* 166*   ALT 34 38   AST 21 21

## 2018-09-23 NOTE — PLAN OF CARE
Problem: Patient Care Overview  Goal: Plan of Care/Patient Progress Review  Outcome: No Change   Pt has a history of cognitive delay, left sided hemiplegia, severe epilepsy, cortical blindness, ITP, chronic gait instability, who was admitted with increased seizure activity.  Pt is currently connected to EEG monitoring, pt was to be admitted to , but no tele bed available. Brother at bedside overnight and supportive. Head CT was negative. Pt up to the commode with an assist x1. Denies pain or nausea. Pt does scream for attention. Will discharge when medically stable back to a group home. No issues overnight, pt slept well. Shoes on in bed per pt request.

## 2018-09-23 NOTE — PLAN OF CARE
Problem: Patient Care Overview  Goal: Plan of Care/Patient Progress Review  Discharge Planner PT   Patient plan for discharge: not discussed  Current status: Pt modA bed mobility, Aguila/CGA sit<>stand to FWW, ambulates x15' with FWW with maxA assist for walker management with CGA on pt. Pt requires significant redirection and cues for safe mobility throughout session.   Barriers to return to prior living situation: medical needs  Recommendations for discharge: Need to confirm GH set up and pt's PLOF with SW or GH to determine most accurate PT rec. As of now return to  based off pt's mobility and notes from previous admission as pt appears near baseline.  Rationale for recommendations: Pt appears near baseline mobility, but need to clarify PLOF with SW and GH for appropriateness of return, though from previous admissions GH able to provide assist for all self-cares and assist with short distance gait and transfers. Will update recs accordingly.       Entered by: Alyssa Branham 09/23/2018 4:10 PM

## 2018-09-23 NOTE — PROGRESS NOTES
Preliminary Video EEG impression on September 22-23, 2018  until 6am   Full report to follow. Please look in inpatient chart, under procedure section.       This EEG is abnormal due to the presences of  Intermittent genneralized polymorphic delta/theta slowing with a somewhat disorganized background. There is a  7-8 hz symmetric parieto-occipital rhythm. There are atypical generalized epileptiform discharges, these epileptiform discharges are maximum in the bifrontal region, 1-5 hz in frequency, at times may be polyspike discharges.  No  electrographic seizures were seen in this record. If events of interest are noted, please press the event button and complete behavioral testing (ROAR testing) if possible. Clinical correlation is advised.     Melida Barnes MD  Staff Epilepsy Neurologist   [phone]

## 2018-09-24 LAB
10OH-CARBAZEPINE SERPL-MCNC: 23.4 UG/ML
LAMOTRIGINE SERPL-MCNC: 4.5 UG/ML (ref 2.5–15)

## 2018-09-24 PROCEDURE — 99231 SBSQ HOSP IP/OBS SF/LOW 25: CPT | Performed by: INTERNAL MEDICINE

## 2018-09-24 PROCEDURE — A9270 NON-COVERED ITEM OR SERVICE: HCPCS | Mod: GY | Performed by: STUDENT IN AN ORGANIZED HEALTH CARE EDUCATION/TRAINING PROGRAM

## 2018-09-24 PROCEDURE — 25000132 ZZH RX MED GY IP 250 OP 250 PS 637: Mod: GY | Performed by: HOSPITALIST

## 2018-09-24 PROCEDURE — 25000132 ZZH RX MED GY IP 250 OP 250 PS 637: Mod: GY | Performed by: INTERNAL MEDICINE

## 2018-09-24 PROCEDURE — 12000008 ZZH R&B INTERMEDIATE UMMC

## 2018-09-24 PROCEDURE — 25000132 ZZH RX MED GY IP 250 OP 250 PS 637: Mod: GY | Performed by: STUDENT IN AN ORGANIZED HEALTH CARE EDUCATION/TRAINING PROGRAM

## 2018-09-24 PROCEDURE — A9270 NON-COVERED ITEM OR SERVICE: HCPCS | Mod: GY | Performed by: HOSPITALIST

## 2018-09-24 PROCEDURE — A9270 NON-COVERED ITEM OR SERVICE: HCPCS | Mod: GY | Performed by: INTERNAL MEDICINE

## 2018-09-24 RX ADMIN — TRAZODONE HYDROCHLORIDE 50 MG: 50 TABLET ORAL at 17:51

## 2018-09-24 RX ADMIN — DOCUSATE SODIUM 100 MG: 100 CAPSULE, LIQUID FILLED ORAL at 08:14

## 2018-09-24 RX ADMIN — LEVETIRACETAM 1500 MG: 500 TABLET, FILM COATED ORAL at 08:13

## 2018-09-24 RX ADMIN — RISPERIDONE 2 MG: 2 TABLET ORAL at 08:15

## 2018-09-24 RX ADMIN — LAMOTRIGINE 200 MG: 100 TABLET ORAL at 08:14

## 2018-09-24 RX ADMIN — LAMOTRIGINE 200 MG: 100 TABLET ORAL at 19:57

## 2018-09-24 RX ADMIN — TRAZODONE HYDROCHLORIDE 150 MG: 150 TABLET ORAL at 19:57

## 2018-09-24 RX ADMIN — TRAZODONE HYDROCHLORIDE 50 MG: 50 TABLET ORAL at 08:14

## 2018-09-24 RX ADMIN — LEVETIRACETAM 1500 MG: 500 TABLET, FILM COATED ORAL at 19:56

## 2018-09-24 RX ADMIN — DOCUSATE SODIUM 100 MG: 100 CAPSULE, LIQUID FILLED ORAL at 19:58

## 2018-09-24 RX ADMIN — OXCARBAZEPINE 600 MG: 600 TABLET, FILM COATED ORAL at 08:14

## 2018-09-24 RX ADMIN — RISPERIDONE 2 MG: 2 TABLET ORAL at 19:57

## 2018-09-24 RX ADMIN — LACOSAMIDE 200 MG: 200 TABLET, FILM COATED ORAL at 08:14

## 2018-09-24 RX ADMIN — OXCARBAZEPINE 600 MG: 600 TABLET, FILM COATED ORAL at 19:56

## 2018-09-24 RX ADMIN — LACOSAMIDE 200 MG: 200 TABLET, FILM COATED ORAL at 19:56

## 2018-09-24 ASSESSMENT — ACTIVITIES OF DAILY LIVING (ADL)
ADLS_ACUITY_SCORE: 19
ADLS_ACUITY_SCORE: 21
ADLS_ACUITY_SCORE: 19
ADLS_ACUITY_SCORE: 19

## 2018-09-24 NOTE — PROCEDURES
Procedure Date: 09/22/2018      EEG #-1 (Video EEG day 1)      DATE OF RECORDING/SERVICE DATE:  09/22/2018      SOURCE FILE DURATION:  8 hours and 29 minutes.      PATIENT INFORMATION:  A 42-year-old female with a history of seizures and developmental delay, admitted for falls.  EEG is being done to evaluate for seizures.      MEDICATIONS:  Vimpat 200 mg twice a day, lamotrigine 200 mg twice a day, Keppra 1500 mg twice a day, Ativan and Trileptal 600 mg twice a day, risperidone, trazodone and diazepam.     TECHNICAL SUMMARY: This video EEG monitoring procedure was performed with 23 scalp electrodes in 10-20 system placements, and additional scalp, precordial and other surface electrodes used for electrical referencing and artifact detection. Video was reviewed intermittently by EEG technologist and physician for electroclinical seizures.      BACKGROUND:  The patient has a disorganized background. When she is fully awake, there is up to a 7-8 Hz parieto-occipital rhythm.  There are bursts of high voltage theta slowing identified throughout the record.  In sleep, the patient does not have well-formed sleep architecture and she has bursts of sharply contoured theta and alpha activity that are maximum in the bifrontal region.  A good example is at 22:28:32.  These may last up to 4 seconds in duration and occur frequently, nearly 70% of the sleep record.  These are not correlated with seizures on the video.      ACTIVATION PROCEDURES:  Photic stimulation and hyperventilation were not done.  The patient is responsive to auditory and sensory stimuli.      EPILEPTIFORM DISCHARGES:  The patient has slow generalized spike wave complexes and 5 Hz sharply contoured generalized epileptiform discharges.  A good example of this is at 15:01:42, 1-2 Hz generalized spike-wave complexes with maximum negativity in the bifrontal region.  A good example of this is at 15:01:31 and another example is at 15:02:32.  These generalized  epileptiform discharges are seen throughout the entire record and appear to increase in sleep.      ICTAL:  No electrographic seizures.      IMPRESSION:  Video EEG day 1 is abnormal due to the presence of atypical generalized epileptiform discharges consistent with a diagnosis of most likely symptomatic generalized epilepsy.  The patient does have bursts of intermittent generalized delta-theta slowing consistent with a mild encephalopathy.  No electrographic seizures or epileptiform discharges were seen.  Clinical correlation is advised.         DAVON SHARP MD             D: 2018   T: 2018   MT: NADIA      Name:     JOHANNA SIMONS   MRN:      [id number]        Account:        [id number]   :      1975           Procedure Date: 2018      Document: [id number]

## 2018-09-24 NOTE — PLAN OF CARE
"Problem: Patient Care Overview  Goal: Plan of Care/Patient Progress Review  7 am - 7 pm  Pt is alert,JOCELIN pt's orientation.Pt does scream for attention, pt tells if she is hungry and needs to use bathroom; pt says one word at a time.AVSS. No event is witnessed today.EEG discontinued this evening.\" EEG showed patient's baseline known epileptiform activity (slow background, atypical GPDs) but no seizures seen\" per neurology Md note.Voided x2 large amount , assist of two to bed side commode. Good appetite. Pt needs help with ordering her meals, brother listed on the white board the different foods pt would like to eat brother was at bed side this morning and he stated that pt's father will come tomorrow.  Plan  \" Expected discharge: 1-2 days, recommended to prior living arrangement once mental status at baseline and safe disposition plan/ TCU bed available\" per primary team note.      "

## 2018-09-24 NOTE — PROGRESS NOTES
09/23/18 1500   Quick Adds   Type of Visit Initial PT Evaluation   Living Environment   Lives With facility resident   Living Arrangements group home   Home Accessibility stairs (1 railing present);stairs within home   Number of Stairs to Enter Home 14   Number of Stairs Within Home 0   Stair Railings at Home inside, present on left side   Living Environment Comment pt lives in [facility]   Self-Care   Dominant Hand right   Equipment Currently Used at Home shower chair;walker, rolling;grab bar   Functional Level Prior   Ambulation 0-->independent   Transferring 0-->independent   Toileting 0-->independent   Bathing 2-->assistive person   Dressing 2-->assistive person   Eating 1-->assistive equipment   Communication 2-->difficulty understanding and speaking (not related to language barrier)   Swallowing 0-->swallows foods/liquids without difficulty   Cognition 2 - difficulty with organizing thoughts   Which of the above functional risks had a recent onset or change? fall history;cognition;ambulation;transferring;toileting;bathing;dressing   Prior Functional Level Comment need to clarify PLOF as pt unable to provide subjective history, info from eval gathered from previous admission and discussion with OT who spoke with brother. Need to touch base with SW to discuss PLOF with GH.   General Information   Onset of Illness/Injury or Date of Surgery - Date 09/21/18   Referring Physician Keerthi Arvizu MD   Patient/Family Goals Statement none stated   Pertinent History of Current Problem (include personal factors and/or comorbidities that impact the POC) Nella Helms is a 42 year old female admitted on 9/21/2018. She has a history of cognitive delay, left sided hemiplegia, severe epilepsy, cortical blindness, ITP, chronic gait instability, who was admitted with increased seizure activity.     Precautions/Limitations fall precautions;seizure precautions   General Info Comments activity orders: up w/assist    Cognitive Status Examination   Orientation person   Level of Consciousness alert   Follows Commands and Answers Questions 50% of the time   Personal Safety and Judgment impaired   Memory impaired   Cognitive Comment pt with cognitive deficit at baseline   Pain Assessment   Patient Currently in Pain No   Integumentary/Edema   Integumentary/Edema no deficits were identifed   Posture    Posture Kyphosis;Forward head position;Protracted shoulders   Range of Motion (ROM)   ROM Comment WFL per functional mobility   Strength   Strength Comments WFL per functional mobility deficits, pt unable to follow commands for formal testing   Bed Mobility   Bed Mobility Comments modA supine<>sit   Transfer Skills   Transfer Comments Aguila sit<>stand to FWW   Gait   Gait Comments ambualtes x3' with FWW CGA, strong need for walker management    Balance   Balance Comments able to stand statically CGA, needs assist for functional standing activities   Sensory Examination   Sensory Perception Comments not tested   General Therapy Interventions   Planned Therapy Interventions balance training;bed mobility training;gait training;neuromuscular re-education;ROM;strengthening;transfer training;home program guidelines;progressive activity/exercise   Clinical Impression   Criteria for Skilled Therapeutic Intervention yes, treatment indicated   PT Diagnosis impaired functional mobility   Influenced by the following impairments strength, balance, cognition   Functional limitations due to impairments gait, transfers, bed mobility   Clinical Presentation Stable/Uncomplicated   Clinical Presentation Rationale clinician impression, [facility]   Clinical Decision Making (Complexity) Low complexity   Therapy Frequency` 5 times/week   Predicted Duration of Therapy Intervention (days/wks) 1 week   Anticipated Equipment Needs at Discharge other (see comments)  (none)   Anticipated Discharge Disposition Other (see comments)  (return to )   Risk & Benefits of  "therapy have been explained Yes   Patient, Family & other staff in agreement with plan of care Yes   Clinical Impression Comments eval complete, tx initiated   [facility] AM-PAC TM \"6 Clicks\"   2016, Trustees of [facility], under license to [employer].  All rights reserved.   6 Clicks Short Forms Basic Mobility Inpatient Short Form   [facility] AM-PAC  \"6 Clicks\" V.2 Basic Mobility Inpatient Short Form   1. Turning from your back to your side while in a flat bed without using bedrails? 3 - A Little   2. Moving from lying on your back to sitting on the side of a flat bed without using bedrails? 3 - A Little   3. Moving to and from a bed to a chair (including a wheelchair)? 3 - A Little   4. Standing up from a chair using your arms (e.g., wheelchair, or bedside chair)? 3 - A Little   5. To walk in hospital room? 3 - A Little   6. Climbing 3-5 steps with a railing? 2 - A Lot   Basic Mobility Raw Score (Score out of 24.Lower scores equate to lower levels of function) 17   Total Evaluation Time   Total Evaluation Time (Minutes) 10     "

## 2018-09-24 NOTE — PLAN OF CARE
Problem: Patient Care Overview  Goal: Plan of Care/Patient Progress Review  Pt has been sleeping on/off most of the morning. Oriented to self and place (at times) when woke up. Bed alarm on for safety reason. No sz activities this shift. Pt denied any pain, VSS, tolerated regular diet well. Pt declined the need to go to the BR but was inc of urine when turned. Parents are currently at the bedside, involved in cares.

## 2018-09-24 NOTE — PLAN OF CARE
Problem: PT General Care Plan  Goal: PT Frequency  PT5A: cancel- pt sleeping upon attempt and more info needed to obtain PT POC. Per discussion with RNCC and after reviewing pt's previous hospital stay it was noted she performs much better with  staff directing care and  reporting no concerns with her actually mobility rather concerns with seizure events themselves causing falls. Pt is likely at baseline mobility per chart review in comparison to PT eval with baseline cognitive and behavioral deficits as barrier to IND mobility with PT and hospital staff. Will continue to work with pt utilizing  staff base mobility techniques including HH assist ambulation and assess safe stairs. If pt unable to follow commands and participate may need  staff to schedule visit to direct mobility for accurate mobility assessment if pt's IND mobility neccessary for return to .

## 2018-09-24 NOTE — PROGRESS NOTES
[facility], [place]    Internal Medicine Progress Note - Gold Service      Assessment & Plan   Nella Helms is a 42 year old female admitted on 9/21/2018. She has a history of cognitive delay, left sided hemiplegia, severe epilepsy, cortical blindness, ITP, chronic gait instability, who was admitted with increased seizure activity. Now seizure free, working with PT, approaching discharge.     # Epilepsy   # Increased seizure frequency   Patient presenting with 1 day increased seizure activity at her group home, requiring several rescue mediations at admission. On multiple agents, overall difficult-to-control seizures. No signs of infection in terms of fever, leukocytosis, or focal findings on exam, UA normal. Elevated lactate post-seizure, resolved, other electrolytes without significant derangement to explain seizures. On vEEG, she did not have subclinical seizures, and it demonstrated her known epileptiform baseline activity.   Per neuro who had seen her before in clinic, this is close to her baseline mental status/exam.   - Neurology consulted, appreciate involvement   - continue home AEDs, rescue lorazepam available   - checked AED levels, lamotrigine mildly low but otherwise WNL   - increased keppra to 1500 mg BID     # Chronic gait instability and falls   Had had some recent changes in medications PTA, otherwise has been followed by neuro for this outpatient with ongoing work-up. May be related to polypharmacy, concern per neuro about behavioral component. Brother identifies risperidone as the usual culprit in prior admissions/outpatient trials.    - fall precautions   - decreased risperidone to 4 mg BID 9/23  - PT/OT   - Orthostatics     Other issues:   # Developmental Delay/OCD/behavioral issues: intermittently crying out, suspect this is behavioral given that she has no complaints and is calm when she has company. Continue trazodone, risperidone as above   # ITP: stable, plts  112 at admission, on no medications (previoius issues on long term prednisone). Will monitor       Diet: Combination Diet Regular Diet Adult  Fluids: none   Reyes Catheter: not present      DVT Prophylaxis: Low Risk/Ambulatory with no VTE prophylaxis indicated and Pneumatic Compression Devices  Code Status: Full Code    Disposition Plan   Expected discharge: Tomorrow, recommended to prior living arrangement once safe disposition plan/ TCU bed available.     Entered: Keerthi Arvizu MD 09/24/2018, 3:36 PM   Information in the above section will display in the discharge planner report.      The patient's care was discussed with the Bedside Nurse, Patient and Patient's Family.    Keerthi Arvizu MD  Internal Medicine Staff Hospitalist Service  [facility]  Pager: [phone]  Please see sticky note for cross cover information    Interval History   No acute events overnight, no seizures. Often crying out, wants company. Tolerating diet, denies pain at bedside. Parents at bedside today, updated, they feel she is baseline but concerned about fall history.     Data reviewed today: I reviewed all medications, new labs and imaging results over the last 24 hours. I personally reviewed no images or EKG's today.    Physical Exam   Vital Signs: Temp: 95.1  F (35.1  C) Temp src: Axillary BP: 119/65 Pulse: 75   Resp: 20 SpO2: 96 % O2 Device: None (Room air)    Weight: 160 lbs 1.6 oz  Gen: alert, sitting up in bed, answering some questions appropriately, sometimes unintelligible.   HEENT: Normocephalic/atraumatic, EEG leads in place, sclera clear, oropharynx with moist mucous membranes,  Resp: Clear bilaterally, easy work of breathing on room air  CV: Regular rate and rhythm, no murmurs noted   Abd: obese, soft, non-tender, nondistended  Ext: no lower extremity edema, warm and well-perfused with brisk capillary refill   Neuro: Alert, answering simple questions, not oriented, generally not following commands for strength  testing but moving R > L side    Data   Data     Recent Labs  Lab 09/23/18  0743 09/22/18  1334   WBC  --  6.8   HGB  --  12.7   MCV  --  94   PLT  --  112*    132*   POTASSIUM 4.0 4.2   CHLORIDE 99 96   CO2 29 28   BUN 7 7   CR 0.46* 0.43*   ANIONGAP 7 8   SHARMILA 9.3 9.4   GLC 93 103*   ALBUMIN 3.6 3.8   PROTTOTAL 7.1 7.8   BILITOTAL 0.2 0.2   ALKPHOS 154* 166*   ALT 34 38   AST 21 21

## 2018-09-24 NOTE — PLAN OF CARE
Problem: Patient Care Overview  Goal: Plan of Care/Patient Progress Review  Outcome: No Change  Reason for Admission: Syncope/Falls and breakthrough seizures.   PMHx: Cognitive delay, OCD, severe epilepsy, cortical blindness, chronic gait instability.     Vitals: VSS on RA.   Activity: Up Ax2 d/t gait instability. Bed alarm on for safety and impulsivity. Up to BSC, wheeled around unit in WC.   Pain: C/o leg pain d/t fall PTA. Ice applied  Neuro: Needs frequent orientation. No seizure activity this shift. Some left sided weakness. Eyes dysconugate. PERRLA. Pt yelling out frequently this shift and an episode of crying. Easily redirected.   Cardiac: On tele NSR. No  Acute changes this shift.   Respiratory: Stable on RA.   GI/: Frequent toileting required as pt will hold urine for extended periods of time. No BM this shift.   Diet: Regular diet. Food preferences noted on whiteboard in pt room. Pt prefers finger foods. Meals ordered for pt by staff. Good appetite. Prefers pills crushed in applesauce, but is able swallow pills without difficulty.   Skin: Bruise to L shin.   LDAs: R PIV SL.     Plan: Discharge in 1-2 days to prior living arrangement. Will continue to monitor and follow POC.

## 2018-09-24 NOTE — PLAN OF CARE
Problem: Patient Care Overview  Goal: Plan of Care/Patient Progress Review  Outcome: No Change  11a-7p. Pt developmentally delayed. VSS on RA. Orthos done this shift. Resting for most of the shift, then increased agitation around 1815, MD notified. Medically stable, evaluated by PT today for discharge. Will likely discharge tomorrow to previous facility.

## 2018-09-24 NOTE — PROGRESS NOTES
"    Care Coordinator Progress Note    Admission Date/Time:  9/21/2018  Attending MD:  Melanie Aguila,*    Data  Chart reviewed, discussed with interdisciplinary team.   Patient was admitted for: Falls at the Group Home    Concerns with insurance coverage for discharge needs: None.  Current Living Situation: Patient lives in a group home.  Support System: Supportive and Involved  Services Involved: Group home staffed with 1 aide to 4 residents  Transportation at Discharge:  staff will come to [facility] for dc ride if pt is cleared for discharge and accepted/safe to return to .  Transportation to Medical Appointments:   - Name of caregiver: Abdon- brother and Legal Guardian  Barriers to Discharge: Needs to be able to be mainly independant with walking, stairs, and grooming.     Medical work up continues.  Pt is not yet ready to be considered for DC, pending continued medical work up, PT to eval and work with pt once md team deems appropriate.       Current Living Situation: Patient lives in a group home.  [facility] in [place]. Main phone: [phone]. Ely Renteria, Nurse or Rosalina Jaime, Director.        Coordination of Care and Referrals  Abdon, brother,  is pt's legal guardian/ as is pts father. - this was scanned in \"Scan on 3/4/2018  9:40 AM : ORDER APPOINTING GENERAL GUARDIAN OF THE PERSON 1/6/95\".  Pt has been at [facility] in [place] for about 20 years. It is a split level home. Pt's mobility has declined in the last year and pt has been falling, this is another readmission. Per SANDY Thakkar at the , she was told that this pts eyes roll back in her head and then she falls.  Ariane states that Alina is the nurse that knows this pt best but notes that these symptoms continue to return.  Pt continues to be unsteady with ambulation, however staffing is adequate for this need.  Pt was brought in for the episode where she rolled her eyes back and falls- staff concerned that she " may be having seizure.  PT consulted here pt will need to be safe with ambulation and stair work necessary and due to recent increase in falls at the . RNKESHAV reached out to the group home, [facility], and spoke with SANDY germain and left message for Ely (@ [phone]) to return call- MD team should call Ely for background info on this pt.  Pts mobility is a big concern for this home.  Pt able to use a walker and do stairs with minimal assist. She was able to feed herself; toilet herself and make her needs known. Staff assisted with washing hair and supervised bathing.   [place], . Phone: [phone] or [phone] when pt is nearing dc to discuss disposition.  Transport provided by staff at  upon dc from the hospital and to/ from appointments.  Pt cannot dc over the weekend unless the dc papers are completed Friday for the RN to review and NO medications or cares change for the Weekend dc.         Pharmacy of [facility] in [place].         Dilia Acharya, MITCH, BSN     [facility]    Medicine Group  [address] [place] [address]    [email or web address]  [email or web address]    Office: [phone] Pager: [phone]

## 2018-09-24 NOTE — PLAN OF CARE
Problem: Patient Care Overview  Goal: Plan of Care/Patient Progress Review  Outcome: No Change  Reason for admission: Syncope/falls.   Neuro: Alert- disoriented to time, place, situation.  No seizures during this shift. Eye dysconjugate. Left sided weakness.    Behavior: Slept most of night.   Activity: Ax2 with transfers and bed repositioning.  Up to BSC.   Vitals: VSS on RA.        LDAS: R PIV S/L.   Cardiac: Tele- completed by nurse preceptor. No acute changes this shift.    Respiratory: LS clear. O2 sat stable.      GI/: Up to BSC- putting out large amounts of urine at one time. No bm this shift.   Skin: Bruising on L leg from previous fall.    Pain: Denies pain.   Diet: Regular diet- no swallowing difficulties. Prefers finger foods.  Takes meds crushed in apple sauce.   Plan: Discharge in 1-2 days. F/u w/ neurologist after discharge.

## 2018-09-25 ENCOUNTER — APPOINTMENT (OUTPATIENT)
Dept: PHYSICAL THERAPY | Facility: CLINIC | Age: 43
DRG: 057 | End: 2018-09-25
Attending: HOSPITALIST
Payer: MEDICARE

## 2018-09-25 VITALS
RESPIRATION RATE: 18 BRPM | BODY MASS INDEX: 30.25 KG/M2 | TEMPERATURE: 97.1 F | SYSTOLIC BLOOD PRESSURE: 155 MMHG | WEIGHT: 160.1 LBS | OXYGEN SATURATION: 97 % | DIASTOLIC BLOOD PRESSURE: 95 MMHG | HEART RATE: 84 BPM

## 2018-09-25 LAB
ERYTHROCYTE [DISTWIDTH] IN BLOOD BY AUTOMATED COUNT: 14.5 % (ref 10–15)
HCT VFR BLD AUTO: 36.6 % (ref 35–47)
HGB BLD-MCNC: 12 G/DL (ref 11.7–15.7)
LAMOTRIGINE SERPL-MCNC: 4.7 UG/ML (ref 2.5–15)
LEVETIRACETAM SERPL-MCNC: 46 UG/ML (ref 12–46)
MCH RBC QN AUTO: 31.8 PG (ref 26.5–33)
MCHC RBC AUTO-ENTMCNC: 32.8 G/DL (ref 31.5–36.5)
MCV RBC AUTO: 97 FL (ref 78–100)
PLATELET # BLD AUTO: 110 10E9/L (ref 150–450)
RBC # BLD AUTO: 3.77 10E12/L (ref 3.8–5.2)
WBC # BLD AUTO: 4.3 10E9/L (ref 4–11)

## 2018-09-25 PROCEDURE — 85027 COMPLETE CBC AUTOMATED: CPT | Performed by: HOSPITALIST

## 2018-09-25 PROCEDURE — A9270 NON-COVERED ITEM OR SERVICE: HCPCS | Mod: GY | Performed by: STUDENT IN AN ORGANIZED HEALTH CARE EDUCATION/TRAINING PROGRAM

## 2018-09-25 PROCEDURE — 12000001 ZZH R&B MED SURG/OB UMMC

## 2018-09-25 PROCEDURE — 99232 SBSQ HOSP IP/OBS MODERATE 35: CPT | Performed by: HOSPITALIST

## 2018-09-25 PROCEDURE — 97530 THERAPEUTIC ACTIVITIES: CPT | Mod: GP

## 2018-09-25 PROCEDURE — 36415 COLL VENOUS BLD VENIPUNCTURE: CPT | Performed by: HOSPITALIST

## 2018-09-25 PROCEDURE — 40000193 ZZH STATISTIC PT WARD VISIT

## 2018-09-25 PROCEDURE — 25000132 ZZH RX MED GY IP 250 OP 250 PS 637: Mod: GY | Performed by: STUDENT IN AN ORGANIZED HEALTH CARE EDUCATION/TRAINING PROGRAM

## 2018-09-25 PROCEDURE — A9270 NON-COVERED ITEM OR SERVICE: HCPCS | Mod: GY | Performed by: HOSPITALIST

## 2018-09-25 PROCEDURE — A9270 NON-COVERED ITEM OR SERVICE: HCPCS | Mod: GY | Performed by: INTERNAL MEDICINE

## 2018-09-25 PROCEDURE — 25000132 ZZH RX MED GY IP 250 OP 250 PS 637: Mod: GY | Performed by: HOSPITALIST

## 2018-09-25 PROCEDURE — 25000132 ZZH RX MED GY IP 250 OP 250 PS 637: Mod: GY | Performed by: INTERNAL MEDICINE

## 2018-09-25 RX ADMIN — DOCUSATE SODIUM 100 MG: 100 CAPSULE, LIQUID FILLED ORAL at 20:49

## 2018-09-25 RX ADMIN — RISPERIDONE 2 MG: 2 TABLET ORAL at 20:49

## 2018-09-25 RX ADMIN — ACETAMINOPHEN 650 MG: 325 TABLET, FILM COATED ORAL at 07:44

## 2018-09-25 RX ADMIN — LACOSAMIDE 200 MG: 200 TABLET, FILM COATED ORAL at 20:49

## 2018-09-25 RX ADMIN — LAMOTRIGINE 200 MG: 100 TABLET ORAL at 20:49

## 2018-09-25 RX ADMIN — OXCARBAZEPINE 600 MG: 600 TABLET, FILM COATED ORAL at 07:44

## 2018-09-25 RX ADMIN — TRAZODONE HYDROCHLORIDE 50 MG: 50 TABLET ORAL at 17:01

## 2018-09-25 RX ADMIN — LEVETIRACETAM 1500 MG: 500 TABLET, FILM COATED ORAL at 07:44

## 2018-09-25 RX ADMIN — TRAZODONE HYDROCHLORIDE 50 MG: 50 TABLET ORAL at 07:44

## 2018-09-25 RX ADMIN — TRAZODONE HYDROCHLORIDE 150 MG: 150 TABLET ORAL at 20:49

## 2018-09-25 RX ADMIN — LACOSAMIDE 200 MG: 200 TABLET, FILM COATED ORAL at 07:44

## 2018-09-25 RX ADMIN — OXCARBAZEPINE 600 MG: 600 TABLET, FILM COATED ORAL at 20:49

## 2018-09-25 RX ADMIN — RISPERIDONE 2 MG: 2 TABLET ORAL at 07:44

## 2018-09-25 RX ADMIN — LAMOTRIGINE 200 MG: 100 TABLET ORAL at 07:44

## 2018-09-25 RX ADMIN — LEVETIRACETAM 1500 MG: 500 TABLET, FILM COATED ORAL at 20:49

## 2018-09-25 RX ADMIN — DOCUSATE SODIUM 100 MG: 100 CAPSULE, LIQUID FILLED ORAL at 07:44

## 2018-09-25 ASSESSMENT — ACTIVITIES OF DAILY LIVING (ADL)
ADLS_ACUITY_SCORE: 24
ADLS_ACUITY_SCORE: 19
ADLS_ACUITY_SCORE: 24
ADLS_ACUITY_SCORE: 24
ADLS_ACUITY_SCORE: 23
ADLS_ACUITY_SCORE: 24

## 2018-09-25 ASSESSMENT — PAIN DESCRIPTION - DESCRIPTORS: DESCRIPTORS: ACHING;SORE

## 2018-09-25 NOTE — PROGRESS NOTES
[facility], [place]    Internal Medicine Progress Note - Gold Service      Assessment & Plan   Nella Helms is a 42 year old female admitted on 9/21/2018. She has a history of cognitive delay, left sided hemiplegia, severe epilepsy, cortical blindness, ITP, chronic gait instability, who was admitted with increased seizure activity. Now seizure free, working with PT, approaching discharge.     # Epilepsy   # Increased seizure frequency   Patient presenting with 1 day increased seizure activity at her group home, requiring several rescue mediations at admission. On multiple agents, overall difficult-to-control seizures. No signs of infection in terms of fever, leukocytosis, or focal findings on exam, UA normal. Elevated lactate post-seizure, resolved, other electrolytes without significant derangement to explain seizures. On vEEG, she did not have subclinical seizures, and it demonstrated her known epileptiform baseline activity.   Per neuro who had seen her before in clinic, this is close to her baseline mental status/exam.   - Neurology consulted, appreciate involvement   - continue home AEDs, rescue lorazepam available   - checked AED levels, lamotrigine mildly low but otherwise WNL   - increased keppra to 1500 mg BID   -  Will need to check Keppra level to ensure therapeutic values as supra-therpuetic can cause thrombocytopenia and mental status changes    # Chronic gait instability and falls   Had had some recent changes in medications PTA, otherwise has been followed by neuro for this outpatient with ongoing work-up. May be related to polypharmacy, concern per neuro about behavioral component. Brother identifies risperidone as the usual culprit in prior admissions/outpatient trials.    - fall precautions   - decreased risperidone to 2 mg BID 9/23  - PT/OT   - Orthostatics   - Upon review of records,  Appears that she has improvement in gait with Risperidone, but when she is OP, her  risperidone is uptitrated (possibly due to behaviors). Will make a note of this for her OP Psychiatrist  - I had along chat with the brother, at this time, most likely reason for falls is her risperidone, but if we keep her risperidone at a level of 2mg BID and she continues to deteriorate, we can look into other etiologies like Neuromuscular causes, PMR, dilated ventricles causing imablance etc. We will set her up with Neuro OP f/u. Given her weakness has improved since admission, at this time, no further studies. Discussed with Neuro who saw her again today and they agree with OP f/u    Other issues:   # Developmental Delay/OCD/behavioral issues: intermittently crying out, suspect this is behavioral given that she has no complaints and is calm when she has company. Continue trazodone, risperidone as above   # ITP: stable, plts 112 at admission, on no medications (previoius issues on long term prednisone). Will monitor  -  Was seen by oncology as an OP, they thought this is unclear if it was ITP vs Seizure med related, so ask to continue monitoring at this point, would benefit from another visit with them as an OP     Diet: Combination Diet Regular Diet Adult  Fluids: none   Reyes Catheter: not present      DVT Prophylaxis: Low Risk/Ambulatory with no VTE prophylaxis indicated and Pneumatic Compression Devices  Code Status: Full Code    Hospital Course So Far:  9/21- Admitted with falls, had seizures x 2  9/22- neuro c/s,  increase keppra to 1500 mg BID , vEEG  9/23- Status quo with improving functional status    Recent hospital stays PTA  Feb 2018- Admission for ATP - Rxed with steroids with taper to March June 2018- Admission for weakness, hypotension, thought to be due to adrenal insufficiency, improved with steroids,DC'ed off of steroids  June 2018- Seen as OP by Heme who stated, unclear if it was ITP vs related to meds  In May 2018- Seen by PCP for falls at home, at that time thought to be due to Risperidone.  "Apparently something simialr happened in Feb with improvement after discontinue of risperidone  Seen most recently by psychiatrist in Sep 17 2018 who gave kept her trazadone and risperidone at 3mg BID    Disposition Plan   Expected discharge: Tomorrow, recommended to prior living arrangement once safe disposition plan/ TCU bed available.     Entered: Archie Corona MD 09/25/2018, 5:21 PM   Information in the above section will display in the discharge planner report.      The patient's care was discussed with the Bedside Nurse, Patient and Patient's Family.    Archie Corona MD  Internal Medicine Staff Hospitalist Service  [facility]  Pager: [phone]  Please see sticky note for cross cover information    Interval History   No acute events overnight, no seizures. Often crying out, wants company. Tolerating diet, denies pain at bedside. Parents at bedside today, updated, they feel she is baseline but concerned about fall history.     Data reviewed today: I reviewed all medications, new labs and imaging results over the last 24 hours. I personally reviewed no images or EKG's today.    Physical Exam   Vital Signs: Temp: 95.7  F (35.4  C) Temp src: Axillary BP: 138/74 Pulse: 73   Resp: 18 SpO2: 97 % O2 Device: None (Room air)    Weight: 160 lbs 1.6 oz  Gen: alert, sitting up in bed, answering some questions appropriately, sometimes unintelligible. Mostly just says \"hi\" and \"ouch\"  HEENT: Normocephalic/atraumatic, sclera clear, oropharynx with moist mucous membranes,  Resp: Clear bilaterally, easy work of breathing on room air  CV: Regular rate and rhythm, no murmurs noted   Abd: obese, soft, non-tender, nondistended  Ext: no lower extremity edema, warm and well-perfused with brisk capillary refill   Neuro: Alert, answering simple questions, not oriented, generally not following commands for strength testing but moving R > L side, I saw her ambulate 20 steps with 1 person " assist    Data   Data     Recent Labs  Lab 09/25/18  0705 09/23/18  0743 09/22/18  1334   WBC 4.3  --  6.8   HGB 12.0  --  12.7   MCV 97  --  94   *  --  112*   NA  --  135 132*   POTASSIUM  --  4.0 4.2   CHLORIDE  --  99 96   CO2  --  29 28   BUN  --  7 7   CR  --  0.46* 0.43*   ANIONGAP  --  7 8   SHARMILA  --  9.3 9.4   GLC  --  93 103*   ALBUMIN  --  3.6 3.8   PROTTOTAL  --  7.1 7.8   BILITOTAL  --  0.2 0.2   ALKPHOS  --  154* 166*   ALT  --  34 38   AST  --  21 21

## 2018-09-25 NOTE — PLAN OF CARE
Problem: Patient Care Overview  Goal: Plan of Care/Patient Progress Review  OT 5A. Defer. Per discussion with PT, pt receives assist with ADL completion at baseline and does not have IP OT needs.

## 2018-09-25 NOTE — PLAN OF CARE
Problem: Patient Care Overview  Goal: Plan of Care/Patient Progress Review  Outcome: No Change  Neuro: Patient is alert and oriented to self. Disoriented to time, situation and place. Patient intermittently yelling and shouting for our attention, but has periods of calm.   Cardiac: VSS.   Respiratory: Sating 97% on RA. Lung sounds clear.   GI/: Adequate urine output. Intermittently incontinent.   Diet/appetite: Tolerating regular diet. Eating well.  Activity:  Assist of 1, up to chair and in halls.  Pain: Patient complained of neck pain this morning and received prn tylenol with relief.   Skin: No new deficits noted.  LDA's: PIV saline locked.     No seizure activity this shift.   Plan to discharge tomorrow at 1030am to group home.     Plan: Continue with POC. Notify primary team with changes.      Problem: Syncope (Adult)  Goal: Identify Related Risk Factors and Signs and Symptoms  Related risk factors and signs and symptoms are identified upon initiation of Human Response Clinical Practice Guideline (CPG).   Outcome: No Change   09/25/18 0597   Syncope   Related Risk Factors (Syncope) medication effects   Signs and Symptoms (Syncope) other (see comments)

## 2018-09-25 NOTE — PROGRESS NOTES
"  Care Coordinator - Discharge Planning    Admission Date/Time:  9/21/2018  Attending MD:  Melanie Aguila,*     Data  Chart reviewed, discussed with interdisciplinary team.   Patient was admitted for: fall at the group home.     Assessment   Full assessment completed in previous note    Coordination of Care and Referrals: Provided patient/family with options for coordination of transition back to .        Interventions:  RNCC contacted  this am to see if they were able to come in for both discharge ride and assessment of pts mobility to allow return safely to the Group home.  RNCC discussed PT notes and RN notes with the  as well as MD team update that the episodes may be related to her medications which are being adjusted and will need follow up in outpt psyc after discharge.   stated they will arrange time, but wanted to confirm that pt can infact walk up stairs.  RNCC relayed information to PT who will see pt.    9/25/2018 1000  RNCC received a call from pts brother (co-guardian of this pt) who was angry that md did not update him yesterday about pt progress, he stated frustration at lack of dx and that the mds have not \"fixed\" her in \"all the admissions she has had\".  RNCC explained the PT would see pt again today, but in previous experience with pt she preforms much better if  staff are present when they do their evals with her as they are able to direct pts behaviors well.  RNCC paged md to call brother for update today and to discuss discharge plan with him.      9/25/2018 11:00 AM Per PT pt was able to follow commands, walks with SBA and was able to walk up stairs necessary to return home safely.  PT contacted the brother to relay how well pt was able ambulate and preform stairs.  Per Antoine in PT pts brother was much happier to have the updates and see this this pt has improved since Sunday.  RNCC left VM for Ely at  to return call    9/25/2018 11:35 AM Ely at  returned call, " asking for RNCC to set up medivan transport.  RNCC declined this as pt has behaviors and it would be in the best interest of this pt to have staff with her for discharge ride home and using her usual route of transportation.  Staff states that for other pts they have had medivan transport  Discussed in depth with  RN, she will reconnect with their staff to see if discharge today can be arranged.  If this is not available pt will need to remain in pt over night for one night and  will pick pt up in the am tomorrow.  RNCC will fax AVS as soon as it is available to [phone].      9/25/2018 11:39 AM Per md team pts brother requested visit by neurology prior to discharge. Gold 1 attending relayed speaking to neuro and that they confirmed they will see pt today.  RNCC received return call from the  and MD updated now that pt will be held over night and Edith  will be here tomorrow at 1030 am to transport her home with their staff, RNCC to fax avs once it is complete to the .        Plan  Anticipated Discharge Date:  9/26/18   Anticipated Discharge Plan:  Discontinue back to  with out pt therapy and follow in out pt clinics per AVS.      Dilia Acharya, MITCH, BSN    [facility]    Medicine Group  [address] [place] [id number]    [email or web address]  [email or web address]    Office: [phone] Pager: [phone]  To contact weekend RNCC, dial * [phone] and enter pager number [phone] at prompt. This pager can not be contacted by text page or outside line.

## 2018-09-25 NOTE — PLAN OF CARE
Problem: Patient Care Overview  Goal: Plan of Care/Patient Progress Review  Outcome: No Change  Pt is alert to self. Restless and yelling at the start of shift, was calm and cooperative most of the time overngiht. Denies pain. Incontinent of urine, turned and repositioned with check and change of pad done. On continuous tele monitor. No seizures noted overnight. Possible discharge today. Will continue to monitor and follow plan of care

## 2018-09-25 NOTE — PLAN OF CARE
Problem: Patient Care Overview  Goal: Plan of Care/Patient Progress Review  Discharge Planner PT  PT 5A  Patient plan for discharge: not discussed with pt. POA expects return to group home.  Current status: Pt transferred sit to stand with hand-hold assist of 1. Pt ambulated with minimal assist/SBA of 1. Assist was needed for directional cues, verbal and manual, as pt distracts easily. She ambulates with slow pace, without an assistive device, and with hand-hold of 1 ~ 40'. Pt completed ascending/descending stairs with SBA of 1 and 1 handrail on the right. Pt completed 6 steps, one step at a time. Balance was good on stairs and level surface. MD requested that I contact pt's POA and brother, Abdon. The call was completed and Abdon was updated on the pt's progress and was satisfied that she is improving. Abdon requested that I contact the pt's group home to update the RN. I spoke with Ely Dominguez at  [facility] and updated her on pt's ambulatory and stair ability. She will discuss this information with her team.  Barriers to return to prior living situation: Medical condition  Recommendations for discharge: Return to group home. If needed, home health services could provide assistance with her transition back to the group home.  PT could assist with training staff with pt's ambulation.  Rationale for recommendations: Pt is at or near her prior level of function and is safe to ambulate with the assist of 1 person.  services could be utilized to assist with transition and staff training for ambulatory activity.       Entered by: Bharat Mora 09/25/2018 1:03 PM

## 2018-09-26 PROCEDURE — A9270 NON-COVERED ITEM OR SERVICE: HCPCS | Mod: GY | Performed by: INTERNAL MEDICINE

## 2018-09-26 PROCEDURE — 25000132 ZZH RX MED GY IP 250 OP 250 PS 637: Mod: GY | Performed by: STUDENT IN AN ORGANIZED HEALTH CARE EDUCATION/TRAINING PROGRAM

## 2018-09-26 PROCEDURE — 25000132 ZZH RX MED GY IP 250 OP 250 PS 637: Mod: GY | Performed by: INTERNAL MEDICINE

## 2018-09-26 PROCEDURE — A9270 NON-COVERED ITEM OR SERVICE: HCPCS | Mod: GY | Performed by: HOSPITALIST

## 2018-09-26 PROCEDURE — 25000132 ZZH RX MED GY IP 250 OP 250 PS 637: Mod: GY | Performed by: HOSPITALIST

## 2018-09-26 PROCEDURE — 99239 HOSP IP/OBS DSCHRG MGMT >30: CPT | Performed by: HOSPITALIST

## 2018-09-26 PROCEDURE — A9270 NON-COVERED ITEM OR SERVICE: HCPCS | Mod: GY | Performed by: STUDENT IN AN ORGANIZED HEALTH CARE EDUCATION/TRAINING PROGRAM

## 2018-09-26 RX ORDER — RISPERIDONE 2 MG/1
2 TABLET ORAL 2 TIMES DAILY
Qty: 90 TABLET | Refills: 0 | Status: SHIPPED | OUTPATIENT
Start: 2018-09-26 | End: 2020-04-08

## 2018-09-26 RX ORDER — RISPERIDONE 2 MG/1
2 TABLET ORAL 2 TIMES DAILY
Qty: 90 TABLET | Refills: 0 | Status: SHIPPED | OUTPATIENT
Start: 2018-09-26 | End: 2018-09-26

## 2018-09-26 RX ORDER — LEVETIRACETAM 1000 MG/1
1500 TABLET ORAL 2 TIMES DAILY
Qty: 60 TABLET | Refills: 11 | Status: SHIPPED | OUTPATIENT
Start: 2018-09-26 | End: 2019-03-29

## 2018-09-26 RX ORDER — POLYVINYL ALCOHOL 14 MG/ML
2-3 SOLUTION/ DROPS OPHTHALMIC PRN
COMMUNITY

## 2018-09-26 RX ORDER — LEVETIRACETAM 1000 MG/1
1500 TABLET ORAL 2 TIMES DAILY
Qty: 60 TABLET | Refills: 11 | Status: SHIPPED | OUTPATIENT
Start: 2018-09-26 | End: 2018-09-26

## 2018-09-26 RX ADMIN — DOCUSATE SODIUM 100 MG: 100 CAPSULE, LIQUID FILLED ORAL at 08:31

## 2018-09-26 RX ADMIN — LEVETIRACETAM 1500 MG: 500 TABLET, FILM COATED ORAL at 08:31

## 2018-09-26 RX ADMIN — TRAZODONE HYDROCHLORIDE 50 MG: 50 TABLET ORAL at 08:31

## 2018-09-26 RX ADMIN — RISPERIDONE 2 MG: 2 TABLET ORAL at 08:31

## 2018-09-26 RX ADMIN — LACOSAMIDE 200 MG: 200 TABLET, FILM COATED ORAL at 08:31

## 2018-09-26 RX ADMIN — OXCARBAZEPINE 600 MG: 600 TABLET, FILM COATED ORAL at 08:31

## 2018-09-26 RX ADMIN — LAMOTRIGINE 200 MG: 100 TABLET ORAL at 08:31

## 2018-09-26 ASSESSMENT — ACTIVITIES OF DAILY LIVING (ADL)
ADLS_ACUITY_SCORE: 20
ADLS_ACUITY_SCORE: 20
ADLS_ACUITY_SCORE: 24

## 2018-09-26 NOTE — DISCHARGE SUMMARY
[facility], [place]    Internal Medicine Discharge Summary- Gold Service    Date of Admission:  9/21/2018  Date of Discharge:  9/26/2018 11:02 AM  Discharging Provider: Archie Corona  Discharge Team: Gold 1    Follow-ups Needed After Discharge   1. Her Keppra has been increased. Will need a level in 2 weeks. High levels can cause encephalopathy and thrombocytopenia  2. F/u with general neurology to investigate weakness and falls   3. F/u with heme-onc for low platelets  4. If her behaviors worsen, consider other medications than risperidone as this med might be causing extrapyramidal symptoms leading to falls    Hospital Course   Nella Helms is a 42 year old female admitted on 9/21/2018. She has a history of cognitive delay, left sided hemiplegia, severe epilepsy, cortical blindness, ITP, chronic gait instability, who was admitted with after a fall from her group home    1. Acute on Chronic Gait Instability and Falls:  -Patient has had recent admissions in May and June for weakness and falls. Came in with worsening weakness and falls this time as well. CT head on this admission had stable changes of enlarged ventricles with parietal lobe atrophy.  She was in the past found to have improved with a lower dose of risperidone.  So this time, her risperidone was decreased to 2 mg twice daily.  - She was seen by physical therapy in house.  She made improvement with walking with 1 person assist and climbing stairs upon the day of discharge.  -She was seen by neurology as well.  Brother was concerned that she could have a neuromuscular disorder.  At this time, per neurology, given she is improved with decreasing dose of risperidone, most likely etiology is drug related.  -They recommend to follow-up with general neurology as an outpatient to workup this issue further.  I discussed with the brother that he could have other etiologies like PMR, neuropathy etc however, given  improvement in her symptoms and strength, most likely this is related to polypharmacy.  - The only trouble I foresee is that with the decreased dose of risperidone, her behaviors would worsen and they would have to go up on the dose of risperidone.  I called her psychiatrist Dr. Campuzano office to discuss her case.  I left a message along with my cell phone and have not received a call back.  In case her behaviors worsen, would be ideal to try a different agent other than risperidone.    2.  Generalized tonic-clonic seizures, breakthrough seizures with history of epilepsy  - She had 2 episodes of seizures in-house  - She was seen by neurology, and had video EEG.  - Her Keppra has been increased to 1500 mg twice daily.  - Continue on lamotrigine and lacosamide.  - She will need to follow-up with outpatient epileptic neurologist for this with a follow-up Keppra level to monitor drug levels.  As Keppra supratherapeutic range can result in thrombocytopenia and encephalopathy and falls as well.    3. Her behavioral issues were relatively well controlled while she was in the hospital without any additional medication.    4.  ITP history  - Platelets have been stable at 112 during this admission.  She was seen by oncology as an outpatient in May 2018.  The were unclear if this was ITP or related to her seizure medications.  At this point they recommend to continue monitoring without any medication.  - She would benefit from outpatient [facility] follow-up.  Discussed with brother coordinated outpatient care and this discharge summary is being forwarded to Dr. Joiner as well    Recent hospital stays PTA  Feb 2018- Admission for ATP - Rxed with steroids with taper to March June 2018- Admission for weakness, hypotension, thought to be due to adrenal insufficiency, improved with steroids,DC'ed off of steroids  June 2018- Seen as OP by Heme who stated, unclear if it was ITP vs related to meds  In May 2018- Seen by PCP for  "falls at home, at that time thought to be due to Risperidone. Apparently something simialr happened in Feb with improvement after discontinue of risperidone  Seen most recently by psychiatrist in Sep 17 2018 who gave kept her trazadone and risperidone at 3mg BID    Consultations This Hospital Stay   NEUROLOGY GENERAL ADULT IP CONSULT  PHYSICAL THERAPY ADULT IP CONSULT  OCCUPATIONAL THERAPY ADULT IP CONSULT     Code Status   Full Code    Time Spent on this Encounter   I, Archie Corona, personally saw the patient today and spent greater than 30 minutes discharging this patient.       Archie Corona MD  Internal Medicine Staff Hospitalist Service  [facility]  Pager: *[phone]  ______________________________________________________________________    Physical Exam   Vital Signs: Temp: 97.1  F (36.2  C) Temp src: Oral BP: (!) 155/95 Pulse: 84   Resp: 18 SpO2: 97 % O2 Device: None (Room air)    Weight: 160 lbs 1.6 oz  Gen: alert, sitting up in bed, answering some questions appropriately, sometimes unintelligible. Mostly just says \"hi\" and \"ouch\"  HEENT: Normocephalic/atraumatic, sclera clear, oropharynx with moist mucous membranes,Left eye is closed, Right eye pupils do not respond to light  Resp: Clear bilaterally, easy work of breathing on room air  CV: Regular rate and rhythm, no murmurs noted   Abd: obese, soft, non-tender, nondistended  Ext: no lower extremity edema, warm and well-perfused with brisk capillary refill   Neuro: Alert, answering simple questions, not oriented, generally not following commands for strength testing but moving R > L side, Left upper limb with contracture      Significant Results and Procedures   Results for orders placed or performed during the hospital encounter of 09/21/18   CT Head w/o Contrast    Narrative    CT HEAD W/O CONTRAST 9/22/2018 4:03 PM    Provided History: frequent falls    Comparison: 2/16/2018.    Technique: Using multidetector " thin collimation helical acquisition  technique, axial, coronal and sagittal CT images from the skull base  to the vertex were obtained without intravenous contrast.     Findings:    No intracranial hemorrhage, mass effect, or midline shift. Stable  appearing enlargement of the posterior horns of lateral ventricles  with associated severe thinning of the bilateral parietal cortex. The  gray to white matter differentiation of the cerebral hemispheres is  preserved. The basal cisterns are patent.    Mucosal thickening of the left maxillary sinus. Remaining paranasal  sinuses are clear. Mastoid air cells are clear.       Impression    Impression:   1. No acute intracranial pathology.  2. Stable appearing enlargement of the posterior horns of lateral  ventricles bilaterally with associated biparietal lobe atrophy.    I have personally reviewed the examination and initial interpretation  and I agree with the findings.    TONY FALK MD            Primary Care Physician   Bryan Fritsch    Discharge Disposition   Discharged to assisted living  Condition at discharge: Stable    Discharge Orders     Reason for your hospital stay   Falls, most likely due to medication     Follow Up and recommended labs and tests   Follow up with primary care provider, Bryan Fritsch, within 7 days to evaluate medication change.  No follow up labs or test are needed.    Follow-up needed with general neurology in 2-4 weeks (patient does have an outpatient neurologist for seizures, but she needs to be [facility] at [facility] for general neurology to evaluate for weakness and falls)    Follow-up with Psychiatrist in 2 weeks to note change in meds     Activity   Your activity upon discharge: activity as tolerated     Diet   Follow this diet upon discharge: Orders Placed This Encounter     Combination Diet Regular Diet Adult       Discharge Medications   Discharge Medication List as of 9/26/2018  9:58 AM      CONTINUE these medications which have CHANGED     Details   levETIRAcetam 1000 MG TABS Take 1,500 mg by mouth 2 times daily, Disp-60 tablet, R-11, E-Prescribe      risperiDONE (RISPERDAL) 2 MG tablet Take 1 tablet (2 mg) by mouth 2 times daily, Disp-90 tablet, R-0, E-Prescribe         CONTINUE these medications which have NOT CHANGED    Details   diazepam (DIAZEPAM INTENSOL) 5 MG/ML (HIGH CONC) solution (1)ML (5MG) AS NEEDED FOR ANY SEIZURE. WHEN ADMINISTERED NOTIFY PC, RN & PD., Disp-120 mL, R-3, Local Print      docusate sodium (COLACE) 100 MG capsule Take by mouth 2 times daily, Historical      lacosamide (VIMPAT) 200 MG TABS tablet Take 1 tablet (200 mg) by mouth 2 times daily, Disp-60 tablet, R-5, Local Print      lamoTRIgine (LAMICTAL) 200 MG tablet Take 1 tablet (200 mg) by mouth 2 times daily, Disp-60 tablet, R-5, E-Prescribe      Multiple Vitamin (MULTIVITAMINS PO) Take by mouth daily, Historical      OXcarbazepine (TRILEPTAL) 600 MG tablet Take 1 tablet (600 mg) by mouth 2 times daily, Disp-60 tablet, R-11, E-Prescribe      polyvinyl alcohol (LIQUITEARS) 1.4 % ophthalmic solution 2-3 drops as needed for dry eyes, Historical      traZODone (DESYREL) 50 MG tablet 1 tablet (50 mg) q 8am. 1 tablet (50 mg) q 4 pm,  3 tablets (150 mg) 8pm, Disp-90 tablet, R-0, E-Prescribe      VITAMIN E 2 times daily, Historical         STOP taking these medications       Tetrahydrozoline-Zn Sulfate (EYE DROPS AR OP) Comments:   Reason for Stopping:             Allergies   Allergies   Allergen Reactions     Cefzil [Cefprozil]      Codeine Phosphate      Dilantin [Phenytoin]      Penicillins

## 2018-09-26 NOTE — PROGRESS NOTES
Brief Neurology update    I spoke to patient's brother (Abdon) about concerns regarding patient's falls and gait imbalance. This has been evaluated by inpatient neurology several times; etiology is unclear but differential may include parkinsonism/postural instability from antipsychotics, deconditioning, polypharmacy, peripheral neuropathy, or other. Today on exam she is able to ambulate with assist, her gait is slightly shuffling and broad-based, it is difficult to assess strength due to inability to follow commands but she can support her own weight and all limbs move spontaneously against gravity, tone in increased in arms and normal in legs, reflexes are slightly brisk. Per PT, her walking has been improving over the last few days. Her brother is concerned for 'neuromuscular disease' but I don't appreciate this based on my clinical evaluation today. Overall, it is re-assuring that her gait has improved over the last few days. I recommended that patient should f/u with General Neurology clinic for ongoing evaluation of falls and abnormal gait.     Neurology service will sign off. Please call with questions.    Eboni Barnes  [facility] Neurology

## 2018-09-26 NOTE — PLAN OF CARE
Problem: Patient Care Overview  Goal: Plan of Care/Patient Progress Review  Physical Therapy Discharge Summary    Reason for therapy discharge:    Discharged to home.    Progress towards therapy goal(s). See goals on Care Plan in [facility] electronic health record for goal details.  Goals partially met.  Barriers to achieving goals:   discharge from facility.    Therapy recommendation(s):    Continued therapy is recommended.  Rationale/Recommendations:  Pt is at or near her prior level of function and is safe to ambulate with the assist of 1 person.  services could be utilized to assist with transition and staff training for ambulatory activity..

## 2018-09-26 NOTE — PLAN OF CARE
Problem: Patient Care Overview  Goal: Plan of Care/Patient Progress Review  Outcome: No Change  Reason for Admission: Breakthrough seizures and weakness.   PMHx: Cognitive delay, OCD, severe epilepsy, cortical blindness, chronic gait instability.      Vitals: VSS on RA.   Activity: Up Ax1 d/t gait instability. Bed alarm on for safety and impulsivity. Gets out of bed without difficulty. Up to BSC.  Pain: Denies pain this shift.   Neuro: Needs frequent orientation. No seizure activity this shift. Difficult to assess strength d/t inability to follow commands. Eyes dysconugate. PERRLA. Pt yelling out frequently this shift and an episode of crying. Easily redirected.   Cardiac: BPs slightly elevated. No acute changes this shift.   Respiratory: Stable on RA.   GI/: Frequent toileting required as pt will hold urine for extended periods of time. No BM this shift. Receiving scheduled docusate.   Diet: Regular diet. Food preferences noted on whiteboard in pt room. Pt prefers finger foods. Meals ordered for pt by staff. Good appetite. Prefers pills crushed in applesauce, but is able swallow pills without difficulty.   Skin: Bruise to L shin.   LDAs: R PIV SL.      Plan: Discharge back to group home in AM. Ride set up for 1030 in AM via [facility] and their staff.  Will continue to monitor and follow POC.

## 2018-09-26 NOTE — PLAN OF CARE
Problem: Patient Care Overview  Goal: Plan of Care/Patient Progress Review  Outcome: No Change  DISCHARGE                         9/26/2018 1030 am   ----------------------------------------------------------------------------  Discharged to:  Group Home  Via: private transportation  Accompanied by: Group Home Staff  Discharge Instructions: Regular diet, activity as tolerated, medications, follow up appointments, when to call the MD, aftercare instructions.  Prescriptions: To be filled by [facility]; medication list reviewed & sent with pt  Follow Up Appointments: arranged; information given  Belongings: All sent with pt  IV: d/c'd  Pt exhibits understanding of above discharge instructions; all questions answered.    Discharge Paperwork: Signed, copied, and sent home with patient.       Problem: Seizure Disorder/Epilepsy (Adult)  Goal: Signs and Symptoms of Listed Potential Problems Will be Absent, Minimized or Managed (Seizure Disorder/Epilepsy)  Signs and symptoms of listed potential problems will be absent, minimized or managed by discharge/transition of care (reference Seizure Disorder/Epilepsy (Adult) CPG).   Outcome: No Change   09/26/18 1013   Seizure Disorder/Epilepsy   Problems Assessed (Seizure Disorder/Epilepsy) all   Problems Present (Seizure/Epilepsy) situational response

## 2018-10-04 NOTE — PROCEDURES
Procedure Date: 09/23/2018      EEG #:  -2        DATE OF RECORDING/SERVICE DATE:  09/23/2018      TYPE OF STUDY:  Video EEG day 2.       DURATION OF STUDY:  16 hours and 50 minutes.       PATIENT INFORMATION:  A 42-year-old female with history of epilepsy and developmental delay, admitted for recurrent falls.  EEG is being done to evaluate for seizures.       MEDICATIONS:   1.  Vimpat 200 mg twice a day   2.  Lamictal 200 mg twice a day.   3.  Keppra 1500 mg twice a day.   4.  Trileptal 600 mg twice a day.     5.  Risperdal 3 mg twice a day.   6.  Trazodone.      TECHNICAL SUMMARY: This video EEG monitoring procedure was performed with 23 scalp electrodes in 10-20 system placements, and additional scalp, precordial and other surface electrodes used for electrical referencing and artifact detection. Video was reviewed intermittently by EEG technologist and physician for electroclinical seizures.     BACKGROUND:  The patient has a disorganized background.  When the patient is fully awake, there is a 7-8 Hz parieto-occipital rhythm.  There is diffuse generalized delta-theta slowing, at times there is sharply contoured delta slowing and bursts of runs of up to 7 Hz sharply contoured delta activity.  A good example is at 10:03:59 and this is seen throughout the record.  Another example is at 00:00:15.  The patient does fall asleep and again at that time well-formed sleep architecture is not seen.      ACTIVATION PROCEDURES:  Photic stimulation and hyperventilation were not done.  The patient was asked to follow commands and she was able to squeeze hands, open and close eyes and move feet.      EPILEPTIFORM DISCHARGES:  The patient has slow generalized spike-wave discharges that are 1-1.5 Hz in frequency.  A good example is at 00:08:12.  There is a diffuse generalized spike with a slow after-going slow wave.  In other instances, the patient does have bursts of higher voltage theta activity and this is seen  throughout the entire record.  Now this may represent an encephalopathic state or faster frequency generalized epileptiform discharges.      ICTAL:  None.      IMPRESSION:  Video EEG day 2 is abnormal due to presence of generalized epileptiform discharges and diffuse generalized delta-theta slowing.  These findings are consistent with a severe encephalopathy with increased diffuse cortical irritability.  No electrographic seizures were seen.      SUMMARY OF 2 DAYS OF VIDEO EEG RECORDING:  The patient had atypical generalized epileptiform discharges that were 1 Hz to at times 7 Hz in frequency.  EEG is consistent with a diagnosis of generalized epilepsy and moderate encephalopathy.  No electrographic seizures were seen.         DAVON SHARP MD             D: 10/04/2018   T: 10/04/2018   MT: NTS      Name:     JOHANNA SIMONS   MRN:      [id number]        Account:        [id number]   :      1975           Procedure Date: 2018      Document: [id number]

## 2018-10-11 ENCOUNTER — OFFICE VISIT (OUTPATIENT)
Dept: NEUROLOGY | Facility: CLINIC | Age: 43
End: 2018-10-11
Payer: MEDICARE

## 2018-10-11 VITALS — SYSTOLIC BLOOD PRESSURE: 163 MMHG | HEART RATE: 79 BPM | DIASTOLIC BLOOD PRESSURE: 90 MMHG

## 2018-10-11 DIAGNOSIS — R26.9 GAIT DISORDER: Primary | ICD-10-CM

## 2018-10-11 PROBLEM — F29 ATYPICAL PSYCHOSIS (H): Status: ACTIVE | Noted: 2018-05-09

## 2018-10-11 PROBLEM — Z78.9 LIVES IN GROUP HOME: Status: ACTIVE | Noted: 2018-07-16

## 2018-10-11 PROBLEM — H47.619 CORTICAL BLINDNESS: Status: ACTIVE | Noted: 2017-01-04

## 2018-10-11 PROBLEM — H50.15 ALTERNATING EXOTROPIA: Status: ACTIVE | Noted: 2017-01-04

## 2018-10-11 PROBLEM — R26.2 IMPAIRED AMBULATION: Status: ACTIVE | Noted: 2018-09-21

## 2018-10-11 RX ORDER — BENZOCAINE/MENTHOL 6 MG-10 MG
LOZENGE MUCOUS MEMBRANE 2 TIMES DAILY PRN
COMMUNITY

## 2018-10-11 RX ORDER — MULTIPLE VITAMINS W/ MINERALS TAB 9MG-400MCG
1 TAB ORAL DAILY
COMMUNITY

## 2018-10-11 RX ORDER — LEVETIRACETAM 500 MG/1
500 TABLET ORAL 2 TIMES DAILY
COMMUNITY
End: 2019-03-29

## 2018-10-11 ASSESSMENT — ENCOUNTER SYMPTOMS
SEIZURES: 1
FEVER: 0
HALLUCINATIONS: 0
MEMORY LOSS: 0
NUMBNESS: 0
POLYDIPSIA: 0
TINGLING: 0
DECREASED APPETITE: 0
WEIGHT LOSS: 0
POLYPHAGIA: 0
HEADACHES: 0
DIZZINESS: 1
INCREASED ENERGY: 0
PARALYSIS: 0
WEAKNESS: 1
NIGHT SWEATS: 0
LOSS OF CONSCIOUSNESS: 0
CHILLS: 0
WEIGHT GAIN: 0
TREMORS: 0
BRUISES/BLEEDS EASILY: 0
FATIGUE: 1
SWOLLEN GLANDS: 0
DISTURBANCES IN COORDINATION: 1
SPEECH CHANGE: 1
ALTERED TEMPERATURE REGULATION: 0

## 2018-10-11 ASSESSMENT — PAIN SCALES - GENERAL: PAINLEVEL: NO PAIN (1)

## 2018-10-11 NOTE — LETTER
10/11/2018       RE: Nella Helms  [address] [place] [id number]     Dear Colleague,    Thank you for referring your patient, Nella Helms, to the [facility] NEUROLOGY at [facility]. Please see a copy of my visit note below.    Service Date: 10/11/2018      REASON FOR VISIT:  This patient is a 42-year-old right-handed woman seen for evaluation of gait disorder.        HISTORY OF PRESENT ILLNESS:  She has a complex history.  She is here with her brother, Abdon, and Alina, who runs the group home where the patient lives.  Alina has known the patient for a long time.  The patient has had 3 episodes this year of marked impairment of gait.  She had her first such episode in February.  Her baseline is that she has a left hemiparesis.  She suffered a birth injury.  She has longstanding epilepsy as well.  She walks independently.  She tends to shuffle when she walks.  She has markedly impaired vision with cortical blindness.  She can only see close in front of her.  When she walks she is very careful so she does not fall.  However, in a familiar environment she goes up and down stairs holding onto a banister.  She has been on a complex regimen of medication as well.  In any case, in February she was admitted with a low platelet count and marked weakness.  The symptoms probably began in December.  She started to require 2-person assistance to walk.  Gradually her gait deteriorated.  She ended up in a wheelchair.  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  She was treated with steroids.  Her anticonvulsant medications were adjusted.  She had been on Depakote and that was stopped.  Felbatol was stopped.  Keppra and Vimpat were added to her regimen.  She was treated with steroids.  Her situation improved.  She eventually got back to baseline.  Then, in June she had a similar problem.  Again, she was hospitalized.  She was found to be suffering from  hypothermia as well as low-sodium.  There was concern about addisonian crisis.  Again, she was treated.  Her risperidone dose was reduced.  It had been reduced in February as well.  She was treated again with steroids.  After a couple of weeks she got back to baseline and was back in her group home ambulating as usual.  Then in August, the symptoms started up again.  She had not had any more medication changes.  She began to fall.  She was hospitalized at [facility].  Risperidone dose was reduced to 4 mg.  Within 48 hours, she was back to baseline.  She was going up and down stairs and walking with one person assist.  The symptom is mainly a weakness in her legs.  She has no problem with bowel or bladder control.  Her arms seem to be fairly strong during these episodes.  On one occasion was noted that her arms were weak.      The question is whether or not there is some additional neurologic issue that is affecting the patient's gait.      PAST MEDICAL HISTORY:   Significant for longstanding epileptic disorder.  She also had to have corrective eye surgery for strabismus.  She has had a chronic left ptosis.        CURRENT MEDICATIONS:   Her medication regimen includes:   1.  Risperidone 2 mg twice a day.     2.  Oxcarbazepine 600 mg 2 times daily.   3.  Levetiracetam 1500 mg 2 times daily.   4.  Lamotrigine 200 mg 2 times daily.   5.  Lacosamide 200 mg 2 times daily.        She is on multivitamin.        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  She has not had surgery or trauma to the head or neck as far as can be determined.  In addition to the risperidone, she is on trazodone 250 mg daily total dose.      FAMILY HISTORY:   Positive for diabetes in her father.        PHYSICAL EXAMINATION:    GENERAL:  The patient is cooperative and in no distress.  She does in interject herself in a conversation and is very pleasant.      VITAL SIGNS:   Her blood pressure is 163/90.   NEUROLOGIC:  I could not detect carotid bruits.  Auscultation of the heart shows S1 and S2.  She could count fingers in front of her face.  Visual fields could otherwise not be assessed.  Funduscopic exam could not be adequately performed because she closes her eyes and is looking around.  She does have disconjugate gaze, especially with the left eye and this seems to be longstanding.  Pupil responses could not be assessed.  Facies are symmetric.  Tongue protrudes in the midline and palate elevates in the midline.  She does have a history of a right Bell's palsy.  Motor evaluation shows a chronic left hemiparesis with cortical thumb posture on the left.  She has fairly good  on the right.  There is no obvious ataxia on movement of the limbs.  Muscle stretch reflexes are present and there is a tendency for them to be brisk on the left compared to the right.  Both toes are upgoing.  Sensory exam could not be adequately performed.  She gets on and off the exam table.  She ambulates without assistance.      She has had extensive testing over the last several months.  She had a CT scan of the brain performed in February and again in September.  She has stable appearing enlargement of the posterior horns of the lateral ventricles with associated biparietal lobe atrophy.  In June she had imaging of the brain and cervical spine with CT scan at [facility].  The CT showed no fracture or subluxation.  She also has had laboratory testing performed.  A CK level in September was 168.  In February, she had a vitamin B12 level checked.  It was 1100.  Folic acid and copper both normal.  TSH normal.  Glucose levels have all been unremarkable.      ASSESSMENT:   1.  Recurring episodes of gait disorder and leg weakness.   2.  Chronic encephalopathy.   3.  Chronic left hemiparesis.   4.  Generalized seizure disorder on multiple anticonvulsants.   5.  Psychiatric issues  with impulse control disorder.      DISCUSSION:  The patient is seen with the above problem list.  The main issue has to do with the recurring problems of leg weakness and gait disorder.  In February and , the symptoms were associated with a medical illness.  I suspect that she is marginally compensated in terms of her gait and the slightest additional stress makes it impossible for her to walk.  In February it was the ITP and in  it was the hypothermia and hyponatremia.  It is less clear what happened in September.  However, reducing the risperidone to its current dose of 4 mg resulted in significant improvement in her gait within 2 days.  It could have been a risperidone affect.  I am not going to proceed with any additional testing or workup at this time.  She is functioning at her baseline.  I think her neurologic status should be carefully monitored.  If she begins to deteriorate again, consideration should be given to tapering off the risperidone and trying some other agent to control the behaviors.  I encouraged the patient's brother to call me if he notices a deterioration in her neurologic status.  Followup will be on an as needed basis.        D: 10/11/2018   T: 10/11/2018   MT: MAYKEL      Name:     JOHANNA SIMONS   MRN:      [id number]        Account:      [id number]   :      1975           Service Date: 10/11/2018      Document: [id number]       Again, thank you for allowing me to participate in the care of your patient.      Sincerely,    Cuate Liu MD

## 2018-10-11 NOTE — MR AVS SNAPSHOT
After Visit Summary   10/11/2018    Nella Helms    MRN: [id number]           Patient Information     Date Of Birth          1975        Visit Information        Provider Department      10/11/2018 11:00 AM Cuate Liu MD [facility] Neurology        Today's Diagnoses     Gait disorder    -  1       Follow-ups after your visit        Follow-up notes from your care team     Return if symptoms worsen or fail to improve.      Your next 10 appointments already scheduled     Dec 18, 2018  2:30 PM CST   Return Visit with Elysia Gomez MD   [facility] ([facility])    [address], [address]  [facility] [address] [phone]              Who to contact     Please call your clinic at [phone] to:    Ask questions about your health    Make or cancel appointments    Discuss your medicines    Learn about your test results    Speak to your doctor            Additional Information About Your Visit        MyChart Information     gdgtt is an electronic gateway that provides easy, online access to your medical records. With [employer], you can request a clinic appointment, read your test results, renew a prescription or communicate with your care team.     To sign up for gdgtt visit the website at [email or web address]   You will be asked to enter the access code listed below, as well as some personal information. Please follow the directions to create your username and password.     Your access code is: [email or web address]  Expires: 2018  3:54 PM     Your access code will  in 90 days. If you need help or a new code, please contact your [facility] or call [phone] for assistance.        Care EveryWhere ID     This is your Care EveryWhere ID. This could be used by other organizations to access your [place] medical records  [id number]        Your Vitals Were     Pulse                   79            Blood Pressure  from Last 3 Encounters:   10/11/18 163/90   09/25/18 (!) 155/95   06/20/18 138/71    Weight from Last 3 Encounters:   09/21/18 72.6 kg (160 lb 1.6 oz)   06/19/18 71.6 kg (157 lb 14.4 oz)   04/17/18 71.2 kg (157 lb)              Today, you had the following     No orders found for display         Today's Medication Changes          These changes are accurate as of 10/11/18 11:33 AM.  If you have any questions, ask your nurse or doctor.               These medicines have changed or have updated prescriptions.        Dose/Directions    * levETIRAcetam 500 MG tablet   Commonly known as:  KEPPRA   This may have changed:  Another medication with the same name was changed. Make sure you understand how and when to take each.        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 Read the directions carefully, and ask your doctor or other care provider to review them with you.             Primary Care Provider Office Phone # Fax #    Bryan Fritsch [phone]       [address] [address]        Equal Access to Services     JE BROCK AH: Hadii arslan ribera hadasho Soomaali, waaxda luqadaha, qaybta kaalmada adeegyada, waxay brian gunn ademiryam sparks . So [facility] [phone].    ATENCIÓN: Si habla pamela, tiene a singh disposición servicios gratuitos de asistencia lingüística. Llame al [phone].    We comply with applicable federal civil rights laws and [place] laws. We do not discriminate on the basis of race, color, national origin, age, disability, sex, sexual orientation, or gender identity.             Thank you!     Thank you for choosing [facility] NEUROLOGY  for your care. Our goal is always to provide you with excellent care. Hearing back from our patients is one way we can continue to improve our services. Please take a few minutes to complete the written survey that you may receive in the mail after your visit with us. Thank you!             Your Updated Medication List - Protect others around you: Learn how to safely use, store and throw away your medicines at www.disposemymeds.org.          This list is accurate as of 10/11/18 11:33 AM.  Always use your most recent med list.                   Brand Name Dispense Instructions for use Diagnosis    COLACE 100 MG capsule   Generic drug:  docusate sodium      Take by mouth 2 times daily        diazepam 5 MG/ML (HIGH CONC) solution    DIAZEPAM INTENSOL    120 mL    (1)ML (5MG) AS NEEDED FOR ANY SEIZURE. WHEN ADMINISTERED NOTIFY PC, RN & PD.    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)       * levETIRAcetam 500 MG tablet    KEPPRA     Take 500 mg by mouth 2 times daily        * levETIRAcetam 1000 MG Tabs     60 tablet    Take 1,500 mg by mouth 2 times daily    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)       LIQUITEARS 1.4 % ophthalmic solution   Generic drug:  polyvinyl alcohol      2-3 drops as needed for dry eyes        multivitamin, therapeutic with minerals Tabs tablet      Take 1 tablet by mouth daily        MULTIVITAMINS PO      Take by mouth daily        OXcarbazepine 600 MG tablet     TRILEPTAL    60 tablet    Take 1 tablet (600 mg) by mouth 2 times daily    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 1 tablet (50 mg) q 4 pm,  3 tablets (150 mg) 8pm    Agitation       VITAMIN E      2 times daily        * Notice:  This list has 2 medication(s) that are the same as other medications prescribed for you. Read the directions carefully, and ask your doctor or other care provider to review them with you.

## 2018-10-11 NOTE — PROGRESS NOTES
Service Date: 10/11/2018      REASON FOR VISIT:  This patient is a 42-year-old right-handed woman seen for evaluation of gait disorder.        HISTORY OF PRESENT ILLNESS:  She has a complex history.  She is here with her brother, Abdon, and Alina, who runs the group home where the patient lives.  Alina has known the patient for a long time.  The patient has had 3 episodes this year of marked impairment of gait.  She had her first such episode in February.  Her baseline is that she has a left hemiparesis.  She suffered a birth injury.  She has longstanding epilepsy as well.  She walks independently.  She tends to shuffle when she walks.  She has markedly impaired vision with cortical blindness.  She can only see close in front of her.  When she walks she is very careful so she does not fall.  However, in a familiar environment she goes up and down stairs holding onto a banister.  She has been on a complex regimen of medication as well.  In any case, in February she was admitted with a low platelet count and marked weakness.  The symptoms probably began in December.  She started to require 2-person assistance to walk.  Gradually her gait deteriorated.  She ended up in a wheelchair.  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  She was treated with steroids.  Her anticonvulsant medications were adjusted.  She had been on Depakote and that was stopped.  Felbatol was stopped.  Keppra and Vimpat were added to her regimen.  She was treated with steroids.  Her situation improved.  She eventually got back to baseline.  Then, in June she had a similar problem.  Again, she was hospitalized.  She was found to be suffering from hypothermia as well as low-sodium.  There was concern about addisonian crisis.  Again, she was treated.  Her risperidone dose was reduced.  It had been reduced in February as well.  She was treated again with steroids.  After a couple of weeks she got back to baseline and was  back in her group home ambulating as usual.  Then in August, the symptoms started up again.  She had not had any more medication changes.  She began to fall.  She was hospitalized at [facility].  Risperidone dose was reduced to 4 mg.  Within 48 hours, she was back to baseline.  She was going up and down stairs and walking with one person assist.  The symptom is mainly a weakness in her legs.  She has no problem with bowel or bladder control.  Her arms seem to be fairly strong during these episodes.  On one occasion was noted that her arms were weak.      The question is whether or not there is some additional neurologic issue that is affecting the patient's gait.      PAST MEDICAL HISTORY:   Significant for longstanding epileptic disorder.  She also had to have corrective eye surgery for strabismus.  She has had a chronic left ptosis.        CURRENT MEDICATIONS:   Her medication regimen includes:   1.  Risperidone 2 mg twice a day.     2.  Oxcarbazepine 600 mg 2 times daily.   3.  Levetiracetam 1500 mg 2 times daily.   4.  Lamotrigine 200 mg 2 times daily.   5.  Lacosamide 200 mg 2 times daily.        She is on multivitamin.        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  She has not had surgery or trauma to the head or neck as far as can be determined.  In addition to the risperidone, she is on trazodone 250 mg daily total dose.      FAMILY HISTORY:   Positive for diabetes in her father.        PHYSICAL EXAMINATION:    GENERAL:  The patient is cooperative and in no distress.  She does in interject herself in a conversation and is very pleasant.     VITAL SIGNS:   Her blood pressure is 163/90.   NEUROLOGIC:  I could not detect carotid bruits.  Auscultation of the heart shows S1 and S2.  She could count fingers in front of her face.  Visual fields could otherwise not be assessed.  Funduscopic exam could not be  adequately performed because she closes her eyes and is looking around.  She does have disconjugate gaze, especially with the left eye and this seems to be longstanding.  Pupil responses could not be assessed.  Facies are symmetric.  Tongue protrudes in the midline and palate elevates in the midline.  She does have a history of a right Bell's palsy.  Motor evaluation shows a chronic left hemiparesis with cortical thumb posture on the left.  She has fairly good  on the right.  There is no obvious ataxia on movement of the limbs.  Muscle stretch reflexes are present and there is a tendency for them to be brisk on the left compared to the right.  Both toes are upgoing.  Sensory exam could not be adequately performed.  She gets on and off the exam table.  She ambulates without assistance.      She has had extensive testing over the last several months.  She had a CT scan of the brain performed in February and again in September.  She has stable appearing enlargement of the posterior horns of the lateral ventricles with associated biparietal lobe atrophy.  In June she had imaging of the brain and cervical spine with CT scan at [facility].  The CT showed no fracture or subluxation.  She also has had laboratory testing performed.  A CK level in September was 168.  In February, she had a vitamin B12 level checked.  It was 1100.  Folic acid and copper both normal.  TSH normal.  Glucose levels have all been unremarkable.      ASSESSMENT:   1.  Recurring episodes of gait disorder and leg weakness.   2.  Chronic encephalopathy.   3.  Chronic left hemiparesis.   4.  Generalized seizure disorder on multiple anticonvulsants.   5.  Psychiatric issues with impulse control disorder.      DISCUSSION:  The patient is seen with the above problem list.  The main issue has to do with the recurring problems of leg weakness and gait disorder.  In February and June, the symptoms were associated with a medical illness.  I  suspect that she is marginally compensated in terms of her gait and the slightest additional stress makes it impossible for her to walk.  In February it was the ITP and in  it was the hypothermia and hyponatremia.  It is less clear what happened in September.  However, reducing the risperidone to its current dose of 4 mg resulted in significant improvement in her gait within 2 days.  It could have been a risperidone affect.  I am not going to proceed with any additional testing or workup at this time.  She is functioning at her baseline.  I think her neurologic status should be carefully monitored.  If she begins to deteriorate again, consideration should be given to tapering off the risperidone and trying some other agent to control the behaviors.  I encouraged the patient's brother to call me if he notices a deterioration in her neurologic status.  Followup will be on an as needed basis.        MD PIOTR Hooks MD             D: 10/11/2018   T: 10/11/2018   MT: MAYKEL      Name:     JOHANNA SIMONS   MRN:      [id number]        Account:      [id number]   :      1975           Service Date: 10/11/2018      Document: [id number]

## 2018-10-11 NOTE — NURSING NOTE
Chief Complaint   Patient presents with     Consult     [facility] - HOSPITAL F/U FOR FALLS AND WEAKNESS       Esteban Cavazos, EMT

## 2018-11-02 DIAGNOSIS — G40.319 GENERALIZED CONVULSIVE EPILEPSY WITH INTRACTABLE EPILEPSY (H): Chronic | ICD-10-CM

## 2018-11-06 RX ORDER — LACOSAMIDE 200 MG/1
TABLET, FILM COATED ORAL
Qty: 60 TABLET | Refills: 0 | Status: SHIPPED | OUTPATIENT
Start: 2018-11-06 | End: 2018-11-29

## 2018-11-09 ENCOUNTER — OFFICE VISIT (OUTPATIENT)
Dept: NEUROLOGY | Facility: CLINIC | Age: 43
End: 2018-11-09
Payer: MEDICARE

## 2018-11-09 VITALS
RESPIRATION RATE: 16 BRPM | HEART RATE: 86 BPM | SYSTOLIC BLOOD PRESSURE: 147 MMHG | WEIGHT: 165.6 LBS | BODY MASS INDEX: 31.29 KG/M2 | DIASTOLIC BLOOD PRESSURE: 74 MMHG

## 2018-11-09 DIAGNOSIS — G40.319 GENERALIZED CONVULSIVE EPILEPSY WITH INTRACTABLE EPILEPSY (H): Primary | Chronic | ICD-10-CM

## 2018-11-09 RX ORDER — DIAZEPAM 10 MG/2G
GEL RECTAL
Qty: 1 EACH | Refills: 1 | Status: SHIPPED | OUTPATIENT
Start: 2018-11-09 | End: 2019-03-29

## 2018-11-09 ASSESSMENT — PAIN SCALES - GENERAL: PAINLEVEL: NO PAIN (0)

## 2018-11-09 NOTE — MR AVS SNAPSHOT
After Visit Summary   11/9/2018    Nella Helms    MRN: [id number]           Patient Information     Date Of Birth          1975        Visit Information        Provider Department      11/9/2018 8:30 AM Elysia Gomez MD [facility]         Follow-ups after your visit        Your next 10 appointments already scheduled     Dec 18, 2018  2:30 PM CST   Return Visit with MD [facility] ([facility])    [address], [address]  [facility] [address] [phone]              Who to contact     Please call your clinic at [phone] to:    Ask questions about your health    Make or cancel appointments    Discuss your medicines    Learn about your test results    Speak to your doctor            Additional Information About Your Visit        Care EveryWhere ID     This is your Care EveryWhere ID. This could be used by other organizations to access your [place] medical records  [id number]        Your Vitals Were     Pulse Respirations BMI (Body Mass Index)             86 16 31.29 kg/m2          Blood Pressure from Last 3 Encounters:   11/09/18 147/74   10/11/18 163/90   09/25/18 (!) 155/95    Weight from Last 3 Encounters:   11/09/18 165 lb 9.6 oz (75.1 kg)   09/21/18 160 lb 1.6 oz (72.6 kg)   06/19/18 157 lb 14.4 oz (71.6 kg)              Today, you had the following     No orders found for display         Today's Medication Changes          These changes are accurate as of 11/9/18  9:24 AM.  If you have any questions, ask your nurse or doctor.               These medicines have changed or have updated prescriptions.        Dose/Directions    * levETIRAcetam 500 MG tablet   Commonly known as:  KEPPRA   This may have changed:  Another medication with the same name was changed. Make sure you understand how and when to take each.        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 Read the directions carefully, and ask your doctor or other care provider to review them with you.             Primary Care Provider Office Phone # Fax #    Bryan Fritsch [phone]       [place] [id number]  [facility] [address]        Equal Access to Services     [facility]: Hadii aad ku hadasho Soomaali, waaxda luqadaha, qaybta kaalmada adeegyada, sahara sparks . So [facility] [phone].    ATENCIÓN: Si habla español, tiene a singh disposición servicios gratuitos de asistencia lingüística. [facility] [phone].    We comply with applicable federal civil rights laws and [place] laws. We do not discriminate on the basis of race, color, national origin, age, disability, sex, sexual orientation, or gender identity.            Thank you!     Thank you for choosing [facility]  for your care. Our goal is always to provide you with excellent care. Hearing back from our patients is one way we can continue to improve our services. Please take a few minutes to complete the written survey that you may receive in the mail after your visit with us. Thank you!             Your Updated Medication List - Protect others around you: Learn how to safely use, store and throw away your medicines at www.disposemymeds.org.          This list is accurate as of 11/9/18  9:24 AM.  Always use your most recent med list.                   Brand Name Dispense Instructions for use Diagnosis    COLACE 100 MG capsule   Generic drug:  docusate sodium      Take by mouth 2 times daily        diazepam 5 MG/ML (HIGH CONC) solution    DIAZEPAM INTENSOL    120 mL    (1)ML  (5MG) AS NEEDED FOR ANY SEIZURE. WHEN ADMINISTERED NOTIFY PC, RN & PD.    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)       LIQUITEARS 1.4 % ophthalmic solution   Generic drug:  polyvinyl alcohol      2-3 drops as needed for dry eyes        multivitamin, therapeutic with minerals Tabs tablet      Take 1 tablet by mouth daily        MULTIVITAMINS PO      Take by mouth daily        OXcarbazepine 600 MG tablet    TRILEPTAL    60 tablet    Take 1 tablet (600 mg) by mouth 2 times daily    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 1 tablet (50 mg) q 4 pm,  3 tablets (150 mg) 8pm    Agitation       VIMPAT 200 MG Tabs tablet   Generic drug:  lacosamide     60 tablet    TAKE (1) TABLET TWICE DAILY.    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 Read the directions carefully, and ask your doctor or other care provider to review them with you.

## 2018-11-09 NOTE — PROGRESS NOTES
INTERVAL HX:  Since last visit 4/17/18  had marked deterioration. Had  3  hospital admisions (6-11, 9-21 and 11-8 ) Records rerviewed for Hosp and outpt visits withpsych, internal med, & Dr Liu. After seeing me, she had major decompensation in behavior, and then weakness and difficulty ambulating. Since 9-21 has has 3 GTSz with lat night needed ER visit and hospt admission as sz did not respond to buccal diazepam. Taken out of hospital this AM to come to clinic.Since 9-21 her behavior has been stable and gait back to baseline. Caregiver for many years believes it may have been resperidol  that  caused issues, not ASDs as they have not been changed  .  Prior to Admission medications    Medication Sig Start Date End Date Taking? Authorizing Provider   diazepam (DIASTAT ACUDIAL) 10 MG GEL rectal kit 1o mg per rectum if one dose of diazepam buccal does not control sz. 11/9/18  Yes Elysia Gomez MD   diazepam (DIAZEPAM INTENSOL) 5 MG/ML (HIGH CONC) solution (1)ML (5MG) AS NEEDED FOR ANY SEIZURE. WHEN ADMINISTERED NOTIFY PC, RN & PD. 2/9/18  Yes Elysia Gomez MD   docusate sodium (COLACE) 100 MG capsule Take by mouth 2 times daily   Yes Reported, Patient   hydrocortisone (CORTAID) 1 % cream Apply topically 2 times daily as needed   Yes Reported, Patient   lamoTRIgine (LAMICTAL) 200 MG tablet Take 1 tablet (200 mg) by mouth 2 times daily 7/13/18  Yes Elysia Gomez MD   levETIRAcetam (KEPPRA) 500 MG tablet Take 500 mg by mouth 2 times daily   Yes Reported, Patient   levETIRAcetam 1000 MG TABS Take 1,500 mg by mouth 2 times daily  Patient taking differently: Take 1,000 mg by mouth 2 times daily  9/26/18  Yes Archie Corona MD   Multiple Vitamin (MULTIVITAMINS PO) Take by mouth daily   Yes Reported, Patient   OXcarbazepine (TRILEPTAL) 600 MG tablet Take 1 tablet (600 mg) by mouth 2 times daily 2/26/18  Yes Megan Chin MD   polyvinyl alcohol (LIQUITEARS) 1.4 % ophthalmic solution 2-3 drops as needed  for dry eyes   Yes Reported, Patient   risperiDONE (RISPERDAL) 2 MG tablet Take 1 tablet (2 mg) by mouth 2 times daily 9/26/18  Yes Archie Corona MD   traZODone (DESYREL) 50 MG tablet 1 tablet (50 mg) q 8am. 1 tablet (50 mg) q 4 pm,  3 tablets (150 mg) 8pm 6/20/18  Yes Candice Liu MD   VIMPAT 200 MG TABS tablet TAKE (1) TABLET TWICE DAILY. 11/6/18  Yes Elysia Gomez MD   VITAMIN E 2 times daily   Yes Reported, Patient   multivitamin, therapeutic with minerals (THERA-VIT-M) TABS tablet Take 1 tablet by mouth daily    Reported, Patient       EPILEPSY HX REVIEWED 11/9/18:   I have been seeing her since she was  age 19.  She was initially seen at [facility] when she was 15 years old.    First seizure occurred at 6 hours of age.  It was described as color turning dusky, turning head to the right, eyes fixed to the right and no response.  She continued to have intermittent seizure activity and subsequently was transferred to the [facility] where she was hospitalized for 2 weeks.  It is not clear what diagnostic evaluation was done but they decided to discharge her without seizure medications.  Seizures recurred at 9 months of age.  At that time they noticed twitching of her left arm and then twitching of the side of the face.  EEG initially showed hypsarrhythmic pattern.  She was treated with phenobarbital.  Then, she was treated with Tegretol and later Dilantin.  CT scan in 1983 we do not have records.      Seizure types:   In 1991 seizure types were described as sitting, both arms jerk up, her head turns to the right, eyes go to the right and roll up.  These events last 2 minutes and at that time she was having 6 a month.      Second seizure type is described as her arms go up, legs stiffen and she moans.  She can be lowered to the floor and then her arms and legs shake with some erratic breathing and sweating profusely.  These occurred at that  time, 3-4 per month.        Seizure type 3 was described as being alert.  Her arms fly out and her eyes blink.      Seizure type 4 described as stares.      She has had 1 episode of status epilepticus in .      EEG when she was 12 was read as abnormal record by virtue of diffuse theta and delta slowing greater on the right.      Parents report that Nella has always had a left hemiparesis, mostly affecting the left upper extremity with some involvement of her left lower extremity.  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  She has been diagnosed as legally blind as having cortical blindness.      BIRTH HISTORY:  Birth history was that she was the result of a full-term pregnancy that was complicated by maternal high blood pressure and use of Enduron.  Pregnancy ended in a 14 labored and vaginal delivery.  Birth weight 7 pounds 6 ounces.  Apgar scores of 4 at 1 minute and 8 at 5 minutes.      She has had global developmental delay.      Subsequent treatment at [facility] has been quite extensive and will not be reviewed completely here.  Last EEG was 2010 which showed mild to moderate nonspecific diffuse disturbance of cerebral activity and multifocal interictal activity with one complex partial seizure recorded.      In the last year they have described 204 seizures,all given diazepam.  OTHER ISSUES:        She has had behavioral issues throughout.      Bell's palsy in , resolved.      Most of her seizures that the staff is noticing now would be described as brief tonic seizures are brief tonic-clonic seizures.  They are more concerned with the length then the precise observation.  She might be having some minor seizures are not being reported.      CURRENT MEDICATIONS:  Felbatol and Lamictal have really been the best treatment for her.  She has been tried almost all the other seizure medications over time including Depakote, Tegretol, phenobarbital, Dilantin, etc.      SOCIAL HISTORY:  She is living in a group  home.      REVIEW OF SYSTEMS:  Through the caregiver is essentially negative.  She has not had any upper respiratory infections lately.  Appetite is good.  She has no obvious headaches.   GI and  appear to be functioning normally.      PHYSICAL EXAMINATION:  Blood pressure 147/74, pulse 86, resp. rate 16, weight 165 lb 9.6 oz (75.1 kg), unknown if currently breastfeeding. Erythematous right face  She is accompanied in clinic today by her caregiver.  Nella alert, no longer in wheelchair  Her eyes are rolling.  She is microcephalic, has a small chin. Less communicative than in past.   Gait: walks unaided but moderate spastic gait.  Attention span is very short and limited and she does not really follow her discussion.      Coordination is moderately impaired.  However, she is difficult to examine because she does not follow commands.  On observation, she does have difficulty more with the left than the right upper extremity but limb tone is increased bilaterally.        ASSESSMENT:  Complicated case; hospital notes, EEG and D/C summary reviewed. After thorough review, we decided to not change current ASDs but add rectal Diastat to be used if seizure persists after single dos of buccal diazepam. If he rectal does not work; call 911. Discussed possibility of respiratory depression after Diastat. LEV was d/silvia and Vimpat added since my last visit. May need to re-evaluate this as sz seem to be less well controlled.     PLAN:   1) Await levels of ASDs from yesterday ER  2) Rectal diastat  3) RTC 1 mo  .

## 2018-11-09 NOTE — LETTER
2018       RE: Nella Helms  : 1975   MRN: [id number]      Dear Colleague,    Thank you for referring your patient, Nella Helms, to the [facility] at [facility]. Please see a copy of my visit note below.    INTERVAL HX:  Since last visit 19  had marked deterioration. Had  3  hospital admisions (,  and  ) Records rerviewed for Hosp and outpt visits withpsych, internal med, & Dr Liu. After seeing me, she had major decompensation in behavior, and then weakness and difficulty ambulating. Since  has has 3 GTSz with lat night needed ER visit and hospt admission as sz did not respond to buccal diazepam. Taken out of hospital this AM to come to clinic.Since  her behavior has been stable and gait back to baseline. Caregiver for many years believes it may have been resperidol  that  caused issues, not ASDs as they have not been changed  .  Prior to Admission medications    Medication Sig Start Date End Date Taking? Authorizing Provider   diazepam (DIASTAT ACUDIAL) 10 MG GEL rectal kit 1o mg per rectum if one dose of diazepam buccal does not control sz. 19  Yes Elysia Gomez MD   diazepam (DIAZEPAM INTENSOL) 5 MG/ML (HIGH CONC) solution (1)ML (5MG) AS NEEDED FOR ANY SEIZURE. WHEN ADMINISTERED NOTIFY PC, RN & PD. 19  Yes Elysia Gomez MD   docusate sodium (COLACE) 100 MG capsule Take by mouth 2 times daily   Yes Reported, Patient   hydrocortisone (CORTAID) 1 % cream Apply topically 2 times daily as needed   Yes Reported, Patient   lamoTRIgine (LAMICTAL) 200 MG tablet Take 1 tablet (200 mg) by mouth 2 times daily 19  Yes Elysia Gomez MD   levETIRAcetam (KEPPRA) 500 MG tablet Take 500 mg by mouth 2 times daily   Yes Reported, Patient   levETIRAcetam 1000 MG TABS Take 1,500 mg by mouth 2 times daily  Patient taking differently: Take 1,000 mg by mouth 2 times daily  19  Yes Archie Corona MD   Multiple  Vitamin (MULTIVITAMINS PO) Take by mouth daily   Yes Reported, Patient   OXcarbazepine (TRILEPTAL) 600 MG tablet Take 1 tablet (600 mg) by mouth 2 times daily 2/26/18  Yes Megan Chin MD   polyvinyl alcohol (LIQUITEARS) 1.4 % ophthalmic solution 2-3 drops as needed for dry eyes   Yes Reported, Patient   risperiDONE (RISPERDAL) 2 MG tablet Take 1 tablet (2 mg) by mouth 2 times daily 9/26/18  Yes Archie Corona MD   traZODone (DESYREL) 50 MG tablet 1 tablet (50 mg) q 8am. 1 tablet (50 mg) q 4 pm,  3 tablets (150 mg) 8pm 6/20/18  Yes Candice Liu MD   VIMPAT 200 MG TABS tablet TAKE (1) TABLET TWICE DAILY. 11/6/18  Yes Elysia Gomez MD   VITAMIN E 2 times daily   Yes Reported, Patient   multivitamin, therapeutic with minerals (THERA-VIT-M) TABS tablet Take 1 tablet by mouth daily    Reported, Patient       EPILEPSY HX REVIEWED 11/9/18:   I have been seeing her since she was  age 19.  She was initially seen at [facility] when she was 15 years old.    First seizure occurred at 6 hours of age.  It was described as color turning dusky, turning head to the right, eyes fixed to the right and no response.  She continued to have intermittent seizure activity and subsequently was transferred to the [facility] where she was hospitalized for 2 weeks.  It is not clear what diagnostic evaluation was done but they decided to discharge her without seizure medications.  Seizures recurred at 9 months of age.  At that time they noticed twitching of her left arm and then twitching of the side of the face.  EEG initially showed hypsarrhythmic pattern.  She was treated with phenobarbital.  Then, she was treated with Tegretol and later Dilantin.  CT scan in 1983 we do not have records.      Seizure types:   In 1991 seizure types were described as sitting, both arms jerk up, her head turns to the right, eyes go to the right and roll up.  These events last 2  minutes and at that time she was having 6 a month.      Second seizure type is described as her arms go up, legs stiffen and she moans.  She can be lowered to the floor and then her arms and legs shake with some erratic breathing and sweating profusely.  These occurred at that time, 3-4 per month.        Seizure type 3 was described as being alert.  Her arms fly out and her eyes blink.      Seizure type 4 described as stares.      She has had 1 episode of status epilepticus in .      EEG when she was 12 was read as abnormal record by virtue of diffuse theta and delta slowing greater on the right.      Parents report that Nella has always had a left hemiparesis, mostly affecting the left upper extremity with some involvement of her left lower extremity.  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  She has been diagnosed as legally blind as having cortical blindness.      BIRTH HISTORY:  Birth history was that she was the result of a full-term pregnancy that was complicated by maternal high blood pressure and use of Enduron.  Pregnancy ended in a 15 labored and vaginal delivery.  Birth weight 7 pounds 6 ounces.  Apgar scores of 4 at 1 minute and 8 at 5 minutes.      She has had global developmental delay.      Subsequent treatment at [facility] has been quite extensive and will not be reviewed completely here.  Last EEG was 2010 which showed mild to moderate nonspecific diffuse disturbance of cerebral activity and multifocal interictal activity with one complex partial seizure recorded.      In the last year they have described 204 seizures,all given diazepam.  OTHER ISSUES:        She has had behavioral issues throughout.      Bell's palsy in , resolved.      Most of her seizures that the staff is noticing now would be described as brief tonic seizures are brief tonic-clonic seizures.  They are more concerned with the length then the precise observation.  She might be having some minor seizures are not being  reported.      CURRENT MEDICATIONS:  Felbatol and Lamictal have really been the best treatment for her.  She has been tried almost all the other seizure medications over time including Depakote, Tegretol, phenobarbital, Dilantin, etc.      SOCIAL HISTORY:  She is living in a group home.      REVIEW OF SYSTEMS:  Through the caregiver is essentially negative.  She has not had any upper respiratory infections lately.  Appetite is good.  She has no obvious headaches.   GI and  appear to be functioning normally.      PHYSICAL EXAMINATION:  Blood pressure 147/74, pulse 86, resp. rate 16, weight 165 lb 9.6 oz (75.1 kg), unknown if currently breastfeeding. Erythematous right face  She is accompanied in clinic today by her caregiver.  Nella alert, no longer in wheelchair  Her eyes are rolling.  She is microcephalic, has a small chin. Less communicative than in past.   Gait: walks unaided but moderate spastic gait.  Attention span is very short and limited and she does not really follow her discussion.      Coordination is moderately impaired.  However, she is difficult to examine because she does not follow commands.  On observation, she does have difficulty more with the left than the right upper extremity but limb tone is increased bilaterally.        ASSESSMENT:  Complicated case; hospital notes, EEG and D/C summary reviewed. After thorough review, we decided to not change current ASDs but add rectal Diastat to be used if seizure persists after single dos of buccal diazepam. If he rectal does not work; call 911. Discussed possibility of respiratory depression after Diastat. LEV was d/silvia and Vimpat added since my last visit. May need to re-evaluate this as sz seem to be less well controlled.     PLAN:   1) Await levels of ASDs from yesterday ER  2) Rectal diastat  3) RTC 1 mo      Again, thank you for allowing me to participate in the care of your patient.      Sincerely,    Elysia Gomez MD

## 2018-11-29 ENCOUNTER — TELEPHONE (OUTPATIENT)
Dept: NEUROLOGY | Facility: CLINIC | Age: 43
End: 2018-11-29

## 2018-11-29 DIAGNOSIS — G40.319 GENERALIZED CONVULSIVE EPILEPSY WITH INTRACTABLE EPILEPSY (H): Primary | ICD-10-CM

## 2018-11-29 RX ORDER — LACOSAMIDE 200 MG/1
TABLET ORAL
Qty: 60 TABLET | Refills: 0 | Status: SHIPPED | OUTPATIENT
Start: 2018-11-29 | End: 2019-01-04

## 2018-11-29 NOTE — TELEPHONE ENCOUNTER
Refill request received by fax from [facility] [employer] for Vimpat. Chart reviewed. Patient will return to clinic 12/18/18. Refill approved to bridge patient to her return appointment.

## 2018-12-28 DIAGNOSIS — G40.319 GENERALIZED CONVULSIVE EPILEPSY WITH INTRACTABLE EPILEPSY (H): Chronic | ICD-10-CM

## 2018-12-28 RX ORDER — LAMOTRIGINE 200 MG/1
TABLET ORAL
Qty: 62 TABLET | Refills: 0 | Status: SHIPPED | OUTPATIENT
Start: 2018-12-28 | End: 2019-01-24

## 2018-12-28 RX ORDER — LAMOTRIGINE 200 MG/1
200 TABLET ORAL 2 TIMES DAILY
Qty: 60 TABLET | Refills: 5 | Status: CANCELLED | OUTPATIENT
Start: 2018-12-28

## 2018-12-28 NOTE — TELEPHONE ENCOUNTER
Pending Prescriptions:                       Disp   Refills    lamoTRIgine (LAMICTAL) 200 MG tablet      60 tab*5            Sig: Take 1 tablet (200 mg) by mouth 2 times daily      Last fill date: 12/01/2018    RTC: 01/25/2019

## 2018-12-31 DIAGNOSIS — G40.319 GENERALIZED CONVULSIVE EPILEPSY WITH INTRACTABLE EPILEPSY (H): ICD-10-CM

## 2018-12-31 NOTE — TELEPHONE ENCOUNTER
Pending Prescriptions:                       Disp   Refills    lacosamide (VIMPAT) 200 MG TABS tablet    60 tab*0            Sig: TAKE (1) TABLET TWICE DAILY.      Last fill date: 11/30/2018    RTC: 01/25/2019

## 2019-01-02 DIAGNOSIS — G40.319 GENERALIZED CONVULSIVE EPILEPSY WITH INTRACTABLE EPILEPSY (H): ICD-10-CM

## 2019-01-04 RX ORDER — DIAZEPAM ORAL SOLUTION (CONCENTRATE) 5 MG/ML
SOLUTION ORAL
Qty: 120 ML | Refills: 0 | Status: SHIPPED | OUTPATIENT
Start: 2019-01-04 | End: 2019-03-29

## 2019-01-04 RX ORDER — LACOSAMIDE 200 MG/1
TABLET ORAL
Qty: 60 TABLET | Refills: 0 | Status: SHIPPED | OUTPATIENT
Start: 2019-01-04 | End: 2019-03-29

## 2019-01-04 RX ORDER — LACOSAMIDE 200 MG/1
TABLET ORAL
Qty: 60 TABLET | Refills: 0 | Status: SHIPPED | OUTPATIENT
Start: 2019-01-04 | End: 2020-04-08

## 2019-01-23 DIAGNOSIS — G40.319 GENERALIZED CONVULSIVE EPILEPSY WITH INTRACTABLE EPILEPSY (H): Primary | Chronic | ICD-10-CM

## 2019-01-24 DIAGNOSIS — G40.319 GENERALIZED CONVULSIVE EPILEPSY WITH INTRACTABLE EPILEPSY (H): Chronic | ICD-10-CM

## 2019-01-24 RX ORDER — LAMOTRIGINE 200 MG/1
TABLET ORAL
Qty: 12 TABLET | Refills: 0 | Status: SHIPPED | OUTPATIENT
Start: 2019-01-24 | End: 2019-02-18

## 2019-01-24 RX ORDER — LACOSAMIDE 200 MG/1
TABLET, FILM COATED ORAL
Qty: 60 TABLET | Refills: 5 | Status: SHIPPED | OUTPATIENT
Start: 2019-01-24 | End: 2019-10-01

## 2019-02-18 DIAGNOSIS — G40.319 GENERALIZED CONVULSIVE EPILEPSY WITH INTRACTABLE EPILEPSY (H): Chronic | ICD-10-CM

## 2019-02-19 RX ORDER — LAMOTRIGINE 200 MG/1
TABLET ORAL
Qty: 56 TABLET | Refills: 11 | Status: SHIPPED | OUTPATIENT
Start: 2019-02-19 | End: 2019-03-29

## 2019-03-29 ENCOUNTER — OFFICE VISIT (OUTPATIENT)
Dept: NEUROLOGY | Facility: CLINIC | Age: 44
End: 2019-03-29
Payer: MEDICARE

## 2019-03-29 VITALS
DIASTOLIC BLOOD PRESSURE: 97 MMHG | SYSTOLIC BLOOD PRESSURE: 160 MMHG | BODY MASS INDEX: 30.23 KG/M2 | HEART RATE: 99 BPM | WEIGHT: 160 LBS | TEMPERATURE: 98.1 F

## 2019-03-29 DIAGNOSIS — G40.319 GENERALIZED CONVULSIVE EPILEPSY WITH INTRACTABLE EPILEPSY (H): Chronic | ICD-10-CM

## 2019-03-29 RX ORDER — OXCARBAZEPINE 600 MG/1
600 TABLET, FILM COATED ORAL 2 TIMES DAILY
Qty: 60 TABLET | Refills: 11 | Status: SHIPPED | OUTPATIENT
Start: 2019-03-29 | End: 2020-05-12

## 2019-03-29 RX ORDER — LEVETIRACETAM 500 MG/1
500 TABLET ORAL 2 TIMES DAILY
Qty: 60 TABLET | Refills: 11 | Status: SHIPPED | OUTPATIENT
Start: 2019-03-29 | End: 2020-04-20

## 2019-03-29 RX ORDER — LAMOTRIGINE 200 MG/1
TABLET ORAL
Qty: 56 TABLET | Refills: 11 | Status: SHIPPED | OUTPATIENT
Start: 2019-03-29 | End: 2020-02-20

## 2019-03-29 RX ORDER — DIAZEPAM ORAL SOLUTION (CONCENTRATE) 5 MG/ML
SOLUTION ORAL
Qty: 30 ML | Refills: 1 | Status: SHIPPED | OUTPATIENT
Start: 2019-03-29 | End: 2019-07-03

## 2019-03-29 RX ORDER — LEVETIRACETAM 1000 MG/1
1000 TABLET ORAL 2 TIMES DAILY
Qty: 60 TABLET | Refills: 11 | Status: SHIPPED | OUTPATIENT
Start: 2019-03-29 | End: 2020-03-03

## 2019-03-29 RX ORDER — LACOSAMIDE 200 MG/1
TABLET ORAL
Qty: 60 TABLET | Refills: 5 | Status: SHIPPED | OUTPATIENT
Start: 2019-03-29 | End: 2019-10-03

## 2019-03-29 RX ORDER — OLANZAPINE 5 MG/1
TABLET ORAL
COMMUNITY
Start: 2019-01-23

## 2019-03-29 NOTE — LETTER
RE: Nella Helms  : 1975   MRN: [id number]      Dear Colleague,    Thank you for referring your patient, Nella Helms, to the [facility] EPILEPSY CARE at [facility]. Please see a copy of my visit note below.    INTERVAL HX:  Resperidol    caused issues  Last year including hospitalization. Now stable. Did have episode of toxicity as was found to be chewing ASDs as one caregiver not monitoring swa,lowing. This now under controlled. About 10 sz since last visit. Rxeed by diazepam as needed.      Prior to Admission medications    Medication Sig Start Date End Date Taking? Authorizing Provider   diazepam (DIASTAT ACUDIAL) 10 MG GEL rectal kit 1o mg per rectum if one dose of diazepam buccal does not control sz. 18  Yes Elysia Gomez MD   diazepam (DIAZEPAM INTENSOL) 5 MG/ML (HIGH CONC) solution (1)ML (5MG) AS NEEDED FOR ANY SEIZURE. WHEN ADMINISTERED NOTIFY PC, RN & PD. 18  Yes Elysia Gomez MD   docusate sodium (COLACE) 100 MG capsule Take by mouth 2 times daily   Yes Reported, Patient   hydrocortisone (CORTAID) 1 % cream Apply topically 2 times daily as needed   Yes Reported, Patient   lamoTRIgine (LAMICTAL) 200 MG tablet Take 1 tablet (200 mg) by mouth 2 times daily 18  Yes Elysia Gomez MD   levETIRAcetam (KEPPRA) 500 MG tablet Take 500 mg by mouth 2 times daily   Yes Reported, Patient   levETIRAcetam 1000 MG TABS Take 1,500 mg by mouth 2 times daily  Patient taking differently: Take 1,000 mg by mouth 2 times daily  18  Yes Archie Corona MD   Multiple Vitamin (MULTIVITAMINS PO) Take by mouth daily   Yes Reported, Patient   OXcarbazepine (TRILEPTAL) 600 MG tablet Take 1 tablet (600 mg) by mouth 2 times daily 18  Yes Megan Chin MD   polyvinyl alcohol (LIQUITEARS) 1.4 % ophthalmic solution 2-3 drops as needed for dry eyes   Yes Reported, Patient   risperiDONE (RISPERDAL) 2 MG tablet Take 1 tablet (2 mg) by mouth 2 times daily  9/26/18  Yes Archie Corona MD   traZODone (DESYREL) 50 MG tablet 1 tablet (50 mg) q 8am. 1 tablet (50 mg) q 4 pm,  3 tablets (150 mg) 8pm 6/20/18  Yes Candice Liu MD   VIMPAT 200 MG TABS tablet TAKE (1) TABLET TWICE DAILY. 11/6/18  Yes Elysia Gomez MD   VITAMIN E 2 times daily   Yes Reported, Patient   multivitamin, therapeutic with minerals (THERA-VIT-M) TABS tablet Take 1 tablet by mouth daily    Reported, Patient     EPILEPSY HX REVIEWED 3/29/19:   I have been seeing her since she was  age 19.  She was initially seen at [facility] when she was 15 years old.    First seizure occurred at 6 hours of age.  It was described as color turning dusky, turning head to the right, eyes fixed to the right and no response.  She continued to have intermittent seizure activity and subsequently was transferred to the [facility] where she was hospitalized for 2 weeks.  It is not clear what diagnostic evaluation was done but they decided to discharge her without seizure medications.  Seizures recurred at 9 months of age.  At that time they noticed twitching of her left arm and then twitching of the side of the face.  EEG initially showed hypsarrhythmic pattern.  She was treated with phenobarbital.  Then, she was treated with Tegretol and later Dilantin.  CT scan in 1983 we do not have records.      Seizure types:   In 1991 seizure types were described as sitting, both arms jerk up, her head turns to the right, eyes go to the right and roll up.  These events last 2 minutes and at that time she was having 6 a month.      Second seizure type is described as her arms go up, legs stiffen and she moans.  She can be lowered to the floor and then her arms and legs shake with some erratic breathing and sweating profusely.  These occurred at that time, 3-4 per month.        Seizure type 3 was described as being alert.  Her arms fly out and her eyes blink.      Seizure  type 4 described as stares.      She has had 1 episode of status epilepticus in .      EEG when she was 12 was read as abnormal record by virtue of diffuse theta and delta slowing greater on the right.      Parents report that Nella has always had a left hemiparesis, mostly affecting the left upper extremity with some involvement of her left lower extremity.  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  She has been diagnosed as legally blind as having cortical blindness.      BIRTH HISTORY:  Birth history was that she was the result of a full-term pregnancy that was complicated by maternal high blood pressure and use of Enduron.  Pregnancy ended in a 14 labored and vaginal delivery.  Birth weight 7 pounds 6 ounces.  Apgar scores of 4 at 1 minute and 8 at 5 minutes.      She has had global developmental delay.      Subsequent treatment at [facility] has been quite extensive and will not be reviewed completely here.  Last EEG was 2010 which showed mild to moderate nonspecific diffuse disturbance of cerebral activity and multifocal interictal activity with one complex partial seizure recorded.      In the last year they have described 204 seizures,all given diazepam.  OTHER ISSUES:        She has had behavioral issues throughout.      Bell's palsy in , resolved.      Most of her seizures that the staff is noticing now would be described as brief tonic seizures are brief tonic-clonic seizures.  They are more concerned with the length then the precise observation.  She might be having some minor seizures are not being reported.      CURRENT MEDICATIONS:  Felbatol and Lamictal have really been the best treatment for her.  She has been tried almost all the other seizure medications over time including Depakote, Tegretol, phenobarbital, Dilantin, etc.      SOCIAL HISTORY:  She is living in a group home.      PHYSICAL EXAMINATION:  Blood pressure (!) 160/97, pulse 99, temperature 98.1  F (36.7  C), weight 160 lb (72.6 kg),  unknown if currently breastfeeding. Erythematous right face  She is accompanied in clinic today by her caregiver.  Nella alert, no longer in wheelchair  Her eyes are rolling.  She is microcephalic, has a small chin. Less communicative than in past.   Gait: walks unaided but moderate spastic gait.  Attention span is very short and limited and she does not really follow her discussion.      Coordination is moderately impaired.  However, she is difficult to examine because she does not follow commands.  On observation, she does have difficulty more with the left than the right upper extremity but limb tone is increased bilaterally.        ASSESSMENT:  Complicated case; hospital notes, EEG and D/C summary reviewed. After thorough review, we decided to not change current ASDs   Sz now back under control. Behavior stable   PLAN:   1) Same ASDs renewed.  2) RTC 1 yr.    Again, thank you for allowing me to participate in the care of your patient.      Sincerely,    Elysia Gomez MD

## 2019-06-13 DIAGNOSIS — G40.319 GENERALIZED CONVULSIVE EPILEPSY WITH INTRACTABLE EPILEPSY (H): Chronic | ICD-10-CM

## 2019-06-13 RX ORDER — DIAZEPAM ORAL SOLUTION (CONCENTRATE) 5 MG/ML
SOLUTION ORAL
Qty: 30 ML | Refills: 1 | OUTPATIENT
Start: 2019-06-13

## 2019-06-13 NOTE — TELEPHONE ENCOUNTER
This nurse spoke with the patient's pharmacist, Bailey. She has Rx on hand for this request and states to disregard this request.

## 2019-07-03 DIAGNOSIS — G40.319 GENERALIZED CONVULSIVE EPILEPSY WITH INTRACTABLE EPILEPSY (H): Chronic | ICD-10-CM

## 2019-07-05 RX ORDER — DIAZEPAM ORAL SOLUTION (CONCENTRATE) 5 MG/ML
SOLUTION ORAL
Qty: 120 ML | Refills: 0 | Status: SHIPPED | OUTPATIENT
Start: 2019-07-05 | End: 2019-11-02

## 2019-10-01 DIAGNOSIS — G40.319 GENERALIZED CONVULSIVE EPILEPSY WITH INTRACTABLE EPILEPSY (H): Chronic | ICD-10-CM

## 2019-10-03 DIAGNOSIS — G40.319 GENERALIZED CONVULSIVE EPILEPSY WITH INTRACTABLE EPILEPSY (H): Chronic | ICD-10-CM

## 2019-10-03 RX ORDER — LACOSAMIDE 200 MG/1
TABLET, FILM COATED ORAL
Qty: 60 TABLET | Refills: 5 | Status: SHIPPED | OUTPATIENT
Start: 2019-10-03 | End: 2020-11-10

## 2019-10-03 NOTE — TELEPHONE ENCOUNTER
Refill requested for: Vimpat 200 mg    Last ordered: 3/29/19    Last Filled:     Last Clinic Visit: 3/29/19    Next Clinic Visit: None scheduled    From last visit note:  ASSESSMENT:  Complicated case; hospital notes, EEG and D/C summary reviewed. After thorough review, we decided to not change current ASDs   Sz now back under control. Behavior stable     PLAN:   1) Same ASDs renewed.  2) RTC 1 yr.    Action taken:   Refill pended to Dr. Gomez

## 2019-10-03 NOTE — TELEPHONE ENCOUNTER
What is the concern that needs to be addressed by a nurse? Demi Rascon is calling to check on the status of this prescription being refilled as she states she's sent it to us a few times.      May a detailed message be left on voicemail? Yes    Date of last office visit: 3/29/19    Message routed to:

## 2019-10-04 RX ORDER — LACOSAMIDE 200 MG/1
TABLET ORAL
Qty: 60 TABLET | Refills: 5 | Status: SHIPPED | OUTPATIENT
Start: 2019-10-04 | End: 2020-03-03

## 2019-10-30 DIAGNOSIS — G40.319 GENERALIZED CONVULSIVE EPILEPSY WITH INTRACTABLE EPILEPSY (H): Chronic | ICD-10-CM

## 2019-11-02 RX ORDER — DIAZEPAM ORAL SOLUTION (CONCENTRATE) 5 MG/ML
SOLUTION ORAL
Qty: 120 ML | Refills: 0 | Status: SHIPPED | OUTPATIENT
Start: 2019-11-02 | End: 2020-03-03

## 2020-02-19 DIAGNOSIS — G40.319 GENERALIZED CONVULSIVE EPILEPSY WITH INTRACTABLE EPILEPSY (H): Chronic | ICD-10-CM

## 2020-02-20 RX ORDER — LAMOTRIGINE 200 MG/1
TABLET ORAL
Qty: 60 TABLET | Refills: 2 | Status: SHIPPED | OUTPATIENT
Start: 2020-02-20 | End: 2020-03-03

## 2020-02-20 NOTE — TELEPHONE ENCOUNTER
lamoTRIgine (LAMICTAL) 200 MG tablet   Last Written Prescription Date:  3/29/19  Last Fill Quantity: 56,   # refills: 11  Last Office Visit : 3/29/19  Future Office visit:  3/3/20    Filling per [facility] medication refill protocols - seizure medications.  Not all labs required.

## 2020-02-28 ENCOUNTER — TELEPHONE (OUTPATIENT)
Dept: NEUROLOGY | Facility: CLINIC | Age: 45
End: 2020-02-28

## 2020-02-28 DIAGNOSIS — T42.6X5S ADVERSE EFFECT OF OTHER ANTIEPILEPTIC AND SEDATIVE-HYPNOTIC DRUGS, SEQUELA: ICD-10-CM

## 2020-02-28 DIAGNOSIS — G40.319 GENERALIZED CONVULSIVE EPILEPSY WITH INTRACTABLE EPILEPSY (H): Primary | ICD-10-CM

## 2020-02-28 NOTE — TELEPHONE ENCOUNTER
What is the concern that needs to be addressed by a nurse? Patient would like lab orders fax ASAP to [phone] so patient can have blood drawn on Monday.    May a detailed message be left on voicemail?     Date of last office visit: 03/29/2019    Message routed to: [facility] RN POOL

## 2020-02-28 NOTE — TELEPHONE ENCOUNTER
Abdon called in to confirm that the labs request had been clarified.  Additionally, patient has had an increase in the smaller seizures. No ER visits needed.  Some may only last a few seconds.  Current rescue protocol has been used to prevent emergency situations.  Abdon wonders if  will want to make changes in medications  Abdon may be able to make the appointment, but in the event he is unable to attend with Nella, he can be reached by telephone. Alina, the , will accompany the patient.    We discussed the orders had been printed and faxed to [place] for a lab draw Monday morning. It is possible that the ammonia and sodium levels would be available, but the antiseizure medication levels not available.    No other questions at this time

## 2020-02-28 NOTE — TELEPHONE ENCOUNTER
Attempted to call to find out the reason for the request for labs. Presumably this is yearly labs prior to her visit with , however more detail is needed. Perhaps the patient is having seizure or side effects that need to be addressed.    Call placed to the number noted as the incoming. Lesly is not aware of the request, and recommended calling Abdon.  No answer from Abdon's number.  Call placed to number listed for Nella.  Spoke with house manage for [facility] homes where Nella lives.    The request if for labs prior to the annual visit  Fax number is for [facility]    Discussed with , get AED levels, sodium, and an ammonia level    Orders placed.

## 2020-03-02 ENCOUNTER — TRANSFERRED RECORDS (OUTPATIENT)
Dept: HEALTH INFORMATION MANAGEMENT | Facility: CLINIC | Age: 45
End: 2020-03-02

## 2020-03-03 ENCOUNTER — OFFICE VISIT (OUTPATIENT)
Dept: NEUROLOGY | Facility: CLINIC | Age: 45
End: 2020-03-03
Payer: MEDICARE

## 2020-03-03 VITALS — WEIGHT: 170 LBS | BODY MASS INDEX: 32.12 KG/M2

## 2020-03-03 DIAGNOSIS — G40.319 GENERALIZED CONVULSIVE EPILEPSY WITH INTRACTABLE EPILEPSY (H): Primary | ICD-10-CM

## 2020-03-03 RX ORDER — LAMOTRIGINE 200 MG/1
TABLET ORAL
Qty: 60 TABLET | Refills: 2 | Status: SHIPPED | OUTPATIENT
Start: 2020-03-03 | End: 2020-05-12

## 2020-03-03 RX ORDER — DIAZEPAM ORAL SOLUTION (CONCENTRATE) 5 MG/ML
SOLUTION ORAL
Qty: 120 ML | Refills: 0 | Status: SHIPPED | OUTPATIENT
Start: 2020-03-03 | End: 2020-04-11

## 2020-03-03 RX ORDER — LACOSAMIDE 200 MG/1
TABLET ORAL
Qty: 60 TABLET | Refills: 5 | Status: SHIPPED | OUTPATIENT
Start: 2020-03-03 | End: 2020-05-12

## 2020-03-03 RX ORDER — LEVETIRACETAM 1000 MG/1
1000 TABLET ORAL 2 TIMES DAILY
Qty: 60 TABLET | Refills: 11 | Status: SHIPPED | OUTPATIENT
Start: 2020-03-03 | End: 2020-05-12

## 2020-03-03 ASSESSMENT — PAIN SCALES - GENERAL: PAINLEVEL: NO PAIN (0)

## 2020-03-03 NOTE — PROGRESS NOTES
INTERVAL HX:  Resperidol  And possibly FBM   caused issues with white count. Both discont. Now sz frequency much  ineased from 4-6/ mo Jan 2019 to 25 in Dec 2019. Behavior also an issue.  Last year including hospitalization.    Prior to Admission medications    Medication Sig Start Date End Date Taking? Authorizing Provider   diazepam (VALIUM) 5 MG/ML (HIGH CONC) solution (1ML)== (5MG) PO AS NEEDED FOR ANY SEIZURE. WHEN ADMINISTERED NOTIFY PC, RN & PD. 3/3/20  Yes Elysia Gomez MD   docusate sodium (COLACE) 100 MG capsule Take by mouth 2 times daily   Yes Reported, Patient   hydrocortisone (CORTAID) 1 % cream Apply topically 2 times daily as needed   Yes Reported, Patient   lacosamide (VIMPAT) 200 MG TABS tablet TAKE (1) TABLET TWICE DAILY. 3/3/20  Yes Elysia Gomez MD   lamoTRIgine (LAMICTAL) 200 MG tablet (1) PO TWICE DAILY. 3/3/20  Yes Elysia Gomez MD   levETIRAcetam (KEPPRA) 1000 MG tablet Take 1 tablet (1,000 mg) by mouth 2 times daily 3/3/20  Yes Elysia Goemz MD   levETIRAcetam (KEPPRA) 500 MG tablet Take 1 tablet (500 mg) by mouth 2 times daily 3/29/19  Yes Elysia Gomez MD   Multiple Vitamin (MULTIVITAMINS PO) Take by mouth daily   Yes Reported, Patient   multivitamin, therapeutic with minerals (THERA-VIT-M) TABS tablet Take 1 tablet by mouth daily   Yes Reported, Patient   OLANZapine (ZYPREXA) 5 MG tablet Take 5 mg by mouth 1/23/19  Yes Reported, Patient   OXcarbazepine (TRILEPTAL) 600 MG tablet Take 1 tablet (600 mg) by mouth 2 times daily 3/29/19  Yes Elysia Gomez MD   perampanel (FYCOMPA) 2 MG tablet Take 1 tablet (2 mg) by mouth At Bedtime 3/3/20  Yes Elysia Gomez MD   polyvinyl alcohol (LIQUITEARS) 1.4 % ophthalmic solution 2-3 drops as needed for dry eyes   Yes Reported, Patient   traZODone (DESYREL) 50 MG tablet 1 tablet (50 mg) q 8am. 1 tablet (50 mg) q 4 pm,  3 tablets (150 mg) 8pm 6/20/18  Yes Candice Liu MD   VIMPAT 200 MG TABS tablet TAKE 1 TABLET BY MOUTH TWICE A DAY FOR SEIZURES 10/3/19  Yes  Elysia Gomez MD   VITAMIN E 2 times daily   Yes Reported, Patient   lacosamide (VIMPAT) 200 MG TABS tablet TAKE (1) TABLET TWICE DAILY.  Patient not taking: Reported on 3/29/2019 1/4/19   Elysia Gomez MD   risperiDONE (RISPERDAL) 2 MG tablet Take 1 tablet (2 mg) by mouth 2 times daily  Patient not taking: Reported on 3/29/2019 9/26/18   Archie Corona MD     EPILEPSY HX REVIEWED 3/3/20:   I have been seeing her since she was  age 19.  She was initially seen at [facility] when she was 15 years old.    First seizure occurred at 6 hours of age.  It was described as color turning dusky, turning head to the right, eyes fixed to the right and no response.  She continued to have intermittent seizure activity and subsequently was transferred to the [facility] where she was hospitalized for 2 weeks.  It is not clear what diagnostic evaluation was done but they decided to discharge her without seizure medications.  Seizures recurred at 9 months of age.  At that time they noticed twitching of her left arm and then twitching of the side of the face.  EEG initially showed hypsarrhythmic pattern.  She was treated with phenobarbital.  Then, she was treated with Tegretol and later Dilantin.  CT scan in 1983 we do not have records.      Seizure types:   In 1991 seizure types were described as sitting, both arms jerk up, her head turns to the right, eyes go to the right and roll up.  These events last 2 minutes and at that time she was having 6 a month.      Second seizure type is described as her arms go up, legs stiffen and she moans.  She can be lowered to the floor and then her arms and legs shake with some erratic breathing and sweating profusely.  These occurred at that time, 3-4 per month.        Seizure type 3 was described as being alert.  Her arms fly out and her eyes blink.      Seizure type 4 described as stares.      She has had 1 episode of status epilepticus in  .      EEG when she was 12 was read as abnormal record by virtue of diffuse theta and delta slowing greater on the right.      Parents report that Nella has always had a left hemiparesis, mostly affecting the left upper extremity with some involvement of her left lower extremity.  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  She has been diagnosed as legally blind as having cortical blindness.      BIRTH HISTORY:  Birth history was that she was the result of a full-term pregnancy that was complicated by maternal high blood pressure and use of Enduron.  Pregnancy ended in a 15 labored and vaginal delivery.  Birth weight 7 pounds 6 ounces.  Apgar scores of 4 at 1 minute and 8 at 5 minutes.      She has had global developmental delay.      Subsequent treatment at [facility] has been quite extensive and will not be reviewed completely here.  Last EEG was 2010 which showed mild to moderate nonspecific diffuse disturbance of cerebral activity and multifocal interictal activity with one complex partial seizure recorded.      In the last year they have described 204 seizures,all given diazepam.  OTHER ISSUES:        She has had behavioral issues throughout.      Bell's palsy in , resolved.      Most of her seizures that the staff is noticing now would be described as brief tonic seizures are brief tonic-clonic seizures.  They are more concerned with the length then the precise observation.  She might be having some minor seizures are not being reported.      CURRENT MEDICATIONS:  Felbatol and Lamictal have really been the best treatment for her.  She has been tried almost all the other seizure medications over time including Depakote, Tegretol, phenobarbital, Dilantin, etc.      SOCIAL HISTORY:  She is living in a group home.      REVIEW OF SYSTEMS:  Through the caregiver is essentially negative.  She has not had any upper respiratory infections lately.  Appetite is good.  She has no obvious headaches.   GI and  appear to  be functioning normally.      PHYSICAL EXAMINATION:  Weight 170 lb (77.1 kg), not currently breastfeeding. Erythematous right face  She is accompanied in clinic today by her caregiver.  Nella alert, no longer in wheelchair  Her eyes are rolling.  She is microcephalic, has a small chin. Less communicative than in past.   Gait: walks unaided but moderate spastic gait.  Attention span is very short and limited and she does not really follow her discussion.      Coordination is moderately impaired.  However, she is difficult to examine because she does not follow commands.  On observation, she does have difficulty more with the left than the right upper extremity but limb tone is increased bilaterally.        ASSESSMENT:  Complicated case; hospital notes, EEG and D/C summary reviewed last visit. Not sure if FBM cause. Needs stronger  ASD. Will add perampanel..   Labs= ammonia and Na ok   PLAN:   1) Perampanel 2 mg hs x 3 weeks tjhen 4 mg hs to next visit.  2) RTC 2 mo  3) other ASDS same

## 2020-03-03 NOTE — LETTER
3/3/2020     RE: Nella Helms  : 1975   MRN: [id number]      Dear Colleague,    Thank you for referring your patient, Nella Helms, to the [facility] EPILEPSY CARE at [facility]. Please see a copy of my visit note below.    INTERVAL HX:  Resperidol  And possibly FBM   caused issues with white count. Both discont. Now sz frequency much  ineased from 4-6/ mo 2019 to 25 in Dec 2019. Behavior also an issue.  Last year including hospitalization.    Prior to Admission medications    Medication Sig Start Date End Date Taking? Authorizing Provider   diazepam (VALIUM) 5 MG/ML (HIGH CONC) solution (1ML)== (5MG) PO AS NEEDED FOR ANY SEIZURE. WHEN ADMINISTERED NOTIFY PC, RN & PD. 3/3/20  Yes Elysia Gomez MD   docusate sodium (COLACE) 100 MG capsule Take by mouth 2 times daily   Yes Reported, Patient   hydrocortisone (CORTAID) 1 % cream Apply topically 2 times daily as needed   Yes Reported, Patient   lacosamide (VIMPAT) 200 MG TABS tablet TAKE (1) TABLET TWICE DAILY. 3/3/20  Yes Elysia Gomez MD   lamoTRIgine (LAMICTAL) 200 MG tablet (1) PO TWICE DAILY. 3/3/20  Yes Elysia Gomez MD   levETIRAcetam (KEPPRA) 1000 MG tablet Take 1 tablet (1,000 mg) by mouth 2 times daily 3/3/20  Yes Elysia Gomez MD   levETIRAcetam (KEPPRA) 500 MG tablet Take 1 tablet (500 mg) by mouth 2 times daily 3/29/19  Yes Elysia Gomez MD   Multiple Vitamin (MULTIVITAMINS PO) Take by mouth daily   Yes Reported, Patient   multivitamin, therapeutic with minerals (THERA-VIT-M) TABS tablet Take 1 tablet by mouth daily   Yes Reported, Patient   OLANZapine (ZYPREXA) 5 MG tablet Take 5 mg by mouth 19  Yes Reported, Patient   OXcarbazepine (TRILEPTAL) 600 MG tablet Take 1 tablet (600 mg) by mouth 2 times daily 3/29/19  Yes Elysia Gomez MD   perampanel (FYCOMPA) 2 MG tablet Take 1 tablet (2 mg) by mouth At Bedtime 3/3/20  Yes Elysia Gomez MD   polyvinyl alcohol (LIQUITEARS) 1.4 % ophthalmic solution 2-3 drops as  needed for dry eyes   Yes Reported, Patient   traZODone (DESYREL) 50 MG tablet 1 tablet (50 mg) q 8am. 1 tablet (50 mg) q 4 pm,  3 tablets (150 mg) 8pm 6/20/18  Yes Candice Liu MD   VIMPAT 200 MG TABS tablet TAKE 1 TABLET BY MOUTH TWICE A DAY FOR SEIZURES 10/3/19  Yes Elysia Gomez MD   VITAMIN E 2 times daily   Yes Reported, Patient   lacosamide (VIMPAT) 200 MG TABS tablet TAKE (1) TABLET TWICE DAILY.  Patient not taking: Reported on 3/29/2019 1/4/19   Elysia Gomez MD   risperiDONE (RISPERDAL) 2 MG tablet Take 1 tablet (2 mg) by mouth 2 times daily  Patient not taking: Reported on 3/29/2019 9/26/18   Archie Corona MD     EPILEPSY HX REVIEWED 3/3/20:   I have been seeing her since she was  age 19.  She was initially seen at [facility] when she was 15 years old.    First seizure occurred at 6 hours of age.  It was described as color turning dusky, turning head to the right, eyes fixed to the right and no response.  She continued to have intermittent seizure activity and subsequently was transferred to the [facility] where she was hospitalized for 2 weeks.  It is not clear what diagnostic evaluation was done but they decided to discharge her without seizure medications.  Seizures recurred at 9 months of age.  At that time they noticed twitching of her left arm and then twitching of the side of the face.  EEG initially showed hypsarrhythmic pattern.  She was treated with phenobarbital.  Then, she was treated with Tegretol and later Dilantin.  CT scan in 1983 we do not have records.      Seizure types:   In 1991 seizure types were described as sitting, both arms jerk up, her head turns to the right, eyes go to the right and roll up.  These events last 2 minutes and at that time she was having 6 a month.      Second seizure type is described as her arms go up, legs stiffen and she moans.  She can be lowered to the floor and then her arms and legs shake with  some erratic breathing and sweating profusely.  These occurred at that time, 3-4 per month.        Seizure type 3 was described as being alert.  Her arms fly out and her eyes blink.      Seizure type 4 described as stares.      She has had 1 episode of status epilepticus in .      EEG when she was 12 was read as abnormal record by virtue of diffuse theta and delta slowing greater on the right.      Parents report that Nella has always had a left hemiparesis, mostly affecting the left upper extremity with some involvement of her left lower extremity.  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  She has been diagnosed as legally blind as having cortical blindness.      BIRTH HISTORY:  Birth history was that she was the result of a full-term pregnancy that was complicated by maternal high blood pressure and use of Enduron.  Pregnancy ended in a 14 labored and vaginal delivery.  Birth weight 7 pounds 6 ounces.  Apgar scores of 4 at 1 minute and 8 at 5 minutes.      She has had global developmental delay.      Subsequent treatment at [facility] has been quite extensive and will not be reviewed completely here.  Last EEG was 2010 which showed mild to moderate nonspecific diffuse disturbance of cerebral activity and multifocal interictal activity with one complex partial seizure recorded.      In the last year they have described 204 seizures,all given diazepam.  OTHER ISSUES:        She has had behavioral issues throughout.      Bell's palsy in , resolved.      Most of her seizures that the staff is noticing now would be described as brief tonic seizures are brief tonic-clonic seizures.  They are more concerned with the length then the precise observation.  She might be having some minor seizures are not being reported.      CURRENT MEDICATIONS:  Felbatol and Lamictal have really been the best treatment for her.  She has been tried almost all the other seizure medications over time including Depakote, Tegretol,  phenobarbital, Dilantin, etc.      SOCIAL HISTORY:  She is living in a group home.      REVIEW OF SYSTEMS:  Through the caregiver is essentially negative.  She has not had any upper respiratory infections lately.  Appetite is good.  She has no obvious headaches.   GI and  appear to be functioning normally.      PHYSICAL EXAMINATION:  Weight 170 lb (77.1 kg), not currently breastfeeding. Erythematous right face  She is accompanied in clinic today by her caregiver.  Nella alert, no longer in wheelchair  Her eyes are rolling.  She is microcephalic, has a small chin. Less communicative than in past.   Gait: walks unaided but moderate spastic gait.  Attention span is very short and limited and she does not really follow her discussion.      Coordination is moderately impaired.  However, she is difficult to examine because she does not follow commands.  On observation, she does have difficulty more with the left than the right upper extremity but limb tone is increased bilaterally.        ASSESSMENT:  Complicated case; hospital notes, EEG and D/C summary reviewed last visit. Not sure if FBM cause. Needs stronger  ASD. Will add perampanel..   Labs= ammonia and Na ok   PLAN:   1) Perampanel 2 mg hs x 3 weeks tjhen 4 mg hs to next visit.  2) RTC 2 mo  3) other ASDS same    Again, thank you for allowing me to participate in the care of your patient.      Sincerely,    Elysia Gomez MD

## 2020-03-06 ENCOUNTER — TELEPHONE (OUTPATIENT)
Dept: PSYCHIATRY | Facility: CLINIC | Age: 45
End: 2020-03-06

## 2020-03-06 NOTE — TELEPHONE ENCOUNTER
Prior Authorization Retail Medication Request    Medication/Dose: Fycompa 2mg  ICD code (if different than what is on RX):    Previously Tried and Failed:  See chart  Rationale:  Pt's brother states the insurance is holding up getting this to them. I am assuming this needs a PA but unsure so starting this just in case.    Insurance Name:    Insurance ID:        Pharmacy Information (if different than what is on RX)  Name:       [facility] [place] [place] - [address]    Phone:  [phone]

## 2020-03-06 NOTE — TELEPHONE ENCOUNTER
Rec'd call from Keegan, pharmacist the insurance wanting more info. My phone is giving me problems with dropping calls, so asked him to fax me the request and I would take care of right away.

## 2020-03-06 NOTE — TELEPHONE ENCOUNTER
Central Prior Authorization Team   Phone: [phone]    PA Initiation    Medication: perampanel (FYCOMPA) 2 MG tablet  Insurance Company: [employer] [facility] - Phone [phone] Fax [phone]  Pharmacy Filling the Rx[facility] - [address]  Filling Pharmacy Phone: [phone]  Filling Pharmacy Fax:    Start Date: 3/6/2020

## 2020-03-09 NOTE — TELEPHONE ENCOUNTER
Prior Authorization Approval    Authorization Effective Date: 1/1/2020  Authorization Expiration Date: 3/6/2023  Medication: perampanel (FYCOMPA) 2 MG tablet  Approved Dose/Quantity:   Reference #:     Insurance Company: [employer] - Phone [phone] Fax [phone]  Expected CoPay:       CoPay Card Available:      Foundation Assistance Needed:    Which Pharmacy is filling the prescription (Not needed for infusion/clinic administered): [facility] [address] [place], [place] [address], [place] - [address]  Pharmacy Notified: Yes - pharmacy closed with no voicemail option. I have faxed approval letter to pharmacy with a request to let patient know once rx is ready. My direct contact info given if needing anything further.  Patient Notified:

## 2020-04-03 ENCOUNTER — TELEPHONE (OUTPATIENT)
Dept: NEUROLOGY | Facility: CLINIC | Age: 45
End: 2020-04-03

## 2020-04-03 NOTE — TELEPHONE ENCOUNTER
What is the concern that needs to be addressed by a nurse? Jeni would like a cb because she states pt's fycompa is causing her to have aggressive behavior.     Message routed to: [facility] RN Pool

## 2020-04-03 NOTE — TELEPHONE ENCOUNTER
I have attempted to contact Jeni several times, I receive a message stating the subscriber you have dialed is not in service. Please try your call later.

## 2020-04-06 NOTE — TELEPHONE ENCOUNTER
I have attempted to contact Jeni several times and continue to receive a message stating the prescriber you have dialed is not in service. Will close encounter.

## 2020-04-08 ENCOUNTER — TELEPHONE (OUTPATIENT)
Dept: NEUROLOGY | Facility: CLINIC | Age: 45
End: 2020-04-08

## 2020-04-08 NOTE — TELEPHONE ENCOUNTER
Patient care coordinator calls the clinic to report concerning behaviors exhibited by Nella since starting Fycompa last week. She started fycompa 2 mg HS. She has been hitting staff and peers, throwing things, threatening to hit people, overall increase in aggression. She did not have a change on her seizure control yet. Care coordinator notices through reading group home nursing notes, that the patient is usually noted to be very anxious prior to her seizures. Care coordinator asks if an anti anxiety medication would be indicated.

## 2020-04-08 NOTE — LETTER
4/8/2020       RE: Nella Helms  [address] [place] [id number]              Discontinue use of fycompa 2mg tablet.

## 2020-04-08 NOTE — TELEPHONE ENCOUNTER
What is the concern that needs to be addressed by a nurse? Pt's RN states that she would like to discuss Fycompa. Pt has been having a lot of aggressive behaviors.     May a detailed message be left on voicemail? Yes     Date of last office visit: 3/3/20    Message routed to: [facility] RN Pool

## 2020-04-09 DIAGNOSIS — G40.319 GENERALIZED CONVULSIVE EPILEPSY WITH INTRACTABLE EPILEPSY (H): ICD-10-CM

## 2020-04-10 NOTE — TELEPHONE ENCOUNTER
diazepam (VALIUM) 5 MG/ML (HIGH CONC) solution      Last Written Prescription Date:  3-3-20  Last Fill Quantity: 120,   # refills: 0  Last Office Visit : 3-3-2020  Future Office visit:  5-    Routing refill request to provider for review/approval because:  Controlled medication.      Kathleen M Doege RN

## 2020-04-11 RX ORDER — DIAZEPAM ORAL SOLUTION (CONCENTRATE) 5 MG/ML
SOLUTION ORAL
Qty: 120 ML | Refills: 0 | Status: SHIPPED | OUTPATIENT
Start: 2020-04-11 | End: 2020-05-12

## 2020-04-16 NOTE — TELEPHONE ENCOUNTER
Verbal order received from Dr. Gomez to discontinue use of fycompa.     This was communicated to the house supervisor, Alina. Order faxed to the group home at [phone].

## 2020-04-17 DIAGNOSIS — G40.319 GENERALIZED CONVULSIVE EPILEPSY WITH INTRACTABLE EPILEPSY (H): Chronic | ICD-10-CM

## 2020-04-20 RX ORDER — LEVETIRACETAM 500 MG/1
500 TABLET ORAL 2 TIMES DAILY
Qty: 60 TABLET | Refills: 11 | Status: SHIPPED | OUTPATIENT
Start: 2020-04-20 | End: 2021-04-15

## 2020-04-20 NOTE — TELEPHONE ENCOUNTER
Last Clinic Visit: 3/3/20  NV:  5/12/20  Filling per [facility] medication refill protocols - seizure medications.  Not all labs required.

## 2020-05-12 ENCOUNTER — VIRTUAL VISIT (OUTPATIENT)
Dept: NEUROLOGY | Facility: CLINIC | Age: 45
End: 2020-05-12
Payer: MEDICARE

## 2020-05-12 DIAGNOSIS — G40.319 GENERALIZED CONVULSIVE EPILEPSY WITH INTRACTABLE EPILEPSY (H): ICD-10-CM

## 2020-05-12 RX ORDER — DIAZEPAM ORAL SOLUTION (CONCENTRATE) 5 MG/ML
SOLUTION ORAL
Qty: 120 ML | Refills: 0 | Status: SHIPPED | OUTPATIENT
Start: 2020-05-12 | End: 2020-09-16

## 2020-05-12 RX ORDER — LACOSAMIDE 200 MG/1
TABLET ORAL
Qty: 60 TABLET | Refills: 5 | Status: SHIPPED | OUTPATIENT
Start: 2020-05-12 | End: 2020-11-10

## 2020-05-12 RX ORDER — LEVETIRACETAM 1000 MG/1
1000 TABLET ORAL 2 TIMES DAILY
Qty: 60 TABLET | Refills: 11 | Status: SHIPPED | OUTPATIENT
Start: 2020-05-12 | End: 2021-04-20

## 2020-05-12 RX ORDER — LAMOTRIGINE 200 MG/1
TABLET ORAL
Qty: 180 TABLET | Refills: 4 | Status: SHIPPED | OUTPATIENT
Start: 2020-05-12 | End: 2020-11-10

## 2020-05-12 RX ORDER — OXCARBAZEPINE 600 MG/1
600 TABLET, FILM COATED ORAL 2 TIMES DAILY
Qty: 60 TABLET | Refills: 11 | Status: SHIPPED | OUTPATIENT
Start: 2020-05-12 | End: 2021-04-20

## 2020-05-12 NOTE — PROGRESS NOTES
"Nella Helms is a 44 year old female who is being evaluated via a billable telephone visit.      The patient has been notified of following:     \"This telephone visit will be conducted via a call between you and your physician/provider. We have found that certain health care needs can be provided without the need for a physical exam.  This service lets us provide the care you need with a short phone conversation.  If a prescription is necessary we can send it directly to your pharmacy.  If lab work is needed we can place an order for that and you can then stop by our lab to have the test done at a later time.    Telephone visits are billed at different rates depending on your insurance coverage. During this emergency period, for some insurers they may be billed the same as an in-person visit.  Please reach out to your insurance provider with any questions.    If during the course of the call the physician/provider feels a telephone visit is not appropriate, you will not be charged for this service.\"    Patient has given verbal consent for Telephone visit?  Yes    What phone number would you like to be contacted at? [phone]    How would you like to obtain your AVS? Mail a copy    INTERVAL HX:  Resperidol  And possibly FBM   caused issues with white count. Both discont. Tried perampanel but had to discont. Due to behaviors. Now quaranteeing; less stress as day program casued stress so sz and behavior better. Still 1-2/week, nut Focal Unaware with no injuries. They are using diazepam for sz about once a week.    Prior to Admission medications    Medication Sig Start Date End Date Taking? Authorizing Provider   diazepam (VALIUM) 5 MG/ML (HIGH CONC) solution (1ML)== (5MG) PO AS NEEDED FOR ANY SEIZURE. WHEN ADMINISTERED NOTIFY PC, RN & PD. 3/3/20  Yes Elysia Gomez MD   docusate sodium (COLACE) 100 MG capsule Take by mouth 2 times daily   Yes Reported, Patient   hydrocortisone (CORTAID) 1 % cream Apply topically 2 " times daily as needed   Yes Reported, Patient   lacosamide (VIMPAT) 200 MG TABS tablet TAKE (1) TABLET TWICE DAILY. 3/3/20  Yes Elysia Gomez MD   lamoTRIgine (LAMICTAL) 200 MG tablet (1) PO TWICE DAILY. 3/3/20  Yes Elysia Gomez MD   levETIRAcetam (KEPPRA) 1000 MG tablet Take 1 tablet (1,000 mg) by mouth 2 times daily 3/3/20  Yes Elysia Gomez MD   levETIRAcetam (KEPPRA) 500 MG tablet Take 1 tablet (500 mg) by mouth 2 times daily 3/29/19  Yes Elysia Gomez MD   Multiple Vitamin (MULTIVITAMINS PO) Take by mouth daily   Yes Reported, Patient   multivitamin, therapeutic with minerals (THERA-VIT-M) TABS tablet Take 1 tablet by mouth daily   Yes Reported, Patient   OLANZapine (ZYPREXA) 5 MG tablet Take 5 mg by mouth 1/23/19  Yes Reported, Patient   OXcarbazepine (TRILEPTAL) 600 MG tablet Take 1 tablet (600 mg) by mouth 2 times daily 3/29/19  Yes Elysia Gomez MD   perampanel (FYCOMPA) 2 MG tablet Take 1 tablet (2 mg) by mouth At Bedtime 3/3/20  Yes Elysia Gomez MD   polyvinyl alcohol (LIQUITEARS) 1.4 % ophthalmic solution 2-3 drops as needed for dry eyes   Yes Reported, Patient   traZODone (DESYREL) 50 MG tablet 1 tablet (50 mg) q 8am. 1 tablet (50 mg) q 4 pm,  3 tablets (150 mg) 8pm 6/20/18  Yes Candice Liu MD   VIMPAT 200 MG TABS tablet TAKE 1 TABLET BY MOUTH TWICE A DAY FOR SEIZURES 10/3/19  Yes Elysia Gomez MD   VITAMIN E 2 times daily   Yes Reported, Patient   lacosamide (VIMPAT) 200 MG TABS tablet TAKE (1) TABLET TWICE DAILY.  Patient not taking: Reported on 3/29/2019 1/4/19   Elysia Gomez MD   risperiDONE (RISPERDAL) 2 MG tablet Take 1 tablet (2 mg) by mouth 2 times daily  Patient not taking: Reported on 3/29/2019 9/26/18   Archie Corona MD     EPILEPSY HX REVIEWED 5/12/20:   I have been seeing her since she was  age 19.  She was initially seen at [facility] when she was 15 years old.    First seizure occurred at 6 hours of age.  It was described as color turning dusky, turning  head to the right, eyes fixed to the right and no response.  She continued to have intermittent seizure activity and subsequently was transferred to the [facility] where she was hospitalized for 2 weeks.  It is not clear what diagnostic evaluation was done but they decided to discharge her without seizure medications.  Seizures recurred at 9 months of age.  At that time they noticed twitching of her left arm and then twitching of the side of the face.  EEG initially showed hypsarrhythmic pattern.  She was treated with phenobarbital.  Then, she was treated with Tegretol and later Dilantin.  CT scan in 1983 we do not have records.      Seizure types:   In 1991 seizure types were described as sitting, both arms jerk up, her head turns to the right, eyes go to the right and roll up.  These events last 2 minutes and at that time she was having 6 a month.      Second seizure type is described as her arms go up, legs stiffen and she moans.  She can be lowered to the floor and then her arms and legs shake with some erratic breathing and sweating profusely.  These occurred at that time, 3-4 per month.        Seizure type 3 was described as being alert.  Her arms fly out and her eyes blink.      Seizure type 4 described as stares.      She has had 1 episode of status epilepticus in 1978.      EEG when she was 12 was read as abnormal record by virtue of diffuse theta and delta slowing greater on the right.      Parents report that Nella has always had a left hemiparesis, mostly affecting the left upper extremity with some involvement of her left lower extremity.  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  She has been diagnosed as legally blind as having cortical blindness.      BIRTH HISTORY:  Birth history was that she was the result of a full-term pregnancy that was complicated by maternal high blood pressure and use of Enduron.  Pregnancy ended in a 15 labored and vaginal delivery.  Birth weight 7  pounds 6 ounces.  Apgar scores of 4 at 1 minute and 8 at 5 minutes.      She has had global developmental delay.      Subsequent treatment at [facility] has been quite extensive and will not be reviewed completely here.  Last EEG was 2010 which showed mild to moderate nonspecific diffuse disturbance of cerebral activity and multifocal interictal activity with one complex partial seizure recorded.      In the last year they have described 204 seizures,all given diazepam.  OTHER ISSUES:        She has had behavioral issues throughout.      Bell's palsy in , resolved.      Most of her seizures that the staff is noticing now would be described as brief tonic seizures are brief tonic-clonic seizures.  They are more concerned with the length then the precise observation.  She might be having some minor seizures are not being reported.      CURRENT MEDICATIONS:  Felbatol  Discont due to possible cause of anemia; peramp discont   After short try for word=senibg behavior. Lamictal have really been the best treatment for her.  She has been tried almost all the other seizure medications over time including Depakote, Tegretol, phenobarbital, Dilantin, etc.      SOCIAL HISTORY:  She is living in a group home.      REVIEW OF SYSTEMS:  Through the caregiver is essentially negative.  She has not had any upper respiratory infections lately.  Appetite is good.  She has no obvious headaches.   GI and  appear to be functioning normally.      PHYSICAL EXAMINATION:  N/A       ASSESSMENT:  Complicated case; hospital notes, EEG and D/C summary reviewed last visit. Not sure if FBM cause of anemia. Needs stronger  ASD. Failed perampanel..      PLAN:   1)  2) RTC 2 mo  3) other ASDS same      Phone call duration: 23  minutes  Elysia Gomez MD

## 2020-07-15 DIAGNOSIS — G40.319 GENERALIZED CONVULSIVE EPILEPSY WITH INTRACTABLE EPILEPSY (H): ICD-10-CM

## 2020-07-16 RX ORDER — PERAMPANEL 2 MG/1
TABLET ORAL
Qty: 30 TABLET | Refills: 3 | OUTPATIENT
Start: 2020-07-16

## 2020-09-16 DIAGNOSIS — G40.319 GENERALIZED CONVULSIVE EPILEPSY WITH INTRACTABLE EPILEPSY (H): ICD-10-CM

## 2020-09-16 NOTE — TELEPHONE ENCOUNTER
DIAZEPAM 5MG/ML CONC       Last Written Prescription Date:  5/12/20  Last Fill Quantity: 120 ml,   # refills: 0  Last Virtual Visit : 5/12/20 was to follow up in 2 months  Future Office visit:  11/10/20    Routing refill request to provider for review/approval because:  Drug not on the [facility], P or [facility] refill protocol and controlled substance

## 2020-09-17 RX ORDER — DIAZEPAM ORAL SOLUTION (CONCENTRATE) 5 MG/ML
SOLUTION ORAL
Qty: 120 ML | Refills: 0 | Status: SHIPPED | OUTPATIENT
Start: 2020-09-17 | End: 2020-11-10

## 2020-10-09 NOTE — PROGRESS NOTES
Addended by: TOBY SEXTON on: 10/9/2020 02:04 PM     Modules accepted: Orders     [facility], [place]    Internal Medicine Progress Note - Gold Service      Assessment & Plan   Nella Helms is a 42 year old female admitted on 9/21/2018. She has a history of cognitive delay, left sided hemiplegia, severe epilepsy, cortical blindness, ITP, chronic gait instability, who was admitted with increased seizure activity.      # Epilepsy   # Increased seizure frequency   Patient presenting with 1 day increased seizure activity at her group home, requiring several rescue mediations at admission. On multiple agents, overall difficult-to-control seizures. No signs of infection in terms of fever, leukocytosis, or focal findings on exam, UA normal. Elevated lactate post-seizure, resolved. Per neuro who had seen her before in clinic, this is close to her baseline mental status/exam.   - Neurology consulted, appreciate involvement   - continue home AEDs, rescue lorazepam available   - checked AED levels, lamotrigine mildly low but otherwise WNL   - vEEG per neurology   - increase keppra to 1500 mg BID   - will attempt repeat CT this pm   - mild electrolyte derangements at OSH, repeat labs still pending     # Chronic gait instability and falls   Had had some recent changes in medications PTA, otherwise has been followed by neuro for this outpatient with ongoing work-up. May be related to polypharmacy, concern per neuro about behavioral component.   - fall precautions   - will consider decreasing trazodone     Other issues:   # Developmental Delay/OCD/behavioral issues: intermittently crying out, suspect this is behavioral given that she has no complaints and is calm when she has company. Continue trazodone, risperidone   # ITP: stable, plts 112 at admission, on no medications (previoius issues on long term prednisone). Will monitor     Diet: Combination Diet Regular Diet Adult  Fluids: none   Reyes Catheter: not present    DVT Prophylaxis: Low Risk/Ambulatory with no VTE prophylaxis  indicated and Pneumatic Compression Devices  Code Status: Full Code    Disposition Plan   Expected discharge: 2 - 3 days, recommended to prior living arrangement once seizues at baseline, neuro clears.     Entered: Keerthi Arvizu MD 09/22/2018, 2:13 PM   Information in the above section will display in the discharge planner report.      The patient's care was discussed with the Bedside Nurse, Patient and neurology Consultant.    Keerthi Arvizu MD  Internal Medicine Staff Hospitalist Service  [facility]  Pager: [phone]  Please see sticky note for cross cover information    Interval History    Seizure x2 since admission noted, prevented CT being completed. This morning patient denies pain, shortness of breath, but some answers to questions unintelligible.     Data reviewed today: I reviewed all medications, new labs and imaging results over the last 24 hours. I personally reviewed no images or EKG's today.    Physical Exam   Vital Signs: Temp: 96.7  F (35.9  C) Temp src: Axillary BP: 114/74 Pulse: 91   Resp: 16 SpO2: 94 % O2 Device: None (Room air)    Weight: 160 lbs 1.6 oz  Gen: alert, sitting up in bed, turns to voice and answering some questions appropriately, but often unintelligible.   HEENT: Normocephalic/atraumatic, sclera clear, oropharynx with moist mucous membranes,  Resp: Clear bilaterally, easy work of breathing on room air  CV: Regular rate and rhythm, no murmurs noted   Abd: obese, soft, non-tender, nondistended  Ext: no lower extremity edema, warm and well-perfused with brisk capillary refill   Neuro: Alert, answering simple questions, not oriented, generally not following commands for strength testing but moving R > L side       Data   Data     Recent Labs  Lab 09/22/18  1334   WBC 6.8   HGB 12.7   MCV 94   *

## 2020-11-10 ENCOUNTER — VIRTUAL VISIT (OUTPATIENT)
Dept: NEUROLOGY | Facility: CLINIC | Age: 45
End: 2020-11-10
Payer: MEDICARE

## 2020-11-10 DIAGNOSIS — G40.319 GENERALIZED CONVULSIVE EPILEPSY WITH INTRACTABLE EPILEPSY (H): ICD-10-CM

## 2020-11-10 RX ORDER — LAMOTRIGINE 200 MG/1
TABLET ORAL
Qty: 180 TABLET | Refills: 4 | Status: SHIPPED | OUTPATIENT
Start: 2020-11-10 | End: 2021-04-20

## 2020-11-10 RX ORDER — DIAZEPAM ORAL SOLUTION (CONCENTRATE) 5 MG/ML
SOLUTION ORAL
Qty: 120 ML | Refills: 0 | Status: SHIPPED | OUTPATIENT
Start: 2020-11-10 | End: 2021-04-20

## 2020-11-10 RX ORDER — LACOSAMIDE 200 MG/1
TABLET ORAL
Qty: 60 TABLET | Refills: 5 | Status: SHIPPED | OUTPATIENT
Start: 2020-11-10 | End: 2021-04-20

## 2020-11-10 NOTE — PROGRESS NOTES
"Nella Helms is a 44 year old female who is being evaluated via a billable telephone visit.      .\"  Nella Helms is a 44 year old female who is being evaluated via a billable telephone visit.      The patient has been notified of following:     \"This telephone visit will be conducted via a call between you and your physician/provider. We have found that certain health care needs can be provided without the need for a physical exam.  This service lets us provide the care you need with a short phone conversation.  If a prescription is necessary we can send it directly to your pharmacy.  If lab work is needed we can place an order for that and you can then stop by our lab to have the test done at a later time.    Telephone visits are billed at different rates depending on your insurance coverage. During this emergency period, for some insurers they may be billed the same as an in-person visit.  Please reach out to your insurance provider with any questions.    If during the course of the call the physician/provider feels a telephone visit is not appropriate, you will not be charged for this service.\"    Patient has given verbal consent for Telephone visit?  Yes    What phone number would you like to be contacted at? [phone]    How would you like to obtain your AVS? Mail a copy    INTERVAL HX:   2019 Tried perampanel but had to discont due to behaviors. Now quaranteeing; less stress as day program casued stress so sz and behavior better. Still 3-6 /week,  Focal Unaware with no injurie but clonic movements. They are using diazepam for sz about 2-5 e a week.    Prior to Admission medications    Medication Sig Start Date End Date Taking? Authorizing Provider   diazepam (VALIUM) 5 MG/ML (HIGH CONC) solution ADMINISTER 1ML (5MG) BY MOUTH AS NEEDED FOR ANY SEIZURE. WHEN ADMINISTERED NOTIFY PC., RN & PD 11/10/20  Yes Elysia Gomez MD   docusate sodium (COLACE) 100 MG capsule Take by mouth 2 times daily   Yes Reported, " Patient   hydrocortisone (CORTAID) 1 % cream Apply topically 2 times daily as needed   Yes Reported, Patient   lacosamide (VIMPAT) 200 MG TABS tablet TAKE (1) TABLET TWICE DAILY. 11/10/20  Yes Elysia Gomez MD   lacosamide (VIMPAT) 200 MG TABS tablet TAKE 1 TABLET BY MOUTH TWICE A DAY FOR SEIZURES 11/10/20  Yes Elysia Gomez MD   lamoTRIgine (LAMICTAL) 200 MG tablet (1) PO TWICE DAILY. 11/10/20  Yes Elysia Gomez MD   levETIRAcetam (KEPPRA) 1000 MG tablet Take 1 tablet (1,000 mg) by mouth 2 times daily 5/12/20  Yes Elysia Gomez MD   levETIRAcetam (KEPPRA) 500 MG tablet Take 1 tablet (500 mg) by mouth 2 times daily 4/20/20  Yes Elysia Gomez MD   multivitamin, therapeutic with minerals (THERA-VIT-M) TABS tablet Take 1 tablet by mouth daily   Yes Reported, Patient   OLANZapine (ZYPREXA) 5 MG tablet Take 10 mg by mouth 10mg and 2.5mg tablets in evening 1/23/19  Yes Reported, Patient   OXcarbazepine (TRILEPTAL) 600 MG tablet Take 1 tablet (600 mg) by mouth 2 times daily 5/12/20  Yes Elysia Gomez MD   traZODone (DESYREL) 50 MG tablet 1 tablet (50 mg) q 8am. 1 tablet (50 mg) q 4 pm,  3 tablets (150 mg) 8pm 6/20/18  Yes Candice Liu MD   VITAMIN E 2 times daily   Yes Reported, Patient   Multiple Vitamin (MULTIVITAMINS PO) Take by mouth daily    Reported, Patient   polyvinyl alcohol (LIQUITEARS) 1.4 % ophthalmic solution 2-3 drops as needed for dry eyes    Reported, Patient         EPILEPSY HX REVIEWED 11/10/20:   I have been seeing her since she was  age 19.  First seizure occurred at 6 hours of age.  It was described as color turning dusky, turning head to the right, eyes fixed to the right and no response.  She continued to have intermittent seizure activity and subsequently was transferred to the [facility] where she was hospitalized for 2 weeks.  It is not clear what diagnostic evaluation was done but they decided to discharge her without seizure medications.  Seizures recurred at 9 months of  age.  At that time they noticed twitching of her left arm and then twitching of the side of the face.  EEG initially showed hypsarrhythmic pattern.  She was treated with phenobarbital.  Then, she was treated with Tegretol and later Dilantin.  CT scan in  we do not have records.      Seizure types:   In  seizure types were described as sitting, both arms jerk up, her head turns to the right, eyes go to the right and roll up.  These events last 2 minutes and at that time she was having 6 a month.      Second seizure type is described as her arms go up, legs stiffen and she moans.  She can be lowered to the floor and then her arms and legs shake with some erratic breathing and sweating profusely.  These occurred at that time, 3-4 per month.        Seizure type 3 was described as being alert.  Her arms fly out and her eyes blink.      Seizure type 4 described as stares.      She has had 1 episode of status epilepticus in .      EEG when she was 12 was read as abnormal record by virtue of diffuse theta and delta slowing greater on the right.      Parents report that Nella has always had a left hemiparesis, mostly affecting the left upper extremity with some involvement of her left lower extremity.  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  She has been diagnosed as legally blind as having cortical blindness.      BIRTH HISTORY:  Birth history was that she was the result of a full-term pregnancy that was complicated by maternal high blood pressure and use of Enduron.  Pregnancy ended in a 14 labored and vaginal delivery.  Birth weight 7 pounds 6 ounces.  Apgar scores of 4 at 1 minute and 8 at 5 minutes.      She has had global developmental delay.      Subsequent treatment at [facility] has been quite extensive and will not be reviewed completely here.  Last EEG was 2010 which showed mild to moderate nonspecific diffuse disturbance of cerebral activity and multifocal interictal activity with one complex partial  seizure recorded.      In the last year they have described 204 seizures,all given diazepam.  OTHER ISSUES:        She has had behavioral issues throughout.      Bell's palsy in 2005, resolved.      Most of her seizures that the staff is noticing now would be described as brief tonic seizures are brief tonic-clonic seizures.  They are more concerned with the length then the precise observation.  She might be having some minor seizures are not being reported.      CURRENT MEDICATIONS:  Felbatol  Discont due to possible cause of anemia; peramp discont 2020 . Lamictal have really been the best treatment for her.  She has been tried almost all the other seizure medications over time including Depakote, Tegretol, phenobarbital, Dilantin, etc.      SOCIAL HISTORY:  She is living in a group home.      REVIEW OF SYSTEMS:  Through the caregiver is essentially negative.  She has not had any upper respiratory infections lately.  Appetite is good.  She has no obvious headaches.   GI and  appear to be functioning normally.      PHYSICAL EXAMINATION:  N/A       ASSESSMENT:  Complicated case; hospital notes, EEG and D/C summary reviewed last visit. Not sure if FBM cause of anemia. Needs stronger  ASD. Failed perampanel..      PLAN:   1)  2) RTC 2 mo  3) other ASDS same        Phone call duration: 18 minutes    Elysia may MD

## 2020-11-10 NOTE — LETTER
"11/10/2020       RE: Nella Helms  : 1975   MRN: [id number]      Dear Colleague,    Thank you for referring your patient, Nella Helms, to the [facility] EPILEPSY CARE at [facility]. Please see a copy of my visit note below.    Nella Helms is a 44 year old female who is being evaluated via a billable telephone visit.      .\"  Nella Helms is a 44 year old female who is being evaluated via a billable telephone visit.      The patient has been notified of following:     \"This telephone visit will be conducted via a call between you and your physician/provider. We have found that certain health care needs can be provided without the need for a physical exam.  This service lets us provide the care you need with a short phone conversation.  If a prescription is necessary we can send it directly to your pharmacy.  If lab work is needed we can place an order for that and you can then stop by our lab to have the test done at a later time.    Telephone visits are billed at different rates depending on your insurance coverage. During this emergency period, for some insurers they may be billed the same as an in-person visit.  Please reach out to your insurance provider with any questions.    If during the course of the call the physician/provider feels a telephone visit is not appropriate, you will not be charged for this service.\"    Patient has given verbal consent for Telephone visit?  Yes    What phone number would you like to be contacted at? [phone]    How would you like to obtain your AVS? Mail a copy    INTERVAL HX:   2019 Tried perampanel but had to discont due to behaviors. Now quaranteeing; less stress as day program casued stress so sz and behavior better. Still 3-6 /week,  Focal Unaware with no injurie but clonic movements. They are using diazepam for sz about 2-5 e a week.    Prior to Admission medications    Medication Sig Start Date End Date Taking? " Authorizing Provider   diazepam (VALIUM) 5 MG/ML (HIGH CONC) solution ADMINISTER 1ML (5MG) BY MOUTH AS NEEDED FOR ANY SEIZURE. WHEN ADMINISTERED NOTIFY PC., RN & PD 11/10/20  Yes Elysia Gomez MD   docusate sodium (COLACE) 100 MG capsule Take by mouth 2 times daily   Yes Reported, Patient   hydrocortisone (CORTAID) 1 % cream Apply topically 2 times daily as needed   Yes Reported, Patient   lacosamide (VIMPAT) 200 MG TABS tablet TAKE (1) TABLET TWICE DAILY. 11/10/20  Yes Elysia Gomez MD   lacosamide (VIMPAT) 200 MG TABS tablet TAKE 1 TABLET BY MOUTH TWICE A DAY FOR SEIZURES 11/10/20  Yes Elysia Gomez MD   lamoTRIgine (LAMICTAL) 200 MG tablet (1) PO TWICE DAILY. 11/10/20  Yes Elysia Gomez MD   levETIRAcetam (KEPPRA) 1000 MG tablet Take 1 tablet (1,000 mg) by mouth 2 times daily 5/12/20  Yes Elysia Gomez MD   levETIRAcetam (KEPPRA) 500 MG tablet Take 1 tablet (500 mg) by mouth 2 times daily 4/20/20  Yes Elysia Gomez MD   multivitamin, therapeutic with minerals (THERA-VIT-M) TABS tablet Take 1 tablet by mouth daily   Yes Reported, Patient   OLANZapine (ZYPREXA) 5 MG tablet Take 10 mg by mouth 10mg and 2.5mg tablets in evening 1/23/19  Yes Reported, Patient   OXcarbazepine (TRILEPTAL) 600 MG tablet Take 1 tablet (600 mg) by mouth 2 times daily 5/12/20  Yes Elysia Gomez MD   traZODone (DESYREL) 50 MG tablet 1 tablet (50 mg) q 8am. 1 tablet (50 mg) q 4 pm,  3 tablets (150 mg) 8pm 6/20/18  Yes Candice Liu MD   VITAMIN E 2 times daily   Yes Reported, Patient   Multiple Vitamin (MULTIVITAMINS PO) Take by mouth daily    Reported, Patient   polyvinyl alcohol (LIQUITEARS) 1.4 % ophthalmic solution 2-3 drops as needed for dry eyes    Reported, Patient         EPILEPSY HX REVIEWED 11/10/20:   I have been seeing her since she was  age 19.  First seizure occurred at 6 hours of age.  It was described as color turning dusky, turning head to the right, eyes fixed to the right and no response.  She continued to have intermittent  seizure activity and subsequently was transferred to the [facility] where she was hospitalized for 2 weeks.  It is not clear what diagnostic evaluation was done but they decided to discharge her without seizure medications.  Seizures recurred at 9 months of age.  At that time they noticed twitching of her left arm and then twitching of the side of the face.  EEG initially showed hypsarrhythmic pattern.  She was treated with phenobarbital.  Then, she was treated with Tegretol and later Dilantin.  CT scan in  we do not have records.      Seizure types:   In  seizure types were described as sitting, both arms jerk up, her head turns to the right, eyes go to the right and roll up.  These events last 2 minutes and at that time she was having 6 a month.      Second seizure type is described as her arms go up, legs stiffen and she moans.  She can be lowered to the floor and then her arms and legs shake with some erratic breathing and sweating profusely.  These occurred at that time, 3-4 per month.        Seizure type 3 was described as being alert.  Her arms fly out and her eyes blink.      Seizure type 4 described as stares.      She has had 1 episode of status epilepticus in .      EEG when she was 12 was read as abnormal record by virtue of diffuse theta and delta slowing greater on the right.      Parents report that Nella has always had a left hemiparesis, mostly affecting the left upper extremity with some involvement of her left lower extremity.  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  She has been diagnosed as legally blind as having cortical blindness.      BIRTH HISTORY:  Birth history was that she was the result of a full-term pregnancy that was complicated by maternal high blood pressure and use of Enduron.  Pregnancy ended in a 14 labored and vaginal delivery.  Birth weight 7 pounds 6 ounces.  Apgar scores of 4 at 1 minute and 8 at 5 minutes.      She has had global  developmental delay.      Subsequent treatment at [facility] has been quite extensive and will not be reviewed completely here.  Last EEG was 12/2010 which showed mild to moderate nonspecific diffuse disturbance of cerebral activity and multifocal interictal activity with one complex partial seizure recorded.      In the last year they have described 204 seizures,all given diazepam.  OTHER ISSUES:        She has had behavioral issues throughout.      Bell's palsy in 2005, resolved.      Most of her seizures that the staff is noticing now would be described as brief tonic seizures are brief tonic-clonic seizures.  They are more concerned with the length then the precise observation.  She might be having some minor seizures are not being reported.      CURRENT MEDICATIONS:  Felbatol  Discont due to possible cause of anemia; peramp discont 2020 . Lamictal have really been the best treatment for her.  She has been tried almost all the other seizure medications over time including Depakote, Tegretol, phenobarbital, Dilantin, etc.      SOCIAL HISTORY:  She is living in a group home.      REVIEW OF SYSTEMS:  Through the caregiver is essentially negative.  She has not had any upper respiratory infections lately.  Appetite is good.  She has no obvious headaches.   GI and  appear to be functioning normally.      PHYSICAL EXAMINATION:  N/A       ASSESSMENT:  Complicated case; hospital notes, EEG and D/C summary reviewed last visit. Not sure if FBM cause of anemia. Needs stronger  ASD. Failed perampanel..      PLAN:   1)  2) RTC 2 mo  3) other ASDS same    Phone call duration: 18 minutes    Elysia may MD

## 2021-04-13 DIAGNOSIS — G40.319 GENERALIZED CONVULSIVE EPILEPSY WITH INTRACTABLE EPILEPSY (H): Chronic | ICD-10-CM

## 2021-04-15 NOTE — TELEPHONE ENCOUNTER
LEVETIRACETAM 500MG TABS   Last Written Prescription Date:  4/20/2020  Last Fill Quantity: 60,   # refills: 11  Last Office Visit : 11/10/2020  Future Office visit:  4/20/2021    Routing refill request to provider for review/approval because:  Pt rescheduled visit on 4/6/2021.  There is 2 orders for Keppra?  Which order would Provider like for Pt care?  Refer to Provider and review.   Appointment on 4/20/2021.   Please advise       Breonna Miller RN  Central Triage Red Flags/Med Refills

## 2021-04-19 RX ORDER — LEVETIRACETAM 500 MG/1
TABLET ORAL
Qty: 60 TABLET | Refills: 11 | Status: SHIPPED | OUTPATIENT
Start: 2021-04-19 | End: 2021-04-20

## 2021-04-20 ENCOUNTER — VIRTUAL VISIT (OUTPATIENT)
Dept: NEUROLOGY | Facility: CLINIC | Age: 46
End: 2021-04-20
Payer: MEDICARE

## 2021-04-20 DIAGNOSIS — G40.319 GENERALIZED CONVULSIVE EPILEPSY WITH INTRACTABLE EPILEPSY (H): ICD-10-CM

## 2021-04-20 RX ORDER — LACOSAMIDE 200 MG/1
TABLET ORAL
Qty: 60 TABLET | Refills: 5 | Status: SHIPPED | OUTPATIENT
Start: 2021-04-20 | End: 2021-09-20

## 2021-04-20 RX ORDER — LAMOTRIGINE 200 MG/1
TABLET ORAL
Qty: 180 TABLET | Refills: 4 | Status: SHIPPED | OUTPATIENT
Start: 2021-04-20 | End: 2022-04-08

## 2021-04-20 RX ORDER — LEVETIRACETAM 500 MG/1
500 TABLET ORAL 2 TIMES DAILY
Qty: 60 TABLET | Refills: 11 | Status: SHIPPED | OUTPATIENT
Start: 2021-04-20 | End: 2022-04-08

## 2021-04-20 RX ORDER — LEVETIRACETAM 1000 MG/1
1000 TABLET ORAL 2 TIMES DAILY
Qty: 60 TABLET | Refills: 11 | Status: SHIPPED | OUTPATIENT
Start: 2021-04-20 | End: 2022-04-08

## 2021-04-20 RX ORDER — DIAZEPAM ORAL SOLUTION (CONCENTRATE) 5 MG/ML
SOLUTION ORAL
Qty: 120 ML | Refills: 1 | Status: SHIPPED | OUTPATIENT
Start: 2021-04-20 | End: 2022-04-12

## 2021-04-20 RX ORDER — DIAZEPAM ORAL SOLUTION (CONCENTRATE) 5 MG/ML
SOLUTION ORAL
Refills: 0 | OUTPATIENT
Start: 2021-04-20

## 2021-04-20 RX ORDER — OXCARBAZEPINE 600 MG/1
600 TABLET, FILM COATED ORAL 2 TIMES DAILY
Qty: 60 TABLET | Refills: 11 | Status: SHIPPED | OUTPATIENT
Start: 2021-04-20 | End: 2022-05-16

## 2021-04-20 NOTE — LETTER
"2021       RE: Nella Helms  : 1975   MRN: [id number]      Dear Colleague,    Thank you for referring your patient, Nella Helms, to the [facility] EPILEPSY CARE at [facility]. Please see a copy of my visit note below.    Nella is a 45 year old who is being evaluated via a billable telephone visit.      What phone number would you like to be contacted at?[phone]  How would you like to obtain your AVS? Mail a copy  Phone call duration: 22 Nella Helms is a 44 year old female who is being evaluated via a billable telephone visit.      .\"  Nella Helms is a 44 year old female who is being evaluated via a billable telephone visit.      The patient has been notified of following:     \"This telephone visit will be conducted via a call between you and your physician/provider. We have found that certain health care needs can be provided without the need for a physical exam.  This service lets us provide the care you need with a short phone conversation.  If a prescription is necessary we can send it directly to your pharmacy.  If lab work is needed we can place an order for that and you can then stop by our lab to have the test done at a later time.      How would you like to obtain your AVS? Mail a copy    INTERVAL HX:  In  Tried perampanel but had to discont due to behaviors. sz  3-6 /week,usually 2 min; use diazepam after each 2 min. One ER visit when 2 doses did not stop sz.  Focal Unaware with no injurie but clonic movements. They are using diazepam for sz about 2-5 e a week.    Prior to Admission medications    Medication Sig Start Date End Date Taking? Authorizing Provider   diazepam (VALIUM) 5 MG/ML (HIGH CONC) solution ADMINISTER 1ML (5MG) BY MOUTH AS NEEDED FOR ANY SEIZURE. WHEN ADMINISTERED NOTIFY PC., RN & PD 11/10/20  Yes Elysia Gomez MD   docusate sodium (COLACE) 100 MG capsule Take by mouth 2 times daily   Yes Reported, Patient "   hydrocortisone (CORTAID) 1 % cream Apply topically 2 times daily as needed   Yes Reported, Patient   lacosamide (VIMPAT) 200 MG TABS tablet TAKE (1) TABLET TWICE DAILY. 11/10/20  Yes Elysia Gomez MD   lacosamide (VIMPAT) 200 MG TABS tablet TAKE 1 TABLET BY MOUTH TWICE A DAY FOR SEIZURES 11/10/20  Yes Elysia Gomez MD   lamoTRIgine (LAMICTAL) 200 MG tablet (1) PO TWICE DAILY. 11/10/20  Yes Elysia Gomez MD   levETIRAcetam (KEPPRA) 1000 MG tablet Take 1 tablet (1,000 mg) by mouth 2 times daily 5/12/20  Yes Elysia Gomez MD   levETIRAcetam (KEPPRA) 500 MG tablet Take 1 tablet (500 mg) by mouth 2 times daily 4/20/20  Yes Elysia Gomez MD   multivitamin, therapeutic with minerals (THERA-VIT-M) TABS tablet Take 1 tablet by mouth daily   Yes Reported, Patient   OLANZapine (ZYPREXA) 5 MG tablet Take 10 mg by mouth 10mg and 2.5mg tablets in evening 1/23/19  Yes Reported, Patient   OXcarbazepine (TRILEPTAL) 600 MG tablet Take 1 tablet (600 mg) by mouth 2 times daily 5/12/20  Yes Elysia Gomez MD   traZODone (DESYREL) 50 MG tablet 1 tablet (50 mg) q 8am. 1 tablet (50 mg) q 4 pm,  3 tablets (150 mg) 8pm 6/20/18  Yes Candice Liu MD   VITAMIN E 2 times daily   Yes Reported, Patient   Multiple Vitamin (MULTIVITAMINS PO) Take by mouth daily    Reported, Patient   polyvinyl alcohol (LIQUITEARS) 1.4 % ophthalmic solution 2-3 drops as needed for dry eyes    Reported, Patient         EPILEPSY HX REVIEWED 4/20/21:   I have been seeing her since she was  age 19.  First seizure occurred at 6 hours of age.  It was described as color turning dusky, turning head to the right, eyes fixed to the right and no response.  She continued to have intermittent seizure activity and subsequently was transferred to the [facility] where she was hospitalized for 2 weeks.  It is not clear what diagnostic evaluation was done but they decided to discharge her without seizure medications.  Seizures recurred at 9 months of age.  At  that time they noticed twitching of her left arm and then twitching of the side of the face.  EEG initially showed hypsarrhythmic pattern.  She was treated with phenobarbital.  Then, she was treated with Tegretol and later Dilantin.  CT scan in  we do not have records.      Seizure types:   In  seizure types were described as sitting, both arms jerk up, her head turns to the right, eyes go to the right and roll up.  These events last 2 minutes and at that time she was having 6 a month.      Second seizure type is described as her arms go up, legs stiffen and she moans.  She can be lowered to the floor and then her arms and legs shake with some erratic breathing and sweating profusely.  These occurred at that time, 3-4 per month.        Seizure type 3 was described as being alert.  Her arms fly out and her eyes blink.      Seizure type 4 described as stares.      She has had 1 episode of status epilepticus in .      EEG when she was 12 was read as abnormal record by virtue of diffuse theta and delta slowing greater on the right.      Parents report that Nella has always had a left hemiparesis, mostly affecting the left upper extremity with some involvement of her left lower extremity.  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  She has been diagnosed as legally blind as having cortical blindness.      BIRTH HISTORY:  Birth history was that she was the result of a full-term pregnancy that was complicated by maternal high blood pressure and use of Enduron.  Pregnancy ended in a 14 labored and vaginal delivery.  Birth weight 7 pounds 6 ounces.  Apgar scores of 4 at 1 minute and 8 at 5 minutes.      She has had global developmental delay.      Subsequent treatment at [facility] has been quite extensive and will not be reviewed completely here.  Last EEG was 2010 which showed mild to moderate nonspecific diffuse disturbance of cerebral activity and multifocal interictal activity with one complex partial seizure  recorded.      In the last year they have described 204 seizures,all given diazepam.  OTHER ISSUES:        She has had behavioral issues throughout.      Bell's palsy in 2005, resolved.      Most of her seizures that the staff is noticing now would be described as brief tonic seizures are brief tonic-clonic seizures.  They are more concerned with the length then the precise observation.  She might be having some minor seizures are not being reported.      CURRENT MEDICATIONS:  Felbatol  Discont due to possible cause of anemia; peramp discont 2020 . Lamictal have really been the best treatment for her.  She has been tried almost all the other seizure medications over time including Depakote, Tegretol, phenobarbital, Dilantin, etc.      SOCIAL HISTORY:  She is living in a group home.      REVIEW OF SYSTEMS:  Through the caregiver is essentially negative.  She has not had any upper respiratory infections lately.  Appetite is good.  She has no obvious headaches.   GI and  appear to be functioning normally.      PHYSICAL EXAMINATION:  N/A       ASSESSMENT:  Complicated case; hospital notes, EEG and D/C summary reviewed last visit. Not sure if FBM cause of anemia.    PLAN:   1)    RTC 12 mo  3)  ASDS same and renewed        Phone call duration: 18 minutes    Elysia may MD     minutes

## 2021-04-20 NOTE — PROGRESS NOTES
"Nella is a 45 year old who is being evaluated via a billable telephone visit.      What phone number would you like to be contacted at?[phone]  How would you like to obtain your AVS? Mail a copy  Phone call duration: 22 Nella Helms is a 44 year old female who is being evaluated via a billable telephone visit.      .\"  Nella Helms is a 44 year old female who is being evaluated via a billable telephone visit.      The patient has been notified of following:     \"This telephone visit will be conducted via a call between you and your physician/provider. We have found that certain health care needs can be provided without the need for a physical exam.  This service lets us provide the care you need with a short phone conversation.  If a prescription is necessary we can send it directly to your pharmacy.  If lab work is needed we can place an order for that and you can then stop by our lab to have the test done at a later time.      How would you like to obtain your AVS? Mail a copy    INTERVAL HX:  In 2019 Tried perampanel but had to discont due to behaviors. sz  3-6 /week,usually 2 min; use diazepam after each 2 min. One ER visit when 2 doses did not stop sz.  Focal Unaware with no injurie but clonic movements. They are using diazepam for sz about 2-5 e a week.    Prior to Admission medications    Medication Sig Start Date End Date Taking? Authorizing Provider   diazepam (VALIUM) 5 MG/ML (HIGH CONC) solution ADMINISTER 1ML (5MG) BY MOUTH AS NEEDED FOR ANY SEIZURE. WHEN ADMINISTERED NOTIFY PC., RN & PD 11/10/20  Yes Elysia Gomez MD   docusate sodium (COLACE) 100 MG capsule Take by mouth 2 times daily   Yes Reported, Patient   hydrocortisone (CORTAID) 1 % cream Apply topically 2 times daily as needed   Yes Reported, Patient   lacosamide (VIMPAT) 200 MG TABS tablet TAKE (1) TABLET TWICE DAILY. 11/10/20  Yes Elysia Gomez MD   lacosamide (VIMPAT) 200 MG TABS tablet TAKE 1 TABLET BY MOUTH TWICE A DAY FOR SEIZURES " 11/10/20  Yes Elysia Gomez MD   lamoTRIgine (LAMICTAL) 200 MG tablet (1) PO TWICE DAILY. 11/10/20  Yes Elysia Gomez MD   levETIRAcetam (KEPPRA) 1000 MG tablet Take 1 tablet (1,000 mg) by mouth 2 times daily 5/12/20  Yes Elysia Gomez MD   levETIRAcetam (KEPPRA) 500 MG tablet Take 1 tablet (500 mg) by mouth 2 times daily 4/20/20  Yes Elysia Gomez MD   multivitamin, therapeutic with minerals (THERA-VIT-M) TABS tablet Take 1 tablet by mouth daily   Yes Reported, Patient   OLANZapine (ZYPREXA) 5 MG tablet Take 10 mg by mouth 10mg and 2.5mg tablets in evening 1/23/19  Yes Reported, Patient   OXcarbazepine (TRILEPTAL) 600 MG tablet Take 1 tablet (600 mg) by mouth 2 times daily 5/12/20  Yes Elysia Gomez MD   traZODone (DESYREL) 50 MG tablet 1 tablet (50 mg) q 8am. 1 tablet (50 mg) q 4 pm,  3 tablets (150 mg) 8pm 6/20/18  Yes Candice Liu MD   VITAMIN E 2 times daily   Yes Reported, Patient   Multiple Vitamin (MULTIVITAMINS PO) Take by mouth daily    Reported, Patient   polyvinyl alcohol (LIQUITEARS) 1.4 % ophthalmic solution 2-3 drops as needed for dry eyes    Reported, Patient         EPILEPSY HX REVIEWED 4/20/21:   I have been seeing her since she was  age 19.  First seizure occurred at 6 hours of age.  It was described as color turning dusky, turning head to the right, eyes fixed to the right and no response.  She continued to have intermittent seizure activity and subsequently was transferred to the [facility] where she was hospitalized for 2 weeks.  It is not clear what diagnostic evaluation was done but they decided to discharge her without seizure medications.  Seizures recurred at 9 months of age.  At that time they noticed twitching of her left arm and then twitching of the side of the face.  EEG initially showed hypsarrhythmic pattern.  She was treated with phenobarbital.  Then, she was treated with Tegretol and later Dilantin.  CT scan in 1983 we do not have records.      Seizure  types:   In  seizure types were described as sitting, both arms jerk up, her head turns to the right, eyes go to the right and roll up.  These events last 2 minutes and at that time she was having 6 a month.      Second seizure type is described as her arms go up, legs stiffen and she moans.  She can be lowered to the floor and then her arms and legs shake with some erratic breathing and sweating profusely.  These occurred at that time, 3-4 per month.        Seizure type 3 was described as being alert.  Her arms fly out and her eyes blink.      Seizure type 4 described as stares.      She has had 1 episode of status epilepticus in .      EEG when she was 12 was read as abnormal record by virtue of diffuse theta and delta slowing greater on the right.      Parents report that Nella has always had a left hemiparesis, mostly affecting the left upper extremity with some involvement of her left lower extremity.  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  She has been diagnosed as legally blind as having cortical blindness.      BIRTH HISTORY:  Birth history was that she was the result of a full-term pregnancy that was complicated by maternal high blood pressure and use of Enduron.  Pregnancy ended in a 14 labored and vaginal delivery.  Birth weight 7 pounds 6 ounces.  Apgar scores of 4 at 1 minute and 8 at 5 minutes.      She has had global developmental delay.      Subsequent treatment at [facility] has been quite extensive and will not be reviewed completely here.  Last EEG was 2010 which showed mild to moderate nonspecific diffuse disturbance of cerebral activity and multifocal interictal activity with one complex partial seizure recorded.      In the last year they have described 204 seizures,all given diazepam.  OTHER ISSUES:        She has had behavioral issues throughout.      Bell's palsy in , resolved.      Most of her seizures that the staff is noticing now would be described as brief tonic seizures are  brief tonic-clonic seizures.  They are more concerned with the length then the precise observation.  She might be having some minor seizures are not being reported.      CURRENT MEDICATIONS:  Felbatol  Discont due to possible cause of anemia; peramp discont 2020 . Lamictal have really been the best treatment for her.  She has been tried almost all the other seizure medications over time including Depakote, Tegretol, phenobarbital, Dilantin, etc.      SOCIAL HISTORY:  She is living in a group home.      REVIEW OF SYSTEMS:  Through the caregiver is essentially negative.  She has not had any upper respiratory infections lately.  Appetite is good.  She has no obvious headaches.   GI and  appear to be functioning normally.      PHYSICAL EXAMINATION:  N/A       ASSESSMENT:  Complicated case; hospital notes, EEG and D/C summary reviewed last visit. Not sure if FBM cause of anemia.    PLAN:   1)    RTC 12 mo  3)  ASDS same and renewed        Phone call duration: 18 minutes    Elysia may MD     minutes

## 2021-04-27 ENCOUNTER — TELEPHONE (OUTPATIENT)
Dept: NEUROLOGY | Facility: CLINIC | Age: 46
End: 2021-04-27

## 2021-08-20 ENCOUNTER — TELEPHONE (OUTPATIENT)
Dept: NEUROLOGY | Facility: CLINIC | Age: 46
End: 2021-08-20

## 2021-09-17 DIAGNOSIS — G40.319 GENERALIZED CONVULSIVE EPILEPSY WITH INTRACTABLE EPILEPSY (H): ICD-10-CM

## 2021-09-20 RX ORDER — LACOSAMIDE 200 MG/1
TABLET ORAL
Qty: 60 TABLET | Refills: 5 | Status: SHIPPED | OUTPATIENT
Start: 2021-09-20 | End: 2022-04-08

## 2021-09-20 NOTE — TELEPHONE ENCOUNTER
VIMPAT 200MG TABS      Last Written Prescription Date:  4/20/21  Last Fill Quantity: 60,   # refills: 5  Last Office Visit : 4/20/21 recommended 1 year follow up  Future Office visit:  None scheduled    Routing refill request to provider for review/approval because:  Drug controlled substance

## 2022-03-17 ENCOUNTER — TELEPHONE (OUTPATIENT)
Dept: NEUROLOGY | Facility: CLINIC | Age: 47
End: 2022-03-17

## 2022-03-17 NOTE — TELEPHONE ENCOUNTER
What is the concern that needs to be addressed by a nurse? Group home wants to schedule an upcoming appt, but they would like to discuss pt before then in case they need more testing. Please call back.     May a detailed message be left on [facility]? yes    Date of last office visit: 4/20/21    Message routed to: mincep rn pool

## 2022-04-08 ENCOUNTER — VIRTUAL VISIT (OUTPATIENT)
Dept: NEUROLOGY | Facility: CLINIC | Age: 47
End: 2022-04-08
Payer: MEDICARE

## 2022-04-08 DIAGNOSIS — G40.319 GENERALIZED CONVULSIVE EPILEPSY WITH INTRACTABLE EPILEPSY (H): ICD-10-CM

## 2022-04-08 RX ORDER — OXCARBAZEPINE 150 MG/1
TABLET, FILM COATED ORAL
COMMUNITY
Start: 2022-03-16 | End: 2022-10-06 | Stop reason: DRUGHIGH

## 2022-04-08 RX ORDER — LEVETIRACETAM 500 MG/1
500 TABLET ORAL 2 TIMES DAILY
Qty: 60 TABLET | Refills: 11 | Status: SHIPPED | OUTPATIENT
Start: 2022-04-08 | End: 2023-03-29

## 2022-04-08 RX ORDER — LAMOTRIGINE 200 MG/1
TABLET ORAL
Qty: 180 TABLET | Refills: 4 | Status: SHIPPED | OUTPATIENT
Start: 2022-04-08 | End: 2022-08-25

## 2022-04-08 RX ORDER — KETOCONAZOLE 2 G/100G
AEROSOL, FOAM TOPICAL EVERY 12 HOURS
COMMUNITY
Start: 2021-09-09

## 2022-04-08 RX ORDER — LACOSAMIDE 200 MG/1
TABLET ORAL
Qty: 60 TABLET | Refills: 5 | Status: SHIPPED | OUTPATIENT
Start: 2022-04-08 | End: 2022-10-08

## 2022-04-08 RX ORDER — LEVETIRACETAM 1000 MG/1
1000 TABLET ORAL 2 TIMES DAILY
Qty: 60 TABLET | Refills: 11 | Status: SHIPPED | OUTPATIENT
Start: 2022-04-08 | End: 2023-03-29

## 2022-04-08 NOTE — LETTER
"2022     RE: Nella Helms  : 1975   MRN: [id number]      Dear Colleague,    Thank you for referring your patient, Nella Helms, to the [facility] EPILEPSY CARE at [facility]. Please see a copy of my visit note below.    Nella is a 46 year old who is being evaluated via a billable telephone visit.        What phone number would you like to be contacted at?[phone]  How would you like to obtain your AVS? Mail a copy  Phone call duration: 22 Nella Helms is a 44 year old female who is being evaluated via a billable telephone visit.      The patient has been notified of following:     \"This telephone visit will be conducted via a call between you and your physician/provider. We have found that certain health care needs can be provided without the need for a physical exam.  This service lets us provide the care you need with a short phone conversation.  If a prescription is necessary we can send it directly to your pharmacy.  If lab work is needed we can place an order for that and you can then stop by our lab to have the test done at a later time.    How would you like to obtain your AVS? Mail a copy    INTERVAL HX:  In 2019 Tried perampanel but had to discont due to behaviors. Increased  sz  Daily now- still focal tonic. use diazepam after each 2 min. One ER visit when as stff concerned when she was slow qwakening and had focal weakness. ER did not think it was strole. Behavior minimally better but still agitated and loud.    Prior to Admission medications    Medication Sig Start Date End Date Taking? Authorizing Provider   diazepam (VALIUM) 5 MG/ML (HIGH CONC) solution ADMINISTER 1ML (5MG) BY MOUTH AS NEEDED FOR ANY SEIZURE. WHEN ADMINISTERED NOTIFY PC., RN & PD 21  Yes Elysia Gomez MD   docusate sodium (COLACE) 100 MG capsule Take by mouth 2 times daily   Yes Reported, Patient   hydrocortisone (CORTAID) 1 % cream Apply topically 2 times daily as " needed   Yes Reported, Patient   ketoconazole (EXTINA) 2 % external foam Apply topically every 12 hours apply by topical route 2 times every day to the affected area(s) 9/9/21  Yes Reported, Patient   lacosamide (VIMPAT) 200 MG TABS tablet TAKE ONE TABLET BY MOUTH TWICE A DAY 4/8/22  Yes Elysia Gomez MD   lamoTRIgine (LAMICTAL) 200 MG tablet (1) PO TWICE DAILY. 4/8/22  Yes Elysia Gomez MD   levETIRAcetam (KEPPRA) 1000 MG tablet Take 1 tablet (1,000 mg) by mouth 2 times daily 4/8/22  Yes Elysia Gomez MD   levETIRAcetam (KEPPRA) 500 MG tablet Take 1 tablet (500 mg) by mouth 2 times daily 4/8/22  Yes Elysia Gomez MD   multivitamin, therapeutic with minerals (THERA-VIT-M) TABS tablet Take 1 tablet by mouth daily   Yes Reported, Patient   OLANZapine (ZYPREXA) 5 MG tablet Take 10 mg by mouth 10mg and 2.5mg tablets in evening 1/23/19  Yes Reported, Patient   OXcarbazepine (TRILEPTAL) 150 MG tablet TAKE ONE TABLET BY MOUTH AT BEDTIME IN ADDITION TO 600MG TWO TIMES A DAY 3/16/22  Yes Reported, Patient   OXcarbazepine (TRILEPTAL) 600 MG tablet Take 1 tablet (600 mg) by mouth 2 times daily 4/20/21  Yes Elysia Gomez MD   traZODone (DESYREL) 50 MG tablet 1 tablet (50 mg) q 8am. 1 tablet (50 mg) q 4 pm,  3 tablets (150 mg) 8pm  Patient taking differently: 1 tablet (50 mg) q 7am. 1 tab (50mg) q 12pm, 1 tablet (50 mg) q 4 pm,  3 tablets (150 mg) 10pm 6/20/18  Yes Candice Liu MD   VITAMIN E 2 times daily   Yes Reported, Patient   Multiple Vitamin (MULTIVITAMINS PO) Take by mouth daily  Patient not taking: Reported on 4/8/2022    Reported, Patient   polyvinyl alcohol (LIQUITEARS) 1.4 % ophthalmic solution 2-3 drops as needed for dry eyes  Patient not taking: Reported on 4/8/2022    Reported, Patient       EPILEPSY HX REVIEWED 4/20/21:   I have been seeing her since she was  age 19.  First seizure occurred at 6 hours of age.  It was described as color turning dusky, turning head to the right, eyes fixed to the right and no  response.  She continued to have intermittent seizure activity and subsequently was transferred to the [facility] where she was hospitalized for 2 weeks.  It is not clear what diagnostic evaluation was done but they decided to discharge her without seizure medications.  Seizures recurred at 9 months of age.  At that time they noticed twitching of her left arm and then twitching of the side of the face.  EEG initially showed hypsarrhythmic pattern.  She was treated with phenobarbital.  Then, she was treated with Tegretol and later Dilantin.  CT scan in  we do not have records.      Seizure types:   In  seizure types were described as sitting, both arms jerk up, her head turns to the right, eyes go to the right and roll up.  These events last 2 minutes and at that time she was having 6 a month.      Second seizure type is described as her arms go up, legs stiffen and she moans.  She can be lowered to the floor and then her arms and legs shake with some erratic breathing and sweating profusely.  These occurred at that time, 3-4 per month.        Seizure type 3 was described as being alert.  Her arms fly out and her eyes blink.      Seizure type 4 described as stares.      She has had 1 episode of status epilepticus in .      EEG when she was 12 was read as abnormal record by virtue of diffuse theta and delta slowing greater on the right.      Parents report that Nella has always had a left hemiparesis, mostly affecting the left upper extremity with some involvement of her left lower extremity.  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  She has been diagnosed as legally blind as having cortical blindness.      BIRTH HISTORY:  Birth history was that she was the result of a full-term pregnancy that was complicated by maternal high blood pressure and use of Enduron.  Pregnancy ended in a 14 labored and vaginal delivery.  Birth weight 7 pounds 6 ounces.  Apgar scores of 4 at 1 minute and 8  at 5 minutes.      She has had global developmental delay.      Subsequent treatment at [facility] has been quite extensive and will not be reviewed completely here.  Last EEG was 12/2010 which showed mild to moderate nonspecific diffuse disturbance of cerebral activity and multifocal interictal activity with one complex partial seizure recorded.      In the last year they have described 204 seizures,all given diazepam.  OTHER ISSUES:        She has had behavioral issues throughout.      Bell's palsy in 2005, resolved.      Most of her seizures that the staff is noticing now would be described as brief tonic seizures are brief tonic-clonic seizures.  They are more concerned with the length then the precise observation.  She might be having some minor seizures are not being reported.      CURRENT MEDICATIONS:  Felbatol  Discont due to possible cause of anemia; peramp discont 2020 . Lamictal have really been the best treatment for her.  She has been tried almost all the other seizure medications over time including Depakote, Tegretol, phenobarbital, Dilantin, etc.      SOCIAL HISTORY:  She is living in a group home.      REVIEW OF SYSTEMS:  Through the caregiver is essentially negative.  She has not had any upper respiratory infections lately.  Appetite is good.  She has no obvious headaches.   GI and  appear to be functioning normally.      PHYSICAL EXAMINATION:  N/A       ASSESSMENT:  Complicated case; hospital notes, EEG and D/C summary reviewed last visit.    PLAN:   1)    RTC 3 mo  In clinic   3)  ASDS same and renewed    Phone call duration: 18 minutes    Elysia may MD     minutes    What phone number would you like to be contacted at? [phone]  How would you like to obtain your AVS? Mail a copy  Phone call duration:18 minutes

## 2022-04-08 NOTE — PROGRESS NOTES
"Nella is a 46 year old who is being evaluated via a billable telephone visit.        What phone number would you like to be contacted at?[phone]  How would you like to obtain your AVS? Mail a copy  Phone call duration: 22 Nella Helms is a 44 year old female who is being evaluated via a billable telephone visit.      .\"        The patient has been notified of following:     \"This telephone visit will be conducted via a call between you and your physician/provider. We have found that certain health care needs can be provided without the need for a physical exam.  This service lets us provide the care you need with a short phone conversation.  If a prescription is necessary we can send it directly to your pharmacy.  If lab work is needed we can place an order for that and you can then stop by our lab to have the test done at a later time.      How would you like to obtain your AVS? Mail a copy    INTERVAL HX:  In 2019 Tried perampanel but had to discont due to behaviors. Increased  sz  Daily now- still focal tonic. use diazepam after each 2 min. One ER visit when as stff concerned when she was slow qwakening and had focal weakness. ER did not think it was strole. Behavior minimally better but still agitated and loud.    Prior to Admission medications    Medication Sig Start Date End Date Taking? Authorizing Provider   diazepam (VALIUM) 5 MG/ML (HIGH CONC) solution ADMINISTER 1ML (5MG) BY MOUTH AS NEEDED FOR ANY SEIZURE. WHEN ADMINISTERED NOTIFY PC., RN & PD 4/20/21  Yes Elysia Gomez MD   docusate sodium (COLACE) 100 MG capsule Take by mouth 2 times daily   Yes Reported, Patient   hydrocortisone (CORTAID) 1 % cream Apply topically 2 times daily as needed   Yes Reported, Patient   ketoconazole (EXTINA) 2 % external foam Apply topically every 12 hours apply by topical route 2 times every day to the affected area(s) 9/9/21  Yes Reported, Patient   lacosamide (VIMPAT) 200 MG TABS tablet TAKE ONE TABLET BY MOUTH " TWICE A DAY 4/8/22  Yes Elysia Gomez MD   lamoTRIgine (LAMICTAL) 200 MG tablet (1) PO TWICE DAILY. 4/8/22  Yes Elysia Gomez MD   levETIRAcetam (KEPPRA) 1000 MG tablet Take 1 tablet (1,000 mg) by mouth 2 times daily 4/8/22  Yes Elysia Gomez MD   levETIRAcetam (KEPPRA) 500 MG tablet Take 1 tablet (500 mg) by mouth 2 times daily 4/8/22  Yes Elysia Gomez MD   multivitamin, therapeutic with minerals (THERA-VIT-M) TABS tablet Take 1 tablet by mouth daily   Yes Reported, Patient   OLANZapine (ZYPREXA) 5 MG tablet Take 10 mg by mouth 10mg and 2.5mg tablets in evening 1/23/19  Yes Reported, Patient   OXcarbazepine (TRILEPTAL) 150 MG tablet TAKE ONE TABLET BY MOUTH AT BEDTIME IN ADDITION TO 600MG TWO TIMES A DAY 3/16/22  Yes Reported, Patient   OXcarbazepine (TRILEPTAL) 600 MG tablet Take 1 tablet (600 mg) by mouth 2 times daily 4/20/21  Yes Elysia Gomez MD   traZODone (DESYREL) 50 MG tablet 1 tablet (50 mg) q 8am. 1 tablet (50 mg) q 4 pm,  3 tablets (150 mg) 8pm  Patient taking differently: 1 tablet (50 mg) q 7am. 1 tab (50mg) q 12pm, 1 tablet (50 mg) q 4 pm,  3 tablets (150 mg) 10pm 6/20/18  Yes Candice Liu MD   VITAMIN E 2 times daily   Yes Reported, Patient   Multiple Vitamin (MULTIVITAMINS PO) Take by mouth daily  Patient not taking: Reported on 4/8/2022    Reported, Patient   polyvinyl alcohol (LIQUITEARS) 1.4 % ophthalmic solution 2-3 drops as needed for dry eyes  Patient not taking: Reported on 4/8/2022    Reported, Patient           EPILEPSY HX REVIEWED 4/20/21:   I have been seeing her since she was  age 19.  First seizure occurred at 6 hours of age.  It was described as color turning dusky, turning head to the right, eyes fixed to the right and no response.  She continued to have intermittent seizure activity and subsequently was transferred to the [facility] where she was hospitalized for 2 weeks.  It is not clear what diagnostic evaluation was done but they decided to discharge her  without seizure medications.  Seizures recurred at 9 months of age.  At that time they noticed twitching of her left arm and then twitching of the side of the face.  EEG initially showed hypsarrhythmic pattern.  She was treated with phenobarbital.  Then, she was treated with Tegretol and later Dilantin.  CT scan in  we do not have records.      Seizure types:   In  seizure types were described as sitting, both arms jerk up, her head turns to the right, eyes go to the right and roll up.  These events last 2 minutes and at that time she was having 6 a month.      Second seizure type is described as her arms go up, legs stiffen and she moans.  She can be lowered to the floor and then her arms and legs shake with some erratic breathing and sweating profusely.  These occurred at that time, 3-4 per month.        Seizure type 3 was described as being alert.  Her arms fly out and her eyes blink.      Seizure type 4 described as stares.      She has had 1 episode of status epilepticus in .      EEG when she was 12 was read as abnormal record by virtue of diffuse theta and delta slowing greater on the right.      Parents report that Nella has always had a left hemiparesis, mostly affecting the left upper extremity with some involvement of her left lower extremity.  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  She has been diagnosed as legally blind as having cortical blindness.      BIRTH HISTORY:  Birth history was that she was the result of a full-term pregnancy that was complicated by maternal high blood pressure and use of Enduron.  Pregnancy ended in a 14 labored and vaginal delivery.  Birth weight 7 pounds 6 ounces.  Apgar scores of 4 at 1 minute and 8 at 5 minutes.      She has had global developmental delay.      Subsequent treatment at [facility] has been quite extensive and will not be reviewed completely here.  Last EEG was 2010 which showed mild to moderate nonspecific diffuse disturbance of cerebral activity  and multifocal interictal activity with one complex partial seizure recorded.      In the last year they have described 204 seizures,all given diazepam.  OTHER ISSUES:        She has had behavioral issues throughout.      Bell's palsy in 2005, resolved.      Most of her seizures that the staff is noticing now would be described as brief tonic seizures are brief tonic-clonic seizures.  They are more concerned with the length then the precise observation.  She might be having some minor seizures are not being reported.      CURRENT MEDICATIONS:  Felbatol  Discont due to possible cause of anemia; peramp discont 2020 . Lamictal have really been the best treatment for her.  She has been tried almost all the other seizure medications over time including Depakote, Tegretol, phenobarbital, Dilantin, etc.      SOCIAL HISTORY:  She is living in a group home.      REVIEW OF SYSTEMS:  Through the caregiver is essentially negative.  She has not had any upper respiratory infections lately.  Appetite is good.  She has no obvious headaches.   GI and  appear to be functioning normally.      PHYSICAL EXAMINATION:  N/A       ASSESSMENT:  Complicated case; hospital notes, EEG and D/C summary reviewed last visit.    PLAN:   1)    RTC 3 mo  In clinic   3)  ASDS same and renewed        Phone call duration: 18 minutes    Elysia may MD     minutes    What phone number would you like to be contacted at? [phone]  How would you like to obtain your AVS? Mail a copy  Phone call duration:18 minutes

## 2022-04-11 DIAGNOSIS — G40.319 GENERALIZED CONVULSIVE EPILEPSY WITH INTRACTABLE EPILEPSY (H): ICD-10-CM

## 2022-04-12 NOTE — TELEPHONE ENCOUNTER
DIAZEPAM 5MG/ML CONC  Last Written Prescription Date:  4/20/2021  Last Fill Quantity: 120,   # refills: 1  Last Office Visit :  4/8/2022  Future Office visit:   7/12/2022    Routing refill request to provider for review/approval because:  Drug not on the [facility], [facility] or [facility] refill protocol or controlled substance      Breonna Miller RN  Central Triage Red Flags/Med Refills

## 2022-04-14 RX ORDER — DIAZEPAM ORAL SOLUTION (CONCENTRATE) 5 MG/ML
SOLUTION ORAL
Qty: 30 ML | Refills: 1 | Status: SHIPPED | OUTPATIENT
Start: 2022-04-14 | End: 2022-06-08

## 2022-05-14 DIAGNOSIS — G40.319 GENERALIZED CONVULSIVE EPILEPSY WITH INTRACTABLE EPILEPSY (H): ICD-10-CM

## 2022-05-16 RX ORDER — OXCARBAZEPINE 600 MG/1
600 TABLET, FILM COATED ORAL 2 TIMES DAILY
Qty: 60 TABLET | Refills: 5 | Status: SHIPPED | OUTPATIENT
Start: 2022-05-16 | End: 2022-10-06

## 2022-06-08 DIAGNOSIS — G40.319 GENERALIZED CONVULSIVE EPILEPSY WITH INTRACTABLE EPILEPSY (H): ICD-10-CM

## 2022-06-08 NOTE — TELEPHONE ENCOUNTER
DIAZEPAM INTENSOL 5MG/ML CONC      Last Written Prescription Date:  4/14/22  Last Fill Quantity: 30 ml,   # refills: 1  Last Office Visit : 4/8/22  Future Office visit:  7/12/22    Routing refill request to provider for review/approval because:  Drug controlled substance

## 2022-06-09 RX ORDER — DIAZEPAM ORAL SOLUTION (CONCENTRATE) 5 MG/ML
SOLUTION ORAL
Qty: 30 ML | Refills: 1 | Status: SHIPPED | OUTPATIENT
Start: 2022-06-09 | End: 2022-09-23 | Stop reason: ALTCHOICE

## 2022-07-26 ENCOUNTER — TELEPHONE (OUTPATIENT)
Dept: NEUROLOGY | Facility: CLINIC | Age: 47
End: 2022-07-26

## 2022-07-26 ENCOUNTER — OFFICE VISIT (OUTPATIENT)
Dept: NEUROLOGY | Facility: CLINIC | Age: 47
End: 2022-07-26
Payer: MEDICARE

## 2022-07-26 VITALS
SYSTOLIC BLOOD PRESSURE: 145 MMHG | WEIGHT: 166.4 LBS | BODY MASS INDEX: 31.42 KG/M2 | TEMPERATURE: 98.5 F | HEIGHT: 61 IN | HEART RATE: 91 BPM | DIASTOLIC BLOOD PRESSURE: 104 MMHG

## 2022-07-26 DIAGNOSIS — G40.812 LENNOX-GASTAUT SYNDROME WITH TONIC SEIZURES (H): Primary | ICD-10-CM

## 2022-07-26 RX ORDER — CANNABIDIOL 100 MG/ML
2.5 SOLUTION ORAL 2 TIMES DAILY
Qty: 132.2 ML | Refills: 0 | Status: SHIPPED | OUTPATIENT
Start: 2022-07-26 | End: 2022-08-02

## 2022-07-26 NOTE — LETTER
2022     RE: Nella Helms  : 1975   MRN: [id number]      Dear Colleague,    Thank you for referring your patient, Nella Helms, to the [facility] EPILEPSY CARE at [facility]. Please see a copy of my visit note below.    Nella is a 46 year old who is being evaluated in clinic.t.      INTERVAL HX:  In 2019 Tried perampanel but had to discont due to behaviors. Increased  sz  Report is that Sz have increased - 72 since last visit. Excellent records Almost aily now- still focal tonic. uses diazepam after each 2 min.No obvious ppt factors: no fever, pneumonia, etc.    Prior to Admission medications Approximately 1/2 sz more than 2 min.   Medication Sig Start Date End Date Taking? Authorizing Provider   diazepam (VALIUM) 5 MG/ML (HIGH CONC) solution ADMINISTER 1ML (5MG) BY MOUTH AS NEEDED FOR ANY SEIZURE. WHEN ADMINISTERED NOTIFY PC., RN & PD 21  Yes Elysia Gomez MD   docusate sodium (COLACE) 100 MG capsule Take by mouth 2 times daily   Yes Reported, Patient   hydrocortisone (CORTAID) 1 % cream Apply topically 2 times daily as needed   Yes Reported, Patient   ketoconazole (EXTINA) 2 % external foam Apply topically every 12 hours apply by topical route 2 times every day to the affected area(s) 21  Yes Reported, Patient   lacosamide (VIMPAT) 200 MG TABS tablet TAKE ONE TABLET BY MOUTH TWICE A DAY 22  Yes Elysia Gomez MD   lamoTRIgine (LAMICTAL) 200 MG tablet (1) PO TWICE DAILY. 22  Yes Elysia Gomez MD   levETIRAcetam (KEPPRA) 1000 MG tablet Take 1 tablet (1,000 mg) by mouth 2 times daily 22  Yes Elysia Gomze MD   levETIRAcetam (KEPPRA) 500 MG tablet Take 1 tablet (500 mg) by mouth 2 times daily 22  Yes Elysia Gomez MD   multivitamin, therapeutic with minerals (THERA-VIT-M) TABS tablet Take 1 tablet by mouth daily   Yes Reported, Patient   OLANZapine (ZYPREXA) 5 MG tablet Take 10 mg by mouth 10mg and 2.5mg tablets in evening 19  Yes  Reported, Patient   OXcarbazepine (TRILEPTAL) 150 MG tablet TAKE ONE TABLET BY MOUTH AT BEDTIME IN ADDITION TO 600MG TWO TIMES A DAY 3/16/22  Yes Reported, Patient   OXcarbazepine (TRILEPTAL) 600 MG tablet Take 1 tablet (600 mg) by mouth 2 times daily 4/20/21  Yes Elysia Gomez MD   traZODone (DESYREL) 50 MG tablet 1 tablet (50 mg) q 8am. 1 tablet (50 mg) q 4 pm,  3 tablets (150 mg) 8pm  Patient taking differently: 1 tablet (50 mg) q 7am. 1 tab (50mg) q 12pm, 1 tablet (50 mg) q 4 pm,  3 tablets (150 mg) 10pm 6/20/18  Yes Candice Liu MD   VITAMIN E 2 times daily   Yes Reported, Patient   Multiple Vitamin (MULTIVITAMINS PO) Take by mouth daily  Patient not taking: Reported on 4/8/2022    Reported, Patient   polyvinyl alcohol (LIQUITEARS) 1.4 % ophthalmic solution 2-3 drops as needed for dry eyes  Patient not taking: Reported on 4/8/2022    Reported, Patient           EPILEPSY HX REVIEWED 7/26/22   I have been seeing her since she was  age 19.  First seizure occurred at 6 hours of age.  It was described as color turning dusky, turning head to the right, eyes fixed to the right and no response.  She continued to have intermittent seizure activity and subsequently was transferred to the [facility] where she was hospitalized for 2 weeks.  It is not clear what diagnostic evaluation was done but they decided to discharge her without seizure medications.  Seizures recurred at 9 months of age.  At that time they noticed twitching of her left arm and then twitching of the side of the face.  EEG initially showed hypsarrhythmic pattern.  She was treated with phenobarbital.  Then, she was treated with Tegretol and later Dilantin.  CT scan in 1983 we do not have records.      Seizure types:   In 1991 seizure types were described as sitting, both arms jerk up, her head turns to the right, eyes go to the right and roll up.  These events last 2 minutes and at that time she was having 6 a month.       Second seizure type is described as her arms go up, legs stiffen and she moans.  She can be lowered to the floor and then her arms and legs shake with some erratic breathing and sweating profusely.  These occurred at that time, 3-4 per month.        Seizure type 3 was described as being alert.  Her arms fly out and her eyes blink.      Seizure type 4 described as stares.      She has had 1 episode of status epilepticus in .      EEG when she was 12 was read as abnormal record by virtue of diffuse theta and delta slowing greater on the right.      Parents report that Nella has always had a left hemiparesis, mostly affecting the left upper extremity with some involvement of her left lower extremity.  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  She has been diagnosed as legally blind as having cortical blindness.      BIRTH HISTORY:  Birth history was that she was the result of a full-term pregnancy that was complicated by maternal high blood pressure and use of Enduron.  Pregnancy ended in a 14 labored and vaginal delivery.  Birth weight 7 pounds 6 ounces.  Apgar scores of 4 at 1 minute and 8 at 5 minutes.      She has had global developmental delay.      Subsequent treatment at [facility] has been quite extensive and will not be reviewed completely here.  Last EEG was 2010 which showed mild to moderate nonspecific diffuse disturbance of cerebral activity and multifocal interictal activity with one complex partial seizure recorded.      In the last year they have described 204 seizures,all given diazepam.  OTHER ISSUES:        She has had behavioral issues throughout.      Bell's palsy in , resolved.      Most of her seizures that the staff is noticing now would be described as brief tonic seizures are brief tonic-clonic seizures.  They are more concerned with the length then the precise observation.  She might be having some minor seizures are not being reported.      CURRENT MEDICATIONS:  Felbatol  Discont due to  possible cause of anemia; [facility] discont 2020 . Lamictal have really been the best treatment for her.  She has been tried almost all the other seizure medications over time including Depakote, Tegretol, phenobarbital, Dilantin, etc.      SOCIAL HISTORY:  She is living in a group home.      REVIEW OF SYSTEMS:  Through the caregiver is essentially negative.  She has not had any upper respiratory infections lately.  Appetite is good.  She has no obvious headaches.   GI and  appear to be functioning normally.      PHYSICAL EXAMINATION: Very physically active- tries to kiss staff. Very loud fverbally- simple repetitive sentences. Walks without assistance but is apractic.     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    ASSESSMENT:  Complicated case; hospital notes, EEG and D/C summary reviewed last visit. Will start Epidiolex 5 mg/kg as 2.5 bid   PLAN:   1)    RTC 3 mo  In clinic to review increase in Epidiolox  3)  ASDS same and renewed with addition of Epidiolex    Time: pre visti chart reiew 4 min: face to face 30 min; post visit 3 min    Elysia may MD

## 2022-07-26 NOTE — LETTER
2022       RE: Nella Helms  [address] [place] [id number]      1975       Cannabidiol (Epidiolex) 100 mg/ml  Take 188.8mg (1.888ml) by mouth two times daily      Elysia Gomez M.D.

## 2022-07-26 NOTE — TELEPHONE ENCOUNTER
Pt needs epidiolex order printed, signed, and mailed to pt's group home, [facility]

## 2022-07-27 ENCOUNTER — TELEPHONE (OUTPATIENT)
Dept: NEUROLOGY | Facility: CLINIC | Age: 47
End: 2022-07-27

## 2022-07-27 NOTE — TELEPHONE ENCOUNTER
Central Prior Authorization Team   Phone: [phone]      PA Initiation    Medication: Epidiolex 100MG/ML Solution  Insurance Company: [employer] - Phone [phone] Fax [phone]  Ph[facility] Filling the Rx[facility] - [address]  Filling Pharmacy Phone: [phone]  Filling Pharmacy Fax:    Start Date: 7/27/2022

## 2022-07-27 NOTE — TELEPHONE ENCOUNTER
Prior Authorization Retail Medication Request    Medication/Dose: Epidiolex 100MG/ML Solution  ICD code (if different than what is on RX):    Previously Tried and Failed:    Rationale:      Insurance Name:    Insurance ID:        Pharmacy Information (if different than what is on RX)  Name:    Phone:

## 2022-07-27 NOTE — TELEPHONE ENCOUNTER
Prior Authorization Approval    Authorization Effective Date: 1/1/2022  Authorization Expiration Date: 7/27/2023  Medication: Epidiolex 100MG/ML Solution-APPROVED  Approved Dose/Quantity:   Reference #:     Insurance Company: [employer] - Phone [phone] Fax [phone]  Expected CoPay:       CoPay Card Available:      Foundation Assistance Needed:    Which Pharmacy is filling the prescription (Not needed for infusion/clinic administered): [facility] - [address]  Pharmacy Notified: Yes  Patient Notified: No

## 2022-07-28 DIAGNOSIS — G40.812 LENNOX-GASTAUT SYNDROME WITH TONIC SEIZURES (H): ICD-10-CM

## 2022-07-28 NOTE — TELEPHONE ENCOUNTER
Received [facility] Concern Sheet to be completed.  Form saved to the [address].  Encounter routed.    Bryanna Rosas CMA

## 2022-07-28 NOTE — TELEPHONE ENCOUNTER
I talked to Dr. Rice at [facility].  The patient had multiple seizures at the group home.  She was given diazepam x3 at the group home.  Then she was taken to ER.  She did not have any further seizures at ER.  Antiseizure medication levels are pending.  No missing medication.  WBC 11.000.   The patient was seen by Dr. Gomez yesterday.  She was started on cannabidiol, however this is not available at [facility].     I suggested to load the patient with IV levetiracetam and increase oxcarbazepine to 900 mg twice a day. She is gonna stay at the hospital for observation.       Sandra Sparks MD

## 2022-07-28 NOTE — PROGRESS NOTES
Nella is a 46 year old who is being evaluated in clinic.t.      INTERVAL HX:  In 2019 Tried perampanel but had to discont due to behaviors. Increased  sz  Report is that Sz have increased - 72 since last visit. Excellent records Almost aily now- still focal tonic. uses diazepam after each 2 min.No obvious ppt factors: no fever, pneumonia, etc.    Prior to Admission medications Approximately 1/2 sz more than 2 min.   Medication Sig Start Date End Date Taking? Authorizing Provider   diazepam (VALIUM) 5 MG/ML (HIGH CONC) solution ADMINISTER 1ML (5MG) BY MOUTH AS NEEDED FOR ANY SEIZURE. WHEN ADMINISTERED NOTIFY PC., RN & PD 4/20/21  Yes Elysia Gomez MD   docusate sodium (COLACE) 100 MG capsule Take by mouth 2 times daily   Yes Reported, Patient   hydrocortisone (CORTAID) 1 % cream Apply topically 2 times daily as needed   Yes Reported, Patient   ketoconazole (EXTINA) 2 % external foam Apply topically every 12 hours apply by topical route 2 times every day to the affected area(s) 9/9/21  Yes Reported, Patient   lacosamide (VIMPAT) 200 MG TABS tablet TAKE ONE TABLET BY MOUTH TWICE A DAY 4/8/22  Yes Elysia Gomez MD   lamoTRIgine (LAMICTAL) 200 MG tablet (1) PO TWICE DAILY. 4/8/22  Yes Elysia Gomez MD   levETIRAcetam (KEPPRA) 1000 MG tablet Take 1 tablet (1,000 mg) by mouth 2 times daily 4/8/22  Yes Elysia Gomez MD   levETIRAcetam (KEPPRA) 500 MG tablet Take 1 tablet (500 mg) by mouth 2 times daily 4/8/22  Yes Elysia Gomez MD   multivitamin, therapeutic with minerals (THERA-VIT-M) TABS tablet Take 1 tablet by mouth daily   Yes Reported, Patient   OLANZapine (ZYPREXA) 5 MG tablet Take 10 mg by mouth 10mg and 2.5mg tablets in evening 1/23/19  Yes Reported, Patient   OXcarbazepine (TRILEPTAL) 150 MG tablet TAKE ONE TABLET BY MOUTH AT BEDTIME IN ADDITION TO 600MG TWO TIMES A DAY 3/16/22  Yes Reported, Patient   OXcarbazepine (TRILEPTAL) 600 MG tablet Take 1 tablet (600 mg) by mouth 2 times daily 4/20/21  Yes Elysia Gomez MD    traZODone (DESYREL) 50 MG tablet 1 tablet (50 mg) q 8am. 1 tablet (50 mg) q 4 pm,  3 tablets (150 mg) 8pm  Patient taking differently: 1 tablet (50 mg) q 7am. 1 tab (50mg) q 12pm, 1 tablet (50 mg) q 4 pm,  3 tablets (150 mg) 10pm 6/20/18  Yes Candice Liu MD   VITAMIN E 2 times daily   Yes Reported, Patient   Multiple Vitamin (MULTIVITAMINS PO) Take by mouth daily  Patient not taking: Reported on 4/8/2022    Reported, Patient   polyvinyl alcohol (LIQUITEARS) 1.4 % ophthalmic solution 2-3 drops as needed for dry eyes  Patient not taking: Reported on 4/8/2022    Reported, Patient           EPILEPSY HX REVIEWED 7/26/22   I have been seeing her since she was  age 19.  First seizure occurred at 6 hours of age.  It was described as color turning dusky, turning head to the right, eyes fixed to the right and no response.  She continued to have intermittent seizure activity and subsequently was transferred to the [facility] where she was hospitalized for 2 weeks.  It is not clear what diagnostic evaluation was done but they decided to discharge her without seizure medications.  Seizures recurred at 9 months of age.  At that time they noticed twitching of her left arm and then twitching of the side of the face.  EEG initially showed hypsarrhythmic pattern.  She was treated with phenobarbital.  Then, she was treated with Tegretol and later Dilantin.  CT scan in 1983 we do not have records.      Seizure types:   In 1991 seizure types were described as sitting, both arms jerk up, her head turns to the right, eyes go to the right and roll up.  These events last 2 minutes and at that time she was having 6 a month.      Second seizure type is described as her arms go up, legs stiffen and she moans.  She can be lowered to the floor and then her arms and legs shake with some erratic breathing and sweating profusely.  These occurred at that time, 3-4 per month.        Seizure type 3 was described as  being alert.  Her arms fly out and her eyes blink.      Seizure type 4 described as stares.      She has had 1 episode of status epilepticus in .      EEG when she was 12 was read as abnormal record by virtue of diffuse theta and delta slowing greater on the right.      Parents report that Nella has always had a left hemiparesis, mostly affecting the left upper extremity with some involvement of her left lower extremity.  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  She has been diagnosed as legally blind as having cortical blindness.      BIRTH HISTORY:  Birth history was that she was the result of a full-term pregnancy that was complicated by maternal high blood pressure and use of Enduron.  Pregnancy ended in a 14 labored and vaginal delivery.  Birth weight 7 pounds 6 ounces.  Apgar scores of 4 at 1 minute and 8 at 5 minutes.      She has had global developmental delay.      Subsequent treatment at [facility] has been quite extensive and will not be reviewed completely here.  Last EEG was 2010 which showed mild to moderate nonspecific diffuse disturbance of cerebral activity and multifocal interictal activity with one complex partial seizure recorded.      In the last year they have described 204 seizures,all given diazepam.  OTHER ISSUES:        She has had behavioral issues throughout.      Bell's palsy in , resolved.      Most of her seizures that the staff is noticing now would be described as brief tonic seizures are brief tonic-clonic seizures.  They are more concerned with the length then the precise observation.  She might be having some minor seizures are not being reported.      CURRENT MEDICATIONS:  Felbatol  Discont due to possible cause of anemia; peramp discont  . Lamictal have really been the best treatment for her.  She has been tried almost all the other seizure medications over time including Depakote, Tegretol, phenobarbital, Dilantin, etc.      SOCIAL HISTORY:  She is living in a group  home.      REVIEW OF SYSTEMS:  Through the caregiver is essentially negative.  She has not had any upper respiratory infections lately.  Appetite is good.  She has no obvious headaches.   GI and  appear to be functioning normally.      PHYSICAL EXAMINATION: Very physically active- tries to kiss staff. Very loud fverbally- simple repetitive sentences. Walks without assistance but is apractic.     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    ASSESSMENT:  Complicated case; hospital notes, EEG and D/C summary reviewed last visit. Will start Epidiolex 5 mg/kg as 2.5 bid   PLAN:   1)    RTC 3 mo  In clinic to review increase in Epidiolox  3)  ASDS same and renewed with addition of Epidiolex        Time: pre visti chart reiew 4 min: face to face 30 min; post visit 3 min    Elysia may MD

## 2022-07-29 NOTE — TELEPHONE ENCOUNTER
Pt has been in er multiple times and is very concerned this form and written orders need to be sent asap. Please call guardian asap as well.

## 2022-08-02 ENCOUNTER — MEDICAL CORRESPONDENCE (OUTPATIENT)
Dept: HEALTH INFORMATION MANAGEMENT | Facility: CLINIC | Age: 47
End: 2022-08-02

## 2022-08-03 ENCOUNTER — TELEPHONE (OUTPATIENT)
Dept: NEUROLOGY | Facility: CLINIC | Age: 47
End: 2022-08-03

## 2022-08-03 RX ORDER — CANNABIDIOL 100 MG/ML
2.5 SOLUTION ORAL 2 TIMES DAILY
Qty: 110 ML | Refills: 1 | Status: SHIPPED | OUTPATIENT
Start: 2022-08-03 | End: 2022-09-22

## 2022-08-04 NOTE — TELEPHONE ENCOUNTER
Called by [facility] about this patient.  Presented with spell where she was slumped over and dysarthric.  Was given diazepam and when remained dysarthric they gave her second dose of diazepam and per her living facility protocol had to transfer to ED after 2nd dose diazepam.  I reviewed notes and this does not seem consistent with her previous seizures.  She is prescribed epidiolex in addition to her 4 drug regimen but has not gotten it to start yet.  I reviewed Dr. Gomez's last note and this does not seem consistent with her previous seizures.  Would be reasonable to slightly increase lamictal for the short term until able to start epidiolex.  Recommended increase to 250mg-200mg of lamotrigine until that time.    I question if these are seizures  and facility may need education on usage of diazepam in this setting especially repeat dosing.  I will route this message to Dr. Gomez and ask them to reach out in AM.  If he feels consistent with seizures would be reasonable to monitor on epidiolex, and if not could discuss plan for rescue medications.   But at this time do not think transfer is indicated. Per ED she is at baseline and has been stable throughout her ED stay.   If further questions should reach out to on call neurology overnight.      Kenneth Cruz, DO

## 2022-08-22 DIAGNOSIS — G40.319 GENERALIZED CONVULSIVE EPILEPSY WITH INTRACTABLE EPILEPSY (H): ICD-10-CM

## 2022-08-24 ENCOUNTER — TELEPHONE (OUTPATIENT)
Dept: NEUROLOGY | Facility: CLINIC | Age: 47
End: 2022-08-24

## 2022-08-24 NOTE — TELEPHONE ENCOUNTER
[facility] Prior Authorization Team   Phone: [phone]  Fax: [phone]    PA Initiation    Medication:   Insurance Company: [employer] - Phone [phone] Fax [phone]  Pharmacy Filling the Rx: [facility] - [place], [place] - [facility] [address]  Filling Pharmacy Phone: [phone]  Filling Pharmacy Fax: [phone]  Start Date: 8/24/2022

## 2022-08-25 RX ORDER — LAMOTRIGINE 200 MG/1
200 TABLET ORAL 2 TIMES DAILY
Qty: 60 TABLET | Refills: 11 | Status: SHIPPED | OUTPATIENT
Start: 2022-08-25 | End: 2023-04-11

## 2022-08-25 NOTE — TELEPHONE ENCOUNTER
lamoTRIgine (LAMICTAL) 200 MG tablet  Historical entry  Last Office Visit : 7/26/22  Future Office visit:  8/30/22    Routing refill request to provider for review/approval because:  Medication is reported/historical  Discrepancy between requested refill and historical entry

## 2022-08-30 ENCOUNTER — VIRTUAL VISIT (OUTPATIENT)
Dept: NEUROLOGY | Facility: CLINIC | Age: 47
End: 2022-08-30
Payer: MEDICARE

## 2022-08-30 DIAGNOSIS — G40.319 GENERALIZED CONVULSIVE EPILEPSY WITH INTRACTABLE EPILEPSY (H): Primary | ICD-10-CM

## 2022-08-30 NOTE — LETTER
2022     RE: Nella Helms  : 1975   MRN: [id number]      Dear Colleague,    Thank you for referring your patient, Nella Helms, to the [facility] EPILEPSY CARE at [facility]. Please see a copy of my visit note below.    Nella is a 46 year old who is being evaluated  By phone call       INTERVAL HX: Call was to unblock hold on Nayzilam. It is being held in pharmacy. Group home needs rewritten rescue medication orders before they will release it. Overal seizures improved since Epidiolex added. Only 7 sz, only one needed rescue med.. Decrease from almost daily before Epidiolex/    In 2019 Tried perampanel but had to discont due to behaviors. Increased  sz  Report is that Sz have increased - 72 since last visit. Excellent records Almost aily now- still focal tonic. uses diazepam after each 2 min.No obvious ppt factors: no fever, pneumonia, etc.    Prior to Admission medications Approximately 1/2 sz more than 2 min.   Medication Sig Start Date End Date Taking? Authorizing Provider   diazepam (VALIUM) 5 MG/ML (HIGH CONC) solution ADMINISTER 1ML (5MG) BY MOUTH AS NEEDED FOR ANY SEIZURE. WHEN ADMINISTERED NOTIFY PC., RN & PD 21  Yes Elysia Gomez MD   docusate sodium (COLACE) 100 MG capsule Take by mouth 2 times daily   Yes Reported, Patient   hydrocortisone (CORTAID) 1 % cream Apply topically 2 times daily as needed   Yes Reported, Patient   ketoconazole (EXTINA) 2 % external foam Apply topically every 12 hours apply by topical route 2 times every day to the affected area(s) 21  Yes Reported, Patient   lacosamide (VIMPAT) 200 MG TABS tablet TAKE ONE TABLET BY MOUTH TWICE A DAY 22  Yes Elysia Gomez MD   lamoTRIgine (LAMICTAL) 200 MG tablet (1) PO TWICE DAILY. 22  Yes Elysia Gomez MD   levETIRAcetam (KEPPRA) 1000 MG tablet Take 1 tablet (1,000 mg) by mouth 2 times daily 22  Yes Elysia Gomez MD   levETIRAcetam (KEPPRA) 500 MG tablet Take 1 tablet  (500 mg) by mouth 2 times daily 4/8/22  Yes Elysia Gomez MD   multivitamin, therapeutic with minerals (THERA-VIT-M) TABS tablet Take 1 tablet by mouth daily   Yes Reported, Patient   OLANZapine (ZYPREXA) 5 MG tablet Take 10 mg by mouth 10mg and 2.5mg tablets in evening 1/23/19  Yes Reported, Patient   OXcarbazepine (TRILEPTAL) 150 MG tablet TAKE ONE TABLET BY MOUTH AT BEDTIME IN ADDITION TO 600MG TWO TIMES A DAY 3/16/22  Yes Reported, Patient   OXcarbazepine (TRILEPTAL) 600 MG tablet Take 1 tablet (600 mg) by mouth 2 times daily 4/20/21  Yes Elysia Gomez MD   traZODone (DESYREL) 50 MG tablet 1 tablet (50 mg) q 8am. 1 tablet (50 mg) q 4 pm,  3 tablets (150 mg) 8pm  Patient taking differently: 1 tablet (50 mg) q 7am. 1 tab (50mg) q 12pm, 1 tablet (50 mg) q 4 pm,  3 tablets (150 mg) 10pm 6/20/18  Yes Candice Liu MD   VITAMIN E 2 times daily   Yes Reported, Patient   Multiple Vitamin (MULTIVITAMINS PO) Take by mouth daily  Patient not taking: Reported on 4/8/2022    Reported, Patient   polyvinyl alcohol (LIQUITEARS) 1.4 % ophthalmic solution 2-3 drops as needed for dry eyes  Patient not taking: Reported on 4/8/2022    Reported, Patient           EPILEPSY HX REVIEWED 7/26/22   I have been seeing her since she was  age 19.  First seizure occurred at 6 hours of age.  It was described as color turning dusky, turning head to the right, eyes fixed to the right and no response.  She continued to have intermittent seizure activity and subsequently was transferred to the [facility] where she was hospitalized for 2 weeks.  It is not clear what diagnostic evaluation was done but they decided to discharge her without seizure medications.  Seizures recurred at 9 months of age.  At that time they noticed twitching of her left arm and then twitching of the side of the face.  EEG initially showed hypsarrhythmic pattern.  She was treated with phenobarbital.  Then, she was treated with Tegretol and later  Dilantin.  CT scan in  we do not have records.      Seizure types:   In  seizure types were described as sitting, both arms jerk up, her head turns to the right, eyes go to the right and roll up.  These events last 2 minutes and at that time she was having 6 a month.      Second seizure type is described as her arms go up, legs stiffen and she moans.  She can be lowered to the floor and then her arms and legs shake with some erratic breathing and sweating profusely.  These occurred at that time, 3-4 per month.        Seizure type 3 was described as being alert.  Her arms fly out and her eyes blink.      Seizure type 4 described as stares.      She has had 1 episode of status epilepticus in .      EEG when she was 12 was read as abnormal record by virtue of diffuse theta and delta slowing greater on the right.      Parents report that Nella has always had a left hemiparesis, mostly affecting the left upper extremity with some involvement of her left lower extremity.  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  She has been diagnosed as legally blind as having cortical blindness.      BIRTH HISTORY:  Birth history was that she was the result of a full-term pregnancy that was complicated by maternal high blood pressure and use of Enduron.  Pregnancy ended in a 14 labored and vaginal delivery.  Birth weight 7 pounds 6 ounces.  Apgar scores of 4 at 1 minute and 8 at 5 minutes.      She has had global developmental delay.      Subsequent treatment at [facility] has been quite extensive and will not be reviewed completely here.  Last EEG was 2010 which showed mild to moderate nonspecific diffuse disturbance of cerebral activity and multifocal interictal activity with one complex partial seizure recorded.      In the last year they have described 204 seizures,all given diazepam.  OTHER ISSUES:        She has had behavioral issues throughout.      Bell's palsy in , resolved.      Most of her seizures that the staff  is noticing now would be described as brief tonic seizures are brief tonic-clonic seizures.  They are more concerned with the length then the precise observation.  She might be having some minor seizures are not being reported.      CURRENT MEDICATIONS:  Felbatol  Discont due to possible cause of anemia; peramp discont 2020 . Lamictal have really been the best treatment for her.  She has been tried almost all the other seizure medications over time including Depakote, Tegretol, phenobarbital, Dilantin, etc.      SOCIAL HISTORY:  She is living in a group home.      REVIEW OF SYSTEMS:  Through the caregiver is essentially negative.  She has not had any upper respiratory infections lately.  Appetite is good.  She has no obvious headaches.   GI and  appear to be functioning normally.      PHYSICAL EXAMINATION: Very physically active- tries to kiss staff. Very loud fverbally- simple repetitive sentences. Walks without assistance but is apractic.     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    ASSESSMENT:  Complicated case; hospital notes, EEG and D/C summary reviewed last visit. Will start Epidiolex 5 mg/kg as 2.5 bid   PLAN:   1)    RTC 3 mo  In clinic to review increase in Epidiolox  3)  ASDS same and renewed with addition of Epidiolex  4)Sign new orders (sheet faxed)  Revise rescue plan.          Elysia may MD    Nella is a 46 year old who is being evaluated via a billable telephone visit.      What phone number would you like to be contacted at? [phone]- Yana  How would you like to obtain your AVS? Mail a copy     NIKOLAY Radford    Phone call duration: 15 minutes    Again, thank you for allowing me to participate in the care of your patient.      Sincerely,    Elysia May MD

## 2022-08-30 NOTE — NURSING NOTE
Group home is unsure about the prescriptions Midazolam 5 MG/01ML nasal spray.    Unable to complete PHQ-9 as patient wasn't present for rooming.     Thania Martins, VF

## 2022-08-30 NOTE — PROGRESS NOTES
Nella is a 46 year old who is being evaluated  By phone call       INTERVAL HX: Call was to unblock hold on Nayzilam. It is being held in pharmacy. Group home needs rewritten rescue medication orders before they will release it. Overal seizures improved since Epidiolex added. Only 7 sz, only one needed rescue med.. Decrease from almost daily before Epidiolex/    In 2019 Tried perampanel but had to discont due to behaviors. Increased  sz  Report is that Sz have increased - 72 since last visit. Excellent records Almost aily now- still focal tonic. uses diazepam after each 2 min.No obvious ppt factors: no fever, pneumonia, etc.    Prior to Admission medications Approximately 1/2 sz more than 2 min.   Medication Sig Start Date End Date Taking? Authorizing Provider   diazepam (VALIUM) 5 MG/ML (HIGH CONC) solution ADMINISTER 1ML (5MG) BY MOUTH AS NEEDED FOR ANY SEIZURE. WHEN ADMINISTERED NOTIFY PC., RN & PD 4/20/21  Yes Elysia Gomez MD   docusate sodium (COLACE) 100 MG capsule Take by mouth 2 times daily   Yes Reported, Patient   hydrocortisone (CORTAID) 1 % cream Apply topically 2 times daily as needed   Yes Reported, Patient   ketoconazole (EXTINA) 2 % external foam Apply topically every 12 hours apply by topical route 2 times every day to the affected area(s) 9/9/21  Yes Reported, Patient   lacosamide (VIMPAT) 200 MG TABS tablet TAKE ONE TABLET BY MOUTH TWICE A DAY 4/8/22  Yes Elysia Gomez MD   lamoTRIgine (LAMICTAL) 200 MG tablet (1) PO TWICE DAILY. 4/8/22  Yes Elysia Gomez MD   levETIRAcetam (KEPPRA) 1000 MG tablet Take 1 tablet (1,000 mg) by mouth 2 times daily 4/8/22  Yes Elysia Gomez MD   levETIRAcetam (KEPPRA) 500 MG tablet Take 1 tablet (500 mg) by mouth 2 times daily 4/8/22  Yes Elysia Gomez MD   multivitamin, therapeutic with minerals (THERA-VIT-M) TABS tablet Take 1 tablet by mouth daily   Yes Reported, Patient   OLANZapine (ZYPREXA) 5 MG tablet Take 10 mg by mouth 10mg and 2.5mg tablets in evening 1/23/19   Yes Reported, Patient   OXcarbazepine (TRILEPTAL) 150 MG tablet TAKE ONE TABLET BY MOUTH AT BEDTIME IN ADDITION TO 600MG TWO TIMES A DAY 3/16/22  Yes Reported, Patient   OXcarbazepine (TRILEPTAL) 600 MG tablet Take 1 tablet (600 mg) by mouth 2 times daily 4/20/21  Yes Elysia Gomez MD   traZODone (DESYREL) 50 MG tablet 1 tablet (50 mg) q 8am. 1 tablet (50 mg) q 4 pm,  3 tablets (150 mg) 8pm  Patient taking differently: 1 tablet (50 mg) q 7am. 1 tab (50mg) q 12pm, 1 tablet (50 mg) q 4 pm,  3 tablets (150 mg) 10pm 6/20/18  Yes Candice Liu MD   VITAMIN E 2 times daily   Yes Reported, Patient   Multiple Vitamin (MULTIVITAMINS PO) Take by mouth daily  Patient not taking: Reported on 4/8/2022    Reported, Patient   polyvinyl alcohol (LIQUITEARS) 1.4 % ophthalmic solution 2-3 drops as needed for dry eyes  Patient not taking: Reported on 4/8/2022    Reported, Patient           EPILEPSY HX REVIEWED 7/26/22   I have been seeing her since she was  age 19.  First seizure occurred at 6 hours of age.  It was described as color turning dusky, turning head to the right, eyes fixed to the right and no response.  She continued to have intermittent seizure activity and subsequently was transferred to the [facility] where she was hospitalized for 2 weeks.  It is not clear what diagnostic evaluation was done but they decided to discharge her without seizure medications.  Seizures recurred at 9 months of age.  At that time they noticed twitching of her left arm and then twitching of the side of the face.  EEG initially showed hypsarrhythmic pattern.  She was treated with phenobarbital.  Then, she was treated with Tegretol and later Dilantin.  CT scan in 1983 we do not have records.      Seizure types:   In 1991 seizure types were described as sitting, both arms jerk up, her head turns to the right, eyes go to the right and roll up.  These events last 2 minutes and at that time she was having 6 a month.       Second seizure type is described as her arms go up, legs stiffen and she moans.  She can be lowered to the floor and then her arms and legs shake with some erratic breathing and sweating profusely.  These occurred at that time, 3-4 per month.        Seizure type 3 was described as being alert.  Her arms fly out and her eyes blink.      Seizure type 4 described as stares.      She has had 1 episode of status epilepticus in .      EEG when she was 12 was read as abnormal record by virtue of diffuse theta and delta slowing greater on the right.      Parents report that Nella has always had a left hemiparesis, mostly affecting the left upper extremity with some involvement of her left lower extremity.  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  She has been diagnosed as legally blind as having cortical blindness.      BIRTH HISTORY:  Birth history was that she was the result of a full-term pregnancy that was complicated by maternal high blood pressure and use of Enduron.  Pregnancy ended in a 14 labored and vaginal delivery.  Birth weight 7 pounds 6 ounces.  Apgar scores of 4 at 1 minute and 8 at 5 minutes.      She has had global developmental delay.      Subsequent treatment at [facility] has been quite extensive and will not be reviewed completely here.  Last EEG was 2010 which showed mild to moderate nonspecific diffuse disturbance of cerebral activity and multifocal interictal activity with one complex partial seizure recorded.      In the last year they have described 204 seizures,all given diazepam.  OTHER ISSUES:        She has had behavioral issues throughout.      Bell's palsy in , resolved.      Most of her seizures that the staff is noticing now would be described as brief tonic seizures are brief tonic-clonic seizures.  They are more concerned with the length then the precise observation.  She might be having some minor seizures are not being reported.      CURRENT MEDICATIONS:  Felbatol  Discont due to  possible cause of anemia; [facility]cont 2020 . Lamictal have really been the best treatment for her.  She has been tried almost all the other seizure medications over time including Depakote, Tegretol, phenobarbital, Dilantin, etc.      SOCIAL HISTORY:  She is living in a group home.      REVIEW OF SYSTEMS:  Through the caregiver is essentially negative.  She has not had any upper respiratory infections lately.  Appetite is good.  She has no obvious headaches.   GI and  appear to be functioning normally.      PHYSICAL EXAMINATION: Very physically active- tries to kiss staff. Very loud fverbally- simple repetitive sentences. Walks without assistance but is apractic.     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    ASSESSMENT:  Complicated case; hospital notes, EEG and D/C summary reviewed last visit. Will start Epidiolex 5 mg/kg as 2.5 bid   PLAN:   1)    RTC 3 mo  In clinic to review increase in Epidiolox  3)  ASDS same and renewed with addition of Epidiolex  4)Sign new orders (sheet faxed)  Revise rescue plan.          Elysia may MD    Nella is a 46 year old who is being evaluated via a billable telephone visit.      What phone number would you like to be contacted at? [phone]- Yana  How would you like to obtain your AVS? Mail a copy     NIKOLAY Radford    Phone call duration: 15 minutes

## 2022-08-31 DIAGNOSIS — G40.812 LENNOX-GASTAUT SYNDROME WITH TONIC SEIZURES (H): Primary | ICD-10-CM

## 2022-08-31 DIAGNOSIS — G40.319 GENERALIZED CONVULSIVE EPILEPSY WITH INTRACTABLE EPILEPSY (H): ICD-10-CM

## 2022-09-01 ENCOUNTER — TELEPHONE (OUTPATIENT)
Dept: NEUROLOGY | Facility: CLINIC | Age: 47
End: 2022-09-01

## 2022-09-01 RX ORDER — LAMOTRIGINE 100 MG/1
TABLET ORAL
Qty: 45 TABLET | Refills: 3 | Status: SHIPPED | OUTPATIENT
Start: 2022-09-01 | End: 2023-08-15

## 2022-09-01 NOTE — TELEPHONE ENCOUNTER
lamoTRIgine (LAMICTAL) 100 MG tablet   Requested:   TAKE 1/2 TABLET (50MG) EVERY MORNING - TAKE IN ADDITION TO THE 200MG TWO TIMES A DAY FOR A TOTAL OF 250MG IN THE MORNING AND 200MG AT NIGHT  Last Written Prescription Date:  HISTORICAL    Last Office Visit : 8-30-22  Future Office visit:  none      Also on med list:  lamoTRIgine (LAMICTAL) 200 MG tablet 60 tablet 11 8/25/2022  No   Sig - Route: Take 1 tablet (200 mg) by mouth 2 times daily One  PO TWICE DAILY. - Oral         Routing refill request to provider for review/approval because:  Medication is reported/historical  Last clinic note unsigned  2 rx on current med list- different directions

## 2022-09-01 NOTE — TELEPHONE ENCOUNTER
Received Concern Sheet to be completed.  Form saved to the [id number] drive.  Encounter routed.  Bryanna Rosas CMA

## 2022-09-07 ENCOUNTER — TELEPHONE (OUTPATIENT)
Dept: NEUROLOGY | Facility: CLINIC | Age: 47
End: 2022-09-07

## 2022-09-08 NOTE — TELEPHONE ENCOUNTER
AVS faxed to them, can we still get updated orders sent, she did clarify orders are for epidiolex and is diazepam being switch to nasal spray?

## 2022-09-09 ENCOUNTER — MEDICAL CORRESPONDENCE (OUTPATIENT)
Dept: HEALTH INFORMATION MANAGEMENT | Facility: CLINIC | Age: 47
End: 2022-09-09

## 2022-09-12 ENCOUNTER — TELEPHONE (OUTPATIENT)
Dept: NEUROLOGY | Facility: CLINIC | Age: 47
End: 2022-09-12

## 2022-09-12 NOTE — TELEPHONE ENCOUNTER
Nella Helms's sister, Abdon is calling to inform Dr. Gomez that at a recent ER visit her Keppra Level was 52.7.    Abdon is requesting a call back to discuss further.

## 2022-09-13 NOTE — TELEPHONE ENCOUNTER
"Per   \"Elysia Gomez MD Hamley, David, RN  Caller: Unspecified (Yesterday, 12:07 PM)  Hi Hudson- Keppral level ok- tell sister that we are keeping her at higher level than \"normal range\" Normal range is too low for persons like her.   \"    Call placed to Abdon.  We discussed that  has reviewed the level ans is happy with where it is at.  No other questions at this time      "

## 2022-09-15 ENCOUNTER — TELEPHONE (OUTPATIENT)
Dept: NEUROLOGY | Facility: CLINIC | Age: 47
End: 2022-09-15

## 2022-09-15 DIAGNOSIS — G40.812 LENNOX-GASTAUT SYNDROME WITH TONIC SEIZURES (H): ICD-10-CM

## 2022-09-15 NOTE — LETTER
9/23/2022       RE: Nella Helms  [address] [place] [id number]          Discontinue Diazepam Intensol    Use Nayzilam as a seizure rescue medication:-  Administer Nayzilam (Intranasal midazolam) one dose (5mg) for any seizure lasting 2 minutes.  A second dose may be given no sooner than 10 minutes after the first if seizure activity continues.  Maximum 2 doses per day.  If seizure activity continues 5 minutes after the second dose, contact emergency medical services (call 9-1-1) or as indicated in the seizure plan.          Elysia Gomez M.D.

## 2022-09-15 NOTE — LETTER
9/23/2022       RE: Nella Helms  [address] [place] [phone]      SEIZURE PLAN AND PROTOCOL    Plan of Care:    Follow general seizure care and First Aid guidelines for seizures.  Anticonvulsant medications will be administered as prescribed.  All seizures will be documented on a Seizure Report including:  date, time, length of seizure, specific body movements and behaviors exhibited during the seizure, and post-seizure state.  Antiepileptic blood levels will be ordered by the physician based upon client's condition and medications.    Missed Doses:    IF YOU REALIZE WITHIN 24 HOURS:    ...You MISSED ONE DOSE of your anticonvulsant medication(s), take the missed dose immediately unless it is time for your next scheduled dose. If that is the case, take your next scheduled dose, wait two hours, and then take the missed dose.    ...You MISSED TWO OR MORE DOSES, take one of the missed doses immediately, even if it is time for your next scheduled dose. Then call the [facility] to schedule an appointment.     IF YOU REALIZE NOW YOU MISSED A DOSE MORE THAN 24 HOURS AGO, william it on your calendar. DO NOT take an extra dose.    Medication Errors:    Your facility nurse should be notified of any medication errors. The nurse should observe the urgency of the error. It is not necessary to notify the MD on call unless the facility nurse or delegate believes it to be life threatening or could possibly result in a serious complication. Routine notices required by regulation should be telephoned to the clinic during office hours.      Extra Dose:    An extra dose of an antiepileptic drug is not serious. Ordinarily, at most, the client may experience increased side effects for several hours. Contact your facility nurse immediately if an extra dose was given.    Seizure Protocol:    Administer Nayzilam (Intranasal midazolam) one dose (5mg) for any seizure lasting 2 minutes.  A second dose may be given  no sooner than 10 minutes after the first if seizure activity continues.  Maximum 2 doses per day.  If seizure activity continues 5 minutes after the second dose, contact emergency medical services (call 9-1-1)    When to call 911 for Seizures:    Call 911 as per rescue protocol, or if Nella:    ... does not start breathing adequately within 1 minute after the seizure. If this happens call 911 immediately and start CPR.    ...sustains an injury    ... has one seizure right after another without regaining consciousness in between or a convulsive seizure that lasts over 5 minutes    ... requests an ambulance    Or facility protocol requires activation of emergency medical services        Provider:              Elysia Gomez M.D.

## 2022-09-15 NOTE — TELEPHONE ENCOUNTER
What is the concern that needs to be addressed by a nurse?Daiana from [facility] called. Want current protocol for rescue med. Are we dc'ing diazepam?     May a detailed message be left on voicemail?     Date of last office visit: 8/30/22    Message routed to: Ana Mcfarland

## 2022-09-16 NOTE — TELEPHONE ENCOUNTER
EPIDIOLEX 100MG/ML [facility]  Last Written Prescription Date:   8/3/2022  Last Fill Quantity: 110,   # refills: 1  Last Office Visit :  8/30/2022  Future Office visit:  None    Routing refill request to provider for review/approval because:  Drug not on the [facility], P or [facility] refill protocol or controlled substance      Breonna Miller RN  Central Triage Red Flags/Med Refills

## 2022-09-22 RX ORDER — CANNABIDIOL 100 MG/ML
1.89 SOLUTION ORAL 2 TIMES DAILY
OUTPATIENT
Start: 2022-09-22

## 2022-09-23 RX ORDER — CANNABIDIOL 100 MG/ML
2.5 SOLUTION ORAL 2 TIMES DAILY
Qty: 110 ML | Refills: 1 | Status: SHIPPED | OUTPATIENT
Start: 2022-09-23 | End: 2022-11-01

## 2022-09-23 NOTE — TELEPHONE ENCOUNTER
"Call received from daiana.  She had a number of questions/requests.  1- please refill the epidiolex, there are only supplies available until Monday, and why was the refill request denied?    I explained that the refill that came in showed the incorrect dose, so it was declined, and a new prescription with the correct dose is pending provider approval ( is out of office this week, so it will be the house coverage provider).    2 - a written order is needed at the facility to discontinue the Diazepam Intensol, now that it has been replaced with intranasal midazolam. (this is per the visit discussion with  on 8/30/2022). This will need to be faxed to [phone]    3 - Prescription for Nayzilam needs to have a time frame of when to repeat the dose, currently reads may repeat x1.    4 - if the second rescue dose is used, and seizures continue, instruction as to what to do is needed (e.g. \"call 9-1-1\" or what ever is appropriate)    Daiana also asked about how the Nayzilam is used.  We discussed that the applicator is a single use devise, and one squirt/spray is one dose.  The device should not be primed, as this will release the dose.        Seizure protocol completed and printed, along with nayzilam administration instructions from [facility].  Order letter compiled and printed for MD signature.  First aid for epilepsy sheet added to protocol.  To be faxed as requested once signed  "

## 2022-09-23 NOTE — TELEPHONE ENCOUNTER
Forms/letters signed by  and faxed to Daiana as requested  Call placed to Daiana to update her.  No other questions at this time

## 2022-10-06 ENCOUNTER — TELEPHONE (OUTPATIENT)
Dept: NEUROLOGY | Facility: CLINIC | Age: 47
End: 2022-10-06

## 2022-10-06 DIAGNOSIS — G40.319 GENERALIZED CONVULSIVE EPILEPSY WITH INTRACTABLE EPILEPSY (H): ICD-10-CM

## 2022-10-06 RX ORDER — OXCARBAZEPINE 300 MG/1
900 TABLET, FILM COATED ORAL 2 TIMES DAILY
Qty: 180 TABLET | Refills: 4 | COMMUNITY
Start: 2022-10-06

## 2022-10-06 NOTE — TELEPHONE ENCOUNTER
New group home calls asking if epidiolex can be used PRN for behavioral issues. I advised that this will not be appropriate and that they should contact patient's PCP to discuss alternatives. We updated demographics, confirmed pharmacy, and reviewed AED doses.     Patient is reported to be taking    LEV (500,1000) 8597-5928  LTG (100, 200) 250-200  LCS (200) 200-200  OXC (300) 900-900  Epidiolex 1.9mL-1.9mL    These doses were vetted against the EHR and found to be correct. Our notes were updated to reflect current dosing. Phone call with ED/Dr. Fountain 7/27/22 and ED/Dr. Cruz 8/3/22.

## 2022-10-06 NOTE — TELEPHONE ENCOUNTER
What is the concern that needs to be addressed by a nurse? Patient recently was moved to new [facility] home. She has been having a few behavioral issues with adjusting to her new surroundings. Can they add a daily PRN for Epidiolex , it seems to help her calm down. This would just be while she's adjusting to new home.    May a detailed message be left on voicemail?     Date of last office visit: 8/30/22    Message routed to: [facility] RN pool

## 2022-10-07 DIAGNOSIS — G40.319 GENERALIZED CONVULSIVE EPILEPSY WITH INTRACTABLE EPILEPSY (H): ICD-10-CM

## 2022-10-08 NOTE — TELEPHONE ENCOUNTER
VIMPAT 200MG TABS      Last Written Prescription Date:  4/8/22  Last Fill Quantity: 60,   # refills: 5  Last Office Visit : 8/30/22 recommended 3 month follow up  Future Office visit:  None scheduled    Routing refill request to provider for review/approval because:  Drug controlled substance

## 2022-10-10 RX ORDER — LACOSAMIDE 200 MG/1
TABLET ORAL
Qty: 60 TABLET | Refills: 5 | Status: SHIPPED | OUTPATIENT
Start: 2022-10-10 | End: 2023-03-29

## 2022-10-16 DIAGNOSIS — G40.319 GENERALIZED CONVULSIVE EPILEPSY WITH INTRACTABLE EPILEPSY (H): ICD-10-CM

## 2022-10-17 ENCOUNTER — TELEPHONE (OUTPATIENT)
Dept: NEUROLOGY | Facility: CLINIC | Age: 47
End: 2022-10-17

## 2022-10-17 DIAGNOSIS — H47.619: ICD-10-CM

## 2022-10-17 DIAGNOSIS — F72 SEVERE INTELLECTUAL DISABILITIES: ICD-10-CM

## 2022-10-17 DIAGNOSIS — G40.812 LENNOX-GASTAUT SYNDROME WITH TONIC SEIZURES (H): Primary | ICD-10-CM

## 2022-10-17 DIAGNOSIS — Z78.9 LIVES IN GROUP HOME: ICD-10-CM

## 2022-10-17 DIAGNOSIS — G81.90 HEMIPLEGIA (H): ICD-10-CM

## 2022-10-17 NOTE — TELEPHONE ENCOUNTER
What is the concern that needs to be addressed by a nurse? Lack of sleep increase, in seizure activity, had .gran mal 10/13 lasted over 5min    May a detailed message be left on voicemail? Yes    Date of last office visit: 8/30/22    Message routed to: [facility] RN pool

## 2022-10-18 RX ORDER — LACOSAMIDE 200 MG/1
TABLET ORAL
Qty: 60 TABLET | Refills: 5 | OUTPATIENT
Start: 2022-10-18

## 2022-10-19 DIAGNOSIS — G40.319 GENERALIZED CONVULSIVE EPILEPSY WITH INTRACTABLE EPILEPSY (H): ICD-10-CM

## 2022-10-24 NOTE — TELEPHONE ENCOUNTER
"This nurse contacted patient's group home caregiver, Jorge Luis. Per Jorge Luis, patient has not been sleeping through the night, has been restless. He wonders if this could be causing the increase he sees in seizures. She is having 1-2 staring spells per day. They did provider her with nasal spray rescue medication during her convulsive seizure last week per protocol. She was evaluated at ED due to this seizure, was discharged home without changes to plan. Per our chart, patient is on olazapine at night. Jorge Luis states she is not on that at this time. He states he called to get a primary care appointment for her at Dr. Fritsch's office but was unable to, the office told him they \"don't care for her anymore\". I placed a referral to primary care within our health system to help them get established. I instructed Jorge Luis to contact her previous pharmacy to review if her medications were continued correctly from the last group home to current, to double check if she is supposed to be on olanzapine.     We scheduled a follow up with Dr. Gomez as per last office note.   "

## 2022-10-24 NOTE — TELEPHONE ENCOUNTER
NAYZILAM 5MG/0.1ML/Midazolam 5 MG/0.1ML  Last Written Prescription Date:  8/23/22  Last Fill Quantity: 2 EACH,   # refills: 3  Last Office Visit : 8/30/22  Future Office visit:  11/29/22  Routing refill request to provider for review/approval because: Drug not on the refill protocol /controlled substance

## 2022-10-25 ENCOUNTER — TELEPHONE (OUTPATIENT)
Dept: NEUROLOGY | Facility: CLINIC | Age: 47
End: 2022-10-25

## 2022-10-25 NOTE — TELEPHONE ENCOUNTER
Patient transitioned to new facility they are requesting-Updated med list signed and seizure protocol   no fax, can we e-mail- [email or web address]

## 2022-10-25 NOTE — TELEPHONE ENCOUNTER
Kathryn states that the new home is double checking the information they were given by the previous home and would like us to send copies of the seizure protocol and med list.

## 2022-10-31 DIAGNOSIS — G40.812 LENNOX-GASTAUT SYNDROME WITH TONIC SEIZURES (H): ICD-10-CM

## 2022-10-31 RX ORDER — MIDAZOLAM 5 MG/.1ML
SPRAY NASAL
Qty: 2 EACH | Refills: 3 | Status: SHIPPED | OUTPATIENT
Start: 2022-10-31 | End: 2022-11-09

## 2022-10-31 NOTE — TELEPHONE ENCOUNTER
Patient's home is calling stating that pharmacy has advised that they have not received medication NAYZILAM 5 MG/0.1ML SOLN as of yet. Pharmacy has stated that a verbal can be called in.    Home is asking clinical staff to reach out to [facility] to okay script.

## 2022-11-01 RX ORDER — CANNABIDIOL 100 MG/ML
SOLUTION ORAL
Qty: 110 ML | Refills: 11 | Status: SHIPPED | OUTPATIENT
Start: 2022-11-01 | End: 2023-09-21

## 2022-11-07 DIAGNOSIS — G40.319 GENERALIZED CONVULSIVE EPILEPSY WITH INTRACTABLE EPILEPSY (H): ICD-10-CM

## 2022-11-09 DIAGNOSIS — G40.319 GENERALIZED CONVULSIVE EPILEPSY WITH INTRACTABLE EPILEPSY (H): ICD-10-CM

## 2022-11-09 RX ORDER — MIDAZOLAM 5 MG/.1ML
SPRAY NASAL
Refills: 3 | OUTPATIENT
Start: 2022-11-09

## 2022-11-09 NOTE — TELEPHONE ENCOUNTER
Received call from [facility] specialty pharmacy that they never received the Rx for the nayzilam and would  like this to be sent .

## 2022-11-09 NOTE — TELEPHONE ENCOUNTER
Maximiliano from [facility] called to confirm Nayzilam prescription and number of refills  Information provided verbally by phone

## 2022-11-12 RX ORDER — MIDAZOLAM 5 MG/.1ML
1 SPRAY NASAL 2 TIMES DAILY PRN
Qty: 2 EACH | Refills: 3 | Status: SHIPPED | OUTPATIENT
Start: 2022-11-12 | End: 2022-11-29

## 2022-11-29 ENCOUNTER — MEDICAL CORRESPONDENCE (OUTPATIENT)
Dept: HEALTH INFORMATION MANAGEMENT | Facility: CLINIC | Age: 47
End: 2022-11-29

## 2022-11-29 ENCOUNTER — VIRTUAL VISIT (OUTPATIENT)
Dept: NEUROLOGY | Facility: CLINIC | Age: 47
End: 2022-11-29
Payer: MEDICARE

## 2022-11-29 ENCOUNTER — TELEPHONE (OUTPATIENT)
Dept: NEUROLOGY | Facility: CLINIC | Age: 47
End: 2022-11-29

## 2022-11-29 DIAGNOSIS — G40.319 GENERALIZED CONVULSIVE EPILEPSY WITH INTRACTABLE EPILEPSY (H): ICD-10-CM

## 2022-11-29 RX ORDER — OLANZAPINE 2.5 MG/1
TABLET, FILM COATED ORAL
COMMUNITY
Start: 2022-11-16

## 2022-11-29 RX ORDER — DOXYCYCLINE HYCLATE 100 MG
TABLET ORAL
COMMUNITY
Start: 2022-10-31

## 2022-11-29 RX ORDER — VITAMIN E 268 MG
CAPSULE ORAL
COMMUNITY
Start: 2022-10-19

## 2022-11-29 RX ORDER — MIDAZOLAM 5 MG/.1ML
1 SPRAY NASAL 2 TIMES DAILY PRN
Qty: 2 EACH | Refills: 3 | Status: SHIPPED | OUTPATIENT
Start: 2022-11-29 | End: 2023-04-11

## 2022-11-29 RX ORDER — OLANZAPINE 10 MG/1
10 TABLET ORAL
COMMUNITY
Start: 2022-11-16

## 2022-11-29 NOTE — LETTER
2022     RE: Nella Helms  : 1975   MRN: [id number]      Dear Colleague,    Thank you for referring your patient, Nella Helms, to the [facility] EPILEPSY CARE at [facility]. Please see a copy of my visit note below.    Nella is a 46 year old who is being evaluated via a billable telephone visit.      What phone number would you like to be contacted at? [phone]  How would you like to obtain your AVS? Mail a copy  Phone call duration:22 minutesLogloria is a 46 year old who is being evaluated  By phone call       INTERVAL HX: Moved to new [facility] [place] home.  Spoke with Dilia- owner- has 4 persons but Nella most severe one.Has had at least 3 ER visits . These for clusters.     Prior to Admission medications Approximately 1/2 sz more than 2 min.   Medication Sig Start Date End Date Taking? Authorizing Provider   diazepam (VALIUM) 5 MG/ML (HIGH CONC) solution ADMINISTER 1ML (5MG) BY MOUTH AS NEEDED FOR ANY SEIZURE. WHEN ADMINISTERED NOTIFY PC., RN & PD 21  Yes Elysia Gomez MD   docusate sodium (COLACE) 100 MG capsule Take by mouth 2 times daily   Yes Reported, Patient   hydrocortisone (CORTAID) 1 % cream Apply topically 2 times daily as needed   Yes Reported, Patient   ketoconazole (EXTINA) 2 % external foam Apply topically every 12 hours apply by topical route 2 times every day to the affected area(s) 21  Yes Reported, Patient   lacosamide (VIMPAT) 200 MG TABS tablet TAKE ONE TABLET BY MOUTH TWICE A DAY 22  Yes Elysia Gomez MD   lamoTRIgine (LAMICTAL) 200 MG tablet (1) PO TWICE DAILY. 22  Yes Elysia Gomez MD   levETIRAcetam (KEPPRA) 1000 MG tablet Take 1 tablet (1,000 mg) by mouth 2 times daily 22  Yes Elysia Gomez MD   levETIRAcetam (KEPPRA) 500 MG tablet Take 1 tablet (500 mg) by mouth 2 times daily 22  Yes Elysia Gomez MD   multivitamin, therapeutic with minerals (THERA-VIT-M) TABS tablet Take 1 tablet by mouth daily   Yes Reported,  Patient   OLANZapine (ZYPREXA) 5 MG tablet Take 10 mg by mouth 10mg and 2.5mg tablets in evening 1/23/19  Yes Reported, Patient   OXcarbazepine (TRILEPTAL) 150 MG tablet TAKE ONE TABLET BY MOUTH AT BEDTIME IN ADDITION TO 600MG TWO TIMES A DAY 3/16/22  Yes Reported, Patient   OXcarbazepine (TRILEPTAL) 600 MG tablet Take 1 tablet (600 mg) by mouth 2 times daily 4/20/21  Yes Elysia Gomez MD   traZODone (DESYREL) 50 MG tablet 1 tablet (50 mg) q 8am. 1 tablet (50 mg) q 4 pm,  3 tablets (150 mg) 8pm  Patient taking differently: 1 tablet (50 mg) q 7am. 1 tab (50mg) q 12pm, 1 tablet (50 mg) q 4 pm,  3 tablets (150 mg) 10pm 6/20/18  Yes Candice Liu MD   VITAMIN E 2 times daily   Yes Reported, Patient   Multiple Vitamin (MULTIVITAMINS PO) Take by mouth daily  Patient not taking: Reported on 4/8/2022    Reported, Patient   polyvinyl alcohol (LIQUITEARS) 1.4 % ophthalmic solution 2-3 drops as needed for dry eyes  Patient not taking: Reported on 4/8/2022    Reported, Patient           EPILEPSY HX REVIEWED 11/29/22   I have been seeing her since she was  age 19.  First seizure occurred at 6 hours of age.  It was described as color turning dusky, turning head to the right, eyes fixed to the right and no response.  She continued to have intermittent seizure activity and subsequently was transferred to the [facility] where she was hospitalized for 2 weeks.  It is not clear what diagnostic evaluation was done but they decided to discharge her without seizure medications.  Seizures recurred at 9 months of age.  At that time they noticed twitching of her left arm and then twitching of the side of the face.  EEG initially showed hypsarrhythmic pattern.  She was treated with phenobarbital.  Then, she was treated with Tegretol and later Dilantin.  CT scan in 1983 we do not have records.      Seizure types:   In 1991 seizure types were described as sitting, both arms jerk up, her head turns to the right,  eyes go to the right and roll up.  These events last 2 minutes and at that time she was having 6 a month.      Second seizure type is described as her arms go up, legs stiffen and she moans.  She can be lowered to the floor and then her arms and legs shake with some erratic breathing and sweating profusely.  These occurred at that time, 3-4 per month.        Seizure type 3 was described as being alert.  Her arms fly out and her eyes blink.      Seizure type 4 described as stares.      She has had 1 episode of status epilepticus in .      EEG when she was 12 was read as abnormal record by virtue of diffuse theta and delta slowing greater on the right.      Parents report that Nella has always had a left hemiparesis, mostly affecting the left upper extremity with some involvement of her left lower extremity.  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  She has been diagnosed as legally blind as having cortical blindness.      BIRTH HISTORY:  Birth history was that she was the result of a full-term pregnancy that was complicated by maternal high blood pressure and use of Enduron.  Pregnancy ended in a 14 labored and vaginal delivery.  Birth weight 7 pounds 6 ounces.  Apgar scores of 4 at 1 minute and 8 at 5 minutes.      She has had global developmental delay.      Subsequent treatment at [facility] has been quite extensive and will not be reviewed completely here.  Last EEG was 2010 which showed mild to moderate nonspecific diffuse disturbance of cerebral activity and multifocal interictal activity with one complex partial seizure recorded.      In the last year they have described 204 seizures,all given diazepam.  OTHER ISSUES:        She has had behavioral issues throughout.      Bell's palsy in , resolved.      Most of her seizures that the staff is noticing now would be described as brief tonic seizures are brief tonic-clonic seizures.  They are more concerned with the length then the precise observation.  She  might be having some minor seizures are not being reported.      CURRENT MEDICATIONS:  Felbatol  Discont due to possible cause of anemia; peramp discont 2020 . Lamictal have really been the best treatment for her.  She has been tried almost all the other seizure medications over time including Depakote, Tegretol, phenobarbital, Dilantin, etc.      SOCIAL HISTORY:  She is living in a group home.      REVIEW OF SYSTEMS:  Through the caregiver is essentially negative.  She has not had any upper respiratory infections lately.  Appetite is good.  She has no obvious headaches.   GI and  appear to be functioning normally.      PHYSICAL EXAMINATION: Very physically active- tries to kiss staff. Very loud fverbally- simple repetitive sentences. Walks without assistance but is apractic.     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    ASSESSMENT:  Complicated case; hospital notes, EEG and D/C summary reviewed last visit.  Epidiolex 5 mg/kg as 2.5 bid Re-wrote Nazylam for clusters- 15 sec sz 3 or more in 1hr.    PLAN:   1)    RTC    In clinic ASAP  3)  ASDS same and renewed with addition of Epidiolex  4)Sign new orders (sheet faxed)  Revise rescue plan.          Elysia may MD    Nella is a 46 year old who is being evaluated via a billable telephone visit.      What phone number would you like to be contacted at? [phone]- jen  How would you like to obtain your AVS? Mail a copy     NIKOLAY Radford    Phone call duration: 15 minutes    Again, thank you for allowing me to participate in the care of your patient.      Sincerely,    Elysia May MD

## 2022-11-29 NOTE — PROGRESS NOTES
Nella is a 46 year old who is being evaluated via a billable telephone visit.      What phone number would you like to be contacted at? [phone]  How would you like to obtain your AVS? Mail a copy  Phone call duration:22 minutesNella is a 46 year old who is being evaluated  By phone call       INTERVAL HX: Moved to [facility] home.  Spoke with Dilia- owner- has 4 persons but Nella most severe one.Has had at least 3 ER visits . These for clusters.     Prior to Admission medications Approximately 1/2 sz more than 2 min.   Medication Sig Start Date End Date Taking? Authorizing Provider   diazepam (VALIUM) 5 MG/ML (HIGH CONC) solution ADMINISTER 1ML (5MG) BY MOUTH AS NEEDED FOR ANY SEIZURE. WHEN ADMINISTERED NOTIFY PC., RN & PD 4/20/21  Yes Elysia Gomez MD   docusate sodium (COLACE) 100 MG capsule Take by mouth 2 times daily   Yes Reported, Patient   hydrocortisone (CORTAID) 1 % cream Apply topically 2 times daily as needed   Yes Reported, Patient   ketoconazole (EXTINA) 2 % external foam Apply topically every 12 hours apply by topical route 2 times every day to the affected area(s) 9/9/21  Yes Reported, Patient   lacosamide (VIMPAT) 200 MG TABS tablet TAKE ONE TABLET BY MOUTH TWICE A DAY 4/8/22  Yes Elysia Gomez MD   lamoTRIgine (LAMICTAL) 200 MG tablet (1) PO TWICE DAILY. 4/8/22  Yes Elysia Gomez MD   levETIRAcetam (KEPPRA) 1000 MG tablet Take 1 tablet (1,000 mg) by mouth 2 times daily 4/8/22  Yes Elysia Gomez MD   levETIRAcetam (KEPPRA) 500 MG tablet Take 1 tablet (500 mg) by mouth 2 times daily 4/8/22  Yes Elysia Gomez MD   multivitamin, therapeutic with minerals (THERA-VIT-M) TABS tablet Take 1 tablet by mouth daily   Yes Reported, Patient   OLANZapine (ZYPREXA) 5 MG tablet Take 10 mg by mouth 10mg and 2.5mg tablets in evening 1/23/19  Yes Reported, Patient   OXcarbazepine (TRILEPTAL) 150 MG tablet TAKE ONE TABLET BY MOUTH AT BEDTIME IN ADDITION TO 600MG TWO TIMES A DAY 3/16/22  Yes Reported, Patient    OXcarbazepine (TRILEPTAL) 600 MG tablet Take 1 tablet (600 mg) by mouth 2 times daily 4/20/21  Yes Elysia Gomez MD   traZODone (DESYREL) 50 MG tablet 1 tablet (50 mg) q 8am. 1 tablet (50 mg) q 4 pm,  3 tablets (150 mg) 8pm  Patient taking differently: 1 tablet (50 mg) q 7am. 1 tab (50mg) q 12pm, 1 tablet (50 mg) q 4 pm,  3 tablets (150 mg) 10pm 6/20/18  Yes Candice Liu MD   VITAMIN E 2 times daily   Yes Reported, Patient   Multiple Vitamin (MULTIVITAMINS PO) Take by mouth daily  Patient not taking: Reported on 4/8/2022    Reported, Patient   polyvinyl alcohol (LIQUITEARS) 1.4 % ophthalmic solution 2-3 drops as needed for dry eyes  Patient not taking: Reported on 4/8/2022    Reported, Patient           EPILEPSY HX REVIEWED 11/29/22   I have been seeing her since she was  age 19.  First seizure occurred at 6 hours of age.  It was described as color turning dusky, turning head to the right, eyes fixed to the right and no response.  She continued to have intermittent seizure activity and subsequently was transferred to the [facility] where she was hospitalized for 2 weeks.  It is not clear what diagnostic evaluation was done but they decided to discharge her without seizure medications.  Seizures recurred at 9 months of age.  At that time they noticed twitching of her left arm and then twitching of the side of the face.  EEG initially showed hypsarrhythmic pattern.  She was treated with phenobarbital.  Then, she was treated with Tegretol and later Dilantin.  CT scan in 1983 we do not have records.      Seizure types:   In 1991 seizure types were described as sitting, both arms jerk up, her head turns to the right, eyes go to the right and roll up.  These events last 2 minutes and at that time she was having 6 a month.      Second seizure type is described as her arms go up, legs stiffen and she moans.  She can be lowered to the floor and then her arms and legs shake with some erratic  breathing and sweating profusely.  These occurred at that time, 3-4 per month.        Seizure type 3 was described as being alert.  Her arms fly out and her eyes blink.      Seizure type 4 described as stares.      She has had 1 episode of status epilepticus in .      EEG when she was 12 was read as abnormal record by virtue of diffuse theta and delta slowing greater on the right.      Parents report that Nella has always had a left hemiparesis, mostly affecting the left upper extremity with some involvement of her left lower extremity.  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  She has been diagnosed as legally blind as having cortical blindness.      BIRTH HISTORY:  Birth history was that she was the result of a full-term pregnancy that was complicated by maternal high blood pressure and use of Enduron.  Pregnancy ended in a 14 labored and vaginal delivery.  Birth weight 7 pounds 6 ounces.  Apgar scores of 4 at 1 minute and 8 at 5 minutes.      She has had global developmental delay.      Subsequent treatment at [facility] has been quite extensive and will not be reviewed completely here.  Last EEG was 2010 which showed mild to moderate nonspecific diffuse disturbance of cerebral activity and multifocal interictal activity with one complex partial seizure recorded.      In the last year they have described 204 seizures,all given diazepam.  OTHER ISSUES:        She has had behavioral issues throughout.      Bell's palsy in , resolved.      Most of her seizures that the staff is noticing now would be described as brief tonic seizures are brief tonic-clonic seizures.  They are more concerned with the length then the precise observation.  She might be having some minor seizures are not being reported.      CURRENT MEDICATIONS:  Felbatol  Discont due to possible cause of anemia; peramp discont  . Lamictal have really been the best treatment for her.  She has been tried almost all the other seizure medications  over time including Depakote, Tegretol, phenobarbital, Dilantin, etc.      SOCIAL HISTORY:  She is living in a group home.      REVIEW OF SYSTEMS:  Through the caregiver is essentially negative.  She has not had any upper respiratory infections lately.  Appetite is good.  She has no obvious headaches.   GI and  appear to be functioning normally.      PHYSICAL EXAMINATION: Very physically active- tries to kiss staff. Very loud fverbally- simple repetitive sentences. Walks without assistance but is apractic.     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    ASSESSMENT:  Complicated case; hospital notes, EEG and D/C summary reviewed last visit.  Epidiolex 5 mg/kg as 2.5 bid Re-wrote Nazylam for clusters- 15 sec sz 3 or more in 1hr.    PLAN:   1)    RTC    In clinic ASAP  3)  ASDS same and renewed with addition of Epidiolex  4)Sign new orders (sheet faxed)  Revise rescue plan.          Elysai may MD    Nella is a 46 year old who is being evaluated via a billable telephone visit.      What phone number would you like to be contacted at? [phone]- Yana  How would you like to obtain your AVS? Mail a copy     NIKOLAY Radford    Phone call duration: 15 minutes

## 2022-11-30 ENCOUNTER — TELEPHONE (OUTPATIENT)
Dept: NEUROLOGY | Facility: CLINIC | Age: 47
End: 2022-11-30

## 2022-11-30 NOTE — TELEPHONE ENCOUNTER
What is the concern that needs to be addressed by a nurse? Dilia from [facility] called about seizure protocol, hadn't received yet, fax to [phone]    Dilia would also like to discuss patient's seizure types and how to identify them.    May a detailed message be left on voicemail? Yes    Date of last office visit: 11/29/22    Message routed to: [facility] RN Pool

## 2023-01-06 ENCOUNTER — OFFICE VISIT (OUTPATIENT)
Dept: FAMILY MEDICINE | Facility: CLINIC | Age: 48
End: 2023-01-06
Payer: MEDICARE

## 2023-01-06 VITALS
TEMPERATURE: 98.9 F | BODY MASS INDEX: 33.79 KG/M2 | WEIGHT: 179 LBS | HEART RATE: 109 BPM | OXYGEN SATURATION: 100 % | HEIGHT: 61 IN | RESPIRATION RATE: 18 BRPM

## 2023-01-06 DIAGNOSIS — G40.319 GENERALIZED CONVULSIVE EPILEPSY WITH INTRACTABLE EPILEPSY (H): Chronic | ICD-10-CM

## 2023-01-06 DIAGNOSIS — Z13.220 SCREENING FOR HYPERLIPIDEMIA: ICD-10-CM

## 2023-01-06 DIAGNOSIS — Z12.4 CERVICAL CANCER SCREENING: ICD-10-CM

## 2023-01-06 DIAGNOSIS — G40.919 BREAKTHROUGH SEIZURE (H): Primary | ICD-10-CM

## 2023-01-06 DIAGNOSIS — R26.89 BALANCE PROBLEMS: ICD-10-CM

## 2023-01-06 DIAGNOSIS — Z12.11 SCREEN FOR COLON CANCER: ICD-10-CM

## 2023-01-06 DIAGNOSIS — F63.9 IMPULSE CONTROL DISORDER: ICD-10-CM

## 2023-01-06 DIAGNOSIS — F29 ATYPICAL PSYCHOSIS (H): ICD-10-CM

## 2023-01-06 LAB
ALBUMIN SERPL BCG-MCNC: 4.3 G/DL (ref 3.5–5.2)
ALP SERPL-CCNC: 125 U/L (ref 35–104)
ALT SERPL W P-5'-P-CCNC: 18 U/L (ref 10–35)
ANION GAP SERPL CALCULATED.3IONS-SCNC: 14 MMOL/L (ref 7–15)
AST SERPL W P-5'-P-CCNC: 20 U/L (ref 10–35)
BILIRUB SERPL-MCNC: <0.2 MG/DL
BUN SERPL-MCNC: 11.1 MG/DL (ref 6–20)
CALCIUM SERPL-MCNC: 9.8 MG/DL (ref 8.6–10)
CHLORIDE SERPL-SCNC: 104 MMOL/L (ref 98–107)
CHOLEST SERPL-MCNC: 184 MG/DL
CREAT SERPL-MCNC: 0.48 MG/DL (ref 0.51–0.95)
DEPRECATED HCO3 PLAS-SCNC: 21 MMOL/L (ref 22–29)
ERYTHROCYTE [DISTWIDTH] IN BLOOD BY AUTOMATED COUNT: 12.6 % (ref 10–15)
GFR SERPL CREATININE-BSD FRML MDRD: >90 ML/MIN/1.73M2
GLUCOSE SERPL-MCNC: 95 MG/DL (ref 70–99)
HCT VFR BLD AUTO: 42.4 % (ref 35–47)
HDLC SERPL-MCNC: 62 MG/DL
HGB BLD-MCNC: 13.8 G/DL (ref 11.7–15.7)
LDLC SERPL CALC-MCNC: 107 MG/DL
MCH RBC QN AUTO: 31.9 PG (ref 26.5–33)
MCHC RBC AUTO-ENTMCNC: 32.5 G/DL (ref 31.5–36.5)
MCV RBC AUTO: 98 FL (ref 78–100)
NONHDLC SERPL-MCNC: 122 MG/DL
PLATELET # BLD AUTO: 293 10E3/UL (ref 150–450)
POTASSIUM SERPL-SCNC: 4.2 MMOL/L (ref 3.4–5.3)
PROT SERPL-MCNC: 7.5 G/DL (ref 6.4–8.3)
RBC # BLD AUTO: 4.32 10E6/UL (ref 3.8–5.2)
SODIUM SERPL-SCNC: 139 MMOL/L (ref 136–145)
TRIGL SERPL-MCNC: 73 MG/DL
WBC # BLD AUTO: 7.1 10E3/UL (ref 4–11)

## 2023-01-06 PROCEDURE — G0008 ADMIN INFLUENZA VIRUS VAC: HCPCS | Performed by: FAMILY MEDICINE

## 2023-01-06 PROCEDURE — 36415 COLL VENOUS BLD VENIPUNCTURE: CPT | Performed by: FAMILY MEDICINE

## 2023-01-06 PROCEDURE — 85027 COMPLETE CBC AUTOMATED: CPT | Performed by: FAMILY MEDICINE

## 2023-01-06 PROCEDURE — 80053 COMPREHEN METABOLIC PANEL: CPT | Performed by: FAMILY MEDICINE

## 2023-01-06 PROCEDURE — 99204 OFFICE O/P NEW MOD 45 MIN: CPT | Mod: 25 | Performed by: FAMILY MEDICINE

## 2023-01-06 PROCEDURE — 80061 LIPID PANEL: CPT | Performed by: FAMILY MEDICINE

## 2023-01-06 PROCEDURE — 90686 IIV4 VACC NO PRSV 0.5 ML IM: CPT | Performed by: FAMILY MEDICINE

## 2023-01-06 ASSESSMENT — PAIN SCALES - GENERAL: PAINLEVEL: NO PAIN (0)

## 2023-01-06 NOTE — LETTER
January 10, 2023      Nella S Scooter  [address] [place] [id number]        Dear ,        I have reviewed your recent labs. Here are the results:     -Normal red blood cell (hgb) levels, normal white blood cell count and normal platelet levels.   -Cholesterol levels (LDL,HDL, Triglycerides) are normal.  ADVISE: rechecking in 1 year.   -Liver and gallbladder tests are normal (ALT,AST, Alk phos, bilirubin), kidney function is normal (Cr, GFR), sodium is normal, potassium is normal, calcium is normal, glucose is normal.   -slight elevated or low numbers are considered stable.       Resulted Orders   Lipid panel reflex to direct LDL Non-fasting   Result Value Ref Range    Cholesterol 184 <200 mg/dL    Triglycerides 73 <150 mg/dL    Direct Measure HDL 62 >=50 mg/dL    LDL Cholesterol Calculated 107 (H) <=100 mg/dL    Non HDL Cholesterol 122 <130 mg/dL    Narrative    Cholesterol  Desirable:  <200 mg/dL    Triglycerides  Normal:  Less than 150 mg/dL  Borderline High:  150-199 mg/dL  High:  200-499 mg/dL  Very High:  Greater than or equal to 500 mg/dL    Direct Measure HDL  Female:  Greater than or equal to 50 mg/dL   Male:  Greater than or equal to 40 mg/dL    LDL Cholesterol  Desirable:  <100mg/dL  Above Desirable:  100-129 mg/dL   Borderline High:  130-159 mg/dL   High:  160-189 mg/dL   Very High:  >= 190 mg/dL    Non HDL Cholesterol  Desirable:  130 mg/dL  Above Desirable:  130-159 mg/dL  Borderline High:  160-189 mg/dL  High:  190-219 mg/dL  Very High:  Greater than or equal to 220 mg/dL   CBC with platelets   Result Value Ref Range    WBC Count 7.1 4.0 - 11.0 10e3/uL    RBC Count 4.32 3.80 - 5.20 10e6/uL    Hemoglobin 13.8 11.7 - 15.7 g/dL    Hematocrit 42.4 35.0 - 47.0 %    MCV 98 78 - 100 fL    MCH 31.9 26.5 - 33.0 pg    MCHC 32.5 31.5 - 36.5 g/dL    RDW 12.6 10.0 - 15.0 %    Platelet Count 293 150 - 450 10e3/uL   Comprehensive metabolic panel (BMP + Alb, Alk Phos, ALT, AST, Total. Bili, TP)    Result Value Ref Range    Sodium 139 136 - 145 mmol/L    Potassium 4.2 3.4 - 5.3 mmol/L    Chloride 104 98 - 107 mmol/L    Carbon Dioxide (CO2) 21 (L) 22 - 29 mmol/L    Anion Gap 14 7 - 15 mmol/L    Urea Nitrogen 11.1 6.0 - 20.0 mg/dL    Creatinine 0.48 (L) 0.51 - 0.95 mg/dL    Calcium 9.8 8.6 - 10.0 mg/dL    Glucose 95 70 - 99 mg/dL    Alkaline Phosphatase 125 (H) 35 - 104 U/L    AST 20 10 - 35 U/L    ALT 18 10 - 35 U/L    Protein Total 7.5 6.4 - 8.3 g/dL    Albumin 4.3 3.5 - 5.2 g/dL    Bilirubin Total <0.2 <=1.2 mg/dL    GFR Estimate >90 >60 mL/min/1.73m2      Comment:      Effective December 21, 2021 eGFRcr in adults is calculated using the 2021 CKD-EPI creatinine equation which includes age and gender ( et al., NEJM, DOI: 10.1056/ABRFky2394582)       If you have any questions or concerns, please call the clinic at the number listed above.       Sincerely,      Dudley Phillips MD

## 2023-01-06 NOTE — PROGRESS NOTES
"  Assessment & Plan     Screen for colon cancer  Declined    Cervical cancer screening  Declined    Screening for hyperlipidemia  Labs order to get some baseline blood work.  - Lipid panel reflex to direct LDL Non-fasting; Future  - CBC with platelets; Future  - Lipid panel reflex to direct LDL Non-fasting    Breakthrough seizure (H)  sees neurology  Discussed with the caregiver any treatment related to seizures or psycosis needs to be addressed by appropriate physicians.  We will not be able to address those as lack of much insight to those medical conditions.  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        BMI:   Estimated body mass index is 33.82 kg/m  as calculated from the following:    Height as of this encounter: 1.549 m (5' 1\").    Weight as of this encounter: 81.2 kg (179 lb).           Return in about 1 year (around 1/6/2024).    Dudley Phillips MD  [facility]   Nella is a 47 year old, presenting for the following health issues:  Establish Care    Please see problem list for patient issues that include seizures mental retardation, aggressive behavior psychosis seen neurology and psychiatry for evaluation.  Currently started on a new group home.  It has been somewhat challenging to keep her entertained.  Some aggressive behavior towards staff.  Psychiatry and neurology working on that.  Recent facial laceration sutures.  No current discomfort.        Review of Systems   Constitutional, HEENT, cardiovascular, pulmonary, gi and gu systems are negative, except as otherwise noted.      Objective    Pulse 109   Temp 98.9  F (37.2  C) (Tympanic)   Resp 18   Ht 1.549 m (5' 1\")   Wt 81.2 kg (179 lb)   SpO2 100%   BMI 33.82 kg/m    Body mass " index is 33.82 kg/m .   Patient did not cooperate to get a blood pressure check.  Physical Exam   GENERAL: Not able to communicate much does not seem to be any distress  NECK: no adenopathy, no asymmetry, masses, or scars and thyroid normal to palpation  RESP: lungs clear to auscultation - no rales, rhonchi or wheezes  CV: regular rate and rhythm, normal S1 S2, no S3 or S4, no murmur, click or rub, no peripheral edema and peripheral pulses strong  ABDOMEN: soft, nontender, no hepatosplenomegaly, no masses and bowel sounds normal  Rest of the exam not possible due to patient issues.

## 2023-01-10 ENCOUNTER — TELEPHONE (OUTPATIENT)
Dept: NEUROLOGY | Facility: CLINIC | Age: 48
End: 2023-01-10

## 2023-01-10 NOTE — TELEPHONE ENCOUNTER
Prior Authorization Retail Medication Request    Medication/Dose: lacosamide (VIMPAT) 200 MG TABS tablet  ICD code (if different than what is on RX):    Previously Tried and Failed:    Rationale:      Insurance Name:    Insurance ID:        Pharmacy Information (if different than what is on RX)  Name:    Phone:

## 2023-01-12 NOTE — TELEPHONE ENCOUNTER
Prior Authorization Not Needed per Insurance    Medication:   Insurance Company: [employer] - Phone [phone] Fax [phone]  Expected CoPay:      Pharmacy Filling the Rx: [facility] - [address]  Pharmacy Notified: Yes  Patient Notified: Yes    Medication is covered under plan.  Called [facility] pharmacy to inquire about what type of rejection there are getting.     Pharmacist was able to put in the necessary codes, they now have a paid claim.

## 2023-01-12 NOTE — TELEPHONE ENCOUNTER
Central Prior Authorization Team   Phone: [phone]    PA Initiation    Medication: Lacosamide 200MG tablets    Insurance Company: [facility] - Phone [phone] Fax [phone]  Pharmacy Filling the Rx: [facility] - [address]  Filling Pharmacy Phone: [phone]  Filling Pharmacy Fax:    Start Date: 1/12/2023

## 2023-01-27 NOTE — TELEPHONE ENCOUNTER
[facility] Prior Authorization Team   Phone: [phone]  Fax: [phone]    Prior Authorization Approval    Authorization Effective Date:    Authorization Expiration Date: 12/31/2022  Medication:   Approved Dose/Quantity: 5mg/0.1ml - 2  Reference #: [id number]   Insurance Company: [employer] - Phone [phone] Fax [phone]  Expected CoPay:       CoPay Card Available:      Foundation Assistance Needed:    Which Pharmacy is filling the prescription (Not needed for infusion/clinic administered): [facility] - [place], [place] - [address]  Pharmacy Notified: Yes  Patient Notified: Yes

## 2023-03-09 ENCOUNTER — TELEPHONE (OUTPATIENT)
Dept: NEUROLOGY | Facility: CLINIC | Age: 48
End: 2023-03-09

## 2023-03-09 NOTE — TELEPHONE ENCOUNTER
Received Seizure Plan & Protocol Form to be completed. Form saved to R drive, encounter routed.  Bryanna Rosas CMA

## 2023-03-29 DIAGNOSIS — G40.319 GENERALIZED CONVULSIVE EPILEPSY WITH INTRACTABLE EPILEPSY (H): ICD-10-CM

## 2023-03-29 NOTE — TELEPHONE ENCOUNTER
lacosamide 200 mg tablet  Last Written Prescription Date:   10/10/2022  Last Fill Quantity: 60,   # refills: 5  Last Office Visit :  11/29/2022  Future Office visit:  None  Routing refill request to provider for review/approval because:  Drug not on the [facility] or [facility] refill protocol or controlled substance      levetiracetam 500 mg tablet  Last Written Prescription Date:   4/8/2022  Last Fill Quantity: 60,   # refills: 11  Last Office Visit :  11/29/2022  Future Office visit:  None  Routing refill request to provider for review/approval because:  Refer to Provider for review    levetiracetam 1,000 mg tablet  Last Written Prescription Date:   4/8/2022  Last Fill Quantity: 60,   # refills: 11  Last Office Visit :  11/29/2022  Future Office visit:  None  Routing refill request to provider for review/approval because:  Refer to Provider for review     Breonna Miller RN  Central Triage Red Flags/Med Refills

## 2023-03-31 RX ORDER — LEVETIRACETAM 500 MG/1
500 TABLET ORAL 2 TIMES DAILY
Qty: 32 TABLET | Refills: 11 | Status: SHIPPED | OUTPATIENT
Start: 2023-03-31 | End: 2023-08-15

## 2023-03-31 RX ORDER — LACOSAMIDE 200 MG/1
200 TABLET ORAL 2 TIMES DAILY
Qty: 62 TABLET | Refills: 5 | Status: SHIPPED | OUTPATIENT
Start: 2023-03-31 | End: 2023-09-06

## 2023-03-31 RX ORDER — LEVETIRACETAM 1000 MG/1
1000 TABLET ORAL 2 TIMES DAILY
Qty: 32 TABLET | Refills: 11 | Status: SHIPPED | OUTPATIENT
Start: 2023-03-31 | End: 2023-08-15

## 2023-04-04 ENCOUNTER — VIRTUAL VISIT (OUTPATIENT)
Dept: NEUROLOGY | Facility: CLINIC | Age: 48
End: 2023-04-04
Payer: MEDICARE

## 2023-04-04 VITALS — BODY MASS INDEX: 28.34 KG/M2 | WEIGHT: 150 LBS

## 2023-04-04 DIAGNOSIS — G40.319 GENERALIZED CONVULSIVE EPILEPSY WITH INTRACTABLE EPILEPSY (H): Primary | ICD-10-CM

## 2023-04-04 NOTE — LETTER
2023     RE: Nella Helms  : 1975   MRN: [id number]      Dear Colleague,    Thank you for referring your patient, Nella Helms, [place] [facility] [place] at [facility]. Please see a copy of my visit note below.    Virtual Visit Details    Type of service:  Telephone Visit   Phone call duration:22 minutes   3 party phone call: me; staff; guardian.   She has moved to [facility] where she can get one to one supervision. Major concern of her guardian and staff is that her balance is worse and she is having falls. Also sleeping a lot. Only a few brief seizure lasting a few seconds, but other behaviors which staff is concerned about being seizures. Has had ER visits, but MD in ER thinks they were not seizures.    Behavior continues to be a major problem.Recue plan needs to be upated to reduce ER visits.   Staff needs educqtion re seizure types.  Otherwise in [facility] health.  Plan:  Clinic visit soon to examine for toxicity.    Again, thank you for allowing me to participate in the care of your patient.      Sincerely,    Elysia Gomez MD

## 2023-04-04 NOTE — NURSING NOTE
Pt is unable to complete the PHQ-9.    Is the patient currently in the state of [place]? YES    Visit mode:TELEPHONE    If the visit is dropped, the patient can be reconnected by: TELEPHONE VISIT: Phone number: [phone] and [phone]     Will anyone else be joining the visit? Yes, pt's brother Bella Coppola (PC at group home) and Corazon (group home nurse) will be joining.     How would you like to obtain your AVS? Mail a copy    Are changes needed to the allergy or medication list? NO    Reason for visit: Check up appt.    Medication and allergies have been reviewed.     Julius Anderson, VF

## 2023-04-11 ENCOUNTER — OFFICE VISIT (OUTPATIENT)
Dept: NEUROLOGY | Facility: CLINIC | Age: 48
End: 2023-04-11
Payer: MEDICARE

## 2023-04-11 VITALS
WEIGHT: 166 LBS | DIASTOLIC BLOOD PRESSURE: 85 MMHG | TEMPERATURE: 97.2 F | HEART RATE: 85 BPM | BODY MASS INDEX: 31.37 KG/M2 | SYSTOLIC BLOOD PRESSURE: 131 MMHG

## 2023-04-11 DIAGNOSIS — G40.319 GENERALIZED CONVULSIVE EPILEPSY WITH INTRACTABLE EPILEPSY (H): Primary | ICD-10-CM

## 2023-04-11 RX ORDER — LAMOTRIGINE 200 MG/1
TABLET ORAL
Qty: 30 TABLET | Refills: 11 | Status: SHIPPED | OUTPATIENT
Start: 2023-04-11 | End: 2023-11-03

## 2023-04-11 RX ORDER — MIDAZOLAM 5 MG/.1ML
1 SPRAY NASAL 2 TIMES DAILY PRN
Qty: 2 EACH | Refills: 3 | Status: SHIPPED | OUTPATIENT
Start: 2023-04-11 | End: 2023-10-26

## 2023-04-11 NOTE — PATIENT INSTRUCTIONS
4/11/2023  RE: Nella Helms  [address] [place] [id number]          SEIZURE PLAN AND PROTOCOL     Plan of Care:     Follow general seizure care and First Aid guidelines for seizures.  Anticonvulsant medications will be administered as prescribed.  All seizures will be documented on a Seizure Report including:  date, time, length of seizure, specific body movements and behaviors exhibited during the seizure, and post-seizure state.  Antiepileptic blood levels will be ordered by the physician based upon client's condition and medications.     Missed Doses:     IF YOU REALIZE WITHIN 24 HOURS:     ...You MISSED ONE DOSE of your anticonvulsant medication(s), take the missed dose immediately unless it is time for your next scheduled dose. If that is the case, take your next scheduled dose, wait two hours, and then take the missed dose.     ...You MISSED TWO OR MORE DOSES, take one of the missed doses immediately, even if it is time for your next scheduled dose. Then call the [facility] clinic to schedule an appointment.      IF YOU REALIZE NOW YOU MISSED A DOSE MORE THAN 24 HOURS AGO, william it on your calendar. DO NOT take an extra dose.     Medication Errors:     Your facility nurse should be notified of any medication errors. The nurse should observe the urgency of the error. It is not necessary to notify the MD on call unless the facility nurse or delegate believes it to be life threatening or could possibly result in a serious complication. Routine notices required by regulation should be telephoned to the clinic during office hours.        Extra Dose:     An extra dose of an antiepileptic drug is not serious. Ordinarily, at most, the client may experience increased side effects for several hours. Contact your facility nurse immediately if an extra dose was given.         Seizure Protocol:     Administer Nayzilam (Intranasal midazolam) one dose (5mg) for two seizures within one hour. Give a  second dose no sooner that 10 minutes after the first if a third seizure happens within the next 12 hours  Maximum 2 doses per day.       When to call 911 for Seizures:     Call 911 as per rescue protocol, or if Nella:     ... does not start breathing adequately within 1 minute after the seizure. If this happens call 911 immediately and start CPR.     ...sustains an injury     ... has one seizure right after another without regaining consciousness in between or a convulsive seizure that lasts over 5 minutes     ... requests an ambulance     Or facility protocol requires activation of emergency medical services           Provider:                   Elysia Gomez M.D

## 2023-04-11 NOTE — LETTER
4/11/2023  RE: Nella Helms  [address] [place] [id number]          SEIZURE PLAN AND PROTOCOL     Plan of Care:     Follow general seizure care and First Aid guidelines for seizures.  Anticonvulsant medications will be administered as prescribed.  All seizures will be documented on a Seizure Report including:  date, time, length of seizure, specific body movements and behaviors exhibited during the seizure, and post-seizure state.  Antiepileptic blood levels will be ordered by the physician based upon client's condition and medications.     Missed Doses:     IF YOU REALIZE WITHIN 24 HOURS:     ...You MISSED ONE DOSE of your anticonvulsant medication(s), take the missed dose immediately unless it is time for your next scheduled dose. If that is the case, take your next scheduled dose, wait two hours, and then take the missed dose.     ...You MISSED TWO OR MORE DOSES, take one of the missed doses immediately, even if it is time for your next scheduled dose. Then call the [facility] clinic to schedule an appointment.      IF YOU REALIZE NOW YOU MISSED A DOSE MORE THAN 24 HOURS AGO, william it on your calendar. DO NOT take an extra dose.     Medication Errors:     Your facility nurse should be notified of any medication errors. The nurse should observe the urgency of the error. It is not necessary to notify the MD on call unless the facility nurse or delegate believes it to be life threatening or could possibly result in a serious complication. Routine notices required by regulation should be telephoned to the clinic during office hours.        Extra Dose:     An extra dose of an antiepileptic drug is not serious. Ordinarily, at most, the client may experience increased side effects for several hours. Contact your facility nurse immediately if an extra dose was given.         Seizure Protocol:     Administer Nayzilam (Intranasal midazolam) one dose (5mg) for two seizures within one hour. Give a  second dose no sooner that 10 minutes after the first if a third seizure happens within the next 12 hours  Maximum 2 doses per day.       When to call 911 for Seizures:     Call 911 as per rescue protocol, or if Nella:     ... does not start breathing adequately within 1 minute after the seizure. If this happens call 911 immediately and start CPR.     ...sustains an injury     ... has one seizure right after another without regaining consciousness in between or a convulsive seizure that lasts over 5 minutes     ... requests an ambulance     Or facility protocol requires activation of emergency medical services           Provider:                   Elysia Gomez M.D

## 2023-04-11 NOTE — PROGRESS NOTES
Nella is a 46 year old who is being evaluated in clinic       INTERVAL HX: Moved to new [facility] home.  Spoke with Dilia- owner- has 4 persons but Nella most severe one.Has had at least 3 ER visits . These for clusters. Also sleepy after AM meds, also balance issues but sz under good control.    Prior to Admission medications Approximately 1/2 sz more than 2 min.   Medication Sig Start Date End Date Taking? Authorizing Provider   diazepam (VALIUM) 5 MG/ML (HIGH CONC) solution ADMINISTER 1ML (5MG) BY MOUTH AS NEEDED FOR ANY SEIZURE. WHEN ADMINISTERED NOTIFY PC., RN & PD 4/20/21  Yes Elysia Gomez MD   docusate sodium (COLACE) 100 MG capsule Take by mouth 2 times daily   Yes Reported, Patient   hydrocortisone (CORTAID) 1 % cream Apply topically 2 times daily as needed   Yes Reported, Patient   ketoconazole (EXTINA) 2 % external foam Apply topically every 12 hours apply by topical route 2 times every day to the affected area(s) 9/9/21  Yes Reported, Patient   lacosamide (VIMPAT) 200 MG TABS tablet TAKE ONE TABLET BY MOUTH TWICE A DAY 4/8/22  Yes Elysia Gomez MD   lamoTRIgine (LAMICTAL) 200 MG tablet (1) PO TWICE DAILY. 4/8/22  Yes Elysia Gomez MD   levETIRAcetam (KEPPRA) 1000 MG tablet Take 1 tablet (1,000 mg) by mouth 2 times daily 4/8/22  Yes Elysia Gomez MD   levETIRAcetam (KEPPRA) 500 MG tablet Take 1 tablet (500 mg) by mouth 2 times daily 4/8/22  Yes Elysia Gomez MD   multivitamin, therapeutic with minerals (THERA-VIT-M) TABS tablet Take 1 tablet by mouth daily   Yes Reported, Patient   OLANZapine (ZYPREXA) 5 MG tablet Take 10 mg by mouth 10mg and 2.5mg tablets in evening 1/23/19  Yes Reported, Patient   OXcarbazepine (TRILEPTAL) 150 MG tablet TAKE ONE TABLET BY MOUTH AT BEDTIME IN ADDITION TO 600MG TWO TIMES A DAY 3/16/22  Yes Reported, Patient   OXcarbazepine (TRILEPTAL) 600 MG tablet Take 1 tablet (600 mg) by mouth 2 times daily 4/20/21  Yes Elysia Gomez MD   traZODone (DESYREL) 50 MG tablet 1 tablet (50 mg) q  8am. 1 tablet (50 mg) q 4 pm,  3 tablets (150 mg) 8pm  Patient taking differently: 1 tablet (50 mg) q 7am. 1 tab (50mg) q 12pm, 1 tablet (50 mg) q 4 pm,  3 tablets (150 mg) 10pm 6/20/18  Yes Candice Liu MD   VITAMIN E 2 times daily   Yes Reported, Patient   Multiple Vitamin (MULTIVITAMINS PO) Take by mouth daily  Patient not taking: Reported on 4/8/2022    Reported, Patient   polyvinyl alcohol (LIQUITEARS) 1.4 % ophthalmic solution 2-3 drops as needed for dry eyes  Patient not taking: Reported on 4/8/2022    Reported, Patient           EPILEPSY HX REVIEWED 4/11/23   I have been seeing her since she was  age 19.  First seizure occurred at 6 hours of age.  It was described as color turning dusky, turning head to the right, eyes fixed to the right and no response.  She continued to have intermittent seizure activity and subsequently was transferred to the [facility] where she was hospitalized for 2 weeks.  It is not clear what diagnostic evaluation was done but they decided to discharge her without seizure medications.  Seizures recurred at 9 months of age.  At that time they noticed twitching of her left arm and then twitching of the side of the face.  EEG initially showed hypsarrhythmic pattern.  She was treated with phenobarbital.  Then, she was treated with Tegretol and later Dilantin.  CT scan in 1983 we do not have records.      Seizure types:   In 1991 seizure types were described as sitting, both arms jerk up, her head turns to the right, eyes go to the right and roll up.  These events last 2 minutes and at that time she was having 6 a month.      Second seizure type is described as her arms go up, legs stiffen and she moans.  She can be lowered to the floor and then her arms and legs shake with some erratic breathing and sweating profusely.  These occurred at that time, 3-4 per month.        Seizure type 3 was described as being alert.  Her arms fly out and her eyes blink.       Seizure type 4 described as stares.      She has had 1 episode of status epilepticus in .      EEG when she was 12 was read as abnormal record by virtue of diffuse theta and delta slowing greater on the right.      Parents report that Nella has always had a left hemiparesis, mostly affecting the left upper extremity with some involvement of her left lower extremity.  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  She has been diagnosed as legally blind as having cortical blindness.      BIRTH HISTORY:  Birth history was that she was the result of a full-term pregnancy that was complicated by maternal high blood pressure and use of Enduron.  Pregnancy ended in a 14 labored and vaginal delivery.  Birth weight 7 pounds 6 ounces.  Apgar scores of 4 at 1 minute and 8 at 5 minutes.      She has had global developmental delay.      Subsequent treatment at [facility] has been quite extensive and will not be reviewed completely here.  Last EEG was 2010 which showed mild to moderate nonspecific diffuse disturbance of cerebral activity and multifocal interictal activity with one complex partial seizure recorded.      In the last year they have described 204 seizures,all given diazepam.  OTHER ISSUES:        She has had behavioral issues throughout.      Bell's palsy in , resolved.      Most of her seizures that the staff is noticing now would be described as brief tonic seizures are brief tonic-clonic seizures.  They are more concerned with the length then the precise observation.  She might be having some minor seizures are not being reported.      CURRENT MEDICATIONS:  Felbatol  Discont due to possible cause of anemia; peramp discont  . Lamictal have really been the best treatment for her.  She has been tried almost all the other seizure medications over time including Depakote, Tegretol, phenobarbital, Dilantin, etc.      SOCIAL HISTORY:  She is living in a group home.      REVIEW OF SYSTEMS:  Through the caregiver is  essentially negative.  She has not had any upper respiratory infections lately.  Appetite is good.  She has no obvious headaches.   GI and  appear to be functioning normally.      PHYSICAL EXAMINATION: Very physically active- tries to kiss staff. Very loud fverbally- simple repetitive sentences. Walks without assistance but is apractic.     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    ASSESSMENT:  Complicated case; hospital notes, EEG and D/C summary reviewed   Epidiolex 5 mg/kg as 2.5 bid Re-wrote Nazylam for clusters-  Will discontinue  lamotrigine and make other adjustments as needed. Tried to get blood but ubable enter vein    PLAN:   1)   Phone 1 mo  20 Discontinue AM  LAMO  3)  Other ASDS same  4)Sign new orders (sheet faxed)  4) Revised  rescue plan. To decrease unneessary 911 and ER calls          Elysia may MD     TIME: pre visit 5 min; face to face with staff and brother 32 min; post visit 4 min

## 2023-04-11 NOTE — LETTER
2023       RE: Nella Helms  : 1975   MRN: [id number]      Dear Colleague,    Thank you for referring your patient, Nella Helms, to the [facility] EPILEPSY CARE at [facility]. Please see a copy of my visit note below.    Nella is a 46 year old who is being evaluated in clinic       INTERVAL HX: Moved to new group home.  Spoke with Dilia- owner- has 4 persons but Nella most severe one.Has had at least 3 ER visits . These for clusters. Also sleepy after AM meds, also balance issues but sz under good control.    Prior to Admission medications Approximately 1/2 sz more than 2 min.   Medication Sig Start Date End Date Taking? Authorizing Provider   diazepam (VALIUM) 5 MG/ML (HIGH CONC) solution ADMINISTER 1ML (5MG) BY MOUTH AS NEEDED FOR ANY SEIZURE. WHEN ADMINISTERED NOTIFY PC., RN & PD 21  Yes Elysia Gomez MD   docusate sodium (COLACE) 100 MG capsule Take by mouth 2 times daily   Yes Reported, Patient   hydrocortisone (CORTAID) 1 % cream Apply topically 2 times daily as needed   Yes Reported, Patient   ketoconazole (EXTINA) 2 % external foam Apply topically every 12 hours apply by topical route 2 times every day to the affected area(s) 21  Yes Reported, Patient   lacosamide (VIMPAT) 200 MG TABS tablet TAKE ONE TABLET BY MOUTH TWICE A DAY 22  Yes Elysia Gomez MD   lamoTRIgine (LAMICTAL) 200 MG tablet (1) PO TWICE DAILY. 22  Yes Elysia Gomez MD   levETIRAcetam (KEPPRA) 1000 MG tablet Take 1 tablet (1,000 mg) by mouth 2 times daily 22  Yes Elysia Gomez MD   levETIRAcetam (KEPPRA) 500 MG tablet Take 1 tablet (500 mg) by mouth 2 times daily 22  Yes Elysia Gomez MD   multivitamin, therapeutic with minerals (THERA-VIT-M) TABS tablet Take 1 tablet by mouth daily   Yes Reported, Patient   OLANZapine (ZYPREXA) 5 MG tablet Take 10 mg by mouth 10mg and 2.5mg tablets in evening 19  Yes Reported, Patient   OXcarbazepine (TRILEPTAL) 150 MG  tablet TAKE ONE TABLET BY MOUTH AT BEDTIME IN ADDITION TO 600MG TWO TIMES A DAY 3/16/22  Yes Reported, Patient   OXcarbazepine (TRILEPTAL) 600 MG tablet Take 1 tablet (600 mg) by mouth 2 times daily 4/20/21  Yes Elysia Gomez MD   traZODone (DESYREL) 50 MG tablet 1 tablet (50 mg) q 8am. 1 tablet (50 mg) q 4 pm,  3 tablets (150 mg) 8pm  Patient taking differently: 1 tablet (50 mg) q 7am. 1 tab (50mg) q 12pm, 1 tablet (50 mg) q 4 pm,  3 tablets (150 mg) 10pm 6/20/18  Yes Candice Liu MD   VITAMIN E 2 times daily   Yes Reported, Patient   Multiple Vitamin (MULTIVITAMINS PO) Take by mouth daily  Patient not taking: Reported on 4/8/2022    Reported, Patient   polyvinyl alcohol (LIQUITEARS) 1.4 % ophthalmic solution 2-3 drops as needed for dry eyes  Patient not taking: Reported on 4/8/2022    Reported, Patient           EPILEPSY HX REVIEWED 4/11/23   I have been seeing her since she was  age 19.  First seizure occurred at 6 hours of age.  It was described as color turning dusky, turning head to the right, eyes fixed to the right and no response.  She continued to have intermittent seizure activity and subsequently was transferred to the [facility] where she was hospitalized for 2 weeks.  It is not clear what diagnostic evaluation was done but they decided to discharge her without seizure medications.  Seizures recurred at 9 months of age.  At that time they noticed twitching of her left arm and then twitching of the side of the face.  EEG initially showed hypsarrhythmic pattern.  She was treated with phenobarbital.  Then, she was treated with Tegretol and later Dilantin.  CT scan in 1983 we do not have records.      Seizure types:   In 1991 seizure types were described as sitting, both arms jerk up, her head turns to the right, eyes go to the right and roll up.  These events last 2 minutes and at that time she was having 6 a month.      Second seizure type is described as her arms go up, legs  stiffen and she moans.  She can be lowered to the floor and then her arms and legs shake with some erratic breathing and sweating profusely.  These occurred at that time, 3-4 per month.        Seizure type 3 was described as being alert.  Her arms fly out and her eyes blink.      Seizure type 4 described as stares.      She has had 1 episode of status epilepticus in .      EEG when she was 12 was read as abnormal record by virtue of diffuse theta and delta slowing greater on the right.      Parents report that Nella has always had a left hemiparesis, mostly affecting the left upper extremity with some involvement of her left lower extremity.  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  She has been diagnosed as legally blind as having cortical blindness.      BIRTH HISTORY:  Birth history was that she was the result of a full-term pregnancy that was complicated by maternal high blood pressure and use of Enduron.  Pregnancy ended in a 14 labored and vaginal delivery.  Birth weight 7 pounds 6 ounces.  Apgar scores of 4 at 1 minute and 8 at 5 minutes.      She has had global developmental delay.      Subsequent treatment at [facility] has been quite extensive and will not be reviewed completely here.  Last EEG was 2010 which showed mild to moderate nonspecific diffuse disturbance of cerebral activity and multifocal interictal activity with one complex partial seizure recorded.      In the last year they have described 204 seizures,all given diazepam.  OTHER ISSUES:        She has had behavioral issues throughout.      Bell's palsy in , resolved.      Most of her seizures that the staff is noticing now would be described as brief tonic seizures are brief tonic-clonic seizures.  They are more concerned with the length then the precise observation.  She might be having some minor seizures are not being reported.      CURRENT MEDICATIONS:  Felbatol  Discont due to possible cause of anemia; peramp discont  . Lamictal  have really been the best treatment for her.  She has been tried almost all the other seizure medications over time including Depakote, Tegretol, phenobarbital, Dilantin, etc.      SOCIAL HISTORY:  She is living in a group home.      REVIEW OF SYSTEMS:  Through the caregiver is essentially negative.  She has not had any upper respiratory infections lately.  Appetite is good.  She has no obvious headaches.   GI and  appear to be functioning normally.      PHYSICAL EXAMINATION: Very physically active- tries to kiss staff. Very loud fverbally- simple repetitive sentences. Walks without assistance but is apractic.     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    ASSESSMENT:  Complicated case; hospital notes, EEG and D/C summary reviewed   Epidiolex 5 mg/kg as 2.5 bid Re-wrote Nazylam for clusters-  Will discontinue  lamotrigine and make other adjustments as needed. Tried to get blood but ubable enter vein    PLAN:   1)   Phone 1 mo  20 Discontinue AM  [facility]AMO  3)  Other ASDS same  4)Sign new orders (sheet faxed)  4) Revised  rescue plan. To decrease unneessary 911 and ER calls       TIME: pre visit 5 min; face to face with staff and brother 32 min; post visit 4 min             Again, thank you for allowing me to participate in the care of your patient.      Sincerely,    Elysia Gomez MD

## 2023-05-02 ENCOUNTER — VIRTUAL VISIT (OUTPATIENT)
Dept: NEUROLOGY | Facility: CLINIC | Age: 48
End: 2023-05-02
Payer: MEDICARE

## 2023-05-02 VITALS
SYSTOLIC BLOOD PRESSURE: 131 MMHG | BODY MASS INDEX: 31.34 KG/M2 | WEIGHT: 166 LBS | DIASTOLIC BLOOD PRESSURE: 85 MMHG | HEIGHT: 61 IN

## 2023-05-02 DIAGNOSIS — G40.812 LENNOX-GASTAUT SYNDROME WITH TONIC SEIZURES (H): Primary | ICD-10-CM

## 2023-05-02 ASSESSMENT — PAIN SCALES - GENERAL: PAINLEVEL: NO PAIN (0)

## 2023-05-02 NOTE — PROGRESS NOTES
Nella is a 46 year old who is being evaluated in clinic       INTERVAL HX: Discon of  mg lamotrigine has resolved balance issue.Spoke with Dilia- owner- has 4 persons but Nella most severe one.Has had at least 3 ER visits . These for clusters. Also sleepy after AM meds, also balance issues but sz under good control.    Prior to Admission medications Approximately 1/2 sz more than 2 min.   Medication Sig Start Date End Date Taking? Authorizing Provider   diazepam (VALIUM) 5 MG/ML (HIGH CONC) solution ADMINISTER 1ML (5MG) BY MOUTH AS NEEDED FOR ANY SEIZURE. WHEN ADMINISTERED NOTIFY PC., RN & PD 4/20/21  Yes Elysia Gomez MD   docusate sodium (COLACE) 100 MG capsule Take by mouth 2 times daily   Yes Reported, Patient   hydrocortisone (CORTAID) 1 % cream Apply topically 2 times daily as needed   Yes Reported, Patient   ketoconazole (EXTINA) 2 % external foam Apply topically every 12 hours apply by topical route 2 times every day to the affected area(s) 9/9/21  Yes Reported, Patient   lacosamide (VIMPAT) 200 MG TABS tablet TAKE ONE TABLET BY MOUTH TWICE A DAY 4/8/22  Yes Elysia Gomez MD   lamoTRIgine (LAMICTAL) 200 MG tablet (1) PO TWICE DAILY. 4/8/22  Yes Elysia Gomez MD   levETIRAcetam (KEPPRA) 1000 MG tablet Take 1 tablet (1,000 mg) by mouth 2 times daily 4/8/22  Yes Elysia Gomez MD   levETIRAcetam (KEPPRA) 500 MG tablet Take 1 tablet (500 mg) by mouth 2 times daily 4/8/22  Yes Elysia Gomez MD   multivitamin, therapeutic with minerals (THERA-VIT-M) TABS tablet Take 1 tablet by mouth daily   Yes Reported, Patient   OLANZapine (ZYPREXA) 5 MG tablet Take 10 mg by mouth 10mg and 2.5mg tablets in evening 1/23/19  Yes Reported, Patient   OXcarbazepine (TRILEPTAL) 150 MG tablet TAKE ONE TABLET BY MOUTH AT BEDTIME IN ADDITION TO 600MG TWO TIMES A DAY 3/16/22  Yes Reported, Patient   OXcarbazepine (TRILEPTAL) 600 MG tablet Take 1 tablet (600 mg) by mouth 2 times daily 4/20/21  Yes Elysia Gomez MD   traZODone (DESYREL)  50 MG tablet 1 tablet (50 mg) q 8am. 1 tablet (50 mg) q 4 pm,  3 tablets (150 mg) 8pm  Patient taking differently: 1 tablet (50 mg) q 7am. 1 tab (50mg) q 12pm, 1 tablet (50 mg) q 4 pm,  3 tablets (150 mg) 10pm 6/20/18  Yes Candice Liu MD   VITAMIN E 2 times daily   Yes Reported, Patient   Multiple Vitamin (MULTIVITAMINS PO) Take by mouth daily  Patient not taking: Reported on 4/8/2022    Reported, Patient   polyvinyl alcohol (LIQUITEARS) 1.4 % ophthalmic solution 2-3 drops as needed for dry eyes  Patient not taking: Reported on 4/8/2022    Reported, Patient           EPILEPSY HX REVIEWED 4/11/23   I have been seeing her since she was  age 19.  First seizure occurred at 6 hours of age.  It was described as color turning dusky, turning head to the right, eyes fixed to the right and no response.  She continued to have intermittent seizure activity and subsequently was transferred to the [facility] where she was hospitalized for 2 weeks.  It is not clear what diagnostic evaluation was done but they decided to discharge her without seizure medications.  Seizures recurred at 9 months of age.  At that time they noticed twitching of her left arm and then twitching of the side of the face.  EEG initially showed hypsarrhythmic pattern.  She was treated with phenobarbital.  Then, she was treated with Tegretol and later Dilantin.  CT scan in 1983 we do not have records.      Seizure types:   In 1991 seizure types were described as sitting, both arms jerk up, her head turns to the right, eyes go to the right and roll up.  These events last 2 minutes and at that time she was having 6 a month.      Second seizure type is described as her arms go up, legs stiffen and she moans.  She can be lowered to the floor and then her arms and legs shake with some erratic breathing and sweating profusely.  These occurred at that time, 3-4 per month.        Seizure type 3 was described as being alert.  Her arms  fly out and her eyes blink.      Seizure type 4 described as stares.      She has had 1 episode of status epilepticus in .      EEG when she was 12 was read as abnormal record by virtue of diffuse theta and delta slowing greater on the right.      Parents report that Nella has always had a left hemiparesis, mostly affecting the left upper extremity with some involvement of her left lower extremity.  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  She has been diagnosed as legally blind as having cortical blindness.      BIRTH HISTORY:  Birth history was that she was the result of a full-term pregnancy that was complicated by maternal high blood pressure and use of Enduron.  Pregnancy ended in a 14 labored and vaginal delivery.  Birth weight 7 pounds 6 ounces.  Apgar scores of 4 at 1 minute and 8 at 5 minutes.      She has had global developmental delay.      Subsequent treatment at [facility] has been quite extensive and will not be reviewed completely here.  Last EEG was 2010 which showed mild to moderate nonspecific diffuse disturbance of cerebral activity and multifocal interictal activity with one complex partial seizure recorded.      In the last year they have described 204 seizures,all given diazepam.  OTHER ISSUES:        She has had behavioral issues throughout.      Bell's palsy in , resolved.      Most of her seizures that the staff is noticing now would be described as brief tonic seizures are brief tonic-clonic seizures.  They are more concerned with the length then the precise observation.  She might be having some minor seizures are not being reported.      CURRENT MEDICATIONS:  Felbatol  Discont due to possible cause of anemia; peramp discont  . Lamictal have really been the best treatment for her.  She has been tried almost all the other seizure medications over time including Depakote, Tegretol, phenobarbital, Dilantin, etc.      SOCIAL HISTORY:  She is living in a group home.      REVIEW OF  SYSTEMS:  Through the caregiver is essentially negative.  She has not had any upper respiratory infections lately.  Appetite is good.  She has no obvious headaches.   GI and  appear to be functioning normally.      PHYSICAL EXAMINATION: Very physically active- tries to kiss staff. Very loud fverbally- simple repetitive sentences. Walks without assistance but is apractic.     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    ASSESSMENT:  Complicated case; hospital notes, EEG and D/C summary reviewed   Epidiolex 5 mg/kg as 2.5 bid Re-wrote Nazylam for clusters-  Will discontinue  lamotrigine and make other adjustments as needed. Tried to get blood but ubable enter vein    PLAN:   1)  RTC 6 mo  20 LAMO 200 HS  3)  Other ASDS same  4)Sign new orders (sheet faxed)  4) Revised  rescue plan. To decrease unneessary 911 and ER calls  5) ASM levels ordered - faxed orders also Na+          Elysia may MD     Type of service:  Telhone Visit   Phone call duration: 22 minutes - 3 way call- brother- [facility], [place]

## 2023-05-02 NOTE — LETTER
2023       RE: Nella Helms  : 1975   MRN: [id number]        Dear Colleague,    Thank you for referring your patient, Nella Helms, to the [facility] EPILEPSY [facility]ARE at [facility]. Please see a copy of my visit note below.    Nella is a 46 year old who is being evaluated in clinic       INTERVAL HX: Discon of  mg lamotrigine has resolved balance issue.Spoke with Dilia- owner- has 4 persons but Nella most severe one.Has had at least 3 ER visits . These for clusters. Also sleepy after AM meds, also balance issues but sz under good control.    Prior to Admission medications Approximately 1/2 sz more than 2 min.   Medication Sig Start Date End Date Taking? Authorizing Provider   diazepam (VALIUM) 5 MG/ML (HIGH CONC) solution ADMINISTER 1ML (5MG) BY MOUTH AS NEEDED FOR ANY SEIZURE. WHEN ADMINISTERED NOTIFY PC., RN & PD 21  Yes Elysia Gomez MD   docusate sodium (COLACE) 100 MG capsule Take by mouth 2 times daily   Yes Reported, Patient   hydrocortisone (CORTAID) 1 % cream Apply topically 2 times daily as needed   Yes Reported, Patient   ketoconazole (EXTINA) 2 % external foam Apply topically every 12 hours apply by topical route 2 times every day to the affected area(s) 21  Yes Reported, Patient   lacosamide (VIMPAT) 200 MG TABS tablet TAKE ONE TABLET BY MOUTH TWICE A DAY 22  Yes Elysia Gomez MD   lamoTRIgine (LAMICTAL) 200 MG tablet (1) PO TWICE DAILY. 22  Yes Elysia Gomez MD   levETIRAcetam (KEPPRA) 1000 MG tablet Take 1 tablet (1,000 mg) by mouth 2 times daily 22  Yes Elysia Gomez MD   levETIRAcetam (KEPPRA) 500 MG tablet Take 1 tablet (500 mg) by mouth 2 times daily 22  Yes Elysia Gomez MD   multivitamin, therapeutic with minerals (THERA-VIT-M) TABS tablet Take 1 tablet by mouth daily   Yes Reported, Patient   OLANZapine (ZYPREXA) 5 MG tablet Take 10 mg by mouth 10mg and 2.5mg tablets in evening 19  Yes Reported, Patient    OXcarbazepine (TRILEPTAL) 150 MG tablet TAKE ONE TABLET BY MOUTH AT BEDTIME IN ADDITION TO 600MG TWO TIMES A DAY 3/16/22  Yes Reported, Patient   OXcarbazepine (TRILEPTAL) 600 MG tablet Take 1 tablet (600 mg) by mouth 2 times daily 4/20/21  Yes Elysia Gomez MD   traZODone (DESYREL) 50 MG tablet 1 tablet (50 mg) q 8am. 1 tablet (50 mg) q 4 pm,  3 tablets (150 mg) 8pm  Patient taking differently: 1 tablet (50 mg) q 7am. 1 tab (50mg) q 12pm, 1 tablet (50 mg) q 4 pm,  3 tablets (150 mg) 10pm 6/20/18  Yes Candice Liu MD   VITAMIN E 2 times daily   Yes Reported, Patient   Multiple Vitamin (MULTIVITAMINS PO) Take by mouth daily  Patient not taking: Reported on 4/8/2022    Reported, Patient   polyvinyl alcohol (LIQUITEARS) 1.4 % ophthalmic solution 2-3 drops as needed for dry eyes  Patient not taking: Reported on 4/8/2022    Reported, Patient           EPILEPSY HX REVIEWED 4/11/23   I have been seeing her since she was  age 19.  First seizure occurred at 6 hours of age.  It was described as color turning dusky, turning head to the right, eyes fixed to the right and no response.  She continued to have intermittent seizure activity and subsequently was transferred to the [facility] where she was hospitalized for 2 weeks.  It is not clear what diagnostic evaluation was done but they decided to discharge her without seizure medications.  Seizures recurred at 9 months of age.  At that time they noticed twitching of her left arm and then twitching of the side of the face.  EEG initially showed hypsarrhythmic pattern.  She was treated with phenobarbital.  Then, she was treated with Tegretol and later Dilantin.  CT scan in 1983 we do not have records.      Seizure types:   In 1991 seizure types were described as sitting, both arms jerk up, her head turns to the right, eyes go to the right and roll up.  These events last 2 minutes and at that time she was having 6 a month.      Second seizure type  is described as her arms go up, legs stiffen and she moans.  She can be lowered to the floor and then her arms and legs shake with some erratic breathing and sweating profusely.  These occurred at that time, 3-4 per month.        Seizure type 3 was described as being alert.  Her arms fly out and her eyes blink.      Seizure type 4 described as stares.      She has had 1 episode of status epilepticus in .      EEG when she was 12 was read as abnormal record by virtue of diffuse theta and delta slowing greater on the right.      Parents report that Nella has always had a left hemiparesis, mostly affecting the left upper extremity with some involvement of her left lower extremity.  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  She has been diagnosed as legally blind as having cortical blindness.      BIRTH HISTORY:  Birth history was that she was the result of a full-term pregnancy that was complicated by maternal high blood pressure and use of Enduron.  Pregnancy ended in a 14 labored and vaginal delivery.  Birth weight 7 pounds 6 ounces.  Apgar scores of 4 at 1 minute and 8 at 5 minutes.      She has had global developmental delay.      Subsequent treatment at [facility] has been quite extensive and will not be reviewed completely here.  Last EEG was 2010 which showed mild to moderate nonspecific diffuse disturbance of cerebral activity and multifocal interictal activity with one complex partial seizure recorded.      In the last year they have described 204 seizures,all given diazepam.  OTHER ISSUES:        She has had behavioral issues throughout.      Bell's palsy in , resolved.      Most of her seizures that the staff is noticing now would be described as brief tonic seizures are brief tonic-clonic seizures.  They are more concerned with the length then the precise observation.  She might be having some minor seizures are not being reported.      CURRENT MEDICATIONS:  Felbatol  Discont due to possible cause of  anemia; peramp discont 2020 . Lamictal have really been the best treatment for her.  She has been tried almost all the other seizure medications over time including Depakote, Tegretol, phenobarbital, Dilantin, etc.      SOCIAL HISTORY:  She is living in a group home.      REVIEW OF SYSTEMS:  Through the caregiver is essentially negative.  She has not had any upper respiratory infections lately.  Appetite is good.  She has no obvious headaches.   GI and  appear to be functioning normally.      PHYSICAL EXAMINATION: Very physically active- tries to kiss staff. Very loud fverbally- simple repetitive sentences. Walks without assistance but is apractic.     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    ASSESSMENT:  Complicated case; hospital notes, EEG and D/C summary reviewed   Epidiolex 5 mg/kg as 2.5 bid Re-wrote Nazylam for clusters-  Will discontinue  lamotrigine and make other adjustments as needed. Tried to get blood but ubable enter vein    PLAN:   1)  RTC 6 mo  20 LAMO 200 HS  3)  Other ASDS same  4)Sign new orders (sheet faxed)  4) Revised  rescue plan. To decrease unneessary 911 and ER calls  5) ASM levels ordered - faxed orders also Na+       Type of service:  Telhone Visit   Phone call duration: 22 minutes - 3 way call- brother- srtaff me        Again, thank you for allowing me to participate in the care of your patient.      Sincerely,    Elysia Gomez MD

## 2023-05-02 NOTE — NURSING NOTE
Unable to do PHQ-9 with patient.     [facility] staff said no changes to allergies or medications.     Is the patient currently in the state of [place]? YES    Visit mode:TELEPHONE    If the visit is dropped, the patient can be reconnected by: TELEPHONE VISIT: Phone number:  Chevy's Brother and Legal Guardian [phone], [facility] [phone]      Will anyone else be joining the visit? YES: How would they like to receive their invitation? Text to cell phone:  [facility]'s Brother and Legal Guardian [phone], [facility] [phone]      How would you like to obtain your AVS? MyChart    Are changes needed to the allergy or medication list? NO    Reason for visit: Telephone (Abdon said patient is doing a lot better. )      Thania Martins, VF

## 2023-07-24 ENCOUNTER — TELEPHONE (OUTPATIENT)
Dept: NEUROLOGY | Facility: CLINIC | Age: 48
End: 2023-07-24

## 2023-07-24 NOTE — TELEPHONE ENCOUNTER
What is the concern that needs to be addressed by a nurse? Nuha with patient group home requesting a call back to discuss which medications was changed on last visit. Nuha phone number is [phone] option number 2     May a detailed message be left on voicemail? Yes         Message routed to:Ana Mcfarland

## 2023-07-28 NOTE — TELEPHONE ENCOUNTER
Call returned by Nuha  They had been informed by Nella's brother that  had changed a medication, however he was not able to tell them which medication it was.    Chart reviewed.  Lamotrigine was reduced to 200 mg HS at the last visit with Dr.Leppik Breen noted that one of the presumed medication effects that Nella has been experiencing is excessive sleepiness, (sleeping from 7pm to 10am) so their provider has reduced Trazodone to 100mg HS    No other questions at this time

## 2023-08-09 DIAGNOSIS — G40.319 GENERALIZED CONVULSIVE EPILEPSY WITH INTRACTABLE EPILEPSY (H): ICD-10-CM

## 2023-08-09 DIAGNOSIS — G40.812 LENNOX-GASTAUT SYNDROME WITH TONIC SEIZURES (H): ICD-10-CM

## 2023-08-15 RX ORDER — LAMOTRIGINE 100 MG/1
TABLET ORAL
Qty: 45 TABLET | Refills: 2 | Status: SHIPPED | OUTPATIENT
Start: 2023-08-15 | End: 2023-11-03

## 2023-08-15 RX ORDER — LEVETIRACETAM 1000 MG/1
1000 TABLET ORAL 2 TIMES DAILY
Qty: 62 TABLET | Refills: 8 | Status: SHIPPED | OUTPATIENT
Start: 2023-08-15 | End: 2024-04-05

## 2023-08-15 RX ORDER — LEVETIRACETAM 500 MG/1
500 TABLET ORAL 2 TIMES DAILY
Qty: 62 TABLET | Refills: 8 | Status: SHIPPED | OUTPATIENT
Start: 2023-08-15 | End: 2024-04-05

## 2023-08-15 NOTE — TELEPHONE ENCOUNTER
5/2/2023   [facility]  Elysia Gomez MD  Neurology    lamoTRIgine (LAMICTAL) 100 MG tablet   45 tablet 3 9/1/2022     levETIRAcetam (KEPPRA) 1000 MG tablet   32 tablet 11 3/31/2023     levETIRAcetam (KEPPRA) 500 MG tablet   32 tablet 11 3/31/2023

## 2023-09-04 DIAGNOSIS — G40.319 GENERALIZED CONVULSIVE EPILEPSY WITH INTRACTABLE EPILEPSY (H): ICD-10-CM

## 2023-09-05 NOTE — TELEPHONE ENCOUNTER
lacosamide (VIMPAT) 200 MG TABS tablet 62 tablet 5 3/31/2023       Last Office Visit : 5-2-2023  Future Office visit:  11-3-2023    Routing refill request to provider for review/approval because:  CONTROLLED MEDICATION

## 2023-09-06 RX ORDER — LACOSAMIDE 200 MG/1
200 TABLET ORAL 2 TIMES DAILY
Qty: 62 TABLET | Refills: 5 | Status: SHIPPED | OUTPATIENT
Start: 2023-09-06 | End: 2023-11-03

## 2023-09-18 DIAGNOSIS — G40.812 LENNOX-GASTAUT SYNDROME WITH TONIC SEIZURES (H): ICD-10-CM

## 2023-09-21 RX ORDER — CANNABIDIOL 100 MG/ML
SOLUTION ORAL
Qty: 100 ML | Refills: 5 | Status: SHIPPED | OUTPATIENT
Start: 2023-09-21 | End: 2023-11-03

## 2023-09-21 NOTE — TELEPHONE ENCOUNTER
EPIDIOLEX 100MG/ML SOLN       Last Written Prescription Date:  11-1-22  Last Fill Quantity: 110 ml,   # refills: 11  Last Office Visit : 8-23-23  Future Office visit:  11-3-23    Routing refill request to provider for review/approval because:  Drug not on the [facility], [facility] or [facility] refill protocol or controlled substance

## 2023-10-08 ENCOUNTER — TRANSFERRED RECORDS (OUTPATIENT)
Dept: HEALTH INFORMATION MANAGEMENT | Facility: CLINIC | Age: 48
End: 2023-10-08
Payer: MEDICARE

## 2023-10-09 ENCOUNTER — TELEPHONE (OUTPATIENT)
Dept: NEUROLOGY | Facility: CLINIC | Age: 48
End: 2023-10-09

## 2023-10-09 NOTE — TELEPHONE ENCOUNTER
What is the concern that needs to be addressed by a nurse? Corazon is calling to report a seizure and the patient went to the ER. She just would like to discuss any further care     May a detailed message be left on voicemail?yes    Date of last office visit:     Message routed to: mincep

## 2023-10-24 DIAGNOSIS — G40.319 GENERALIZED CONVULSIVE EPILEPSY WITH INTRACTABLE EPILEPSY (H): ICD-10-CM

## 2023-10-26 RX ORDER — MIDAZOLAM 5 MG/.1ML
1 SPRAY NASAL DAILY PRN
Qty: 2 EACH | Refills: 1 | Status: SHIPPED | OUTPATIENT
Start: 2023-10-26 | End: 2023-11-03

## 2023-10-26 NOTE — TELEPHONE ENCOUNTER
Nayzilam 5 mg/spray (0.1 mL) nasal spray   Last Written Prescription Date:  4/11/2023  Last Fill Quantity: 2,   # refills: 3  Last Office Visit :  5/2/2023  Future Office visit:  11/3/2023    Routing refill request to provider for review/approval because:  Drug not on the [facility], [facility] or [facility] refill protocol or controlled substance    Breonna Miller RN  Central Triage Red Flags/Med Refills

## 2023-11-03 ENCOUNTER — OFFICE VISIT (OUTPATIENT)
Dept: NEUROLOGY | Facility: CLINIC | Age: 48
End: 2023-11-03
Payer: MEDICARE

## 2023-11-03 DIAGNOSIS — G40.319 GENERALIZED CONVULSIVE EPILEPSY WITH INTRACTABLE EPILEPSY (H): ICD-10-CM

## 2023-11-03 DIAGNOSIS — G40.812 LENNOX-GASTAUT SYNDROME WITH TONIC SEIZURES (H): ICD-10-CM

## 2023-11-03 RX ORDER — MIDAZOLAM 5 MG/.1ML
1 SPRAY NASAL DAILY PRN
Qty: 2 EACH | Refills: 1 | Status: SHIPPED | OUTPATIENT
Start: 2023-11-03 | End: 2024-03-08

## 2023-11-03 RX ORDER — LAMOTRIGINE 200 MG/1
TABLET ORAL
Qty: 30 TABLET | Refills: 11 | Status: SHIPPED | OUTPATIENT
Start: 2023-11-03 | End: 2024-04-05

## 2023-11-03 RX ORDER — LACOSAMIDE 200 MG/1
200 TABLET ORAL 2 TIMES DAILY
Qty: 62 TABLET | Refills: 5 | Status: SHIPPED | OUTPATIENT
Start: 2023-11-03 | End: 2024-04-05

## 2023-11-03 RX ORDER — LAMOTRIGINE 100 MG/1
TABLET ORAL
Qty: 45 TABLET | Refills: 2 | Status: SHIPPED | OUTPATIENT
Start: 2023-11-03 | End: 2024-04-05 | Stop reason: SINTOL

## 2023-11-03 RX ORDER — CANNABIDIOL 100 MG/ML
10 SOLUTION ORAL 2 TIMES DAILY
Qty: 100 ML | Refills: 11 | Status: SHIPPED | OUTPATIENT
Start: 2023-11-03 | End: 2023-11-13

## 2023-11-03 NOTE — PROGRESS NOTES
Nella is a 47 year old who is being evaluated in clinic       INTERVAL HX: Balance is better; strength is better. Epidiolex 5 mg/kg no marked improvement, but only 1 ER visit. This was after 2 doses of IN midaz. Overal sz are mild and better than years ago.       Prior to Admission medications    Medication Sig Start Date End Date Taking? Authorizing Provider   cannabidiol (EPIDIOLEX) 100 MG/ML oral solution Take 7.53 mLs (753 mg) by mouth 2 times daily 11/3/23  Yes Elysia Gomez MD   lacosamide (VIMPAT) 200 MG TABS tablet Take 1 tablet (200 mg) by mouth 2 times daily 11/3/23  Yes Elysia Gomez MD   lamoTRIgine (LAMICTAL) 100 MG tablet TAKE 1/2 TABLET BY MOUTH EVERY MORNING IN ADDITION TO 200MG FOR TOTAL  IN MORNING  AT BEDTIME 11/3/23  Yes Elysia Gomez MD   lamoTRIgine (LAMICTAL) 200 MG tablet One  HS 11/3/23  Yes Elysia Gomez MD   Midazolam (NAYZILAM) 5 MG/0.1ML SOLN Apply 1 Application into one nostril as directed daily as needed (for convulsion) Spray 1 spray in nostril 2 times daily as needed (Administer one dose (5mg) for any cluster of three or more  30 second seizures within one hour, or any single seizure lasting 2 minutes.) - Nasal 11/3/23  Yes Elysia Gomez MD   docusate sodium (COLACE) 100 MG capsule Take by mouth 2 times daily    Reported, Patient   doxycycline hyclate (VIBRA-TABS) 100 MG tablet TAKE ONE TABLET BY MOUTH TWICE A DAY FOR 7 DAYS  Patient not taking: Reported on 4/4/2023 10/31/22   Reported, Patient   hydrocortisone (CORTAID) 1 % cream Apply topically 2 times daily as needed    Reported, Patient   ketoconazole (EXTINA) 2 % external foam Apply topically every 12 hours apply by topical route 2 times every day to the affected area(s)  Patient not taking: Reported on 4/4/2023 9/9/21   Reported, Patient   levETIRAcetam (KEPPRA) 1000 MG tablet TAKE 1 TABLET BY MOUTH TWICE DAILY 8/15/23   Elysia Gomez MD   levETIRAcetam (KEPPRA) 500 MG tablet TAKE 1 TABLET BY MOUTH TWICE DAILY 8/15/23    Elysia Gomez MD   Multiple Vitamin (MULTIVITAMINS PO) Take by mouth daily    Reported, Patient   multivitamin, therapeutic with minerals (THERA-VIT-M) TABS tablet Take 1 tablet by mouth daily  Patient not taking: Reported on 4/4/2023    Reported, Patient   OLANZapine (ZYPREXA) 10 MG tablet Take 10 mg by mouth TAKE ONE TABLET BY MOUTH AT BEDTIME 11/16/22   Reported, Patient   OLANZapine (ZYPREXA) 2.5 MG tablet TAKE ONE TABLET BY MOUTH AT BEDTIME ALONG WITH A 10MG FOR A TOTAL DOSE OF 12.5MG DAILY 11/16/22   Reported, Patient   OLANZapine (ZYPREXA) 5 MG tablet Taking 10mg at bedtime and 2.5mg tablets at bedtime  Patient not taking: Reported on 4/4/2023 1/23/19   Reported, Patient   OXcarbazepine (TRILEPTAL) 300 MG tablet Take 3 tablets (900 mg) by mouth 2 times daily 10/6/22   Elysia Gomez MD   polyvinyl alcohol (LIQUIFILM TEARS) 1.4 % ophthalmic solution 2-3 drops as needed for dry eyes    Reported, Patient   traZODone (DESYREL) 50 MG tablet 1 tablet (50 mg) q 8am. 1 tablet (50 mg) q 4 pm,  3 tablets (150 mg) 8pm  Patient taking differently: Take 1 tab (50mg) q 12pm,  1/2 tab (25mg)q 4pm, 3 tablets (150 mg) 10pm 6/20/18   Candice Liu MD   Vitamin E 180 MG (400 UNIT) CAPS TAKE ONE CAPSULE BY MOUTH TWICE A DAILY 10/19/22   Reported, Patient   VITAMIN E Take 180 mg by mouth 2 times daily  Patient not taking: Reported on 4/4/2023    Reported, Patient       EPILEPSY HX REVIEWED 4/11/23   I have been seeing her since she was  age 19.  First seizure occurred at 6 hours of age.  It was described as color turning dusky, turning head to the right, eyes fixed to the right and no response.  She continued to have intermittent seizure activity and subsequently was transferred to the [facility] where she was hospitalized for 2 weeks.  It is not clear what diagnostic evaluation was done but they decided to discharge her without seizure medications.  Seizures recurred at 9 months of age.  At that time  they noticed twitching of her left arm and then twitching of the side of the face.  EEG initially showed hypsarrhythmic pattern.  She was treated with phenobarbital.  Then, she was treated with Tegretol and later Dilantin.  CT scan in  we do not have records.      Seizure types:   In  seizure types were described as sitting, both arms jerk up, her head turns to the right, eyes go to the right and roll up.  These events last 2 minutes and at that time she was having 6 a month.      Second seizure type is described as her arms go up, legs stiffen and she moans.  She can be lowered to the floor and then her arms and legs shake with some erratic breathing and sweating profusely.  These occurred at that time, 3-4 per month.        Seizure type 3 was described as being alert.  Her arms fly out and her eyes blink.      Seizure type 4 described as stares.      She has had 1 episode of status epilepticus in .      EEG when she was 12 was read as abnormal record by virtue of diffuse theta and delta slowing greater on the right.      Parents report that Nella has always had a left hemiparesis, mostly affecting the left upper extremity with some involvement of her left lower extremity.  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  She has been diagnosed as legally blind as having cortical blindness.      BIRTH HISTORY:  Birth history was that she was the result of a full-term pregnancy that was complicated by maternal high blood pressure and use of Enduron.  Pregnancy ended in a 14 labored and vaginal delivery.  Birth weight 7 pounds 6 ounces.  Apgar scores of 4 at 1 minute and 8 at 5 minutes.      She has had global developmental delay.      Subsequent treatment at [facility] has been quite extensive and will not be reviewed completely here.  Last EEG was 2010 which showed mild to moderate nonspecific diffuse disturbance of cerebral activity and multifocal interictal activity with one complex partial seizure recorded.       In the last year they have described 204 seizures,all given diazepam.  OTHER ISSUES:        She has had behavioral issues throughout.      Bell's palsy in 2005, resolved.      Most of her seizures that the staff is noticing now would be described as brief tonic seizures are brief tonic-clonic seizures.  They are more concerned with the length then the precise observation.  She might be having some minor seizures are not being reported.      CURRENT MEDICATIONS:  Felbatol  Discont due to possible cause of anemia; peramp discont 2020 . Lamictal have really been the best treatment for her.  She has been tried almost all the other seizure medications over time including Depakote, Tegretol, phenobarbital, Dilantin, etc.      SOCIAL HISTORY:  She is living in a group home.      REVIEW OF SYSTEMS:  Through the caregiver is essentially negative.  She has not had any upper respiratory infections lately.  Appetite is good.  She has no obvious headaches.   GI and  appear to be functioning normally.      PHYSICAL EXAMINATION: Very physically active- tries to kiss staff. Very loud fverbally- simple repetitive sentences. Walks without assistance but is apractic.     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    ASSESSMENT:  Complicated case; hospital notes, EEG and D/C summary reviewed   Epidiolex 5 mg/kg as 2.5 bid Re-wrote Nazylam for clusters-  Will discontinue  lamotrigine and make other adjustments as needed. Tried to get blood but ubable enter vein    PLAN:   1)  RTC 6 mo  2) LAMO 200 HS  3)  Other ASDS same    4)incease Epidiolex to 10 mg/kg  4) Revised  rescue plan. To decrease unneessary 911 and ER calls            Elysia may MD     In person visit: 5 min pre visit; 31 face to face withj brother and staff, discussed DBS. althoiugh candidate, behaviow may make dvaluation difficult.   post visit 4 min.

## 2023-11-03 NOTE — LETTER
11/3/2023       RE: Nella Helms  : 1975   MRN: [id number]      Dear Colleague,    Thank you for referring your patient, Nella Helms, to the [facility] at [facility]. Please see a copy of my visit note below.    Nella is a 47 year old who is being evaluated in clinic       INTERVAL HX: Balance is better; strength is better. Epidiolex 5 mg/kg no marked improvement, but only 1 ER visit. This was after 2 doses of IN midaz. Overal sz are mild and better than years ago.       Prior to Admission medications    Medication Sig Start Date End Date Taking? Authorizing Provider   cannabidiol (EPIDIOLEX) 100 MG/ML oral solution Take 7.53 mLs (753 mg) by mouth 2 times daily 11/3/23  Yes Elysia Gomez MD   lacosamide (VIMPAT) 200 MG TABS tablet Take 1 tablet (200 mg) by mouth 2 times daily 11/3/23  Yes Elysia Gomez MD   lamoTRIgine (LAMICTAL) 100 MG tablet TAKE 1/2 TABLET BY MOUTH EVERY MORNING IN ADDITION TO 200MG FOR TOTAL  IN MORNING  AT BEDTIME 11/3/23  Yes Elysia Gomez MD   lamoTRIgine (LAMICTAL) 200 MG tablet One  HS 11/3/23  Yes Elysia Gomez MD   Midazolam (NAYZILAM) 5 MG/0.1ML SOLN Apply 1 Application into one nostril as directed daily as needed (for convulsion) Spray 1 spray in nostril 2 times daily as needed (Administer one dose (5mg) for any cluster of three or more  30 second seizures within one hour, or any single seizure lasting 2 minutes.) - Nasal 11/3/23  Yes Elysia Gomez MD   docusate sodium (COLACE) 100 MG capsule Take by mouth 2 times daily    Reported, Patient   doxycycline hyclate (VIBRA-TABS) 100 MG tablet TAKE ONE TABLET BY MOUTH TWICE A DAY FOR 7 DAYS  Patient not taking: Reported on 2023 10/31/22   Reported, Patient   hydrocortisone (CORTAID) 1 % cream Apply topically 2 times daily as needed    Reported, Patient   ketoconazole (EXTINA) 2 % external foam Apply topically every 12 hours apply by topical route 2  times every day to the affected area(s)  Patient not taking: Reported on 4/4/2023 9/9/21   Reported, Patient   levETIRAcetam (KEPPRA) 1000 MG tablet TAKE 1 TABLET BY MOUTH TWICE DAILY 8/15/23   Elysia Gomez MD   levETIRAcetam (KEPPRA) 500 MG tablet TAKE 1 TABLET BY MOUTH TWICE DAILY 8/15/23   Elysia Gomez MD   Multiple Vitamin (MULTIVITAMINS PO) Take by mouth daily    Reported, Patient   multivitamin, therapeutic with minerals (THERA-VIT-M) TABS tablet Take 1 tablet by mouth daily  Patient not taking: Reported on 4/4/2023    Reported, Patient   OLANZapine (ZYPREXA) 10 MG tablet Take 10 mg by mouth TAKE ONE TABLET BY MOUTH AT BEDTIME 11/16/22   Reported, Patient   OLANZapine (ZYPREXA) 2.5 MG tablet TAKE ONE TABLET BY MOUTH AT BEDTIME ALONG WITH A 10MG FOR A TOTAL DOSE OF 12.5MG DAILY 11/16/22   Reported, Patient   OLANZapine (ZYPREXA) 5 MG tablet Taking 10mg at bedtime and 2.5mg tablets at bedtime  Patient not taking: Reported on 4/4/2023 1/23/19   Reported, Patient   OXcarbazepine (TRILEPTAL) 300 MG tablet Take 3 tablets (900 mg) by mouth 2 times daily 10/6/22   Elysia Gomez MD   polyvinyl alcohol (LIQUIFILM TEARS) 1.4 % ophthalmic solution 2-3 drops as needed for dry eyes    Reported, Patient   traZODone (DESYREL) 50 MG tablet 1 tablet (50 mg) q 8am. 1 tablet (50 mg) q 4 pm,  3 tablets (150 mg) 8pm  Patient taking differently: Take 1 tab (50mg) q 12pm,  1/2 tab (25mg)q 4pm, 3 tablets (150 mg) 10pm 6/20/18   Candice Liu MD   Vitamin E 180 MG (400 UNIT) CAPS TAKE ONE CAPSULE BY MOUTH TWICE A DAILY 10/19/22   Reported, Patient   VITAMIN E Take 180 mg by mouth 2 times daily  Patient not taking: Reported on 4/4/2023    Reported, Patient       EPILEPSY HX REVIEWED 4/11/23   I have been seeing her since she was  age 19.  First seizure occurred at 6 hours of age.  It was described as color turning dusky, turning head to the right, eyes fixed to the right and no response.  She continued to have intermittent seizure  activity and subsequently was transferred to the [facility] where she was hospitalized for 2 weeks.  It is not clear what diagnostic evaluation was done but they decided to discharge her without seizure medications.  Seizures recurred at 9 months of age.  At that time they noticed twitching of her left arm and then twitching of the side of the face.  EEG initially showed hypsarrhythmic pattern.  She was treated with phenobarbital.  Then, she was treated with Tegretol and later Dilantin.  CT scan in  we do not have records.      Seizure types:   In  seizure types were described as sitting, both arms jerk up, her head turns to the right, eyes go to the right and roll up.  These events last 2 minutes and at that time she was having 6 a month.      Second seizure type is described as her arms go up, legs stiffen and she moans.  She can be lowered to the floor and then her arms and legs shake with some erratic breathing and sweating profusely.  These occurred at that time, 3-4 per month.        Seizure type 3 was described as being alert.  Her arms fly out and her eyes blink.      Seizure type 4 described as stares.      She has had 1 episode of status epilepticus in .      EEG when she was 12 was read as abnormal record by virtue of diffuse theta and delta slowing greater on the right.      Parents report that Nella has always had a left hemiparesis, mostly affecting the left upper extremity with some involvement of her left lower extremity.  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  She has been diagnosed as legally blind as having cortical blindness.      BIRTH HISTORY:  Birth history was that she was the result of a full-term pregnancy that was complicated by maternal high blood pressure and use of Enduron.  Pregnancy ended in a 14 labored and vaginal delivery.  Birth weight 7 pounds 6 ounces.  Apgar scores of 4 at 1 minute and 8 at 5 minutes.      She has had global developmental  delay.      Subsequent treatment at [facility] has been quite extensive and will not be reviewed completely here.  Last EEG was 12/2010 which showed mild to moderate nonspecific diffuse disturbance of cerebral activity and multifocal interictal activity with one complex partial seizure recorded.      In the last year they have described 204 seizures,all given diazepam.  OTHER ISSUES:        She has had behavioral issues throughout.      Bell's palsy in 2005, resolved.      Most of her seizures that the staff is noticing now would be described as brief tonic seizures are brief tonic-clonic seizures.  They are more concerned with the length then the precise observation.  She might be having some minor seizures are not being reported.      CURRENT MEDICATIONS:  Felbatol  Discont due to possible cause of anemia; peramp discont 2020 . Lamictal have really been the best treatment for her.  She has been tried almost all the other seizure medications over time including Depakote, Tegretol, phenobarbital, Dilantin, etc.      SOCIAL HISTORY:  She is living in a group home.      REVIEW OF SYSTEMS:  Through the caregiver is essentially negative.  She has not had any upper respiratory infections lately.  Appetite is good.  She has no obvious headaches.   GI and  appear to be functioning normally.      PHYSICAL EXAMINATION: Very physically active- tries to kiss staff. Very loud fverbally- simple repetitive sentences. Walks without assistance but is apractic.     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    ASSESSMENT:  Complicated case; hospital notes, EEG and D/C summary reviewed   Epidiolex 5 mg/kg as 2.5 bid Re-wrote Nazylam for clusters-  Will discontinue  lamotrigine and make other adjustments as needed. Tried to get blood but ubable enter vein    PLAN:   1)  RTC 6 mo  2) LAMO 200 HS  3)  Other ASDS same    4)incease Epidiolex to 10 mg/kg  4) Revised  rescue plan. To decrease unneessary 911 and ER calls         In  person visit: 5 min pre visit; 31 face to face withj brother and staff, discussed DBS. althoiugh candidate, behaviow may make dvaluation difficult.   post visit 4 min.        Again, thank you for allowing me to participate in the care of your patient.      Sincerely,    Elysia Gomez MD

## 2023-11-06 ENCOUNTER — TELEPHONE (OUTPATIENT)
Dept: NEUROLOGY | Facility: CLINIC | Age: 48
End: 2023-11-06

## 2023-11-06 NOTE — TELEPHONE ENCOUNTER
What is the concern that needs to be addressed by a nurse?     [facility] nurse is requesting a call back to discuss patient medication.    May a detailed message be left on voicemail? Yes     Date of last office visit: 11/03/23    Message routed to: mincep rn pool

## 2023-11-07 NOTE — TELEPHONE ENCOUNTER
Corazon bautista/ patient's Group [facility] is calling requesting a call back to discuss new orders and med changes. Corazon can be reached at [phone].

## 2023-11-08 DIAGNOSIS — G40.812 LENNOX-GASTAUT SYNDROME WITH TONIC SEIZURES (H): ICD-10-CM

## 2023-11-08 NOTE — TELEPHONE ENCOUNTER
Cuate patient's father is calling because he stated that the pharmacy needs clarification on the new dosing (increase) for cannabidiol (EPIDILEX) 100mg.

## 2023-11-09 NOTE — TELEPHONE ENCOUNTER
Cuate Mccarty) called back to check on the status of the RX clarification for cannabidiol (EPIDIOLEX) 100 MG/ML oral solution .

## 2023-11-13 NOTE — TELEPHONE ENCOUNTER
[facility] is calling requesting a call back for clarification on dosage of medication cannabidiol (EPIDIOLEX) 100 MG/ML oral solution. Pharmacist can be reached at [phone].

## 2023-11-14 RX ORDER — CANNABIDIOL 100 MG/ML
5 SOLUTION ORAL 2 TIMES DAILY
Qty: 240 ML | Refills: 5 | Status: SHIPPED | OUTPATIENT
Start: 2023-11-14 | End: 2024-04-05

## 2023-11-14 RX ORDER — TRAZODONE HYDROCHLORIDE 50 MG/1
TABLET, FILM COATED ORAL
COMMUNITY

## 2023-11-14 NOTE — TELEPHONE ENCOUNTER
Spoke with Corazon.  Will need to confirm/clarify dose of Epidiolex with MD and send to [facility]  Also, patient mental health provider has changed the trazodone dosing to 50mg noon, 25mg 4pm .,100mg 8pm    Will update records.

## 2023-11-14 NOTE — TELEPHONE ENCOUNTER
Discussed with   Clarified that dose is 10mg/kg/day, divided in to two doses (3.7ml bid)  Prescription updated and sent (see other encounter)  Call placed to Corazon to update her.  No other questions at this time

## 2023-11-14 NOTE — TELEPHONE ENCOUNTER
Discussed with   The intended dose is 10mg/kg/day, divided in to two doses.  Prescription sent as a VORB.    Call placed to pharmacy to confirm the dose

## 2023-12-04 ENCOUNTER — TELEPHONE (OUTPATIENT)
Dept: NEUROLOGY | Facility: CLINIC | Age: 48
End: 2023-12-04

## 2023-12-04 NOTE — TELEPHONE ENCOUNTER
[facility] Prior Authorization Team   Phone: [phone]  Fax: [phone]    Prior Authorization Not Needed per Insurance    Medication: EPIDIOLEX 100 MG/ML PO SOLN  Insurance Company: [employer] Part D - Phone [phone] Fax [phone]  Expected CoPay: $ 0  Pharmacy Filling the Rx: [facility] - [place], [place] - [address]   Pharmacy Notified: [facility]  Patient Notified: Yes

## 2024-01-25 ENCOUNTER — MEDICAL CORRESPONDENCE (OUTPATIENT)
Dept: HEALTH INFORMATION MANAGEMENT | Facility: CLINIC | Age: 49
End: 2024-01-25

## 2024-02-29 ENCOUNTER — TELEPHONE (OUTPATIENT)
Dept: NEUROLOGY | Facility: CLINIC | Age: 49
End: 2024-02-29

## 2024-02-29 NOTE — TELEPHONE ENCOUNTER
Received Medication Question from [facility] Form to be completed. Form saved to R drive, encounter routed.  Bryanna Rosas, CMA

## 2024-03-06 DIAGNOSIS — G40.319 GENERALIZED CONVULSIVE EPILEPSY WITH INTRACTABLE EPILEPSY (H): ICD-10-CM

## 2024-03-07 NOTE — TELEPHONE ENCOUNTER
What is the concern that needs to be addressed by a nurse?   Pharmacy calling in for patient. Patient is out of nasal spray.     May a detailed message be left on voicemail? yes    Date of last office visit: 11/23/23    Message routed to: [facility]

## 2024-03-08 RX ORDER — MIDAZOLAM 5 MG/.1ML
SPRAY NASAL
Qty: 2 EACH | Refills: 2 | Status: SHIPPED | OUTPATIENT
Start: 2024-03-08 | End: 2024-05-07

## 2024-03-28 NOTE — PROGRESS NOTES
INTERVAL HX:  Resperidol    caused issues  Last year including hospitalization. Now stable. Did have episode of toxicity as was found to be chewing ASDs as one caregiver not monitoring lima urbano. This now under controlled. About 10 sz since last visit. Rxeed by diazepam as needed.      Prior to Admission medications    Medication Sig Start Date End Date Taking? Authorizing Provider   diazepam (DIASTAT ACUDIAL) 10 MG GEL rectal kit 1o mg per rectum if one dose of diazepam buccal does not control sz. 11/9/18  Yes Elysia Gomez MD   diazepam (DIAZEPAM INTENSOL) 5 MG/ML (HIGH CONC) solution (1)ML (5MG) AS NEEDED FOR ANY SEIZURE. WHEN ADMINISTERED NOTIFY PC, RN & PD. 2/9/18  Yes Elysia Gomez MD   docusate sodium (COLACE) 100 MG capsule Take by mouth 2 times daily   Yes Reported, Patient   hydrocortisone (CORTAID) 1 % cream Apply topically 2 times daily as needed   Yes Reported, Patient   lamoTRIgine (LAMICTAL) 200 MG tablet Take 1 tablet (200 mg) by mouth 2 times daily 7/13/18  Yes Elysia Gomez MD   levETIRAcetam (KEPPRA) 500 MG tablet Take 500 mg by mouth 2 times daily   Yes Reported, Patient   levETIRAcetam 1000 MG TABS Take 1,500 mg by mouth 2 times daily  Patient taking differently: Take 1,000 mg by mouth 2 times daily  9/26/18  Yes Archie Corona MD   Multiple Vitamin (MULTIVITAMINS PO) Take by mouth daily   Yes Reported, Patient   OXcarbazepine (TRILEPTAL) 600 MG tablet Take 1 tablet (600 mg) by mouth 2 times daily 2/26/18  Yes Megan Chin MD   polyvinyl alcohol (LIQUITEARS) 1.4 % ophthalmic solution 2-3 drops as needed for dry eyes   Yes Reported, Patient   risperiDONE (RISPERDAL) 2 MG tablet Take 1 tablet (2 mg) by mouth 2 times daily 9/26/18  Yes Archie Corona MD   traZODone (DESYREL) 50 MG tablet 1 tablet (50 mg) q 8am. 1 tablet (50 mg) q 4 pm,  3 tablets (150 mg) 8pm 6/20/18  Yes Candice Liu MD   VIMPAT 200 MG TABS tablet TAKE (1) TABLET TWICE DAILY. 11/6/18   Yes Elysia Gomez MD   VITAMIN E 2 times daily   Yes Reported, Patient   multivitamin, therapeutic with minerals (THERA-VIT-M) TABS tablet Take 1 tablet by mouth daily    Reported, Patient       EPILEPSY HX REVIEWED 3/29/19:   I have been seeing her since she was  age 19.  She was initially seen at [facility] when she was 15 years old.    First seizure occurred at 6 hours of age.  It was described as color turning dusky, turning head to the right, eyes fixed to the right and no response.  She continued to have intermittent seizure activity and subsequently was transferred to the [facility] where she was hospitalized for 2 weeks.  It is not clear what diagnostic evaluation was done but they decided to discharge her without seizure medications.  Seizures recurred at 9 months of age.  At that time they noticed twitching of her left arm and then twitching of the side of the face.  EEG initially showed hypsarrhythmic pattern.  She was treated with phenobarbital.  Then, she was treated with Tegretol and later Dilantin.  CT scan in 1983 we do not have records.      Seizure types:   In 1991 seizure types were described as sitting, both arms jerk up, her head turns to the right, eyes go to the right and roll up.  These events last 2 minutes and at that time she was having 6 a month.      Second seizure type is described as her arms go up, legs stiffen and she moans.  She can be lowered to the floor and then her arms and legs shake with some erratic breathing and sweating profusely.  These occurred at that time, 3-4 per month.        Seizure type 3 was described as being alert.  Her arms fly out and her eyes blink.      Seizure type 4 described as stares.      She has had 1 episode of status epilepticus in 1978.      EEG when she was 12 was read as abnormal record by virtue of diffuse theta and delta slowing greater on the right.      Parents report that Nella has always had a  left hemiparesis, mostly affecting the left upper extremity with some involvement of her left lower extremity.  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  She has been diagnosed as legally blind as having cortical blindness.      BIRTH HISTORY:  Birth history was that she was the result of a full-term pregnancy that was complicated by maternal high blood pressure and use of Enduron.  Pregnancy ended in a 14 labored and vaginal delivery.  Birth weight 7 pounds 6 ounces.  Apgar scores of 4 at 1 minute and 8 at 5 minutes.      She has had global developmental delay.      Subsequent treatment at [facility] has been quite extensive and will not be reviewed completely here.  Last EEG was 2010 which showed mild to moderate nonspecific diffuse disturbance of cerebral activity and multifocal interictal activity with one complex partial seizure recorded.      In the last year they have described 204 seizures,all given diazepam.  OTHER ISSUES:        She has had behavioral issues throughout.      Bell's palsy in , resolved.      Most of her seizures that the staff is noticing now would be described as brief tonic seizures are brief tonic-clonic seizures.  They are more concerned with the length then the precise observation.  She might be having some minor seizures are not being reported.      CURRENT MEDICATIONS:  Felbatol and Lamictal have really been the best treatment for her.  She has been tried almost all the other seizure medications over time including Depakote, Tegretol, phenobarbital, Dilantin, etc.      SOCIAL HISTORY:  She is living in a group home.      REVIEW OF SYSTEMS:  Through the caregiver is essentially negative.  She has not had any upper respiratory infections lately.  Appetite is good.  She has no obvious headaches.   GI and  appear to be functioning normally.      PHYSICAL EXAMINATION:  Blood pressure (!) 160/97, pulse 99, temperature 98.1  F (36.7  C), weight 160 lb (72.6 kg), unknown if currently  breastfeeding. Erythematous right face  She is accompanied in clinic today by her caregiver.  Nella alert, no longer in wheelchair  Her eyes are rolling.  She is microcephalic, has a small chin. Less communicative than in past.   Gait: walks unaided but moderate spastic gait.  Attention span is very short and limited and she does not really follow her discussion.      Coordination is moderately impaired.  However, she is difficult to examine because she does not follow commands.  On observation, she does have difficulty more with the left than the right upper extremity but limb tone is increased bilaterally.        ASSESSMENT:  Complicated case; hospital notes, EEG and D/C summary reviewed. After thorough review, we decided to not change current ASDs   Sz now back under control. Behavior stable   PLAN:   1) Same ASDs renewed.  2) RTC 1 yr.   The [facility] living [facility]

## 2024-04-05 ENCOUNTER — OFFICE VISIT (OUTPATIENT)
Dept: NEUROLOGY | Facility: CLINIC | Age: 49
End: 2024-04-05
Payer: MEDICARE

## 2024-04-05 DIAGNOSIS — G40.812 LENNOX-GASTAUT SYNDROME WITH TONIC SEIZURES (H): ICD-10-CM

## 2024-04-05 DIAGNOSIS — G40.319 GENERALIZED CONVULSIVE EPILEPSY WITH INTRACTABLE EPILEPSY (H): ICD-10-CM

## 2024-04-05 RX ORDER — LAMOTRIGINE 200 MG/1
TABLET ORAL
Qty: 30 TABLET | Refills: 11 | Status: SHIPPED | OUTPATIENT
Start: 2024-04-05

## 2024-04-05 RX ORDER — LACOSAMIDE 200 MG/1
200 TABLET ORAL 2 TIMES DAILY
Qty: 62 TABLET | Refills: 5 | Status: SHIPPED | OUTPATIENT
Start: 2024-04-05 | End: 2024-09-11

## 2024-04-05 RX ORDER — LEVETIRACETAM 500 MG/1
500 TABLET ORAL 2 TIMES DAILY
Qty: 62 TABLET | Refills: 8 | Status: SHIPPED | OUTPATIENT
Start: 2024-04-05

## 2024-04-05 RX ORDER — CANNABIDIOL 100 MG/ML
5 SOLUTION ORAL 2 TIMES DAILY
Qty: 240 ML | Refills: 5 | Status: SHIPPED | OUTPATIENT
Start: 2024-04-05

## 2024-04-05 RX ORDER — LEVETIRACETAM 1000 MG/1
1000 TABLET ORAL 2 TIMES DAILY
Qty: 62 TABLET | Refills: 8 | Status: SHIPPED | OUTPATIENT
Start: 2024-04-05

## 2024-04-05 NOTE — LETTER
2024       RE: Nella Helms  : 1975   MRN: [id number]        Dear Colleague,    Thank you for referring your patient, Nella Helms, to the [facility] EPILEPSY [facility] at [facility]. Please see a copy of my visit note below.    Nella is a 48 year old who is being evaluated in clinic with brother and 2 caregivers       INTERVAL HX: Overall balance is better; strength is better. Epidiolex 5 mg/kg ? improvement, 2 ER visit for falls that may have been due to balance. Is having troubnle with balance in AM from about 9-11. Gets pills 8 Am    Overal sz are mild and better than years ago.       Prior to Admission medications    Medication Sig Start Date End Date Taking? Authorizing Provider   cannabidiol (EPIDIOLEX) 100 MG/ML oral solution Take 7.53 mLs (753 mg) by mouth 2 times daily 11/3/23  Yes Elysia Gomez MD   lacosamide (VIMPAT) 200 MG TABS tablet Take 1 tablet (200 mg) by mouth 2 times daily 11/3/23  Yes Elysia Gomez MD   lamoTRIgine (LAMICTAL) 100 MG tablet TAKE 1/2 TABLET BY MOUTH EVERY MORNING IN ADDITION TO 200MG FOR TOTAL  IN MORNING  AT BEDTIME 11/3/23  Yes Elysia Gomez MD   lamoTRIgine (LAMICTAL) 200 MG tablet One  HS 11/3/23  Yes Elysia Gomez MD   Midazolam (NAYZILAM) 5 MG/0.1ML SOLN Apply 1 Application into one nostril as directed daily as needed (for convulsion) Spray 1 spray in nostril 2 times daily as needed (Administer one dose (5mg) for any cluster of three or more  30 second seizures within one hour, or any single seizure lasting 2 minutes.) - Nasal 11/3/23  Yes Elysia Gomez MD   docusate sodium (COLACE) 100 MG capsule Take by mouth 2 times daily    Reported, Patient   doxycycline hyclate (VIBRA-TABS) 100 MG tablet TAKE ONE TABLET BY MOUTH TWICE A DAY FOR 7 DAYS  Patient not taking: Reported on 2023 10/31/22   Reported, Patient   hydrocortisone (CORTAID) 1 % cream Apply topically 2 times daily as needed    Reported,  Patient   ketoconazole (EXTINA) 2 % external foam Apply topically every 12 hours apply by topical route 2 times every day to the affected area(s)  Patient not taking: Reported on 4/4/2023 9/9/21   Reported, Patient   levETIRAcetam (KEPPRA) 1000 MG tablet TAKE 1 TABLET BY MOUTH TWICE DAILY 8/15/23   Elysia Gomez MD   levETIRAcetam (KEPPRA) 500 MG tablet TAKE 1 TABLET BY MOUTH TWICE DAILY 8/15/23   Elysia Gomez MD   Multiple Vitamin (MULTIVITAMINS PO) Take by mouth daily    Reported, Patient   multivitamin, therapeutic with minerals (THERA-VIT-M) TABS tablet Take 1 tablet by mouth daily  Patient not taking: Reported on 4/4/2023    Reported, Patient   OLANZapine (ZYPREXA) 10 MG tablet Take 10 mg by mouth TAKE ONE TABLET BY MOUTH AT BEDTIME 11/16/22   Reported, Patient   OLANZapine (ZYPREXA) 2.5 MG tablet TAKE ONE TABLET BY MOUTH AT BEDTIME ALONG WITH A 10MG FOR A TOTAL DOSE OF 12.5MG DAILY 11/16/22   Reported, Patient   OLANZapine (ZYPREXA) 5 MG tablet Taking 10mg at bedtime and 2.5mg tablets at bedtime  Patient not taking: Reported on 4/4/2023 1/23/19   Reported, Patient   OXcarbazepine (TRILEPTAL) 300 MG tablet Take 3 tablets (900 mg) by mouth 2 times daily 10/6/22   Elysia Gomez MD   polyvinyl alcohol (LIQUIFILM TEARS) 1.4 % ophthalmic solution 2-3 drops as needed for dry eyes    Reported, Patient   traZODone (DESYREL) 50 MG tablet 1 tablet (50 mg) q 8am. 1 tablet (50 mg) q 4 pm,  3 tablets (150 mg) 8pm  Patient taking differently: Take 1 tab (50mg) q 12pm,  1/2 tab (25mg)q 4pm, 3 tablets (150 mg) 10pm 6/20/18   Candice Liu MD   Vitamin E 180 MG (400 UNIT) CAPS TAKE ONE CAPSULE BY MOUTH TWICE A DAILY 10/19/22   Reported, Patient   VITAMIN E Take 180 mg by mouth 2 times daily  Patient not taking: Reported on 4/4/2023    Reported, Patient       EPILEPSY HX REVIEWED  4/5/24 I have been seeing her since she was  age 19.  First seizure occurred at 6 hours of age.  It was described as color turning dusky, turning  head to the right, eyes fixed to the right and no response.  She continued to have intermittent seizure activity and subsequently was transferred to the [facility] where she was hospitalized for 2 weeks.  It is not clear what diagnostic evaluation was done but they decided to discharge her without seizure medications.  Seizures recurred at 9 months of age.  At that time they noticed twitching of her left arm and then twitching of the side of the face.  EEG initially showed hypsarrhythmic pattern.  She was treated with phenobarbital.  Then, she was treated with Tegretol and later Dilantin.  CT scan in 1983 we do not have records.      Seizure types:   In 1991 seizure types were described as sitting, both arms jerk up, her head turns to the right, eyes go to the right and roll up.  These events last 2 minutes and at that time she was having 6 a month.      Second seizure type is described as her arms go up, legs stiffen and she moans.  She can be lowered to the floor and then her arms and legs shake with some erratic breathing and sweating profusely.  These occurred at that time, 3-4 per month.        Seizure type 3 was described as being alert.  Her arms fly out and her eyes blink.      Seizure type 4 described as stares.      She has had 1 episode of status epilepticus in 1978.      EEG when she was 12 was read as abnormal record by virtue of diffuse theta and delta slowing greater on the right.      Parents report that Nella has always had a left hemiparesis, mostly affecting the left upper extremity with some involvement of her left lower extremity.  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  She has been diagnosed as legally blind as having cortical blindness.      BIRTH HISTORY:  Birth history was that she was the result of a full-term pregnancy that was complicated by maternal high blood pressure and use of Enduron.  Pregnancy ended in a 14 labored and vaginal delivery.  Birth weight 7  pounds 6 ounces.  Apgar scores of 4 at 1 minute and 8 at 5 minutes.      She has had global developmental delay.      Subsequent treatment at [facility] has been quite extensive and will not be reviewed completely here.  Last EEG was 2010 which showed mild to moderate nonspecific diffuse disturbance of cerebral activity and multifocal interictal activity with one complex partial seizure recorded.      In the last year they have described 204 seizures,all given diazepam.  OTHER ISSUES:        She has had behavioral issues throughout.      Bell's palsy in , resolved.      Most of her seizures that the staff is noticing now would be described as brief tonic seizures or brief tonic-clonic seizures.  They are more concerned with the length then the precise observation.  She might be having some minor seizures that are not being reported.      CURRENT MEDICATIONS:  Felbatol  Was discont due to possible cause of anemia; peramp discont  . Lamictal have really been the best treatment for her.  She has been tried almost all the other seizure medications over time including Depakote, Tegretol, phenobarbital, Dilantin, etc.      SOCIAL HISTORY:  She is living in a group home.      REVIEW OF SYSTEMS:  Through the caregiver is essentially negative.  She has not had any upper respiratory infections lately.  Appetite is good.  She has no obvious headaches.   GI and  appear to be functioning normally.      PHYSICAL EXAMINATION: Very physically active- tries to kiss staff. Very loud fverbally- simple repetitive sentences. Walks without assistance but is apractic.     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    ASSESSMENT:  Complicated case; hospital notes, EEG and D/C summary reviewed   Epidiolex 5 mg/kg as 2.5 bid Re-wrote Nazylam for clusters-  Will discontinue AM 50 lamotrigine and move AM 1000 LEV to noon.    PLAN:   1)  RTC 6 mo  2) LAMO 200  Bid   3)  Other ASDS same    4)   Epidiolex  10 mg/kg  4) Revised   rescue plan. to decrease unneessary 911 and ER calls         In person visit: 4min pre visit; 31 face to face with  brother and staff, discussed DBS. althoiugh candidate, behaviow may make evaluation difficult.   post visit 4 min.        Again, thank you for allowing me to participate in the care of your patient.      Sincerely,    Elysia Gomez MD

## 2024-04-05 NOTE — PROGRESS NOTES
Nella is a 48 year old who is being evaluated in clinic with brother and 2 caregivers       INTERVAL HX: Overall balance is better; strength is better. Epidiolex 5 mg/kg ? improvement, 2 ER visit for falls that may have been due to balance. Is having troubnle with balance in AM from about 9-11. Gets pills 8 Am    Overal sz are mild and better than years ago.       Prior to Admission medications    Medication Sig Start Date End Date Taking? Authorizing Provider   cannabidiol (EPIDIOLEX) 100 MG/ML oral solution Take 7.53 mLs (753 mg) by mouth 2 times daily 11/3/23  Yes Elysia Gomez MD   lacosamide (VIMPAT) 200 MG TABS tablet Take 1 tablet (200 mg) by mouth 2 times daily 11/3/23  Yes Elysia Gomez MD   lamoTRIgine (LAMICTAL) 100 MG tablet TAKE 1/2 TABLET BY MOUTH EVERY MORNING IN ADDITION TO 200MG FOR TOTAL  IN MORNING  AT BEDTIME 11/3/23  Yes Elysia Gomez MD   lamoTRIgine (LAMICTAL) 200 MG tablet One  HS 11/3/23  Yes Elysia Gomez MD   Midazolam (NAYZILAM) 5 MG/0.1ML SOLN Apply 1 Application into one nostril as directed daily as needed (for convulsion) Spray 1 spray in nostril 2 times daily as needed (Administer one dose (5mg) for any cluster of three or more  30 second seizures within one hour, or any single seizure lasting 2 minutes.) - Nasal 11/3/23  Yes Elysia Gomez MD   docusate sodium (COLACE) 100 MG capsule Take by mouth 2 times daily    Reported, Patient   doxycycline hyclate (VIBRA-TABS) 100 MG tablet TAKE ONE TABLET BY MOUTH TWICE A DAY FOR 7 DAYS  Patient not taking: Reported on 4/4/2023 10/31/22   Reported, Patient   hydrocortisone (CORTAID) 1 % cream Apply topically 2 times daily as needed    Reported, Patient   ketoconazole (EXTINA) 2 % external foam Apply topically every 12 hours apply by topical route 2 times every day to the affected area(s)  Patient not taking: Reported on 4/4/2023 9/9/21   Reported, Patient   levETIRAcetam (KEPPRA) 1000 MG tablet TAKE 1 TABLET BY MOUTH TWICE DAILY  8/15/23   Elysia Gomez MD   levETIRAcetam (KEPPRA) 500 MG tablet TAKE 1 TABLET BY MOUTH TWICE DAILY 8/15/23   Elysia Gomez MD   Multiple Vitamin (MULTIVITAMINS PO) Take by mouth daily    Reported, Patient   multivitamin, therapeutic with minerals (THERA-VIT-M) TABS tablet Take 1 tablet by mouth daily  Patient not taking: Reported on 4/4/2023    Reported, Patient   OLANZapine (ZYPREXA) 10 MG tablet Take 10 mg by mouth TAKE ONE TABLET BY MOUTH AT BEDTIME 11/16/22   Reported, Patient   OLANZapine (ZYPREXA) 2.5 MG tablet TAKE ONE TABLET BY MOUTH AT BEDTIME ALONG WITH A 10MG FOR A TOTAL DOSE OF 12.5MG DAILY 11/16/22   Reported, Patient   OLANZapine (ZYPREXA) 5 MG tablet Taking 10mg at bedtime and 2.5mg tablets at bedtime  Patient not taking: Reported on 4/4/2023 1/23/19   Reported, Patient   OXcarbazepine (TRILEPTAL) 300 MG tablet Take 3 tablets (900 mg) by mouth 2 times daily 10/6/22   Elysia Gomez MD   polyvinyl alcohol (LIQUIFILM TEARS) 1.4 % ophthalmic solution 2-3 drops as needed for dry eyes    Reported, Patient   traZODone (DESYREL) 50 MG tablet 1 tablet (50 mg) q 8am. 1 tablet (50 mg) q 4 pm,  3 tablets (150 mg) 8pm  Patient taking differently: Take 1 tab (50mg) q 12pm,  1/2 tab (25mg)q 4pm, 3 tablets (150 mg) 10pm 6/20/18   Candice Liu MD   Vitamin E 180 MG (400 UNIT) CAPS TAKE ONE CAPSULE BY MOUTH TWICE A DAILY 10/19/22   Reported, Patient   VITAMIN E Take 180 mg by mouth 2 times daily  Patient not taking: Reported on 4/4/2023    Reported, Patient       EPILEPSY HX REVIEWED  4/5/24 I have been seeing her since she was  age 19.  First seizure occurred at 6 hours of age.  It was described as color turning dusky, turning head to the right, eyes fixed to the right and no response.  She continued to have intermittent seizure activity and subsequently was transferred to the [facility] where she was hospitalized for 2 weeks.  It is not clear what diagnostic evaluation was done but they  decided to discharge her without seizure medications.  Seizures recurred at 9 months of age.  At that time they noticed twitching of her left arm and then twitching of the side of the face.  EEG initially showed hypsarrhythmic pattern.  She was treated with phenobarbital.  Then, she was treated with Tegretol and later Dilantin.  CT scan in  we do not have records.      Seizure types:   In  seizure types were described as sitting, both arms jerk up, her head turns to the right, eyes go to the right and roll up.  These events last 2 minutes and at that time she was having 6 a month.      Second seizure type is described as her arms go up, legs stiffen and she moans.  She can be lowered to the floor and then her arms and legs shake with some erratic breathing and sweating profusely.  These occurred at that time, 3-4 per month.        Seizure type 3 was described as being alert.  Her arms fly out and her eyes blink.      Seizure type 4 described as stares.      She has had 1 episode of status epilepticus in .      EEG when she was 12 was read as abnormal record by virtue of diffuse theta and delta slowing greater on the right.      Parents report that Nella has always had a left hemiparesis, mostly affecting the left upper extremity with some involvement of her left lower extremity.  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  She has been diagnosed as legally blind as having cortical blindness.      BIRTH HISTORY:  Birth history was that she was the result of a full-term pregnancy that was complicated by maternal high blood pressure and use of Enduron.  Pregnancy ended in a 14 labored and vaginal delivery.  Birth weight 7 pounds 6 ounces.  Apgar scores of 4 at 1 minute and 8 at 5 minutes.      She has had global developmental delay.      Subsequent treatment at [facility] has been quite extensive and will not be reviewed completely here.  Last EEG was 2010 which showed mild to moderate nonspecific diffuse  disturbance of cerebral activity and multifocal interictal activity with one complex partial seizure recorded.      In the last year they have described 204 seizures,all given diazepam.  OTHER ISSUES:        She has had behavioral issues throughout.      Bell's palsy in 2005, resolved.      Most of her seizures that the staff is noticing now would be described as brief tonic seizures or brief tonic-clonic seizures.  They are more concerned with the length then the precise observation.  She might be having some minor seizures that are not being reported.      CURRENT MEDICATIONS:  Felbatol  Was discont due to possible cause of anemia; peramp discont 2020 . Lamictal have really been the best treatment for her.  She has been tried almost all the other seizure medications over time including Depakote, Tegretol, phenobarbital, Dilantin, etc.      SOCIAL HISTORY:  She is living in a group home.      REVIEW OF SYSTEMS:  Through the caregiver is essentially negative.  She has not had any upper respiratory infections lately.  Appetite is good.  She has no obvious headaches.   GI and  appear to be functioning normally.      PHYSICAL EXAMINATION: Very physically active- tries to kiss staff. Very loud fverbally- simple repetitive sentences. Walks without assistance but is apractic.     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    ASSESSMENT:  Complicated case; hospital notes, EEG and D/C summary reviewed   Epidiolex 5 mg/kg as 2.5 bid Re-wrote Nazylam for clusters-  Will discontinue AM 50 lamotrigine and move AM 1000 LEV to noon.    PLAN:   1)  RTC 6 mo  2) LAMO 200  Bid   3)  Other ASDS same    4)   Epidiolex  10 mg/kg  4) Revised  rescue plan. to decrease unneessary 911 and ER calls            Elysia may MD     In person visit: 4min pre visit; 31 face to face with  brother and staff, discussed DBS. althoiugh candidate, behaviow may make evaluation difficult.   post visit 4 min.

## 2024-04-08 ENCOUNTER — TELEPHONE (OUTPATIENT)
Dept: NEUROLOGY | Facility: CLINIC | Age: 49
End: 2024-04-08

## 2024-04-08 NOTE — TELEPHONE ENCOUNTER
What is the concern that needs to be addressed by a nurse?     Corazon  is calling wanting to review med changes made at patients 04/05/2024 appointment w/ Dr. Gomez. Corazon can be reached at [phone].    May a detailed message be left on voicemail? YES    Date of last office visit: 04/05/2024    Message routed to: RUY DELGADO

## 2024-04-08 NOTE — TELEPHONE ENCOUNTER
Writer placed call to Corazon, . Writer went over updated medication orders based off new prescriptions and Dr. Rahman most recent note from 4/5/24. Additionally, Corazon asked for seizure protocol to be updated to reflect specific orders of midazolam rescue spray. Corazon had no further questions.    Seizure protocol updated and prepared for provider signature.     Fax to [phone]

## 2024-04-08 NOTE — LETTER
4/11/2023  RE: Nella Helms  [address] [place] [id number]          SEIZURE PLAN AND PROTOCOL     Plan of Care:     Follow general seizure care and First Aid guidelines for seizures.  Anticonvulsant medications will be administered as prescribed.  All seizures will be documented on a Seizure Report including:  date, time, length of seizure, specific body movements and behaviors exhibited during the seizure, and post-seizure state.  Antiepileptic blood levels will be ordered by the physician based upon client's condition and medications.     Missed Doses:     IF YOU REALIZE WITHIN 24 HOURS:     ...You MISSED ONE DOSE of your anticonvulsant medication(s), take the missed dose immediately unless it is time for your next scheduled dose. If that is the case, take your next scheduled dose, wait two hours, and then take the missed dose.     ...You MISSED TWO OR MORE DOSES, take one of the missed doses immediately, even if it is time for your next scheduled dose. Then call the [facility] clinic to schedule an appointment.      IF YOU REALIZE NOW YOU MISSED A DOSE MORE THAN 24 HOURS AGO, william it on your calendar. DO NOT take an extra dose.     Medication Errors:     Your facility nurse should be notified of any medication errors. The nurse should observe the urgency of the error. It is not necessary to notify the MD on call unless the facility nurse or delegate believes it to be life threatening or could possibly result in a serious complication. Routine notices required by regulation should be telephoned to the clinic during office hours.        Extra Dose:     An extra dose of an antiepileptic drug is not serious. Ordinarily, at most, the client may experience increased side effects for several hours. Contact your facility nurse immediately if an extra dose was given.         Seizure Protocol:     Midazolam (NAYZILAM) 5 MG/0.1ML SOLN:  Instill one SPRAY IN nostril for any cluster seizure OF 3 OR more  15 second seizures WITHIN 1 hour OR any single seizure lasting 2 minutes.        When to call 911 for Seizures:     Call 911 as per rescue protocol, or if Nella:     ... does not start breathing adequately within 1 minute after the seizure. If this happens call 911 immediately and start CPR.     ...sustains an injury     ... has one seizure right after another without regaining consciousness in between or a convulsive seizure that lasts over 5 minutes     ... requests an ambulance     Or facility protocol requires activation of emergency medical services           Provider:           Sincerely,         Elysia Gomez M.D

## 2024-04-09 ENCOUNTER — TELEPHONE (OUTPATIENT)
Dept: NEUROLOGY | Facility: CLINIC | Age: 49
End: 2024-04-09

## 2024-04-09 NOTE — TELEPHONE ENCOUNTER
Writer spoke to Dr. Gomez as there was a discrepancy in his note and the prescription.     Dr. Gomez's note stated patient is on Lamotrigine 200 BID, but prescription says 200mg HS (once daily)     Per Dr. Gomez patient should only be taking 200mg at night due to patient being toxic in the morning.     Writer returned call to pharmacy to let them know that 100mg was discontinued. Pharmacy had no further questions.

## 2024-04-09 NOTE — TELEPHONE ENCOUNTER
What is the concern that needs to be addressed by a nurse? Pharmacy would like a updated med list to be faxed to [phone]. Also, recent discharge summary.     May a detailed message be left on voicemail? Yes     Date of last office visit: 4-5-24    Message routed to: [facility] Rn Pool

## 2024-05-01 DIAGNOSIS — G40.319 GENERALIZED CONVULSIVE EPILEPSY WITH INTRACTABLE EPILEPSY (H): ICD-10-CM

## 2024-05-07 RX ORDER — MIDAZOLAM 5 MG/.1ML
SPRAY NASAL
Qty: 2 EACH | Refills: 2 | Status: SHIPPED | OUTPATIENT
Start: 2024-05-07 | End: 2024-07-17

## 2024-05-07 NOTE — TELEPHONE ENCOUNTER
melisa Farah nurse of patient calling in that patient is out of medication. Pharmacy waiting on provider signature.

## 2024-07-16 DIAGNOSIS — G40.319 GENERALIZED CONVULSIVE EPILEPSY WITH INTRACTABLE EPILEPSY (H): ICD-10-CM

## 2024-07-16 NOTE — TELEPHONE ENCOUNTER
NAYZILAM 5 MG/0.1ML SOLN 2 each 2 5/7/2024     Last Office Visit: 4/5/24  Future Office visit:   10/4/24    Routing refill request to provider for review/approval because:  Controlled medication    Preethi Watts RN  [facility] Red Flag Triage/MRT

## 2024-07-17 RX ORDER — MIDAZOLAM 5 MG/.1ML
SPRAY NASAL
Qty: 2 EACH | Refills: 2 | Status: SHIPPED | OUTPATIENT
Start: 2024-07-17 | End: 2024-09-18

## 2024-08-22 NOTE — NURSING NOTE
Fax number provided to Dilia (group home owner) to fax in current medication and allergy list due to time restraint.    Medicare Annual Wellness Visit    Yessenia Elkins is here for Medicare AWV, 6 Month Follow-Up, Dysuria, and Medication Problem (Ozempic)    Assessment & Plan   Welcome to Medicare preventive visit  Dysuria  -     POCT Urinalysis No Micro (Auto)  Post-menopausal    Recommendations for Preventive Services Due: see orders and patient instructions/AVS.  Recommended screening schedule for the next 5-10 years is provided to the patient in written form: see Patient Instructions/AVS.     No follow-ups on file.     Subjective       Patient's complete Health Risk Assessment and screening values have been reviewed and are found in Flowsheets. The following problems were reviewed today and where indicated follow up appointments were made and/or referrals ordered.    Positive Risk Factor Screenings with Interventions:    Fall Risk:  Do you feel unsteady or are you worried about falling? : no  2 or more falls in past year?: (!) yes  Fall with injury in past year?: (!) yes     Interventions:    Reviewed medications, home hazards, visual acuity, and co-morbidities that can increase risk for falls             Inactivity:  On average, how many days per week do you engage in moderate to strenuous exercise (like a brisk walk)?: 0 days (!) Abnormal  On average, how many minutes do you engage in exercise at this level?: 0 min  Interventions:  Recommendations: patient agrees to exercise for at least 150 minutes/week     Abnormal BMI (obese):  Body mass index is 34.82 kg/m². (!) Abnormal  Interventions:  low carbohydrate diet                           Objective   Vitals:    08/22/24 0910   BP: 102/66   Site: Left Upper Arm   Position: Sitting   Cuff Size: Large Adult   Pulse: 72   Resp: 16   Weight: 86.4 kg (190 lb 6.4 oz)   Height: 1.575 m (5' 2\")      Body mass index is 34.82 kg/m².                  Allergies   Allergen Reactions    Augmentin [Amoxicillin-Pot Clavulanate] Diarrhea    Erythromycin Nausea And Vomiting     Prior to Visit  cough and shortness of breath.    Cardiovascular:  Negative for chest pain.   Gastrointestinal:  Negative for abdominal pain and nausea.   Genitourinary:  Positive for dysuria, frequency and urgency.   Skin:  Negative for rash.   Neurological:  Negative for dizziness, light-headedness and headaches.   Psychiatric/Behavioral: Negative.         Objective:   Physical Exam  Constitutional:       General: She is not in acute distress.  Eyes:      Pupils: Pupils are equal, round, and reactive to light.   Cardiovascular:      Rate and Rhythm: Normal rate and regular rhythm.      Heart sounds: No murmur heard.  Pulmonary:      Effort: Pulmonary effort is normal.      Breath sounds: Normal breath sounds. No wheezing.   Abdominal:      General: Bowel sounds are normal. There is no distension.      Palpations: Abdomen is soft.      Tenderness: There is no abdominal tenderness.   Musculoskeletal:         General: No tenderness. Normal range of motion.      Cervical back: Normal range of motion and neck supple.   Skin:     General: Skin is warm and dry.      Findings: No rash.          Assessment:       Diagnosis Orders   1. Welcome to Medicare preventive visit        2. Acute UTI  Culture, Urine    ciprofloxacin (CIPRO) 250 MG tablet      3. Dysuria  POCT Urinalysis No Micro (Auto)      4. Type 2 diabetes mellitus without complication, without long-term current use of insulin (HCC)  Tirzepatide (MOUNJARO) 2.5 MG/0.5ML SOPN SC injection    Basic Metabolic Panel    Hemoglobin A1C      5. Pure hypercholesterolemia        6. Essential hypertension        7. Abnormal thyroid blood test        8. Autoimmune hepatitis (HCC)                Plan:      UA: small PALOMA  Culture sent  Cipro BID  OTC Cranberry SUPP or D-Mannose  Refills as above   - Mounjaro 2.5 mg dose up monthly  Follow up with Specialists  Labs now  Preventative Testing UTD  Immunizations discussed  Healthy Lifestyles discussed  RTO in 6 months        Current Outpatient

## 2024-09-09 DIAGNOSIS — G40.319 GENERALIZED CONVULSIVE EPILEPSY WITH INTRACTABLE EPILEPSY (H): ICD-10-CM

## 2024-09-10 NOTE — TELEPHONE ENCOUNTER
lacosamide (VIMPAT) 200 MG TABS tablet 62 tablet 5 4/5/2024     Last Office Visit: 4/5/24  Future Office visit:   10/15/24    Routing refill request to provider for review/approval because:  Controlled medication    Preethi Watts, RN  [facility] Central Nursing/Red Flag Triage & Med Refill Team

## 2024-09-11 RX ORDER — LACOSAMIDE 200 MG/1
TABLET ORAL
Qty: 62 TABLET | Refills: 5 | Status: SHIPPED | OUTPATIENT
Start: 2024-09-11

## 2024-09-17 DIAGNOSIS — G40.319 GENERALIZED CONVULSIVE EPILEPSY WITH INTRACTABLE EPILEPSY (H): ICD-10-CM

## 2024-09-17 NOTE — TELEPHONE ENCOUNTER
Medication Requested:  Midazolam (NAYZILAM) 5 MG/0.1ML SOLN 2 each 2 7/17/2024 -- No   Sig: INSTILL 1 SPRAY IN NOSTRIL FOR ANY CLUSTER SEIZURE OF 3 OR MORE 15 SECOND SEIZURES WITHIN 1 HOURS OR ANY SINGLE SEIZURE LASTING 2 MINUTES FOLLOW PROTOCOL     ----------------------  Last Office Visit : 4/5/2024   [facility]      Future Office visit:    10/15/2024 1:00 PM (30 min)  Ofe    Arrive by: 12:45 PM   RETURN SEIZURE   MEEPIL (MINCEP)   Elysia Gomez MD     ----------------------        Refill decision: Refill pended and routed to the provider for review/determination due to the following criteria not met:     Scheduled/Controlled Med - Midazolam (NAYZILAM)

## 2024-09-18 ENCOUNTER — TELEPHONE (OUTPATIENT)
Dept: NEUROLOGY | Facility: CLINIC | Age: 49
End: 2024-09-18

## 2024-09-18 RX ORDER — MIDAZOLAM 5 MG/.1ML
SPRAY NASAL
Qty: 2 EACH | Refills: 2 | Status: SHIPPED | OUTPATIENT
Start: 2024-09-18

## 2024-09-18 NOTE — TELEPHONE ENCOUNTER
Prior Authorization Retail Medication Request    Medication/Dose: Nayzilam 5MG/0.1ML solution  Diagnosis and ICD code (if different than what is on RX):    New/renewal/insurance change PA/secondary ins. PA:  Previously Tried and Failed:  see chart  Rationale:  see chart    Insurance   Primary:   Insurance ID:      Secondary (if applicable):  Insurance ID:      Pharmacy Information (if different than what is on RX)  Name:    Phone:    Fax:

## 2024-09-23 NOTE — TELEPHONE ENCOUNTER
PA Initiation    Medication: NAYZILAM 5 MG/0.1ML NA SOLN  Insurance Company: [employer] Part D - Phone [phone] Fax [phone]  Pharmacy Filling the Rx[place] [id number] [address]  Filling Pharmacy Phone: [phone]  Filling Pharmacy Fax: [phone]  Start Date: 9/22/2024

## 2024-09-23 NOTE — TELEPHONE ENCOUNTER
Prior Authorization Not Needed per Insurance    Medication: NAYZILAM 5 MG/0.1ML NA SOLN  Insurance Company: [employer] Part D - Phone [phone] Fax [phone]  Expected CoPay: $    [place]armacy [place]illing the [place] [id number] [address]  Pharmacy Notified: YES  Patient Notified: **Instructed pharmacy to notify patient when script is ready to /ship.**

## 2024-10-15 ENCOUNTER — OFFICE VISIT (OUTPATIENT)
Dept: NEUROLOGY | Facility: CLINIC | Age: 49
End: 2024-10-15
Payer: MEDICARE

## 2024-10-15 VITALS
HEIGHT: 61 IN | TEMPERATURE: 98.6 F | OXYGEN SATURATION: 95 % | BODY MASS INDEX: 30.58 KG/M2 | HEART RATE: 123 BPM | WEIGHT: 162 LBS

## 2024-10-15 DIAGNOSIS — G40.319 GENERALIZED CONVULSIVE EPILEPSY WITH INTRACTABLE EPILEPSY (H): ICD-10-CM

## 2024-10-15 DIAGNOSIS — G40.812 LENNOX-GASTAUT SYNDROME WITH TONIC SEIZURES (H): ICD-10-CM

## 2024-10-15 RX ORDER — CANNABIDIOL 100 MG/ML
5 SOLUTION ORAL 2 TIMES DAILY
Qty: 240 ML | Refills: 5 | Status: SHIPPED | OUTPATIENT
Start: 2024-10-15 | End: 2024-10-18

## 2024-10-15 RX ORDER — LAMOTRIGINE 200 MG/1
TABLET ORAL
Qty: 30 TABLET | Refills: 11 | Status: SHIPPED | OUTPATIENT
Start: 2024-10-15

## 2024-10-15 RX ORDER — LEVETIRACETAM 500 MG/1
500 TABLET ORAL 2 TIMES DAILY
Qty: 62 TABLET | Refills: 8 | Status: SHIPPED | OUTPATIENT
Start: 2024-10-15

## 2024-10-15 RX ORDER — MIDAZOLAM 5 MG/.1ML
1 SPRAY NASAL DAILY PRN
Qty: 2 EACH | Refills: 2 | Status: SHIPPED | OUTPATIENT
Start: 2024-10-15

## 2024-10-15 RX ORDER — LACOSAMIDE 200 MG/1
200 TABLET ORAL 2 TIMES DAILY
Qty: 62 TABLET | Refills: 5 | Status: SHIPPED | OUTPATIENT
Start: 2024-10-15

## 2024-10-15 NOTE — LETTER
10/15/2024       RE: Nella Helms  : 1975   MRN: [id number]      Dear Colleague,    Thank you for referring your patient, Nella Helms, to the [facility] at [facility]. Please see a copy of my visit note below.    Nella is a 48 year old who is being evaluated in clinic with brother and 2 caregivers       INTERVAL HX: Overall balance is better; strength is better. Epidiolex 5 mg/kg ? improvement, 2 ER visit for falls that may have been due to balance. Is having troubnle with balance in AM from about 9-11. Gets pills 8 Am    Overal sz are mild and better than years ago.       Prior to Admission medications    Medication Sig Start Date End Date Taking? Authorizing Provider   cannabidiol (EPIDIOLEX) 100 MG/ML oral solution Take 7.53 mLs (753 mg) by mouth 2 times daily 11/3/23  Yes Elysia Gomez MD   lacosamide (VIMPAT) 200 MG TABS tablet Take 1 tablet (200 mg) by mouth 2 times daily 11/3/23  Yes Elysia Gomez MD   lamoTRIgine (LAMICTAL) 100 MG tablet TAKE 1/2 TABLET BY MOUTH EVERY MORNING IN ADDITION TO 200MG FOR TOTAL  IN MORNING  AT BEDTIME 11/3/23  Yes Elysia Gomez MD   lamoTRIgine (LAMICTAL) 200 MG tablet One  HS 11/3/23  Yes Elysia Gomez MD   Midazolam (NAYZILAM) 5 MG/0.1ML SOLN Apply 1 Application into one nostril as directed daily as needed (for convulsion) Spray 1 spray in nostril 2 times daily as needed (Administer one dose (5mg) for any cluster of three or more  30 second seizures within one hour, or any single seizure lasting 2 minutes.) - Nasal 11/3/23  Yes Elysia Gomez MD   docusate sodium (COLACE) 100 MG capsule Take by mouth 2 times daily    Reported, Patient   doxycycline hyclate (VIBRA-TABS) 100 MG tablet TAKE ONE TABLET BY MOUTH TWICE A DAY FOR 7 DAYS  Patient not taking: Reported on 2023 10/31/22   Reported, Patient   hydrocortisone (CORTAID) 1 % cream Apply topically 2 times daily as needed    Reported,  Patient   ketoconazole (EXTINA) 2 % external foam Apply topically every 12 hours apply by topical route 2 times every day to the affected area(s)  Patient not taking: Reported on 4/4/2023 9/9/21   Reported, Patient   levETIRAcetam (KEPPRA) 1000 MG tablet TAKE 1 TABLET BY MOUTH TWICE DAILY 8/15/23   Elysia Gomez MD   levETIRAcetam (KEPPRA) 500 MG tablet TAKE 1 TABLET BY MOUTH TWICE DAILY 8/15/23   Elysia Gomez MD   Multiple Vitamin (MULTIVITAMINS PO) Take by mouth daily    Reported, Patient   multivitamin, therapeutic with minerals (THERA-VIT-M) TABS tablet Take 1 tablet by mouth daily  Patient not taking: Reported on 4/4/2023    Reported, Patient   OLANZapine (ZYPREXA) 10 MG tablet Take 10 mg by mouth TAKE ONE TABLET BY MOUTH AT BEDTIME 11/16/22   Reported, Patient   OLANZapine (ZYPREXA) 2.5 MG tablet TAKE ONE TABLET BY MOUTH AT BEDTIME ALONG WITH A 10MG FOR A TOTAL DOSE OF 12.5MG DAILY 11/16/22   Reported, Patient   OLANZapine (ZYPREXA) 5 MG tablet Taking 10mg at bedtime and 2.5mg tablets at bedtime  Patient not taking: Reported on 4/4/2023 1/23/19   Reported, Patient   OXcarbazepine (TRILEPTAL) 300 MG tablet Take 3 tablets (900 mg) by mouth 2 times daily 10/6/22   Elysia Gomez MD   polyvinyl alcohol (LIQUIFILM TEARS) 1.4 % ophthalmic solution 2-3 drops as needed for dry eyes    Reported, Patient   traZODone (DESYREL) 50 MG tablet 1 tablet (50 mg) q 8am. 1 tablet (50 mg) q 4 pm,  3 tablets (150 mg) 8pm  Patient taking differently: Take 1 tab (50mg) q 12pm,  1/2 tab (25mg)q 4pm, 3 tablets (150 mg) 10pm 6/20/18   Candice Liu MD   Vitamin E 180 MG (400 UNIT) CAPS TAKE ONE CAPSULE BY MOUTH TWICE A DAILY 10/19/22   Reported, Patient   VITAMIN E Take 180 mg by mouth 2 times daily  Patient not taking: Reported on 4/4/2023    Reported, Patient       EPILEPSY HX REVIEWED  4/5/24 I have been seeing her since she was  age 19.  First seizure occurred at 6 hours of age.  It was described as color turning dusky, turning  head to the right, eyes fixed to the right and no response.  She continued to have intermittent seizure activity and subsequently was transferred to the [facility] where she was hospitalized for 2 weeks.  It is not clear what diagnostic evaluation was done but they decided to discharge her without seizure medications.  Seizures recurred at 9 months of age.  At that time they noticed twitching of her left arm and then twitching of the side of the face.  EEG initially showed hypsarrhythmic pattern.  She was treated with phenobarbital.  Then, she was treated with Tegretol and later Dilantin.  CT scan in 1983 we do not have records.      Seizure types:   In 1991 seizure types were described as sitting, both arms jerk up, her head turns to the right, eyes go to the right and roll up.  These events last 2 minutes and at that time she was having 6 a month.      Second seizure type is described as her arms go up, legs stiffen and she moans.  She can be lowered to the floor and then her arms and legs shake with some erratic breathing and sweating profusely.  These occurred at that time, 3-4 per month.        Seizure type 3 was described as being alert.  Her arms fly out and her eyes blink.      Seizure type 4 described as stares.      She has had 1 episode of status epilepticus in 1978.      EEG when she was 12 was read as abnormal record by virtue of diffuse theta and delta slowing greater on the right.      Parents report that Nella has always had a left hemiparesis, mostly affecting the left upper extremity with some involvement of her left lower extremity.  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  She has been diagnosed as legally blind as having cortical blindness.      BIRTH HISTORY:  Birth history was that she was the result of a full-term pregnancy that was complicated by maternal high blood pressure and use of Enduron.  Pregnancy ended in a 14 labored and vaginal delivery.  Birth weight 7  pounds 6 ounces.  Apgar scores of 4 at 1 minute and 8 at 5 minutes.      She has had global developmental delay.      Subsequent treatment at [facility] has been quite extensive and will not be reviewed completely here.  Last EEG was 2010 which showed mild to moderate nonspecific diffuse disturbance of cerebral activity and multifocal interictal activity with one complex partial seizure recorded.      In the last year they have described 204 seizures,all given diazepam.  OTHER ISSUES:        She has had behavioral issues throughout.      Bell's palsy in , resolved.      Most of her seizures that the staff is noticing now would be described as brief tonic seizures or brief tonic-clonic seizures.  They are more concerned with the length then the precise observation.  She might be having some minor seizures that are not being reported.      CURRENT MEDICATIONS:  Felbatol  Was discont due to possible cause of anemia; peramp discont  . Lamictal have really been the best treatment for her.  She has been tried almost all the other seizure medications over time including Depakote, Tegretol, phenobarbital, Dilantin, etc.      SOCIAL HISTORY:  She is living in a group home.      REVIEW OF SYSTEMS:  Through the caregiver is essentially negative.  She has not had any upper respiratory infections lately.  Appetite is good.  She has no obvious headaches.   GI and  appear to be functioning normally.      PHYSICAL EXAMINATION: Very physically active- tries to kiss staff. Very loud fverbally- simple repetitive sentences. Walks without assistance but is apractic.     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    ASSESSMENT:  Complicated case; hospital notes, EEG and D/C summary reviewed   Epidiolex 5 mg/kg as 2.5 bid Re-wrote Nazylam for clusters-  Will discontinue AM 50 lamotrigine and move AM 1000 LEV to noon.    PLAN:   1)  RTCn9 mo  2) LAMO 200  Bid   3)  Trileptal 300 mg  decrease to  two am (from 3) and continue   3 PM   4)   Epidiolex  10 mg/kg  4) Revised  rescue plan. to decrease unneessary 911 and ER calls            Elysia may MD     In person visit: 4min pre visit; 32 face to face with  brother and staff, discussed DBS. althoiugh candidate, behaviow may make evaluation difficult.   post visit 4 min.      Again, thank you for allowing me to participate in the care of your patient.      Sincerely,    Elysia May MD

## 2024-10-15 NOTE — PROGRESS NOTES
Nella is a 48 year old who is being evaluated in clinic with brother and 2 caregivers       INTERVAL HX: Overall balance is better; strength is better. Epidiolex 5 mg/kg ? improvement, 2 ER visit for falls that may have been due to balance. Is having troubnle with balance in AM from about 9-11. Gets pills 8 Am    Overal sz are mild and better than years ago.       Prior to Admission medications    Medication Sig Start Date End Date Taking? Authorizing Provider   cannabidiol (EPIDIOLEX) 100 MG/ML oral solution Take 7.53 mLs (753 mg) by mouth 2 times daily 11/3/23  Yes Elysia Gomez MD   lacosamide (VIMPAT) 200 MG TABS tablet Take 1 tablet (200 mg) by mouth 2 times daily 11/3/23  Yes Elysia Gomez MD   lamoTRIgine (LAMICTAL) 100 MG tablet TAKE 1/2 TABLET BY MOUTH EVERY MORNING IN ADDITION TO 200MG FOR TOTAL  IN MORNING  AT BEDTIME 11/3/23  Yes Elysia Gomez MD   lamoTRIgine (LAMICTAL) 200 MG tablet One  HS 11/3/23  Yes Elysia Gomez MD   Midazolam (NAYZILAM) 5 MG/0.1ML SOLN Apply 1 Application into one nostril as directed daily as needed (for convulsion) Spray 1 spray in nostril 2 times daily as needed (Administer one dose (5mg) for any cluster of three or more  30 second seizures within one hour, or any single seizure lasting 2 minutes.) - Nasal 11/3/23  Yes Elysia Gomez MD   docusate sodium (COLACE) 100 MG capsule Take by mouth 2 times daily    Reported, Patient   doxycycline hyclate (VIBRA-TABS) 100 MG tablet TAKE ONE TABLET BY MOUTH TWICE A DAY FOR 7 DAYS  Patient not taking: Reported on 4/4/2023 10/31/22   Reported, Patient   hydrocortisone (CORTAID) 1 % cream Apply topically 2 times daily as needed    Reported, Patient   ketoconazole (EXTINA) 2 % external foam Apply topically every 12 hours apply by topical route 2 times every day to the affected area(s)  Patient not taking: Reported on 4/4/2023 9/9/21   Reported, Patient   levETIRAcetam (KEPPRA) 1000 MG tablet TAKE 1 TABLET BY MOUTH TWICE DAILY  8/15/23   Elysia Gomez MD   levETIRAcetam (KEPPRA) 500 MG tablet TAKE 1 TABLET BY MOUTH TWICE DAILY 8/15/23   Elysia Gomez MD   Multiple Vitamin (MULTIVITAMINS PO) Take by mouth daily    Reported, Patient   multivitamin, therapeutic with minerals (THERA-VIT-M) TABS tablet Take 1 tablet by mouth daily  Patient not taking: Reported on 4/4/2023    Reported, Patient   OLANZapine (ZYPREXA) 10 MG tablet Take 10 mg by mouth TAKE ONE TABLET BY MOUTH AT BEDTIME 11/16/22   Reported, Patient   OLANZapine (ZYPREXA) 2.5 MG tablet TAKE ONE TABLET BY MOUTH AT BEDTIME ALONG WITH A 10MG FOR A TOTAL DOSE OF 12.5MG DAILY 11/16/22   Reported, Patient   OLANZapine (ZYPREXA) 5 MG tablet Taking 10mg at bedtime and 2.5mg tablets at bedtime  Patient not taking: Reported on 4/4/2023 1/23/19   Reported, Patient   OXcarbazepine (TRILEPTAL) 300 MG tablet Take 3 tablets (900 mg) by mouth 2 times daily 10/6/22   Elysia Gomez MD   polyvinyl alcohol (LIQUIFILM TEARS) 1.4 % ophthalmic solution 2-3 drops as needed for dry eyes    Reported, Patient   traZODone (DESYREL) 50 MG tablet 1 tablet (50 mg) q 8am. 1 tablet (50 mg) q 4 pm,  3 tablets (150 mg) 8pm  Patient taking differently: Take 1 tab (50mg) q 12pm,  1/2 tab (25mg)q 4pm, 3 tablets (150 mg) 10pm 6/20/18   Candice Liu MD   Vitamin E 180 MG (400 UNIT) CAPS TAKE ONE CAPSULE BY MOUTH TWICE A DAILY 10/19/22   Reported, Patient   VITAMIN E Take 180 mg by mouth 2 times daily  Patient not taking: Reported on 4/4/2023    Reported, Patient       EPILEPSY HX REVIEWED  4/5/24 I have been seeing her since she was  age 19.  First seizure occurred at 6 hours of age.  It was described as color turning dusky, turning head to the right, eyes fixed to the right and no response.  She continued to have intermittent seizure activity and subsequently was transferred to the [facility] where she was hospitalized for 2 weeks.  It is not clear what diagnostic evaluation was done but they  decided to discharge her without seizure medications.  Seizures recurred at 9 months of age.  At that time they noticed twitching of her left arm and then twitching of the side of the face.  EEG initially showed hypsarrhythmic pattern.  She was treated with phenobarbital.  Then, she was treated with Tegretol and later Dilantin.  CT scan in  we do not have records.      Seizure types:   In  seizure types were described as sitting, both arms jerk up, her head turns to the right, eyes go to the right and roll up.  These events last 2 minutes and at that time she was having 6 a month.      Second seizure type is described as her arms go up, legs stiffen and she moans.  She can be lowered to the floor and then her arms and legs shake with some erratic breathing and sweating profusely.  These occurred at that time, 3-4 per month.        Seizure type 3 was described as being alert.  Her arms fly out and her eyes blink.      Seizure type 4 described as stares.      She has had 1 episode of status epilepticus in .      EEG when she was 12 was read as abnormal record by virtue of diffuse theta and delta slowing greater on the right.      Parents report that Nella has always had a left hemiparesis, mostly affecting the left upper extremity with some involvement of her left lower extremity.  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  She has been diagnosed as legally blind as having cortical blindness.      BIRTH HISTORY:  Birth history was that she was the result of a full-term pregnancy that was complicated by maternal high blood pressure and use of Enduron.  Pregnancy ended in a 14 labored and vaginal delivery.  Birth weight 7 pounds 6 ounces.  Apgar scores of 4 at 1 minute and 8 at 5 minutes.      She has had global developmental delay.      Subsequent treatment at [facility] has been quite extensive and will not be reviewed completely here.  Last EEG was 2010 which showed mild to moderate nonspecific diffuse  disturbance of cerebral activity and multifocal interictal activity with one complex partial seizure recorded.      In the last year they have described 204 seizures,all given diazepam.  OTHER ISSUES:        She has had behavioral issues throughout.      Bell's palsy in 2005, resolved.      Most of her seizures that the staff is noticing now would be described as brief tonic seizures or brief tonic-clonic seizures.  They are more concerned with the length then the precise observation.  She might be having some minor seizures that are not being reported.      CURRENT MEDICATIONS:  Felbatol  Was discont due to possible cause of anemia; peramp discont 2020 . Lamictal have really been the best treatment for her.  She has been tried almost all the other seizure medications over time including Depakote, Tegretol, phenobarbital, Dilantin, etc.      SOCIAL HISTORY:  She is living in a group home.      REVIEW OF SYSTEMS:  Through the caregiver is essentially negative.  She has not had any upper respiratory infections lately.  Appetite is good.  She has no obvious headaches.   GI and  appear to be functioning normally.      PHYSICAL EXAMINATION: Very physically active- tries to kiss staff. Very loud fverbally- simple repetitive sentences. Walks without assistance but is apractic.     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    ASSESSMENT:  Complicated case; hospital notes, EEG and D/C summary reviewed   Epidiolex 5 mg/kg as 2.5 bid Re-wrote Nazylam for clusters-  Will discontinue AM 50 lamotrigine and move AM 1000 LEV to noon.    PLAN:   1)  RTCn9 mo  2) LAMO 200  Bid   3)  Trileptal 300 mg  decrease to  two am (from 3) and continue  3 PM   4)   Epidiolex  10 mg/kg  4) Revised  rescue plan. to decrease unneessary 911 and ER calls            Elysia may MD     In person visit: 4min pre visit; 32 face to face with  brother and staff, discussed DBS. althoiugh candidate, behaviow may make evaluation difficult.   post  visit 4 min.

## 2024-10-16 ENCOUNTER — TELEPHONE (OUTPATIENT)
Dept: NEUROLOGY | Facility: CLINIC | Age: 49
End: 2024-10-16

## 2024-10-16 DIAGNOSIS — G40.319 GENERALIZED CONVULSIVE EPILEPSY WITH INTRACTABLE EPILEPSY (H): ICD-10-CM

## 2024-10-16 NOTE — TELEPHONE ENCOUNTER
Received [facility] Medication Order Clarification Form to be completed. Form saved to R drive, encounter routed.  Ivan Hubbard LPN

## 2024-10-18 ENCOUNTER — MEDICAL CORRESPONDENCE (OUTPATIENT)
Dept: HEALTH INFORMATION MANAGEMENT | Facility: CLINIC | Age: 49
End: 2024-10-18
Payer: MEDICARE

## 2024-10-18 RX ORDER — CANNABIDIOL 100 MG/ML
5 SOLUTION ORAL 2 TIMES DAILY
Qty: 240 ML | Refills: 5 | Status: SHIPPED | OUTPATIENT
Start: 2024-10-18

## 2024-10-18 RX ORDER — OXCARBAZEPINE 300 MG/1
TABLET, FILM COATED ORAL
Qty: 150 TABLET | Refills: 5 | Status: SHIPPED | OUTPATIENT
Start: 2024-10-18

## 2024-10-18 NOTE — TELEPHONE ENCOUNTER
"Per , reduce OXC as per note plan  \"3)  Trileptal 300 mg  decrease to  two am (from 3) and continue  3, PM \"    Orders clarified and re-submitted.  "

## 2024-12-02 DIAGNOSIS — G40.319 GENERALIZED CONVULSIVE EPILEPSY WITH INTRACTABLE EPILEPSY (H): ICD-10-CM

## 2024-12-07 RX ORDER — LEVETIRACETAM 1000 MG/1
TABLET ORAL
Qty: 62 TABLET | Refills: 8 | Status: SHIPPED | OUTPATIENT
Start: 2024-12-07

## 2024-12-09 DIAGNOSIS — G40.319 GENERALIZED CONVULSIVE EPILEPSY WITH INTRACTABLE EPILEPSY (H): ICD-10-CM

## 2024-12-09 NOTE — TELEPHONE ENCOUNTER
Nayzilam 5 mg/spray (0.1 mL) nasal spray       Last Written Prescription Date:  10-15-24  Last Fill Quantity: 2 each,   # refills: 2  Last Office Visit : 10-15-24  Future Office visit:  none      Per pharm notes: 2 units last 3 days    Routing refill request to provider for review/approval because:  Drug not on the [facility], [facility] or [facility] refill protocol or controlled substance  ? Increase disp amt, # RF

## 2024-12-10 RX ORDER — MIDAZOLAM 5 MG/.1ML
SPRAY NASAL
Qty: 2 EACH | Refills: 2 | Status: SHIPPED | OUTPATIENT
Start: 2024-12-10

## 2025-02-05 NOTE — TELEPHONE ENCOUNTER
Pulmonary/RT Note    Visit #        12/36       Katie Jordan is a 79 y.o. female presented today for pulmonary rehab. She states that there have been no changes in medication or health since the last visit.          She has a target heart rate of 104-119. She tolerated the exercise session well and has a pain level of 5. Patient states RPE for session today was 5 and RPD was a 7. Today the pt did the following.    Arm bike: 25 min  Wallking:10  min  Breathing retraining:15 min  Recovery and Monitoring:15 min             Physician available for emergencies: BIJU SHORT, RT 2/5/2025 4:07 PM       Writer returned call to house nurse. Patient had a seizure on the 8th and was in the hospital. Patient's nurse stated they ryan labs while in the hospital and were told that they would reach out if there was any abnormalities. Patient is doing okay, just sleeping a lot according to nurse. Writer offered to get patient in earlier, but nurse stated they will keep early November appointment and follow-up if needed. Nurse had no further questions.

## 2025-03-11 ENCOUNTER — TRANSFERRED RECORDS (OUTPATIENT)
Dept: HEALTH INFORMATION MANAGEMENT | Facility: CLINIC | Age: 50
End: 2025-03-11

## 2025-03-24 DIAGNOSIS — G40.319 GENERALIZED CONVULSIVE EPILEPSY WITH INTRACTABLE EPILEPSY (H): ICD-10-CM

## 2025-03-25 ENCOUNTER — TELEPHONE (OUTPATIENT)
Dept: NEUROLOGY | Facility: CLINIC | Age: 50
End: 2025-03-25

## 2025-03-26 RX ORDER — OXCARBAZEPINE 300 MG/1
TABLET, FILM COATED ORAL
Qty: 155 TABLET | Refills: 5 | Status: SHIPPED | OUTPATIENT
Start: 2025-03-26

## 2025-03-26 RX ORDER — LACOSAMIDE 200 MG/1
200 TABLET ORAL 2 TIMES DAILY
Qty: 62 TABLET | Refills: 5 | Status: SHIPPED | OUTPATIENT
Start: 2025-03-26

## 2025-03-26 NOTE — TELEPHONE ENCOUNTER
lacosamide (VIMPAT) 200 MG TABS tablet 62 tablet 5 10/15/2024  OXcarbazepine (TRILEPTAL) 300 MG tablet 150 tablet 5 10/18/2024  ----------------------  Last Visit Date: 10/15/24  Future Visit Date: none  ----------------------  Component      Latest Ref Rng 11/4/2023  12:31 PM   Sodium (External)      133 - 145 mmol/l 139 (E)   Potassium (External)      3.5 - 5.1 mmol/L 3.8 (E)   Chloride (External)      90 - 109 mmol/L 101 (E)   CO2 (External)      21 - 31 K/uL 30 (E)   Anion Gap (External)      3 - 15 mmol/L 9 (E)   Calcium (External)      0.6 - 10.3 mg/dL 10.1 (E)   Glucose (External)      70 - 99 mg/dL 104 (H) (E)   Creatinine (External)      0.6 - 1.2 mg/dl 0.7 (E)   BUN/Creatinine Ratio (External)      10.0 - 20.0 Ratio 15.7 (E)       Refill decision: Medication refilled per  Medication Refill in Ambulatory Care  policy.  Oxcarbazepine       Refill decision: Medication unable to be refilled by RN due to: Controlled medication  Lacosamide      Request from pharmacy:  Requested Prescriptions   Pending Prescriptions Disp Refills    lacosamide (VIMPAT) 200 MG TABS tablet [Pharmacy Med Name: lacosamide 200 mg tablet] 62 tablet 5     Sig: TAKE 1 TABLET BY MOUTH TWO TIMES PER DAY       Rx Protocol Controlled Substance Failed - 3/26/2025  9:07 AM        Failed - Visit with relevant provider in past 3 months or upcoming 3 months        Failed - Urine drug screeen results on file in past 12 months     [unfilled]           Failed - Pain Agreement on file in last 12 months     Please review last Controlled Substance Pain agreement document.   CSA -- Encounter Level:    CSA: None found at the encounter level.       CSA -- Patient Level:    CSA: None found at the patient level.               Failed - Auto Fail - Please forward to Provider        Passed - Medication is active on med list and the sig matches        Passed - Medication not refilled in past 28 days     Invalid Medication Grouper          Passed - No active  pregnancy on record        Passed - No pregnancy test in past 12 months or most recent test was negative          OXcarbazepine (TRILEPTAL) 300 MG tablet [Pharmacy Med Name: oxcarbazepine 300 mg tablet] 150 tablet 5     Sig: TAKE 2 TABLETS BY MOUTH EVERY MORNING and TAKE 3 TABLETS IN THE EVENING       There is no refill protocol information for this order        Preethi Watts RN  [facility] Central Nursing/Red Flag Triage & Med Refill Team

## 2025-04-28 ENCOUNTER — TELEPHONE (OUTPATIENT)
Dept: NEUROLOGY | Facility: CLINIC | Age: 50
End: 2025-04-28

## 2025-05-27 ENCOUNTER — OFFICE VISIT (OUTPATIENT)
Dept: NEUROLOGY | Facility: CLINIC | Age: 50
End: 2025-05-27
Payer: MEDICARE

## 2025-05-27 ENCOUNTER — TELEPHONE (OUTPATIENT)
Dept: NEUROLOGY | Facility: CLINIC | Age: 50
End: 2025-05-27

## 2025-05-27 VITALS
SYSTOLIC BLOOD PRESSURE: 137 MMHG | OXYGEN SATURATION: 98 % | TEMPERATURE: 98.8 F | HEIGHT: 61 IN | HEART RATE: 72 BPM | DIASTOLIC BLOOD PRESSURE: 99 MMHG | BODY MASS INDEX: 32.21 KG/M2 | WEIGHT: 170.6 LBS

## 2025-05-27 DIAGNOSIS — G40.812 LENNOX-GASTAUT SYNDROME WITH TONIC SEIZURES (H): ICD-10-CM

## 2025-05-27 DIAGNOSIS — G40.319 GENERALIZED CONVULSIVE EPILEPSY WITH INTRACTABLE EPILEPSY (H): ICD-10-CM

## 2025-05-27 RX ORDER — LEVETIRACETAM 1000 MG/1
1000 TABLET ORAL
Qty: 62 TABLET | Refills: 11 | Status: SHIPPED | OUTPATIENT
Start: 2025-05-27

## 2025-05-27 RX ORDER — CANNABIDIOL 100 MG/ML
5 SOLUTION ORAL 2 TIMES DAILY
Qty: 240 ML | Refills: 5 | Status: SHIPPED | OUTPATIENT
Start: 2025-05-27

## 2025-05-27 RX ORDER — LEVETIRACETAM 500 MG/1
500 TABLET ORAL 2 TIMES DAILY
Qty: 62 TABLET | Refills: 8 | Status: SHIPPED | OUTPATIENT
Start: 2025-05-27

## 2025-05-27 RX ORDER — MIDAZOLAM 5 MG/.1ML
1 SPRAY NASAL DAILY PRN
Qty: 2 EACH | Refills: 3 | Status: SHIPPED | OUTPATIENT
Start: 2025-05-27

## 2025-05-27 RX ORDER — LAMOTRIGINE 200 MG/1
TABLET ORAL
Qty: 30 TABLET | Refills: 11 | Status: SHIPPED | OUTPATIENT
Start: 2025-05-27

## 2025-05-27 RX ORDER — OXCARBAZEPINE 300 MG/1
TABLET, FILM COATED ORAL
Qty: 155 TABLET | Refills: 5 | Status: SHIPPED | OUTPATIENT
Start: 2025-05-27

## 2025-05-27 RX ORDER — LACOSAMIDE 200 MG/1
TABLET ORAL
Qty: 62 TABLET | Refills: 5 | Status: SHIPPED | OUTPATIENT
Start: 2025-05-27

## 2025-05-27 NOTE — TELEPHONE ENCOUNTER
Retail Pharmacy Prior Authorization Team   Phone: [phone]      [facility] KEY:  (Key: [id number])    PA Initiation    Medication: NAYZILAM 5 MG/0.1ML NA SOLN  Insurance Company: [employer] Part D - Phone [phone] Fax [phone]  Pharmacy Filling the Rx: [id number] [address]  Filling Pharmacy Phone: [phone]  Filling Pharmacy Fax: [phone]  Start Date: 5/27/2025

## 2025-05-27 NOTE — PROGRESS NOTES
Nella is a 48 year old who is being evaluated in clinic with brother and 2 caregivers       INTERVAL HX: Overall balance is better; strength is better. Epidiolex 5 mg/kg  improvement, 2 ER visit for falls that may have been due to balance. Is having troubnle with balance in AM from about 9-11. Gets pills 8 Am    Overal sz are mild and better than years ago.       Prior to Admission medications    Medication Sig Start Date End Date Taking? Authorizing Provider   cannabidiol (EPIDIOLEX) 100 MG/ML oral solution Take 7.53 mLs (753 mg) by mouth 2 times daily 11/3/23  Yes Elysia Gomez MD   lacosamide (VIMPAT) 200 MG TABS tablet Take 1 tablet (200 mg) by mouth 2 times daily 11/3/23  Yes Elysia Gomez MD   lamoTRIgine (LAMICTAL) 100 MG tablet TAKE 1/2 TABLET BY MOUTH EVERY MORNING IN ADDITION TO 200MG FOR TOTAL  IN MORNING  AT BEDTIME 11/3/23  Yes Elysia Gomez MD   lamoTRIgine (LAMICTAL) 200 MG tablet One  HS 11/3/23  Yes Elysia Gomez MD   Midazolam (NAYZILAM) 5 MG/0.1ML SOLN Apply 1 Application into one nostril as directed daily as needed (for convulsion) Spray 1 spray in nostril 2 times daily as needed (Administer one dose (5mg) for any cluster of three or more  30 second seizures within one hour, or any single seizure lasting 2 minutes.) - Nasal 11/3/23  Yes Elysia Gomez MD   docusate sodium (COLACE) 100 MG capsule Take by mouth 2 times daily    Reported, Patient   doxycycline hyclate (VIBRA-TABS) 100 MG tablet TAKE ONE TABLET BY MOUTH TWICE A DAY FOR 7 DAYS  Patient not taking: Reported on 4/4/2023 10/31/22   Reported, Patient   hydrocortisone (CORTAID) 1 % cream Apply topically 2 times daily as needed    Reported, Patient   ketoconazole (EXTINA) 2 % external foam Apply topically every 12 hours apply by topical route 2 times every day to the affected area(s)  Patient not taking: Reported on 4/4/2023 9/9/21   Reported, Patient   levETIRAcetam (KEPPRA) 1000 MG tablet TAKE 1 TABLET BY MOUTH TWICE DAILY 8/15/23    Elysia Gomez MD   levETIRAcetam (KEPPRA) 500 MG tablet TAKE 1 TABLET BY MOUTH TWICE DAILY 8/15/23   Elysia Gomez MD   Multiple Vitamin (MULTIVITAMINS PO) Take by mouth daily    Reported, Patient   multivitamin, therapeutic with minerals (THERA-VIT-M) TABS tablet Take 1 tablet by mouth daily  Patient not taking: Reported on 4/4/2023    Reported, Patient   OLANZapine (ZYPREXA) 10 MG tablet Take 10 mg by mouth TAKE ONE TABLET BY MOUTH AT BEDTIME 11/16/22   Reported, Patient   OLANZapine (ZYPREXA) 2.5 MG tablet TAKE ONE TABLET BY MOUTH AT BEDTIME ALONG WITH A 10MG FOR A TOTAL DOSE OF 12.5MG DAILY 11/16/22   Reported, Patient   OLANZapine (ZYPREXA) 5 MG tablet Taking 10mg at bedtime and 2.5mg tablets at bedtime  Patient not taking: Reported on 4/4/2023 1/23/19   Reported, Patient   OXcarbazepine (TRILEPTAL) 300 MG tablet Take 3 tablets (900 mg) by mouth 2 times daily 10/6/22   Elysia Gomez MD   polyvinyl alcohol (LIQUIFILM TEARS) 1.4 % ophthalmic solution 2-3 drops as needed for dry eyes    Reported, Patient   traZODone (DESYREL) 50 MG tablet 1 tablet (50 mg) q 8am. 1 tablet (50 mg) q 4 pm,  3 tablets (150 mg) 8pm  Patient taking differently: Take 1 tab (50mg) q 12pm,  1/2 tab (25mg)q 4pm, 3 tablets (150 mg) 10pm 6/20/18   Candice Liu MD   Vitamin E 180 MG (400 UNIT) CAPS TAKE ONE CAPSULE BY MOUTH TWICE A DAILY 10/19/22   Reported, Patient   VITAMIN E Take 180 mg by mouth 2 times daily  Patient not taking: Reported on 4/4/2023    Reported, Patient       EPILEPSY HX REVIEWED  5/27/2025 I have been seeing her since she was  age 19.  First seizure occurred at 6 hours of age.  It was described as color turning dusky, turning head to the right, eyes fixed to the right and no response.  She continued to have intermittent seizure activity and subsequently was transferred to the [facility] where she was hospitalized for 2 weeks.  It is not clear what diagnostic evaluation was done but they  decided to discharge her without seizure medications.  Seizures recurred at 9 months of age.  At that time they noticed twitching of her left arm and then twitching of the side of the face.  EEG initially showed hypsarrhythmic pattern.  She was treated with phenobarbital.  Then, she was treated with Tegretol and later Dilantin.  CT scan in  we do not have records.      Seizure types:   In  seizure types were described as sitting, both arms jerk up, her head turns to the right, eyes go to the right and roll up.  These events last 2 minutes and at that time she was having 6 a month.      Second seizure type is described as her arms go up, legs stiffen and she moans.  She can be lowered to the floor and then her arms and legs shake with some erratic breathing and sweating profusely.  These occurred at that time, 3-4 per month.        Seizure type 3 was described as being alert.  Her arms fly out and her eyes blink.      Seizure type 4 described as stares.      She has had 1 episode of status epilepticus in .      EEG when she was 12 was read as abnormal record by virtue of diffuse theta and delta slowing greater on the right.      Parents report that Nella has always had a left hemiparesis, mostly affecting the left upper extremity with some involvement of her left lower extremity.  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  She has been diagnosed as legally blind as having cortical blindness.      BIRTH HISTORY:  Birth history was that she was the result of a full-term pregnancy that was complicated by maternal high blood pressure and use of Enduron.  Pregnancy ended in a 14 labored and vaginal delivery.  Birth weight 7 pounds 6 ounces.  Apgar scores of 4 at 1 minute and 8 at 5 minutes.      She has had global developmental delay.      Subsequent treatment at [facility] has been quite extensive and will not be reviewed completely here.  Last EEG was 2010 which showed mild to moderate nonspecific diffuse  disturbance of cerebral activity and multifocal interictal activity with one complex partial seizure recorded.         OTHER ISSUES:        She has had behavioral issues throughout.      Bell's palsy in 2005, resolved.      Most of her seizures that the staff is noticing now would be described as brief tonic seizures or brief tonic-clonic seizures.  They are more concerned with the length then the precise observation.  She might be having some minor seizures that are not being reported.      CURRENT MEDICATIONS:  Felbatol  Was discont due to possible cause of anemia; peramp discont 2020 . Lamictal have really been the best treatment for her.  She has been tried almost all the other seizure medications over time including Depakote, Tegretol, phenobarbital, Dilantin, etc.      SOCIAL HISTORY:  She is living in a group home.      REVIEW OF SYSTEMS:  Through the caregiver is essentially negative.  She has not had any upper respiratory infections lately.  Appetite is good.  She has no obvious headaches.   GI and  appear to be functioning normally.      PHYSICAL EXAMINATION: Very physically active- tries to kiss staff. Very loud fverbally- simple repetitive sentences. Walks without assistance but is apractic.     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 Has been havin increase balane problems aroun=d 10 Am so will discont AM Vimpat    ASSESSMENT:  Complicated case; hospital notes, EEG and D/C summary reviewed   Epidiolex 5 mg/kg as 2.5 bid Re-wrote Nazylam for clusters-  Will discontinue AM 50 lamotrigine and move AM 1000 LEV to noon.    PLAN:   1)  RTCn9 mo  2) LAMO 200  Bid   3)  Trileptal 300 mg  decrease to  two am (from and continue  3 PM   4)   Epidiolex  10 mg/kg  5 Revised  rescue plan. to decrease unneessary 911 and ER calls  6) Vimpat to 200 HS (discont )  [phone]  7) RTC 9 mo          Elysia may MD     In person visit: 4min pre visit; 32 face to face with  brother and staff,  discussed DBS. althoiugh candidate, behaviow may make evaluation difficult.   post visit 4 min.

## 2025-05-27 NOTE — LETTER
2025       RE: Nella Helms  : 1975   MRN: [id number]      Dear Colleague,    Thank you for referring your patient, Nella Helms, to the [facility] EPILEPSY CARE at [facility]. Please see a copy of my visit note below.    Nella is a 48 year old who is being evaluated in clinic with brother and 2 caregivers       INTERVAL HX: Overall balance is better; strength is better. Epidiolex 5 mg/kg  improvement, 2 ER visit for falls that may have been due to balance. Is having troubnle with balance in AM from about 9-11. Gets pills 8 Am    Overal sz are mild and better than years ago.       Prior to Admission medications    Medication Sig Start Date End Date Taking? Authorizing Provider   cannabidiol (EPIDIOLEX) 100 MG/ML oral solution Take 7.53 mLs (753 mg) by mouth 2 times daily 11/3/23  Yes Elysia Gomez MD   lacosamide (VIMPAT) 200 MG TABS tablet Take 1 tablet (200 mg) by mouth 2 times daily 11/3/23  Yes Elysia Gomez MD   lamoTRIgine (LAMICTAL) 100 MG tablet TAKE 1/2 TABLET BY MOUTH EVERY MORNING IN ADDITION TO 200MG FOR TOTAL  IN MORNING  AT BEDTIME 11/3/23  Yes Elysia Gomez MD   lamoTRIgine (LAMICTAL) 200 MG tablet One  HS 11/3/23  Yes Elysia Gomez MD   Midazolam (NAYZILAM) 5 MG/0.1ML SOLN Apply 1 Application into one nostril as directed daily as needed (for convulsion) Spray 1 spray in nostril 2 times daily as needed (Administer one dose (5mg) for any cluster of three or more  30 second seizures within one hour, or any single seizure lasting 2 minutes.) - Nasal 11/3/23  Yes Elysia Gomez MD   docusate sodium (COLACE) 100 MG capsule Take by mouth 2 times daily    Reported, Patient   doxycycline hyclate (VIBRA-TABS) 100 MG tablet TAKE ONE TABLET BY MOUTH TWICE A DAY FOR 7 DAYS  Patient not taking: Reported on 2023 10/31/22   Reported, Patient   hydrocortisone (CORTAID) 1 % cream Apply topically 2 times daily as needed    Reported,  Patient   ketoconazole (EXTINA) 2 % external foam Apply topically every 12 hours apply by topical route 2 times every day to the affected area(s)  Patient not taking: Reported on 4/4/2023 9/9/21   Reported, Patient   levETIRAcetam (KEPPRA) 1000 MG tablet TAKE 1 TABLET BY MOUTH TWICE DAILY 8/15/23   Elysia Gomez MD   levETIRAcetam (KEPPRA) 500 MG tablet TAKE 1 TABLET BY MOUTH TWICE DAILY 8/15/23   Elysia Gomez MD   Multiple Vitamin (MULTIVITAMINS PO) Take by mouth daily    Reported, Patient   multivitamin, therapeutic with minerals (THERA-VIT-M) TABS tablet Take 1 tablet by mouth daily  Patient not taking: Reported on 4/4/2023    Reported, Patient   OLANZapine (ZYPREXA) 10 MG tablet Take 10 mg by mouth TAKE ONE TABLET BY MOUTH AT BEDTIME 11/16/22   Reported, Patient   OLANZapine (ZYPREXA) 2.5 MG tablet TAKE ONE TABLET BY MOUTH AT BEDTIME ALONG WITH A 10MG FOR A TOTAL DOSE OF 12.5MG DAILY 11/16/22   Reported, Patient   OLANZapine (ZYPREXA) 5 MG tablet Taking 10mg at bedtime and 2.5mg tablets at bedtime  Patient not taking: Reported on 4/4/2023 1/23/19   Reported, Patient   OXcarbazepine (TRILEPTAL) 300 MG tablet Take 3 tablets (900 mg) by mouth 2 times daily 10/6/22   Elysia Gomez MD   polyvinyl alcohol (LIQUIFILM TEARS) 1.4 % ophthalmic solution 2-3 drops as needed for dry eyes    Reported, Patient   traZODone (DESYREL) 50 MG tablet 1 tablet (50 mg) q 8am. 1 tablet (50 mg) q 4 pm,  3 tablets (150 mg) 8pm  Patient taking differently: Take 1 tab (50mg) q 12pm,  1/2 tab (25mg)q 4pm, 3 tablets (150 mg) 10pm 6/20/18   Candice Liu MD   Vitamin E 180 MG (400 UNIT) CAPS TAKE ONE CAPSULE BY MOUTH TWICE A DAILY 10/19/22   Reported, Patient   VITAMIN E Take 180 mg by mouth 2 times daily  Patient not taking: Reported on 4/4/2023    Reported, Patient       EPILEPSY HX REVIEWED  5/27/2025 I have been seeing her since she was  age 19.  First seizure occurred at 6 hours of age.  It was described as color turning dusky, turning  head to the right, eyes fixed to the right and no response.  She continued to have intermittent seizure activity and subsequently was transferred to the [facility] where she was hospitalized for 2 weeks.  It is not clear what diagnostic evaluation was done but they decided to discharge her without seizure medications.  Seizures recurred at 9 months of age.  At that time they noticed twitching of her left arm and then twitching of the side of the face.  EEG initially showed hypsarrhythmic pattern.  She was treated with phenobarbital.  Then, she was treated with Tegretol and later Dilantin.  CT scan in 1983 we do not have records.      Seizure types:   In 1991 seizure types were described as sitting, both arms jerk up, her head turns to the right, eyes go to the right and roll up.  These events last 2 minutes and at that time she was having 6 a month.      Second seizure type is described as her arms go up, legs stiffen and she moans.  She can be lowered to the floor and then her arms and legs shake with some erratic breathing and sweating profusely.  These occurred at that time, 3-4 per month.        Seizure type 3 was described as being alert.  Her arms fly out and her eyes blink.      Seizure type 4 described as stares.      She has had 1 episode of status epilepticus in 1978.      EEG when she was 12 was read as abnormal record by virtue of diffuse theta and delta slowing greater on the right.      Parents report that Nella has always had a left hemiparesis, mostly affecting the left upper extremity with some involvement of her left lower extremity.  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  She has been diagnosed as legally blind as having cortical blindness.      BIRTH HISTORY:  Birth history was that she was the result of a full-term pregnancy that was complicated by maternal high blood pressure and use of Enduron.  Pregnancy ended in a 14 labored and vaginal delivery.  Birth weight 7  pounds 6 ounces.  Apgar scores of 4 at 1 minute and 8 at 5 minutes.      She has had global developmental delay.      Subsequent treatment at [facility] has been quite extensive and will not be reviewed completely here.  Last EEG was 2010 which showed mild to moderate nonspecific diffuse disturbance of cerebral activity and multifocal interictal activity with one complex partial seizure recorded.         OTHER ISSUES:        She has had behavioral issues throughout.      Bell's palsy in , resolved.      Most of her seizures that the staff is noticing now would be described as brief tonic seizures or brief tonic-clonic seizures.  They are more concerned with the length then the precise observation.  She might be having some minor seizures that are not being reported.      CURRENT MEDICATIONS:  Felbatol  Was discont due to possible cause of anemia; peramp discont  . Lamictal have really been the best treatment for her.  She has been tried almost all the other seizure medications over time including Depakote, Tegretol, phenobarbital, Dilantin, etc.      SOCIAL HISTORY:  She is living in a group home.      REVIEW OF SYSTEMS:  Through the caregiver is essentially negative.  She has not had any upper respiratory infections lately.  Appetite is good.  She has no obvious headaches.   GI and  appear to be functioning normally.      PHYSICAL EXAMINATION: Very physically active- tries to kiss staff. Very loud fverbally- simple repetitive sentences. Walks without assistance but is apractic.     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 Has been havin increase balane problems aroun=d 10 Am so will discont AM Vimpat    ASSESSMENT:  Complicated case; hospital notes, EEG and D/C summary reviewed   Epidiolex 5 mg/kg as 2.5 bid Re-wrote Nazylam for clusters-  Will discontinue AM 50 lamotrigine and move AM 1000 LEV to noon.    PLAN:   1)  RTCn9 mo  2) LAMO 200  Bid   3)  Trileptal 300 mg  decrease to  two am (from and  continue  3 PM   4)   Epidiolex  10 mg/kg  5 Revised  rescue plan. to decrease unneessary 911 and ER calls  6) Vimpat to 200 HS (discont )  [phone]  7) RTC 9 mo          Elysia may MD     In person visit: 4min pre visit; 32 face to face with  brother and staff, discussed DBS. althoiugh candidate, behaviow may make evaluation difficult.   post visit 4 min.      Again, thank you for allowing me to participate in the care of your patient.      Sincerely,    Elysia May MD

## 2025-05-27 NOTE — TELEPHONE ENCOUNTER
Prior Authorization Retail Medication Request    Medication/Dose: Nayzilam 5MG/0.1ML  Diagnosis and ICD code (if different than what is on RX):  Generalized convulsive epilepsy with intractable epilepsy   New/renewal/insurance change PA/secondary ins. PA:  Previously Tried and Failed:  See Chart   Rationale:  See Chart     Insurance   Primary: MEDICARE   Insurance ID:  [id number]     Secondary (if applicable):MEDICAID [place]   Insurance ID:  [id number]     Pharmacy Information (if different than what is on RX)  Name:  [facility]   Phone:  [phone]   Fax:[phone]     Clinic Information  Preferred routing pool for dept communication: BRIANNA REDMOND RN POOL

## 2025-05-27 NOTE — TELEPHONE ENCOUNTER
Retail Pharmacy Prior Authorization Team   Phone: [phone]      Prior Authorization Approval    Medication: NAYZILAM 5 MG/0.1ML NA SOLN  Authorization Effective Date: 1/1/2025  Authorization Expiration Date: 12/31/2025  Approved Dose/Quantity: quantity up to 2 every 2 days  Reference #: (Key: [id number])   Insurance Company: [employer] Part D - Phone [phone] Fax [phone]  Expected CoPay: $    CoPay Card Available: No    Financial Assistance Needed:   Which Pharmacy is filling the prescription[place] [id number] [address]  Pharmacy Notified: Yes  Patient Notified: **Instructed pharmacy to notify patient when script is ready to /ship.**